# Patient Record
Sex: MALE | Race: WHITE | NOT HISPANIC OR LATINO | Employment: OTHER | URBAN - METROPOLITAN AREA
[De-identification: names, ages, dates, MRNs, and addresses within clinical notes are randomized per-mention and may not be internally consistent; named-entity substitution may affect disease eponyms.]

---

## 2017-01-26 NOTE — TELEPHONE ENCOUNTER
1/1/17 Started januvia 50mg and on reduced  half of metformin. (500mg twice daily)    Glucose lowest fasting is 233.  Highest 260's.  Most am's in 250-260.  Has tried taking januvia and doesn't seem to affect am reading.     Increase rx ?  januvia copay is $50 for 30 days.  Pharmacist says she doesn't have confidence in januvia.    Any ideas on medication changes?   Increase januvia to 100mg?    Please advise.  Pharmacy correct in chart.    sukhjinder orourke

## 2017-01-26 NOTE — TELEPHONE ENCOUNTER
----- Message from Kristie Hunter sent at 1/26/2017 11:13 AM CST -----  Contact: Self/[phone] or [phone]  Script that was prescribed for blood sugar is worse than the last blood work.Pt is asking is there another script that can be prescribed. [facility] [phone] (Phone) or  [phone] (Fax).

## 2017-01-26 NOTE — TELEPHONE ENCOUNTER
Patient advised to take 2 of the 50mg pills.  He only had 7 left.  So i called rx into pharmacy recorder..  i told him to let us know in 1-2 weeks how glucose doing on new dose.    Please sign rx to chart.    Thanks haven

## 2017-02-07 NOTE — TELEPHONE ENCOUNTER
Patient stated that you had increase his Diabetic medication, but does not feel like it is working.    The lowest reading was 233. He is ranging from 253-300.    Please advise.

## 2017-02-07 NOTE — TELEPHONE ENCOUNTER
----- Message from Calista Box sent at 2/7/2017  8:20 AM CST -----  Contact: Self- Erick- [phone]  Patient is calling to talk about the change in his diabetic medication. He does not feel like it is working as needed. Please call to discuss.

## 2017-02-10 NOTE — TELEPHONE ENCOUNTER
----- Message from Mable Garcia sent at 2/10/2017  7:34 AM CST -----  Contact: Pt at [phone]  Pt said script SITagliptan (JANUVIA) 100 MG Tab is not working for him. Please call/advise.

## 2017-02-10 NOTE — TELEPHONE ENCOUNTER
See rest of message. Patient says januvia not effective.  On 2 50mg pills.  i apologized dr hasn't answered message from 2/7.  Please advise.  Thanks haven

## 2017-02-11 NOTE — TELEPHONE ENCOUNTER
The next step is getting on insulin.  If that is ok with him then levemir 10 units qPM to begin and will work up dose as needed.  He would have to get off Januvia and eventually reduce the metformin doses  [facility] and help with glcuose control

## 2017-02-13 NOTE — TELEPHONE ENCOUNTER
----- Message from Leydi Gaytan sent at 2/13/2017 11:55 AM CST -----  Contact: [facility] [phone]  RX request - refill or new RX.  Is this a refill or new RX:  new  RX name and strength: Levemir pen needles   Directions:   Is this a 30 day or 90 day RX:  30  Pharmacy name and phone #: [id number] @[phone]   Comments:

## 2017-02-13 NOTE — TELEPHONE ENCOUNTER
Patient agreeable to doing insulin and seeing endocrine in [place], close to home.  i told him stop januvia on day he starts insulin and continue metformin.  i said to let us know how blood sugars are running.    He has a friend that can help him with injection education and feels he can handle.    rx called to pharmacy.  Please sign to chart.    Thanks haven

## 2017-02-13 NOTE — TELEPHONE ENCOUNTER
----- Message from Diana Mckinney sent at 2/13/2017  8:57 AM CST -----  Contact: patient [phone]  Pt asked to speak with Sushila . He wants to know what  wants him to do about his elevated blood sugar readings.

## 2017-02-14 RX ORDER — PEN NEEDLE, DIABETIC 30 GX3/16"
NEEDLE, DISPOSABLE MISCELLANEOUS
Qty: 100 EACH | Refills: 3 | Status: SHIPPED | OUTPATIENT
Start: 2017-02-14

## 2017-02-18 ENCOUNTER — TELEPHONE (OUTPATIENT)
Dept: INTERNAL MEDICINE | Facility: CLINIC | Age: 67
End: 2017-02-18

## 2017-02-18 NOTE — TELEPHONE ENCOUNTER
----- Message from Daya Carmona sent at 2/18/2017  9:16 AM CST -----  Contact: Call Pt [phone]  Pt called requesting to speak to you concerning High glucose levels and increasing insulin

## 2017-02-18 NOTE — TELEPHONE ENCOUNTER
Patient called this morning complaining of elevated glucose since starting the Levemir on 02/14/17 in the evening. Please advise.    Readings:  02/14/17 Tues. - 261 / first reading prior to eating (Prior to starting the Levemir)  02/15/17 Wed. - 260 / first reading prior to eating   02/16/17 Thur. - 255 / first reading prior to eating   02/17/17 Fri. - 281 / first reading prior to eating   02/18/17 Sat. - 316 / first reading prior to eating (6:15 AM)  02/18/17 Sat. - 296 / 9 AM  02/18/17 Sat. - 239 / 12:10 PM (After eating a sausage karen & pork chop)

## 2017-02-18 NOTE — TELEPHONE ENCOUNTER
He should increase his levemir to 20 units at night and call next Thursday with his blood sugar readings

## 2017-02-18 NOTE — TELEPHONE ENCOUNTER
Patient has been informed, and verbalized understanding. He will call back with readings next week for Dr. Kinney.

## 2017-02-20 ENCOUNTER — TELEPHONE (OUTPATIENT)
Dept: INTERNAL MEDICINE | Facility: CLINIC | Age: 67
End: 2017-02-20

## 2017-02-20 DIAGNOSIS — E11.9 TYPE II OR UNSPECIFIED TYPE DIABETES MELLITUS WITHOUT MENTION OF COMPLICATION, NOT STATED AS UNCONTROLLED: Primary | ICD-10-CM

## 2017-02-20 NOTE — TELEPHONE ENCOUNTER
1. Dr sepulveda staff called them Saturday and told them to go to 20 units levemir pm and call Thursday with readings.  Did Saturday and Sunday nite.  20 units.  Sunday am 287, Monday am 302.     Still working on  finding Endocrine dr in [place] close to home.  Shona oseguera in [place] ph   Fax   i told him i would send referral.  i entered referral and will fax today.    2.  When you started him on januvia you told him to decrease the metformin 1000mg  1/2 twice daily.  Continue on half?    Is this normal that so difficult to get blood sugar lower?    Please advise today.  Thanks haven

## 2017-02-20 NOTE — TELEPHONE ENCOUNTER
----- Message from Dayaallie Carmona sent at 2/17/2017  3:31 PM CST -----  Contact: Call Pt [phone] or [phone]  Pt called requesting to speak to you concerning insulin and Pt is having trouble finding an endocrinologist.  Pt states glucose levels are not dropping.

## 2017-02-24 ENCOUNTER — TELEPHONE (OUTPATIENT)
Dept: INTERNAL MEDICINE | Facility: CLINIC | Age: 67
End: 2017-02-24

## 2017-02-24 NOTE — TELEPHONE ENCOUNTER
On 30 units levemir  And metformin 500mg twice daily.     Glucose started 261 am    And now Down to 180's am    218am today 4 hours fasting.    Has appt 3/3 with encdocrine (that is next Friday).    Any adjustment before see's endocrine?    Please advise.  Thanks haven    (will be back in [facility] 3/11 thru 3/14)

## 2017-02-24 NOTE — TELEPHONE ENCOUNTER
----- Message from Mable Garcia sent at 2/24/2017  7:41 AM CST -----  Contact: Pt at [phone]  Pt requesting a call back to give an update regarding insulin injection/dosage.

## 2017-03-01 ENCOUNTER — TELEPHONE (OUTPATIENT)
Dept: INTERNAL MEDICINE | Facility: CLINIC | Age: 67
End: 2017-03-01

## 2017-03-01 NOTE — TELEPHONE ENCOUNTER
i called him and confirmed faxing now.  Faxed labs including hgaic from last year , snapshot and last office note to endocrine.  She will see him Friday.

## 2017-03-01 NOTE — TELEPHONE ENCOUNTER
----- Message from Sushila Maharaj sent at 3/1/2017  8:51 AM CST -----  Contact: self- [phone] or [phone]  Patient needs his last blood work results faxed to [phone] patient's Endocrinologist. Can Sushila please send before 3-2 and verify with patient when it has been done.

## 2017-04-06 RX ORDER — AMLODIPINE BESYLATE 5 MG/1
TABLET ORAL
Qty: 34 TABLET | Refills: 11 | Status: SHIPPED | OUTPATIENT
Start: 2017-04-06 | End: 2017-12-27 | Stop reason: SDUPTHER

## 2017-04-18 RX ORDER — HYDROCODONE BITARTRATE AND IBUPROFEN 7.5; 2 MG/1; MG/1
1 TABLET, FILM COATED ORAL EVERY 8 HOURS PRN
Qty: 90 TABLET | Refills: 0 | Status: SHIPPED | OUTPATIENT
Start: 2017-04-18 | End: 2017-10-25

## 2017-04-18 NOTE — TELEPHONE ENCOUNTER
----- Message from Kristie Hunter sent at 4/18/2017  8:35 AM CDT -----  Contact: Self/[phone]  Pt states that he is unable to make apt on 4/25. Pt states that he can do labs on 4/27 and was wondering if he needs to come in or can someone just call pt with results

## 2017-04-18 NOTE — TELEPHONE ENCOUNTER
Patient advised we'll let him know about results once they are back and printed hydrocodone ready for  at .

## 2017-04-18 NOTE — TELEPHONE ENCOUNTER
He is seeing endocrine dr in [place] and she has him janumet and invokana.    Is now off levemir and plain metformin.  Glucose and aic is doing much better.  He is seeing her every 3 months  (he has seen her 3 times since January).    He is booked end of this month for cmp,hgaic, lipid and microprotein u/a.  Needs us to call him on results because can't stay in town to see us after labs done to go over results..     Needs refill of hydrocodone when he is in town next week.  Please approve.  Last refill ed in November.    Thanks haven

## 2017-04-27 ENCOUNTER — LAB VISIT (OUTPATIENT)
Dept: LAB | Facility: HOSPITAL | Age: 67
End: 2017-04-27
Attending: INTERNAL MEDICINE
Payer: MEDICARE

## 2017-04-27 DIAGNOSIS — E11.9 TYPE 2 DIABETES MELLITUS WITHOUT COMPLICATION, WITHOUT LONG-TERM CURRENT USE OF INSULIN: ICD-10-CM

## 2017-04-27 LAB
CREAT UR-MCNC: 182 MG/DL
MICROALBUMIN UR DL<=1MG/L-MCNC: 25 UG/ML
MICROALBUMIN/CREATININE RATIO: 13.7 UG/MG

## 2017-04-27 PROCEDURE — 82570 ASSAY OF URINE CREATININE: CPT

## 2017-05-03 RX ORDER — OMEPRAZOLE 20 MG/1
CAPSULE, DELAYED RELEASE ORAL
Qty: 34 CAPSULE | Refills: 11 | Status: SHIPPED | OUTPATIENT
Start: 2017-05-03 | End: 2017-12-27 | Stop reason: SDUPTHER

## 2017-05-03 RX ORDER — PRAVASTATIN SODIUM 80 MG/1
TABLET ORAL
Qty: 34 TABLET | Refills: 11 | Status: SHIPPED | OUTPATIENT
Start: 2017-05-03 | End: 2017-12-13

## 2017-05-03 RX ORDER — LISINOPRIL 40 MG/1
TABLET ORAL
Qty: 34 TABLET | Refills: 11 | Status: SHIPPED | OUTPATIENT
Start: 2017-05-03 | End: 2017-12-27 | Stop reason: SDUPTHER

## 2017-05-03 RX ORDER — FENOFIBRATE 145 MG/1
TABLET, FILM COATED ORAL
Qty: 34 TABLET | Refills: 11 | Status: SHIPPED | OUTPATIENT
Start: 2017-05-03 | End: 2017-12-27 | Stop reason: SDUPTHER

## 2017-05-05 ENCOUNTER — TELEPHONE (OUTPATIENT)
Dept: INTERNAL MEDICINE | Facility: CLINIC | Age: 67
End: 2017-05-05

## 2017-05-05 NOTE — TELEPHONE ENCOUNTER
----- Message from Leydi Gaytan sent at 5/5/2017 11:23 AM CDT -----  Contact: pt [phone]  Patient would like to get test results.  Name of test (lab, mammo, etc.):  labs  Date of test:  4-27  Ordering provider: broderick  [facility] the [facility] performed[facility]  Comments:

## 2017-05-05 NOTE — TELEPHONE ENCOUNTER
Please see 4/27 labs and give me your comments to give patient.  He is calling and can't make it back to see you till late June early July.    Thanks haven

## 2017-05-11 RX ORDER — GLIMEPIRIDE 4 MG/1
1 TABLET ORAL DAILY
Refills: 5 | COMMUNITY
Start: 2017-04-19 | End: 2017-12-13 | Stop reason: SDUPTHER

## 2017-05-11 NOTE — TELEPHONE ENCOUNTER
----- Message from Kristie Hunter sent at 5/11/2017  9:38 AM CDT -----  Contact: Self/[phone]  Patient would like to get test results.  Name of test (lab, mammo, etc.):  URINE and fasting lab  Date of test:  4/27  Ordering provider: Brandon Kinney  Where was the test performed:  [facility]  Comments:

## 2017-05-11 NOTE — TELEPHONE ENCOUNTER
Since i haven't heard from you yet and he called again  i gave him the numbers. i updated his med list.    Please give me your comments  On labs and he will see you during the summer.  i told him to continue same meds and i'll be in touch once you see labs.    Please advise.  Thanks haven

## 2017-05-16 NOTE — TELEPHONE ENCOUNTER
----- Message from Disha aBker sent at 5/16/2017  9:59 AM CDT -----  Contact: Self. Call him at  [phone]  Sushila patient is requesting a copy of his labs results from 4/27/2017 to be mailed  to him. He never got a copy.

## 2017-05-16 NOTE — TELEPHONE ENCOUNTER
i told him i mailed him letter yesterday .  He asked me to fax copy of labs to endocrine dr oseguera     i am faxing today.  We will see him in fall for annual because he can't make trip in town till then from [place].

## 2017-08-16 ENCOUNTER — TELEPHONE (OUTPATIENT)
Dept: INTERNAL MEDICINE | Facility: CLINIC | Age: 67
End: 2017-08-16

## 2017-08-16 DIAGNOSIS — Z00.00 ANNUAL PHYSICAL EXAM: Primary | ICD-10-CM

## 2017-08-16 NOTE — TELEPHONE ENCOUNTER
----- Message from Mable Garcia sent at 8/16/2017  8:05 AM CDT -----  Contact: Pt at [phone]  Doctor appointment and lab have been scheduled.  Please link lab orders to the lab appointment.  Date of doctor appointment:  10/30  Physical or EP:  physical  Date of lab appointment:  10/27  Comments:

## 2017-10-25 ENCOUNTER — HOSPITAL ENCOUNTER (OUTPATIENT)
Dept: RADIOLOGY | Facility: HOSPITAL | Age: 67
Discharge: HOME | End: 2017-10-25
Attending: INTERNAL MEDICINE
Payer: MEDICARE

## 2017-10-25 ENCOUNTER — TELEPHONE (OUTPATIENT)
Dept: INTERNAL MEDICINE | Facility: CLINIC | Age: 67
End: 2017-10-25

## 2017-10-25 ENCOUNTER — OFFICE VISIT (OUTPATIENT)
Dept: INTERNAL MEDICINE | Facility: CLINIC | Age: 67
End: 2017-10-25
Payer: MEDICARE

## 2017-10-25 VITALS
DIASTOLIC BLOOD PRESSURE: 69 MMHG | HEIGHT: 67 IN | BODY MASS INDEX: 36.47 KG/M2 | RESPIRATION RATE: 16 BRPM | WEIGHT: 232.38 LBS | SYSTOLIC BLOOD PRESSURE: 159 MMHG | HEART RATE: 71 BPM | TEMPERATURE: 98 F

## 2017-10-25 DIAGNOSIS — M77.11 LATERAL EPICONDYLITIS, RIGHT ELBOW: ICD-10-CM

## 2017-10-25 DIAGNOSIS — M25.521 RIGHT ELBOW PAIN: Primary | ICD-10-CM

## 2017-10-25 DIAGNOSIS — M25.521 RIGHT ELBOW PAIN: ICD-10-CM

## 2017-10-25 PROCEDURE — 20551 NJX 1 TENDON ORIGIN/INSJ: CPT | Mod: PBBFAC,PO | Performed by: INTERNAL MEDICINE

## 2017-10-25 PROCEDURE — 73070 X-RAY EXAM OF ELBOW: CPT | Mod: TC,PO,RT

## 2017-10-25 PROCEDURE — 99999 PR PBB SHADOW E&M-EST. PATIENT-LVL III: CPT | Mod: PBBFAC,,, | Performed by: INTERNAL MEDICINE

## 2017-10-25 PROCEDURE — 99213 OFFICE O/P EST LOW 20 MIN: CPT | Mod: PBBFAC,25,PO | Performed by: INTERNAL MEDICINE

## 2017-10-25 PROCEDURE — 99214 OFFICE O/P EST MOD 30 MIN: CPT | Mod: 25,S$PBB,, | Performed by: INTERNAL MEDICINE

## 2017-10-25 PROCEDURE — 20551 NJX 1 TENDON ORIGIN/INSJ: CPT | Mod: S$PBB,,, | Performed by: INTERNAL MEDICINE

## 2017-10-25 PROCEDURE — 73070 X-RAY EXAM OF ELBOW: CPT | Mod: 26,RT,, | Performed by: RADIOLOGY

## 2017-10-25 RX ORDER — ATORVASTATIN CALCIUM 40 MG/1
40 TABLET, FILM COATED ORAL NIGHTLY
Refills: 5 | COMMUNITY
Start: 2017-10-18 | End: 2017-12-20 | Stop reason: SDUPTHER

## 2017-10-25 RX ORDER — MELOXICAM 15 MG/1
15 TABLET ORAL DAILY
Qty: 30 TABLET | Refills: 1 | Status: SHIPPED | OUTPATIENT
Start: 2017-10-25 | End: 2017-12-27 | Stop reason: SDUPTHER

## 2017-10-25 RX ORDER — MELOXICAM 15 MG/1
15 TABLET ORAL DAILY
Refills: 0 | COMMUNITY
Start: 2017-09-01 | End: 2017-10-25 | Stop reason: SDUPTHER

## 2017-10-25 RX ORDER — TRIAMCINOLONE ACETONIDE 40 MG/ML
40 INJECTION, SUSPENSION INTRA-ARTICULAR; INTRAMUSCULAR
Status: COMPLETED | OUTPATIENT
Start: 2017-10-25 | End: 2017-10-25

## 2017-10-25 RX ORDER — HYDROCODONE BITARTRATE AND ACETAMINOPHEN 5; 325 MG/1; MG/1
1 TABLET ORAL EVERY 6 HOURS PRN
Qty: 30 TABLET | Refills: 0 | Status: SHIPPED | OUTPATIENT
Start: 2017-10-25 | End: 2017-10-25

## 2017-10-25 RX ADMIN — TRIAMCINOLONE ACETONIDE 40 MG: 40 INJECTION, SUSPENSION INTRA-ARTICULAR; INTRAMUSCULAR at 10:10

## 2017-10-25 NOTE — TELEPHONE ENCOUNTER
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

## 2017-10-25 NOTE — PROGRESS NOTES
Subjective:       Patient ID: Erick Valdovinos is a 66 y.o. male.    Chief Complaint: Elbow Pain (rt arm pain with tingling sensation)    HPI   Pt is here for evaluation of 2 weeks of right sided elbow pain which is radiating down the arm to the wrist/fingers. It is described as sharp with movement. Minimal relief with Mobic.   Review of Systems   Constitutional: Negative for activity change, appetite change, chills, diaphoresis, fatigue, fever and unexpected weight change.   HENT: Negative for postnasal drip, rhinorrhea, sinus pressure, sneezing, sore throat, trouble swallowing and voice change.    Respiratory: Negative for cough, shortness of breath and wheezing.    Cardiovascular: Negative for chest pain, palpitations and leg swelling.   Gastrointestinal: Negative for abdominal pain, blood in stool, constipation, diarrhea, nausea and vomiting.   Genitourinary: Negative for dysuria.   Musculoskeletal: Positive for arthralgias and back pain. Negative for myalgias.   Skin: Negative for rash and wound.   Allergic/Immunologic: Negative for environmental allergies and food allergies.   Hematological: Negative for adenopathy. Does not bruise/bleed easily.       Objective:      Physical Exam   Constitutional: He is oriented to person, place, and time. He appears well-developed and well-nourished. No distress.   HENT:   Head: Normocephalic and atraumatic.   Right Ear: External ear normal.   Left Ear: External ear normal.   Nose: Nose normal.   Mouth/Throat: Oropharynx is clear and moist. No oropharyngeal exudate.   Eyes: Conjunctivae and EOM are normal. Pupils are equal, round, and reactive to light. Right eye exhibits no discharge. Left eye exhibits no discharge. No scleral icterus.   Neck: Normal range of motion. Neck supple. No JVD present.   Cardiovascular: Normal rate, regular rhythm and normal heart sounds.    No murmur heard.  Pulmonary/Chest: Effort normal and breath sounds normal. No respiratory distress. He has  no wheezes. He has no rales.   Abdominal: Soft. Bowel sounds are normal. He exhibits no mass.   Musculoskeletal: Normal range of motion. He exhibits no edema.        Arms:  Tenderness    Lymphadenopathy:     He has no cervical adenopathy.   Neurological: He is alert and oriented to person, place, and time. He exhibits normal muscle tone.   Skin: Skin is warm and dry. No rash noted. He is not diaphoretic. No pallor.   Psychiatric: He has a normal mood and affect.   Nursing note and vitals reviewed.      Assessment:       1. Right elbow pain    2. Lateral epicondylitis, right elbow        Plan:    1/2. X-ray elbow          S/p tendon injection          Refill Mobic 15 mg daily       Procedure Note(tendon injection):   After the potential risk's and benefit's were discussed with pt and verbal consent was obtained, pt's right lateral elbow area was prepped with alcohol. A total of 2 cc containing 1 cc Marcaine 0.5 % and 1 cc Kenalog 40 mg/cc was injected into the origin of extensor tendons of the right elbow. The pt tolerated the procedure well without any complications.

## 2017-11-06 ENCOUNTER — HOSPITAL ENCOUNTER (OUTPATIENT)
Dept: RADIOLOGY | Facility: HOSPITAL | Age: 67
Discharge: HOME | End: 2017-11-06
Attending: PHYSICIAN ASSISTANT
Payer: MEDICARE

## 2017-11-06 ENCOUNTER — OFFICE VISIT (OUTPATIENT)
Dept: SPORTS MEDICINE | Facility: CLINIC | Age: 67
End: 2017-11-06
Payer: MEDICARE

## 2017-11-06 VITALS
HEIGHT: 67 IN | BODY MASS INDEX: 36.41 KG/M2 | HEART RATE: 75 BPM | DIASTOLIC BLOOD PRESSURE: 90 MMHG | WEIGHT: 232 LBS | SYSTOLIC BLOOD PRESSURE: 160 MMHG

## 2017-11-06 DIAGNOSIS — R20.2 PARESTHESIA AND PAIN OF RIGHT EXTREMITY: ICD-10-CM

## 2017-11-06 DIAGNOSIS — M79.609 PARESTHESIA AND PAIN OF RIGHT EXTREMITY: ICD-10-CM

## 2017-11-06 DIAGNOSIS — M25.511 RIGHT SHOULDER PAIN, UNSPECIFIED CHRONICITY: ICD-10-CM

## 2017-11-06 DIAGNOSIS — M25.511 RIGHT SHOULDER PAIN, UNSPECIFIED CHRONICITY: Primary | ICD-10-CM

## 2017-11-06 PROCEDURE — 99999 PR PBB SHADOW E&M-EST. PATIENT-LVL V: CPT | Mod: PBBFAC,,, | Performed by: PHYSICIAN ASSISTANT

## 2017-11-06 PROCEDURE — 99203 OFFICE O/P NEW LOW 30 MIN: CPT | Mod: S$PBB,,, | Performed by: PHYSICIAN ASSISTANT

## 2017-11-06 PROCEDURE — 73030 X-RAY EXAM OF SHOULDER: CPT | Mod: 26,RT,, | Performed by: RADIOLOGY

## 2017-11-06 PROCEDURE — 99215 OFFICE O/P EST HI 40 MIN: CPT | Mod: PBBFAC,25,PO | Performed by: PHYSICIAN ASSISTANT

## 2017-11-06 PROCEDURE — 73030 X-RAY EXAM OF SHOULDER: CPT | Mod: TC,PO,RT

## 2017-11-09 ENCOUNTER — TELEPHONE (OUTPATIENT)
Dept: SPORTS MEDICINE | Facility: CLINIC | Age: 67
End: 2017-11-09

## 2017-11-09 NOTE — TELEPHONE ENCOUNTER
Attempt to contact pt X2 in regards to scheduling EMG.  Also attempted to reach out to pt emergency contact, but no answer.

## 2017-12-13 ENCOUNTER — OFFICE VISIT (OUTPATIENT)
Dept: ENDOCRINOLOGY | Facility: CLINIC | Age: 67
End: 2017-12-13
Payer: MEDICARE

## 2017-12-13 VITALS
SYSTOLIC BLOOD PRESSURE: 140 MMHG | HEIGHT: 67 IN | WEIGHT: 228.38 LBS | HEART RATE: 66 BPM | DIASTOLIC BLOOD PRESSURE: 94 MMHG | BODY MASS INDEX: 35.84 KG/M2

## 2017-12-13 DIAGNOSIS — R20.9 DISTURBANCE OF SKIN SENSATION: ICD-10-CM

## 2017-12-13 DIAGNOSIS — Z87.19 HISTORY OF ACUTE PANCREATITIS: ICD-10-CM

## 2017-12-13 DIAGNOSIS — I10 ESSENTIAL HYPERTENSION: ICD-10-CM

## 2017-12-13 DIAGNOSIS — N18.30 CHRONIC KIDNEY DISEASE, STAGE III (MODERATE): ICD-10-CM

## 2017-12-13 DIAGNOSIS — E11.29 TYPE 2 DIABETES MELLITUS WITH MICROALBUMINURIA, WITHOUT LONG-TERM CURRENT USE OF INSULIN: Primary | ICD-10-CM

## 2017-12-13 DIAGNOSIS — R80.9 TYPE 2 DIABETES MELLITUS WITH MICROALBUMINURIA, WITHOUT LONG-TERM CURRENT USE OF INSULIN: Primary | ICD-10-CM

## 2017-12-13 DIAGNOSIS — E11.42 TYPE 2 DIABETES MELLITUS WITH DIABETIC POLYNEUROPATHY, WITHOUT LONG-TERM CURRENT USE OF INSULIN: ICD-10-CM

## 2017-12-13 DIAGNOSIS — R06.83 SNORING: ICD-10-CM

## 2017-12-13 DIAGNOSIS — E78.9 LIPID DISORDER: ICD-10-CM

## 2017-12-13 PROCEDURE — 99204 OFFICE O/P NEW MOD 45 MIN: CPT | Mod: S$PBB,,, | Performed by: INTERNAL MEDICINE

## 2017-12-13 PROCEDURE — 99213 OFFICE O/P EST LOW 20 MIN: CPT | Mod: PBBFAC | Performed by: INTERNAL MEDICINE

## 2017-12-13 PROCEDURE — 99999 PR PBB SHADOW E&M-EST. PATIENT-LVL III: CPT | Mod: PBBFAC,,, | Performed by: INTERNAL MEDICINE

## 2017-12-13 RX ORDER — GLIMEPIRIDE 4 MG/1
4 TABLET ORAL
Qty: 90 TABLET | Refills: 3 | Status: SHIPPED | OUTPATIENT
Start: 2017-12-13 | End: 2017-12-27 | Stop reason: SDUPTHER

## 2017-12-13 RX ORDER — HYDROCODONE BITARTRATE AND IBUPROFEN 7.5; 2 MG/1; MG/1
1 TABLET, FILM COATED ORAL EVERY 8 HOURS PRN
COMMUNITY
End: 2018-03-23 | Stop reason: DRUGHIGH

## 2017-12-13 NOTE — LETTER
December 15, 2017      Brandon Kinney MD  [address] [facility] [place]  [place] [address]           [facility]  [address]  [facility] [address]  Phone: [phone]  Fax: [phone]          Patient: Erick Valdovinos   MR Number: [id number]   YOB: 1950   Date of Visit: 12/13/2017       Dear Dr. Brandon Kinney:    Thank you for referring Erick Valdovinos to me for evaluation. Attached you will find relevant portions of my assessment and plan of care.    If you have questions, please do not hesitate to call me. I look forward to following Erick Valdovinos along with you.    Sincerely,    Stephanie Huerta MD    Enclosure  CC:  No Recipients    If you would like to receive this communication electronically, please contact [email or web address] or [phone] to request more information on [employer] access.    For providers and/or their staff who would like to refer a patient to [facility], please contact us through our one-stop-shop provider referral line, [facility], at [phone].    If you feel you have received this communication in error or would no longer like to receive these types of communications, please e-mail [email or web address]

## 2017-12-13 NOTE — PROGRESS NOTES
Subjective:      Patient ID: Erick Valdovinos is a 67 y.o. male.    Chief Complaint:  Diabetes      History of Present Illness  Moved back here from [place]   Looking at houses in [place]        With regards to the diabetes:  Diagnosed in his early 60s   Known complications: PN, ckd     Current regimen:  amaryl 4 mg qam  janumet 50/1000 bid           Was on  SGLT2:   invokana  - too expensive   Basal insulin  levemir 40 qhs       Missed doses? Generally not but recently with moving has missed a few and in the past week diet has not been the best but he will change this       # times a day testing x 1  fbg   qhs      Log reviewed: yes  No hypoglycemia      Hypoglycemic event? None   Yes, did not need assistance     Last eye exam - w/i last year   No DR         Typical meals: generally tries to keep to a healthy diet    Education - last visit:  W/i last year   Exercise - triesto stay active     No Polyuria polydipsia nocturia or vision changes  Denies numbness or tingling of feet      With regards to hypertension:  Tolerating ACEI   Did not take pills yet     With regards to dyslipidemia:  Tolerating statin     Does snore       Review of Systems   Constitutional: Negative for unexpected weight change.   Eyes: Negative for visual disturbance.   Respiratory: Negative for shortness of breath.    Cardiovascular: Negative for chest pain.   Gastrointestinal: Negative for abdominal pain.   Musculoskeletal: Positive for back pain.   Skin: Negative for wound.   Neurological: Negative for headaches.   Hematological: Does not bruise/bleed easily.   Psychiatric/Behavioral: Negative for sleep disturbance.       Objective:   Physical Exam   Constitutional: He appears well-developed.   HENT:   Right Ear: External ear normal.   Left Ear: External ear normal.   Nose: Nose normal.   Hearing normal    Neck: No tracheal deviation present. No thyromegaly present.   Cardiovascular: Normal rate.    No murmur  heard.  Pulmonary/Chest: Effort normal and breath sounds normal.   Abdominal: Soft. He exhibits no mass.   No hernia noted   Musculoskeletal: He exhibits no edema.   Neurological: He is alert. No cranial nerve deficit or sensory deficit. Coordination and gait normal.        Skin: No rash noted.   No nodules   Psychiatric: He has a normal mood and affect. Judgment normal.   Vitals reviewed.    Feet no cuts or  scratches  Shoes appropriate  sensation decreased to vibration    Thick long nails       Body mass index is 35.77 kg/m².    Lab Review:   Lab Results   Component Value Date    HGBA1C 6.9 (H) 04/27/2017     Lab Results   Component Value Date    CHOL 205 (H) 04/27/2017    HDL 38 (L) 04/27/2017    LDLCALC 111.2 04/27/2017    TRIG 279 (H) 04/27/2017    CHOLHDL 18.5 (L) 04/27/2017     Lab Results   Component Value Date     04/27/2017    K 4.2 04/27/2017     04/27/2017    CO2 21 (L) 04/27/2017     04/27/2017    BUN 27 (H) 04/27/2017    CREATININE 1.4 04/27/2017    CALCIUM 10.0 04/27/2017    PROT 6.9 04/27/2017    ALBUMIN 3.7 04/27/2017    BILITOT 0.9 04/27/2017    ALKPHOS 45 (L) 04/27/2017    AST 26 04/27/2017    ALT 20 04/27/2017    ANIONGAP 9 04/27/2017    ESTGFRAFRICA >60.0 04/27/2017    EGFRNONAA 52.4 (A) 04/27/2017    TSH 1.853 11/14/2016   urine + MAC 3/2017     Assessment and Plan     Snoring  Suspect aminata - offered referral to sleep   He prefers to discuss this with Dr Kinney        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  Patient encouraged to document glucose results and bring them to every clinic visit      -Hypoglycemia precautions discussed. Instructed on precautions before driving.      -Close adherence to lifestyle changes recommended.      -Periodic follow ups for eye  evaluations, foot care and dental care suggested.     Labs to determine follow up       Hypertension associated with diabetes  - on ACEI    Controlled  Blood pressure goals discussed with patient      Dyslipidemia associated with diabetes -   On statin per ADA recommendations

## 2017-12-13 NOTE — ASSESSMENT & PLAN NOTE
Labs today  Check b12  Reviewed goals of therapy are to get the best control we can without hypoglycemia         -Advised self blood glucose monitoring.  Patient encouraged to document glucose results and bring them to every clinic visit      -Hypoglycemia precautions discussed. Instructed on precautions before driving.      -Close adherence to lifestyle changes recommended.      -Periodic follow ups for eye evaluations, foot care and dental care suggested.     rtc with me or PAUL in

## 2017-12-14 ENCOUNTER — TELEPHONE (OUTPATIENT)
Dept: INTERNAL MEDICINE | Facility: CLINIC | Age: 67
End: 2017-12-14

## 2017-12-14 NOTE — TELEPHONE ENCOUNTER
----- Message from Mable Garcia sent at 12/14/2017  8:33 AM CST -----  Contact: Pt at [phone]  Doctor appointment and lab have been scheduled.  Please link lab orders to the lab appointment.  Date of doctor appointment:  12/27  Physical or EP:  physical  Date of lab appointment:  12/20  Comments:

## 2017-12-14 NOTE — TELEPHONE ENCOUNTER
Orders were already entered in august.  He just had cmp and hgaic yesterday so that was not linked to next weeks appt.

## 2017-12-20 ENCOUNTER — LAB VISIT (OUTPATIENT)
Dept: LAB | Facility: HOSPITAL | Age: 67
End: 2017-12-20
Attending: INTERNAL MEDICINE
Payer: MEDICARE

## 2017-12-20 DIAGNOSIS — Z00.00 ANNUAL PHYSICAL EXAM: ICD-10-CM

## 2017-12-20 LAB
BASOPHILS # BLD AUTO: 0.03 K/UL
BASOPHILS NFR BLD: 0.5 %
CHOLEST SERPL-MCNC: 192 MG/DL
CHOLEST/HDLC SERPL: 4.4 {RATIO}
COMPLEXED PSA SERPL-MCNC: 1.1 NG/ML
DIFFERENTIAL METHOD: ABNORMAL
EOSINOPHIL # BLD AUTO: 0.4 K/UL
EOSINOPHIL NFR BLD: 7.1 %
ERYTHROCYTE [DISTWIDTH] IN BLOOD BY AUTOMATED COUNT: 12.7 %
HCT VFR BLD AUTO: 39.7 %
HDLC SERPL-MCNC: 44 MG/DL
HDLC SERPL: 22.9 %
HGB BLD-MCNC: 12.8 G/DL
IMM GRANULOCYTES # BLD AUTO: 0.03 K/UL
IMM GRANULOCYTES NFR BLD AUTO: 0.5 %
LDLC SERPL CALC-MCNC: 116.2 MG/DL
LYMPHOCYTES # BLD AUTO: 1.5 K/UL
LYMPHOCYTES NFR BLD: 26.8 %
MCH RBC QN AUTO: 30.4 PG
MCHC RBC AUTO-ENTMCNC: 32.2 G/DL
MCV RBC AUTO: 94 FL
MONOCYTES # BLD AUTO: 1 K/UL
MONOCYTES NFR BLD: 18.1 %
NEUTROPHILS # BLD AUTO: 2.7 K/UL
NEUTROPHILS NFR BLD: 47 %
NONHDLC SERPL-MCNC: 148 MG/DL
NRBC BLD-RTO: 0 /100 WBC
PLATELET # BLD AUTO: 218 K/UL
PMV BLD AUTO: 10.7 FL
RBC # BLD AUTO: 4.21 M/UL
TRIGL SERPL-MCNC: 159 MG/DL
TSH SERPL DL<=0.005 MIU/L-ACNC: 2.23 UIU/ML
WBC # BLD AUTO: 5.74 K/UL

## 2017-12-20 PROCEDURE — 84443 ASSAY THYROID STIM HORMONE: CPT

## 2017-12-20 PROCEDURE — 84153 ASSAY OF PSA TOTAL: CPT

## 2017-12-20 PROCEDURE — 85025 COMPLETE CBC W/AUTO DIFF WBC: CPT

## 2017-12-20 PROCEDURE — 36415 COLL VENOUS BLD VENIPUNCTURE: CPT | Mod: PO

## 2017-12-20 PROCEDURE — 80061 LIPID PANEL: CPT

## 2017-12-20 NOTE — TELEPHONE ENCOUNTER
I called and confirmed what he needed refilled.  rx called into pharmacy recorder to get him to appt end of month.    Please sign to chart.    Thanks haven

## 2017-12-20 NOTE — TELEPHONE ENCOUNTER
"----- Message from Sirisha Hampton sent at 12/20/2017 12:40 PM CST -----  Contact: self/[phone]      RX request - refill or new New RX.  Is this a refill or new RX:  Refill  RX name and strength: atorvastatin (LIPITOR) 40 MG tablet  Directions: Take 40 mg by mouth every evening. - Oral  Is this a 30 day or 90 day RX:    Pharmacy name and phone # (DON'T enter "on file" or "in chart"): [facility], [phone]. [phone], [phone].  Comments:  Please call and advise.       Thank you!!!        "

## 2017-12-22 RX ORDER — ATORVASTATIN CALCIUM 40 MG/1
40 TABLET, FILM COATED ORAL NIGHTLY
Qty: 30 TABLET | Refills: 0 | Status: SHIPPED | OUTPATIENT
Start: 2017-12-22 | End: 2017-12-27 | Stop reason: SDUPTHER

## 2017-12-27 ENCOUNTER — OFFICE VISIT (OUTPATIENT)
Dept: INTERNAL MEDICINE | Facility: CLINIC | Age: 67
End: 2017-12-27
Payer: MEDICARE

## 2017-12-27 VITALS
WEIGHT: 228.19 LBS | TEMPERATURE: 98 F | BODY MASS INDEX: 35.82 KG/M2 | DIASTOLIC BLOOD PRESSURE: 102 MMHG | SYSTOLIC BLOOD PRESSURE: 162 MMHG | RESPIRATION RATE: 16 BRPM | HEART RATE: 98 BPM | HEIGHT: 67 IN

## 2017-12-27 DIAGNOSIS — E08.40 DIABETES MELLITUS DUE TO UNDERLYING CONDITION, CONTROLLED, WITH DIABETIC NEUROPATHY, WITHOUT LONG-TERM CURRENT USE OF INSULIN: ICD-10-CM

## 2017-12-27 DIAGNOSIS — K21.9 GASTROESOPHAGEAL REFLUX DISEASE WITHOUT ESOPHAGITIS: ICD-10-CM

## 2017-12-27 DIAGNOSIS — Z00.00 ANNUAL PHYSICAL EXAM: Primary | ICD-10-CM

## 2017-12-27 DIAGNOSIS — I10 ESSENTIAL HYPERTENSION: ICD-10-CM

## 2017-12-27 DIAGNOSIS — R80.9 TYPE 2 DIABETES MELLITUS WITH MICROALBUMINURIA, WITHOUT LONG-TERM CURRENT USE OF INSULIN: ICD-10-CM

## 2017-12-27 DIAGNOSIS — E11.29 TYPE 2 DIABETES MELLITUS WITH MICROALBUMINURIA, WITHOUT LONG-TERM CURRENT USE OF INSULIN: ICD-10-CM

## 2017-12-27 DIAGNOSIS — E78.1 HYPERLIPIDEMIA TYPE IV: ICD-10-CM

## 2017-12-27 DIAGNOSIS — Z87.19 HISTORY OF PANCREATITIS: ICD-10-CM

## 2017-12-27 PROCEDURE — 99999 PR PBB SHADOW E&M-EST. PATIENT-LVL IV: CPT | Mod: PBBFAC,,, | Performed by: INTERNAL MEDICINE

## 2017-12-27 PROCEDURE — 99214 OFFICE O/P EST MOD 30 MIN: CPT | Mod: PBBFAC,PO | Performed by: INTERNAL MEDICINE

## 2017-12-27 PROCEDURE — 99214 OFFICE O/P EST MOD 30 MIN: CPT | Mod: S$PBB,,, | Performed by: INTERNAL MEDICINE

## 2017-12-27 RX ORDER — AMLODIPINE BESYLATE 10 MG/1
10 TABLET ORAL NIGHTLY
Qty: 90 TABLET | Refills: 3 | Status: SHIPPED | OUTPATIENT
Start: 2017-12-27 | End: 2019-01-02 | Stop reason: SDUPTHER

## 2017-12-27 RX ORDER — FENOFIBRATE 145 MG/1
145 TABLET, FILM COATED ORAL NIGHTLY
Qty: 90 TABLET | Refills: 3 | Status: SHIPPED | OUTPATIENT
Start: 2017-12-27 | End: 2019-01-04 | Stop reason: SDUPTHER

## 2017-12-27 RX ORDER — GLIMEPIRIDE 4 MG/1
4 TABLET ORAL
Qty: 90 TABLET | Refills: 3 | Status: SHIPPED | OUTPATIENT
Start: 2017-12-27 | End: 2019-01-04 | Stop reason: SDUPTHER

## 2017-12-27 RX ORDER — OMEPRAZOLE 20 MG/1
20 CAPSULE, DELAYED RELEASE ORAL DAILY
Qty: 90 CAPSULE | Refills: 3 | Status: SHIPPED | OUTPATIENT
Start: 2017-12-27 | End: 2019-01-04 | Stop reason: SDUPTHER

## 2017-12-27 RX ORDER — LISINOPRIL 40 MG/1
40 TABLET ORAL DAILY
Qty: 90 TABLET | Refills: 3 | Status: SHIPPED | OUTPATIENT
Start: 2017-12-27 | End: 2019-01-04 | Stop reason: SDUPTHER

## 2017-12-27 RX ORDER — AZITHROMYCIN 250 MG/1
TABLET, FILM COATED ORAL
Qty: 6 TABLET | Refills: 0 | Status: SHIPPED | OUTPATIENT
Start: 2017-12-27 | End: 2018-08-21

## 2017-12-27 RX ORDER — ATORVASTATIN CALCIUM 40 MG/1
40 TABLET, FILM COATED ORAL NIGHTLY
Qty: 90 TABLET | Refills: 3 | Status: SHIPPED | OUTPATIENT
Start: 2017-12-27 | End: 2018-01-20 | Stop reason: SDUPTHER

## 2017-12-27 RX ORDER — MELOXICAM 15 MG/1
15 TABLET ORAL DAILY
Qty: 90 TABLET | Refills: 3 | Status: SHIPPED | OUTPATIENT
Start: 2017-12-27 | End: 2019-01-04 | Stop reason: SDUPTHER

## 2018-01-15 NOTE — PROGRESS NOTES
Subjective:       Patient ID: Erick Valdovinos is a 67 y.o. male.    Chief Complaint: Annual Exam  HISTORY OF PRESENT ILLNESS:  A 67-year-old white male patient of mine coming in   for an annual review and he has now moved back to [place], so he has been   less consistent about taking his medication.  The last two to three weeks, he   has been having what he calls a flu with the cough and ear pain on the left,   congestion.  He has been using Coricidin with some control.  He has not been   able monitor his blood pressure because his  blood pressure cuff at home was not   working.  The glucose has been well controlled, was 99 on the day of this   visit.  He, however, has had increased frequency of urination.  He has not   noticed really polydipsia.  He also has noticed nocturia, which he has not had   before.    PHYSICAL EXAMINATION:  VITAL SIGNS:  Blood pressure 162/102.  SKIN:  Fair and healthy.  HEENT:  Clear.  NECK:  Shows no adenopathy, thyroid enlargement or bruit.  CHEST:  Clear.  HEART:  There is no murmur or gallop.  ABDOMEN:  Somewhat obese, nontender, no organomegaly.  RECTAL:  Done.  He has 1+ BPH.  No nodules.  Stool is negative for blood.  EXTREMITIES:  Show muscles, joints are normal.  Good pulses.  Feet were examined   in detail.  He has decreased sensation in his feet, primarily just really the   bottom of his feet.  It  does not extend into the proximal toes even on the   dorsum.  He has no break in the skin, mild nail disease.  NEUROLOGIC:  Otherwise normal.    IMPRESSION:  1.  Hypertension, poor control.  I increased his amlodipine to 10 mg q.p.m. and   asked him to get a new blood pressure cuff and monitor that.  2.  Mild diabetic neuropathy.  3.  Persisting sinobronchial infection for which I put him on Mucinex and a   Z-STEVEN.  4.  Diabetes with microalbuminemia.  5.  History of pancreatitis related to his type IV hyperlipidemia.  6.  Type IV hyperlipidemia.  7.  Gastroesophageal reflux  disease.      I will see him again in four weeks to recheck how he is doing on his blood   pressure control.      JDC/HN  dd: 01/14/2018 21:33:38 (CST)  td: 01/14/2018 21:50:25 (CST)  Doc ID   #[id number]  Job ID #[id number]    CC:     [facility] [id number]  HPI  Review of Systems   Constitutional: Negative for activity change, appetite change, fatigue and unexpected weight change.   HENT: Positive for congestion, ear pain, hearing loss, postnasal drip and sinus pressure. Negative for dental problem and mouth sores.    Eyes: Negative for discharge and visual disturbance.   Respiratory: Positive for cough. Negative for shortness of breath.    Cardiovascular: Negative for chest pain and palpitations.   Gastrointestinal: Negative for abdominal pain, blood in stool, constipation, diarrhea and nausea.   Genitourinary: Positive for frequency. Negative for dysuria, hematuria and testicular pain.   Musculoskeletal: Negative for arthralgias, back pain, joint swelling and neck pain.   Skin: Negative for rash.   Neurological: Negative for weakness and headaches.   Psychiatric/Behavioral: Negative for agitation and sleep disturbance.       Objective:      Physical Exam   Constitutional: He is oriented to person, place, and time. He appears well-developed and well-nourished.   HENT:   Head: Normocephalic.   Right Ear: External ear normal.   Left Ear: External ear normal.   Mouth/Throat: Oropharynx is clear and moist.   Eyes: EOM are normal. Pupils are equal, round, and reactive to light. Right eye exhibits no discharge.   Neck: Normal range of motion. No JVD present. No tracheal deviation present. No thyromegaly present.   Cardiovascular: Normal rate, regular rhythm, normal heart sounds and intact distal pulses.  Exam reveals no gallop.    No murmur heard.  Pulmonary/Chest: Effort normal and breath sounds normal. He has no wheezes. He has no rales.   Abdominal: Soft. Bowel sounds are normal. He exhibits no mass. There is no tenderness.    Genitourinary: Prostate normal and penis normal. Rectal exam shows guaiac negative stool.   Musculoskeletal: Normal range of motion. He exhibits no edema.   Lymphadenopathy:     He has no cervical adenopathy.   Neurological: He is oriented to person, place, and time. He displays normal reflexes. A sensory deficit is present. No cranial nerve deficit. Coordination normal.   Skin: Skin is warm. No rash noted. No pallor.   Psychiatric: He has a normal mood and affect.       Assessment:       1. Annual physical exam    2. Essential hypertension    3. Type 2 diabetes mellitus with microalbuminuria, without long-term current use of insulin    4. Gastroesophageal reflux disease without esophagitis    5. Diabetes mellitus due to underlying condition, controlled, with diabetic neuropathy, without long-term current use of insulin    6. History of pancreatitis    7. Hyperlipidemia type IV        Plan:

## 2018-01-20 RX ORDER — ATORVASTATIN CALCIUM 40 MG/1
TABLET, FILM COATED ORAL
Qty: 30 TABLET | Refills: 6 | Status: SHIPPED | OUTPATIENT
Start: 2018-01-20 | End: 2019-01-04 | Stop reason: SDUPTHER

## 2018-02-05 ENCOUNTER — TELEPHONE (OUTPATIENT)
Dept: INTERNAL MEDICINE | Facility: CLINIC | Age: 68
End: 2018-02-05

## 2018-02-05 ENCOUNTER — OFFICE VISIT (OUTPATIENT)
Dept: INTERNAL MEDICINE | Facility: CLINIC | Age: 68
End: 2018-02-05
Payer: MEDICARE

## 2018-02-05 VITALS
BODY MASS INDEX: 36.85 KG/M2 | RESPIRATION RATE: 16 BRPM | TEMPERATURE: 98 F | SYSTOLIC BLOOD PRESSURE: 142 MMHG | WEIGHT: 234.81 LBS | HEART RATE: 86 BPM | DIASTOLIC BLOOD PRESSURE: 86 MMHG | HEIGHT: 67 IN

## 2018-02-05 DIAGNOSIS — E11.29 TYPE 2 DIABETES MELLITUS WITH MICROALBUMINURIA, WITHOUT LONG-TERM CURRENT USE OF INSULIN: ICD-10-CM

## 2018-02-05 DIAGNOSIS — M25.521 ELBOW PAIN, RIGHT: ICD-10-CM

## 2018-02-05 DIAGNOSIS — M47.22 OSTEOARTHRITIS OF SPINE WITH RADICULOPATHY, CERVICAL REGION: ICD-10-CM

## 2018-02-05 DIAGNOSIS — I10 ESSENTIAL HYPERTENSION: Primary | ICD-10-CM

## 2018-02-05 DIAGNOSIS — R80.9 TYPE 2 DIABETES MELLITUS WITH MICROALBUMINURIA, WITHOUT LONG-TERM CURRENT USE OF INSULIN: ICD-10-CM

## 2018-02-05 DIAGNOSIS — R20.0 RIGHT ARM NUMBNESS: ICD-10-CM

## 2018-02-05 DIAGNOSIS — M54.12 CERVICAL RADICULITIS: ICD-10-CM

## 2018-02-05 PROCEDURE — 1126F AMNT PAIN NOTED NONE PRSNT: CPT | Mod: ,,, | Performed by: INTERNAL MEDICINE

## 2018-02-05 PROCEDURE — 1159F MED LIST DOCD IN RCRD: CPT | Mod: ,,, | Performed by: INTERNAL MEDICINE

## 2018-02-05 PROCEDURE — 99999 PR PBB SHADOW E&M-EST. PATIENT-LVL III: CPT | Mod: PBBFAC,,, | Performed by: INTERNAL MEDICINE

## 2018-02-05 PROCEDURE — 99214 OFFICE O/P EST MOD 30 MIN: CPT | Mod: S$PBB,,, | Performed by: INTERNAL MEDICINE

## 2018-02-05 PROCEDURE — 99213 OFFICE O/P EST LOW 20 MIN: CPT | Mod: PBBFAC,PO | Performed by: INTERNAL MEDICINE

## 2018-02-06 NOTE — PROGRESS NOTES
Subjective:       Patient ID: Erick Valdovinos is a 67 y.o. male.    Chief Complaint: Follow-up (1 month)  HISTORY OF PRESENT ILLNESS:  The patient was seen on 02/05/2018, in followup to   a visit that I had with him in the end of December.  The patient's blood   pressure was elevated at that time.  He was on medication for his blood   pressure.  I increased his amlodipine to 10 mg and he monitored his blood   pressure from home and it stayed in the 150 to 170 something range systolic, but   he brought his cuff in with him on this occasion.  His blood pressure here was   142/86; at the same time in the same arm, blood pressure was 176/102, I believe,   it was.  The patient was told his cuff is not reliable, to get a new one and   bring it in with him when he comes the next time.  If it is running high at   home, to come in and compare to ours.    The patient in the meantime has developed right elbow pain, for which he was   seen by Dr. Mobley, felt to have epicondylitis.  He was given an injection   with no help.  He subsequently saw Sports Medicine who feels that he might have   a cervical radiculopathy.  The patient has severe cervical disease on the left   side, for which he is going to need to be seen by someone here, but I have not   made any arrangements for that yet.  The patient tells me at this point that he   not only has right elbow pain but he has numbness going down his right arm and   forearm to his hand.  The patient has minimal elbow pain currently, but that   will recur with the numbness either with that or preceding that.  The patient   already has an EMG nerve conduction ordered by Sports Medicine.    PHYSICAL EXAMINATION:  VITAL SIGNS:  Normal.  EXTREMITIES:  The right epicondyle laterally has minimal tenderness.  He does   not appear to have epicondylitis.  He does have slight reduction in sensation in   the distal forearm laterally.    IMPRESSION:  1.  Probable cervical radiculopathy  causing his right arm symptoms being   explored with the EMG nerve conduction.  2.  Severe problems with the left side of his neck.  3.  Diabetes.  4.  Severe type IV hyperlipidemia, controlled with his current medication.  5.  Hypertension, appears to be controlled though his home measurements were   wrong and that has been discussed above.  I will see him again if all is well in   six months.  In the meantime, we will probably get him arranged to see   Neurosurgery about his problems with the left side of his neck.      JDC/HN  dd: 02/06/2018 19:39:21 (CST)  td: 02/07/2018 12:19:49 (CST)  Doc ID   #[id number]  Job ID #[id number]    CC:     Dict [id number]  [facility] [place]  Review of Systems   Constitutional: Negative for activity change, appetite change, fatigue and unexpected weight change.   HENT: Negative for dental problem, hearing loss, mouth sores, postnasal drip and sinus pressure.    Eyes: Negative for discharge and visual disturbance.   Respiratory: Negative for cough and shortness of breath.    Cardiovascular: Negative for chest pain and palpitations.   Gastrointestinal: Negative for abdominal pain, blood in stool, constipation, diarrhea and nausea.   Genitourinary: Negative for dysuria, hematuria and testicular pain.   Musculoskeletal: Positive for arthralgias, myalgias and neck pain. Negative for back pain and joint swelling.   Skin: Negative for rash.   Neurological: Positive for numbness. Negative for weakness and headaches.   Psychiatric/Behavioral: Negative for agitation and sleep disturbance.       Objective:      Physical Exam   Constitutional: He is oriented to person, place, and time. He appears well-developed and well-nourished.   HENT:   Head: Normocephalic.   Right Ear: External ear normal.   Left Ear: External ear normal.   Mouth/Throat: Oropharynx is clear and moist.   Eyes: EOM are normal. Pupils are equal, round, and reactive to light. Right eye exhibits no discharge.   Neck: Normal range of motion. No JVD  present. No tracheal deviation present. No thyromegaly present.   Cardiovascular: Normal rate, regular rhythm, normal heart sounds and intact distal pulses.  Exam reveals no gallop.    No murmur heard.  Pulmonary/Chest: Effort normal and breath sounds normal. He has no wheezes. He has no rales.   Abdominal: Soft. Bowel sounds are normal. He exhibits no mass. There is no tenderness.   Genitourinary: Prostate normal and penis normal. Rectal exam shows guaiac negative stool.   Musculoskeletal: Normal range of motion. He exhibits no edema.   Lymphadenopathy:     He has no cervical adenopathy.   Neurological: He is oriented to person, place, and time. He displays normal reflexes. No cranial nerve deficit. Coordination normal.   Skin: Skin is warm. No rash noted. No pallor.   Psychiatric: He has a normal mood and affect.       Assessment:       No diagnosis found.    Plan:

## 2018-02-19 ENCOUNTER — TELEPHONE (OUTPATIENT)
Dept: INTERNAL MEDICINE | Facility: CLINIC | Age: 68
End: 2018-02-19

## 2018-02-19 NOTE — TELEPHONE ENCOUNTER
----- Message from Hipolito Mora sent at 2/19/2018  8:37 AM CST -----  Contact: Self [phone]  Pt would like to leave his BP Readings       02/07 137/84  02/08 134/89  02/09 140/88  02/10 153/85  02/11 137/85    02/13 140/82  02/14 126/77  02/15 115/69  02/16 125/79  02/17 140/80  02/18 136/78  02/19 115/73

## 2018-03-16 ENCOUNTER — TELEPHONE (OUTPATIENT)
Dept: NEUROLOGY | Facility: CLINIC | Age: 68
End: 2018-03-16

## 2018-03-16 NOTE — TELEPHONE ENCOUNTER
----- Message from Milly Pastor sent at 3/12/2018  2:09 PM CDT -----  Contact: Self  Pt is calling to schedule an EMG for the pt from the order in Epic.    She can be reached at [phone].    Thank you.

## 2018-03-20 ENCOUNTER — PROCEDURE VISIT (OUTPATIENT)
Dept: NEUROLOGY | Facility: CLINIC | Age: 68
End: 2018-03-20
Payer: MEDICARE

## 2018-03-20 DIAGNOSIS — M25.511 RIGHT SHOULDER PAIN, UNSPECIFIED CHRONICITY: ICD-10-CM

## 2018-03-20 DIAGNOSIS — M79.609 PARESTHESIA AND PAIN OF RIGHT EXTREMITY: ICD-10-CM

## 2018-03-20 DIAGNOSIS — R20.2 PARESTHESIA AND PAIN OF RIGHT EXTREMITY: ICD-10-CM

## 2018-03-20 PROCEDURE — 95886 MUSC TEST DONE W/N TEST COMP: CPT | Mod: 26,S$PBB,, | Performed by: PSYCHIATRY & NEUROLOGY

## 2018-03-20 PROCEDURE — 95886 MUSC TEST DONE W/N TEST COMP: CPT | Mod: PBBFAC | Performed by: PSYCHIATRY & NEUROLOGY

## 2018-03-20 PROCEDURE — 95887 MUSC TST DONE W/N TST NONEXT: CPT | Mod: PBBFAC | Performed by: PSYCHIATRY & NEUROLOGY

## 2018-03-20 PROCEDURE — 95913 NRV CNDJ TEST 13/> STUDIES: CPT | Mod: 26,S$PBB,, | Performed by: PSYCHIATRY & NEUROLOGY

## 2018-03-20 PROCEDURE — 95913 NRV CNDJ TEST 13/> STUDIES: CPT | Mod: PBBFAC | Performed by: PSYCHIATRY & NEUROLOGY

## 2018-03-22 ENCOUNTER — TELEPHONE (OUTPATIENT)
Dept: SPORTS MEDICINE | Facility: CLINIC | Age: 68
End: 2018-03-22

## 2018-03-22 NOTE — TELEPHONE ENCOUNTER
Called Pt. In regards to in basket message, was unable to leave a message due to Pt. voicemail not being set up.

## 2018-03-22 NOTE — TELEPHONE ENCOUNTER
----- Message from Armin Perez III, PA-C sent at 3/21/2018  1:22 PM CDT -----  Regarding: Please call patient to schedule follow-up visit.   Please call patient to schedule follow-up visit for recheck of symptoms and to go over results of EMG (nerve conduction study). It has been 4 months since I have seen them and need to recheck.     [place]    ----- Message -----  From: Cheyenne Gautam MA  Sent: 3/21/2018   8:06 AM  To: Armin Perez III, PA-C    Patient has only seen you back in November. I don't think he saw Lala. Do you still want me to have Dr. Reeves review it??    Cheyenne Gautam   Clinical assistant to Dr. Billie Reeves    ----- Message -----  From: Armin Perez III, PA-C  Sent: 3/20/2018   5:24 PM  To: ERIC Aparicio patient EMG result

## 2018-03-23 ENCOUNTER — TELEPHONE (OUTPATIENT)
Dept: SPORTS MEDICINE | Facility: CLINIC | Age: 68
End: 2018-03-23

## 2018-03-23 RX ORDER — HYDROCODONE BITARTRATE AND ACETAMINOPHEN 5; 325 MG/1; MG/1
1 TABLET ORAL EVERY 6 HOURS PRN
Qty: 60 TABLET | Refills: 0 | Status: SHIPPED | OUTPATIENT
Start: 2018-03-23 | End: 2018-03-26 | Stop reason: SDUPTHER

## 2018-03-23 NOTE — TELEPHONE ENCOUNTER
----- Message from Liliane Holbrook sent at 3/23/2018  9:47 AM CDT -----  Contact: self/home   Pt would like to speak with Sushila. This is all the info provided by the pt.

## 2018-03-23 NOTE — TELEPHONE ENCOUNTER
Made several attempts to call Pt and spouse to in regards to in basket message to schedule an appointment to review EMG. Unable to leave message on Pt. Number provided and no service on number for spouse.

## 2018-03-23 NOTE — TELEPHONE ENCOUNTER
"Patient reached.  Pressure doing well on current meds.  129-140/ 79-88    Just went thru a "induction test" and neck c4,c5 c6 and rotator cuff problem in left arm.    He hasn't gotten hydrocodone since October.    Can you ok Monday and i'll call when ready to ?    Thanks haven"

## 2018-03-26 RX ORDER — HYDROCODONE BITARTRATE AND ACETAMINOPHEN 5; 325 MG/1; MG/1
1 TABLET ORAL EVERY 6 HOURS PRN
Qty: 60 TABLET | Refills: 0 | Status: SHIPPED | OUTPATIENT
Start: 2018-03-26 | End: 2018-09-06 | Stop reason: SDUPTHER

## 2018-04-02 ENCOUNTER — OFFICE VISIT (OUTPATIENT)
Dept: SPORTS MEDICINE | Facility: CLINIC | Age: 68
End: 2018-04-02
Payer: MEDICARE

## 2018-04-02 VITALS
BODY MASS INDEX: 36.1 KG/M2 | HEART RATE: 83 BPM | DIASTOLIC BLOOD PRESSURE: 69 MMHG | HEIGHT: 67 IN | SYSTOLIC BLOOD PRESSURE: 107 MMHG | WEIGHT: 230 LBS

## 2018-04-02 DIAGNOSIS — R20.2 PARESTHESIA AND PAIN OF RIGHT EXTREMITY: Primary | ICD-10-CM

## 2018-04-02 DIAGNOSIS — M79.609 PARESTHESIA AND PAIN OF RIGHT EXTREMITY: Primary | ICD-10-CM

## 2018-04-02 DIAGNOSIS — G89.29 NECK PAIN, CHRONIC: ICD-10-CM

## 2018-04-02 DIAGNOSIS — M54.2 NECK PAIN, CHRONIC: ICD-10-CM

## 2018-04-02 DIAGNOSIS — M54.12 CERVICAL RADICULOPATHY: ICD-10-CM

## 2018-04-02 PROCEDURE — 99999 PR PBB SHADOW E&M-EST. PATIENT-LVL V: CPT | Mod: PBBFAC,,, | Performed by: PHYSICIAN ASSISTANT

## 2018-04-02 PROCEDURE — 99214 OFFICE O/P EST MOD 30 MIN: CPT | Mod: S$PBB,,, | Performed by: PHYSICIAN ASSISTANT

## 2018-04-02 PROCEDURE — 99215 OFFICE O/P EST HI 40 MIN: CPT | Mod: PBBFAC,PO | Performed by: PHYSICIAN ASSISTANT

## 2018-04-05 NOTE — PROGRESS NOTES
CC: right shoulder pain     67 y.o. Male who reports that the pain is severe and not responding to any conservative care.  He was referred to us by Dr. Mobley.     He reports burning pains intermittently and of varying locations to his right lateral elbow, right shoulder and right hand that has been present for 4-5 months with no injury or trauma. He also reports intermittent paresthesias of these same areas that sometimes wake him from sleep. His pain and paresthesias area independent of activity. He received a lateral epicondyle steroid injection by Dr. Mobley 2 weeks ago with no pain relief at all.      It is difficult for him to locate or isolate where his pain is coming from today. He does however have pain located of the right cervical neck area that is consistently present.     He has chronic pain of other areas and takes vicodin for pain control.    Is affecting ADLs.     Review of Systems   Constitution: Negative. Negative for chills, fever and night sweats.   HENT: Negative for congestion and headaches.    Eyes: Negative for blurred vision, left vision loss and right vision loss.   Cardiovascular: Negative for chest pain and syncope.   Respiratory: Negative for cough and shortness of breath.    Endocrine: Negative for polydipsia, polyphagia and polyuria.   Hematologic/Lymphatic: Negative for bleeding problem. Does not bruise/bleed easily.   Skin: Negative for dry skin, itching and rash.   Musculoskeletal: Negative for falls and muscle weakness.   Gastrointestinal: Negative for abdominal pain and bowel incontinence.   Genitourinary: Negative for bladder incontinence and nocturia.   Neurological: Negative for disturbances in coordination, loss of balance and seizures.   Psychiatric/Behavioral: Negative for depression. The patient does not have insomnia.    Allergic/Immunologic: Negative for hives and persistent infections.     PAST MEDICAL HISTORY:   Past Medical History:   Diagnosis Date     Diabetes mellitus     Diabetes mellitus, type 2     Esophageal reflux     Hyperlipidemia     Hypertension     Nephrolithiasis     Osteoarthritis      PAST SURGICAL HISTORY:   Past Surgical History:   Procedure Laterality Date    hemrrhoidectomy      WRIST FRACTURE SURGERY      WRIST SURGERY       FAMILY HISTORY:   Family History   Problem Relation Age of Onset    Kidney disease Mother     Kidney failure Mother     Hypertension Mother     Heart attack Father     Heart disease Father     Coronary artery disease Father     Hyperlipidemia Father     Coronary artery disease Brother     Hyperlipidemia Brother     Hyperlipidemia Brother      SOCIAL HISTORY:   Social History     Social History    Marital status:      Spouse name: N/A    Number of children: N/A    Years of education: N/A     Occupational History    Not on file.     Social History Main Topics    Smoking status: Never Smoker    Smokeless tobacco: Never Used    Alcohol use Yes      Comment: socially/     Drug use: No    Sexual activity: No     Other Topics Concern    Not on file     Social History Narrative    No narrative on file       MEDICATIONS:   Current Outpatient Prescriptions:     amLODIPine (NORVASC) 10 MG tablet, Take 1 tablet (10 mg total) by mouth every evening., Disp: 90 tablet, Rfl: 3    aspirin (ECOTRIN) 81 MG EC tablet, Take 81 mg by mouth once daily., Disp: , Rfl:     atorvastatin (LIPITOR) 40 MG tablet, TAKE 1 TABLET BY MOUTH EVERY EVENING, Disp: 30 tablet, Rfl: 6    azithromycin (Z-STEVEN) 250 MG tablet, Take 2 tablets by mouth on day 1; Take 1 tablet by mouth on days 2-5, Disp: 6 tablet, Rfl: 0    blood sugar diagnostic Strp, 1 strip by Misc.(Non-Drug; Combo Route) route once daily., Disp: 180 strip, Rfl: 3    blood-glucose meter Misc, Use as directed, Disp: 1 each, Rfl: 0    butabarbital (BUTISOL) 30 mg Tab, Take 30 mg by mouth continuous prn., Disp: , Rfl:     fenofibrate (TRICOR) 145 MG tablet,  "Take 1 tablet (145 mg total) by mouth every evening., Disp: 90 tablet, Rfl: 3    glimepiride (AMARYL) 4 MG tablet, Take 1 tablet (4 mg total) by mouth daily with breakfast., Disp: 90 tablet, Rfl: 3    hydrocodone-acetaminophen 5-325mg (NORCO) 5-325 mg per tablet, Take 1 tablet by mouth every 6 (six) hours as needed for Pain., Disp: 60 tablet, Rfl: 0    lancets Misc, 1 Device by Misc.(Non-Drug; Combo Route) route once daily., Disp: 180 each, Rfl: 3    lisinopril (PRINIVIL,ZESTRIL) 40 MG tablet, Take 1 tablet (40 mg total) by mouth once daily., Disp: 90 tablet, Rfl: 3    meloxicam (MOBIC) 15 MG tablet, Take 1 tablet (15 mg total) by mouth once daily., Disp: 90 tablet, Rfl: 3    multivitamin (MULTIVITAMIN) per tablet, Take 1 tablet by mouth once daily., Disp: , Rfl:     omeprazole (PRILOSEC) 20 MG capsule, Take 1 capsule (20 mg total) by mouth once daily., Disp: 90 capsule, Rfl: 3    pen needle, diabetic (RELION PEN NEEDLES) 32 gauge x 5/32" Ndle, Needs levemir flexpen needles. To use once daily, Disp: 100 each, Rfl: 3    SITagliptan-metformin (JANUMET)  mg per tablet, Take 1 tablet by mouth 2 (two) times daily with meals., Disp: 180 tablet, Rfl: 3    triamcinolone acetonide 0.1% (KENALOG) 0.1 % cream, Apply 1 application topically continuous prn., Disp: 454 g, Rfl: 11    UBIDECARENONE (CO Q-10 ORAL), Take 1 capsule by mouth once daily., Disp: , Rfl:     bacitracin-neomycin-polymyxin b-hydrocortisone 1 % ointment, Apply topically 2 (two) times daily., Disp: 15 g, Rfl: 1  ALLERGIES:   Review of patient's allergies indicates:   Allergen Reactions    Codeine Nausea Only       VITAL SIGNS: /69   Pulse 83   Ht 5' 7" (1.702 m)   Wt 104.3 kg (230 lb)   BMI 36.02 kg/m²      PHYSICAL EXAMINATION:  General:  The patient is alert and oriented x 3.  Mood is pleasant.  Observation of ears, eyes and nose reveal no gross abnormalities.  No labored breathing observed.  Gait is coordinated. Patient can toe " walk and heel walk without difficulty.      right Shoulder / Upper Extremity Exam    OBSERVATION:     Swelling  none  Deformity  none   Discoloration  none   Scapular winging none   Scars   none  Atrophy  none    TENDERNESS / CREPITUS (T/C):          T/C      T/C   Clavicle   -/-  SUPRAspinatus    -/-     AC Jt.    -/-  INFRAspinatus  -/-    SC Jt.    -/-  Deltoid    -/-      G. Tuberosity  -/-  LH BICEP groove  -/-   Acromion:  -/-  Midline Neck   -/-     Scapular Spine -/-  Trapezium   -/-   SMA Scapula  -/-  GH jt. line - post  -/-     Scapulothoracic  -/-         ROM: (* = with pain)  Right shoulder   Left shoulder        AROM (PROM)   AROM (PROM)   FE    170° (175°)     170° (175°)     ER at 0°    60°  (65°)    60°  (65°)   ER at 90° ABD  90°  (90°)    90°  (90°)   IR at 90°  ABD   NA  (40°)     NA  (40°)      IR (spine level)   T12     T10    STRENGTH: (* = with pain) RIGHT SHOULDER  LEFT SHOULDER   SCAPTION   5/5    5/5    IR    5/5    5/5   ER    5/5    5/5   BICEPS   5/5    5/5   Deltoid    5/5    5/5     SIGNS:  Painful side       NEER   -    OPAZS  neg    CORTES   -   SPEEDS  neg     DROP ARM   neg   BELLY PRESS neg   Superior escape none    LIFT-OFF  neg   X-Body ADD    neg    MOVING VALGUS neg        STABILITY TESTING    RIGHT SHOULDER   LEFT SHOULDER     Translation     Anterior  up face     up face    Posterior  up face    up face    Sulcus   < 10mm    < 10 mm     Signs   Apprehension   neg      neg       Relocation   no change     no change      Jerk test  neg     neg    EXTREMITY NEURO-VASCULAR EXAM    Sensation grossly intact to light touch all dermatomal regions.    DTR 2+ Biceps, Triceps, BR and Negative Ledas sign   Grossly intact motor function at Elbow, Wrist and Hand   Distal pulses radial and ulnar 2+, brisk cap refill, symmetric.      NECK:  Painless FROM and spinous processes non-tender. Negative Spurlings sign.      OTHER FINDINGS:    Right Elbow Exam:    Musculoskeletal   Left UE    Upper extremities shows full active and passive range of motion bilaterally of the shoulders and wrist.                            Elbow show full active and passive range of motion, full flexion and extension bilaterally.     Palpation shows no masses    Shoulder stability is normal    Strength to flexion and extension well maintained;    Skin shows no rashes, lesions or cafe au lait spots    Negative tinels, neg phalen's compression test, no thenar atrophy     Right UE    ROM shows normal flexion, extension of elbow and wrist    Palpation shows no masses    Shoulder stability is normal.     Strength to flexion and extension well maintained    Skin shows no rashes, lesions, abrasions, or cafe au lait spots                          Negative tinels, neg phalen's compression test, no thenar atrophy      Patient has NO pain with palpation over lateral epicondyle and NO pain with resisted wrist extension on the left    ROM:  Elbow (AROM)  Right    Left            Elbow Flexion   165°                  165°      Elbow Extension   0°      0°      Pronation   90     90   Supination   90    90    Elbow STRENGTH:                       RIGHT    LEFT    Elbow Flexion   5/5    5/5    Elbow Extension  5/5    5/5   Hand    5/5    5/5   Supination    5/5    5/5   Pronation              5/5    5/5      Hook test - Negative  Cozen sign - negative  Mills test    - negative  Painful supination - negative    Xrays of the right elbow from PCP were reviewed today:  Xrays of the right elbow were reviewed by me today. No fracture, subluxation, or other significant bony or joint abnormality is identified. Mild DJD noted of the elbow joint.    XRAYS:  Shoulder 3 view series right,  were reviewed  No convincing fracture or dislocation is noted. The osseous structures appear well mineralized and well aligned. Mild DJD noted of the right shoulder.    EMG from 3/20/18:  Impression   This is an abnormal study.  There is electrophysiologic evidence of:   1. A moderately severe left carpal tunnel syndrome.   2. Chronic denervation in the right C5 myotome consistent with radiculopathy   3. Chronic denervation in the left C5, C6, and C7 myotomes consistent with radiculopathy.     MRI C-Spine w/o contrast from [facility] in [place] on 8/18/17: (in media)  C6-C7 Broad based posterior disc osteophyte complex with mild facet hypertrophy. Moderate canal stenosis and severe bilateral foraminal narrowing.   C5-[id number] based posterior disc osteophyte complex with mild facet hypertrophy. Mild canal stenosis severe left foraminal narrowing, and moderate right foraminal narrowing.   C4/C5  Broad based posterior disc osteophyte complex with mild facet hypertrophy resulting in severe left foraminal narrowing, mild right foraminal narrowing, and mild central canal stenosis.      ASSESSMENT:   right:  1. Shoulder pain, acute   2.   Chronic cervical neck pain  3.   Paresthesias and pain of the RUE  4. Cervical radiculopathy    I do not suspect pathology of the right upper extremity shoulder, elbow, or wrist joint. Pain appears to be coming from cervical radiculopathy.     PLAN:    1. Continue OTC pain control +/- current narcotic meds    2. Referral to Dr. Michelle and interventional pain management to discuss possible injection to help pain    3.  Referral to [facility] and Dr. Lynn for evaluation and treatment of cervical radiculopathy.     4. RTC prn    All questions were answered, pt will contact us for questions or concerns in the interim.

## 2018-04-10 ENCOUNTER — OFFICE VISIT (OUTPATIENT)
Dept: SPINE | Facility: CLINIC | Age: 68
End: 2018-04-10
Attending: PHYSICAL MEDICINE & REHABILITATION
Payer: MEDICARE

## 2018-04-10 VITALS
BODY MASS INDEX: 36.1 KG/M2 | SYSTOLIC BLOOD PRESSURE: 127 MMHG | HEIGHT: 67 IN | DIASTOLIC BLOOD PRESSURE: 72 MMHG | HEART RATE: 75 BPM | WEIGHT: 230 LBS

## 2018-04-10 DIAGNOSIS — M50.30 DDD (DEGENERATIVE DISC DISEASE), CERVICAL: Primary | ICD-10-CM

## 2018-04-10 DIAGNOSIS — M54.12 CERVICAL RADICULOPATHY: ICD-10-CM

## 2018-04-10 DIAGNOSIS — M54.2 NECK PAIN: ICD-10-CM

## 2018-04-10 DIAGNOSIS — G56.02 CARPAL TUNNEL SYNDROME OF LEFT WRIST: ICD-10-CM

## 2018-04-10 DIAGNOSIS — S76.319A HAMSTRING STRAIN, UNSPECIFIED LATERALITY, INITIAL ENCOUNTER: ICD-10-CM

## 2018-04-10 PROCEDURE — 99999 PR PBB SHADOW E&M-EST. PATIENT-LVL III: CPT | Mod: PBBFAC,,, | Performed by: PHYSICAL MEDICINE & REHABILITATION

## 2018-04-10 PROCEDURE — 99213 OFFICE O/P EST LOW 20 MIN: CPT | Mod: PBBFAC | Performed by: PHYSICAL MEDICINE & REHABILITATION

## 2018-04-10 PROCEDURE — 99204 OFFICE O/P NEW MOD 45 MIN: CPT | Mod: S$PBB,,, | Performed by: PHYSICAL MEDICINE & REHABILITATION

## 2018-04-10 RX ORDER — GABAPENTIN 300 MG/1
300-600 CAPSULE ORAL 3 TIMES DAILY
Qty: 60 CAPSULE | Refills: 2 | Status: SHIPPED | OUTPATIENT
Start: 2018-04-10 | End: 2018-09-27

## 2018-04-10 NOTE — PROGRESS NOTES
Subjective:      Patient ID: Erick Valdovinos is a 67 y.o. male.    Chief Complaint: Neck Pain    Mr Valdovinos is a 66 yo male here for evaluation of left neck and shoulder pain.  He has had neck and shoulder pain for over 2 years and worse 10 months ago.  He thought the pain was in the shoulder, but was not able to do anything.  The pain is in the neck and goes down the left arm.  The neck pain is worse than the arm pain.  The pain is constant.  He has days that are better.  He feels like the pain is with looking down.  Then he feels like he cannot lift the head up.  He has been staying active.  He has been doing work on the house.  The pain is a stabbing pain.  He does not have trouble with buttons or balance.  There is numbness and tingling in the entire left arm and all the fingers.  The right hand it is digit 4 and 5.  Pain is 2/10 now, worst 8/10 looking down too long, best sitting with head slightly down 2/10.  He is taking meloxicam once a day.  He takes hydrocodone as needed.  He has not taken any in 2 weeks.  He has not done PT.  He has not been to chiropractor.  He has not had any injections.  He had right elbow injection in November by Dr. Mobley.      He has left leg pain for 4-5 months.  He feels like the leg knots up.  He feels like his left hip feels tight.  He feels like it spasms and has to get out of bed and walk around and then lie back down.  The leg has been bothering him more than the neck.  The pain is not constant.  He has more pain with standing and walking.      MRI cervical 8/2017  1.  C6-7 broad based posterior disc osteophyte complex with mild facet hypertrophy, moderate canal stenosis and severe bilateral foraminal narrowing  2.  C5-6 broad based posterior disc osteophyte complex with mild facet hypertrophy, mild canal stenosis and severe left foraminal narrowing and moderate right  3.  C4-5 broad based posterior disc osteophyte complex with mild facet hypertrophy, mild canal  stenosis and severe left and mild right foraminal narrowing    MRI left 8/2017 shoulder  Moderate supraspinatus and infraspinatous tendinosis, mild glenohumeral DJD with effusion and synovitis. Moderate AC joint DJD    EMG  Summary  Concentric needle examination of selected muscles in bilateral upper extremities revealed no evidence of acute denervation but did show chronic denervation in bilateral C5 myotomes, the left C6 and C7 myotomes and in the left APB muscle.  Impression  This is an abnormal study. There is electrophysiologic evidence of:  1. A moderately severe left carpal tunnel syndrome.  2. Chronic denervation in the right C5 myotome consistent with radiculopathy  3. Chronic denervation in the left C5, C6, and C7 myotomes consistent with radiculopathy.    Past Medical History:  No date: Diabetes mellitus  No date: Diabetes mellitus, type 2  No date: Esophageal reflux  No date: Hyperlipidemia  No date: Hypertension  No date: Nephrolithiasis  No date: Osteoarthritis    Past Surgical History:  No date: hemrrhoidectomy  No date: WRIST FRACTURE SURGERY  No date: WRIST SURGERY    Review of patient's family history indicates:    Kidney disease                 Mother                    Kidney failure                 Mother                    Hypertension                   Mother                    Heart attack                   Father                    Heart disease                  Father                    Coronary artery disease        Father                    Hyperlipidemia                 Father                    Coronary artery disease        Brother                   Hyperlipidemia                 Brother                   Hyperlipidemia                 Brother                     Social History    Marital status:              Spouse name:                       Years of education:                 Number of children:               Social History Main Topics    Smoking status: Never Smoker                                                                 Smokeless tobacco: Never Used                        Alcohol use: Yes                Comment: socially/     Drug use: No              Sexual activity: No                       Current Outpatient Prescriptions:  amLODIPine (NORVASC) 10 MG tablet, Take 1 tablet (10 mg total) by mouth every evening., Disp: 90 tablet, Rfl: 3  aspirin (ECOTRIN) 81 MG EC tablet, Take 81 mg by mouth once daily., Disp: , Rfl:   atorvastatin (LIPITOR) 40 MG tablet, TAKE 1 TABLET BY MOUTH EVERY EVENING, Disp: 30 tablet, Rfl: 6  azithromycin (Z-STEVEN) 250 MG tablet, Take 2 tablets by mouth on day 1; Take 1 tablet by mouth on days 2-5, Disp: 6 tablet, Rfl: 0  bacitracin-neomycin-polymyxin b-hydrocortisone 1 % ointment, Apply topically 2 (two) times daily., Disp: 15 g, Rfl: 1  blood sugar diagnostic Strp, 1 strip by Misc.(Non-Drug; Combo Route) route once daily., Disp: 180 strip, Rfl: 3  blood-glucose meter Misc, Use as directed, Disp: 1 each, Rfl: 0  butabarbital (BUTISOL) 30 mg Tab, Take 30 mg by mouth continuous prn., Disp: , Rfl:   fenofibrate (TRICOR) 145 MG tablet, Take 1 tablet (145 mg total) by mouth every evening., Disp: 90 tablet, Rfl: 3  glimepiride (AMARYL) 4 MG tablet, Take 1 tablet (4 mg total) by mouth daily with breakfast., Disp: 90 tablet, Rfl: 3  hydrocodone-acetaminophen 5-325mg (NORCO) 5-325 mg per tablet, Take 1 tablet by mouth every 6 (six) hours as needed for Pain., Disp: 60 tablet, Rfl: 0  lancets Misc, 1 Device by Misc.(Non-Drug; Combo Route) route once daily., Disp: 180 each, Rfl: 3  lisinopril (PRINIVIL,ZESTRIL) 40 MG tablet, Take 1 tablet (40 mg total) by mouth once daily., Disp: 90 tablet, Rfl: 3  meloxicam (MOBIC) 15 MG tablet, Take 1 tablet (15 mg total) by mouth once daily., Disp: 90 tablet, Rfl: 3  multivitamin (MULTIVITAMIN) per tablet, Take 1 tablet by mouth once daily., Disp: , Rfl:   omeprazole (PRILOSEC) 20 MG capsule, Take 1 capsule (20 mg total) by mouth  "once daily., Disp: 90 capsule, Rfl: 3  pen needle, diabetic (RELION PEN NEEDLES) 32 gauge x 5/32" Ndle, Needs levemir flexpen needles. To use once daily, Disp: 100 each, Rfl: 3  SITagliptan-metformin (JANUMET)  mg per tablet, Take 1 tablet by mouth 2 (two) times daily with meals., Disp: 180 tablet, Rfl: 3  triamcinolone acetonide 0.1% (KENALOG) 0.1 % cream, Apply 1 application topically continuous prn., Disp: 454 g, Rfl: 11  UBIDECARENONE (CO Q-10 ORAL), Take 1 capsule by mouth once daily., Disp: , Rfl:     No current facility-administered medications for this visit.       Review of patient's allergies indicates:   -- Codeine -- Nausea Only          Review of Systems   Constitution: Negative for weight gain and weight loss.   Cardiovascular: Negative for chest pain.   Respiratory: Negative for shortness of breath.    Musculoskeletal: Positive for back pain (left leg) and neck pain (left arm). Negative for joint pain and joint swelling.   Gastrointestinal: Negative for abdominal pain and bowel incontinence.   Genitourinary: Negative for bladder incontinence.   Neurological: Negative for numbness.         Objective:        General: Erick is well-developed, well-nourished, appears stated age, in no acute distress, alert and oriented to time, place and person.     General    Vitals reviewed.  Constitutional: He is oriented to person, place, and time. He appears well-developed and well-nourished.   HENT:   Head: Normocephalic and atraumatic.   Pulmonary/Chest: Effort normal.   Neurological: He is alert and oriented to person, place, and time.   Psychiatric: He has a normal mood and affect. His behavior is normal. Judgment and thought content normal.     General Musculoskeletal Exam   Gait: normal     Right Ankle/Foot Exam     Tests   Heel Walk: able to perform  Tiptoe Walk: able to perform    Left Ankle/Foot Exam     Tests   Heel Walk: able to perform  Tiptoe Walk: able to perform      Right Hip Exam     Tests "   Pain w/ forced internal rotation (HIRAM): absent  Left Hip Exam     Tenderness   The patient tender to palpation of the piriformis.    Tests   Pain w/ forced internal rotation (HIRAM): absent    Comments:  Pain and muscle tightness proximal hamstring on the left      Back (L-Spine & T-Spine) / Neck (C-Spine) Exam     Tenderness Left paramedian tenderness of the Lower C-Spine and Sacrum.     Back (L-Spine & T-Spine) Range of Motion   Extension: 10   Flexion: 80   Lateral Bend Right: 20   Lateral Bend Left: 20   Rotation Right: 30   Rotation Left: 30     Neck (C-Spine) Range of Motion   Flexion:     > 40  Extension: 30Right Lateral Bend: 20 Left Lateral Bend: 20 Right Rotation: 40 Left Rotation: 40     Spinal Sensation   Right Side Sensation  C-Spine Level: normal   L-Spine Level: normal  S-Spine Level: normal  Left Side Sensation  C-Spine Level: normal  L-Spine Level: normal  S-Spine Level: normal    Back (L-Spine & T-Spine) Tests   Right Side Tests  Straight leg raise:      Sitting SLR: > 70 degrees      Left Side Tests  Straight leg raise:     Sitting SLR: > 70 degrees          Other He has no scoliosis .  Spinal Kyphosis:  Absent      Muscle Strength   Right Upper Extremity   Biceps: 5/5/5   Deltoid:  5/5  Triceps:  5/5  Wrist Extension: 5/5/5   Finger Flexors:  5/5  Left Upper Extremity  Biceps: 5/5/5   Deltoid:  5/5  Triceps:  5/5  Wrist Extension: 5/5/5   Finger Flexors:  5/5  Right Lower Extremity   Hip Flexion: 5/5   Quadriceps:  5/5   Anterior tibial:  5/5/5  EHL:  5/5  Left Lower Extremity   Hip Flexion: 5/5   Quadriceps:  5/5   Anterior tibial:  5/5/5   EHL:  5/5    Reflexes     Left Side  Biceps:  2+  Triceps:  2+  Brachioradialis:  2+  Quadriceps:  2+  Achilles:  2+  Left Pineda's Sign:  Absent  Babinski Sign:  absent    Right Side   Biceps:  2+  Triceps:  2+  Brachioradialis:  2+  Quadriceps:  2+  Achilles:  2+  Right Pineda's Sign:  absent  Babinski Sign:  absent    Vascular Exam     Right  Pulses        Carotid:                  2+    Left Pulses        Carotid:                  2+              Assessment:       1. DDD (degenerative disc disease), cervical    2. Neck pain    3. Hamstring strain, unspecified laterality, initial encounter    4. Carpal tunnel syndrome of left wrist    5. Cervical radiculopathy           Plan:       Orders Placed This Encounter    Ambulatory Referral to Physical/Occupational Therapy    gabapentin (NEURONTIN) 300 MG capsule     More than 50% of the total time of 45 minutes was spent in counseling on diagnosis and treatment options. I reviewed his outside CD with cervical and shoulder MRI from 8/2017.  He does have some mild spinal narrowing, but severe NF narrowing.  I reviewed EMG which showed left cervical radic that is chronic and severe CTS.  We discussed trying a neck injection.  He might also need to see hand about CTS.  We discussed neck and back pain and the nature of back pain.  We discussed the left hamstring is tight and tender, likely just hamstring.  We can get him some stretches.  We also discussed not sitting on the wallet in the left pocket.  We discussed that it is not one thing that causes the pain but an accumulation of multiple things that we do.  We discussed posture sitting and the importance of trying to sit better.  We discussed the benefits of therapy and exercise and continuing to move.  We discussed strengthening and weight loss.  He would like to go to PT in [place].  He is dealing with hamstring and neck.  He might need healthy back later  1.  PT for hamstring stretches, back and core strength neck retractions and scapula stabilization and HEP  2.  We discussed a cervical C7-T1 intralaminar KOBE, but he does not like steroids  3.  mobic 15mg po Qday  4.  Gabapentin 300mg po 1-2 QHS  5.  RTC 10 weeks      Follow-up: Follow-up in about 10 weeks (around 6/19/2018). If there are any questions prior to this, the patient was instructed to contact the  office.

## 2018-04-20 ENCOUNTER — CLINICAL SUPPORT (OUTPATIENT)
Dept: REHABILITATION | Facility: HOSPITAL | Age: 68
End: 2018-04-20
Attending: PHYSICAL MEDICINE & REHABILITATION
Payer: MEDICARE

## 2018-04-20 DIAGNOSIS — M54.2 NECK PAIN: ICD-10-CM

## 2018-04-20 DIAGNOSIS — M79.606 PAIN OF LOWER EXTREMITY, UNSPECIFIED LATERALITY: ICD-10-CM

## 2018-04-20 PROCEDURE — 97161 PT EVAL LOW COMPLEX 20 MIN: CPT | Mod: PO | Performed by: PHYSICAL THERAPIST

## 2018-04-20 PROCEDURE — G8985 CARRY GOAL STATUS: HCPCS | Mod: CJ,PO | Performed by: PHYSICAL THERAPIST

## 2018-04-20 PROCEDURE — G8984 CARRY CURRENT STATUS: HCPCS | Mod: CK,PO | Performed by: PHYSICAL THERAPIST

## 2018-04-20 NOTE — PATIENT INSTRUCTIONS
Hamstring Step 3        Left leg in maximal straight leg raise, heel at maximal stretch, straighten knee further by tightening knee cap. Warning: Intense stretch. Stay within tolerance. Hold _30__ seconds. Relax knee cap only.  Repeat __3_ times, perform twice a day.    Copyright © [employer]. All rights reserved.

## 2018-04-20 NOTE — PLAN OF CARE
Physical Therapy Evaluation    Name: Erick Valdovinos  [facility] Number: [id number]    Diagnosis: No diagnosis found.  Physician: Tameka Crabtree, *  Treatment Orders: PT Eval and Treat, hamstring stretches, back and core strength, neck retractions and scapula stabilization, shoulder ROm  and HEP    Past Medical History:   Diagnosis Date    Diabetes mellitus     Diabetes mellitus, type 2     Esophageal reflux     Hyperlipidemia     Hypertension     Nephrolithiasis     Osteoarthritis      Current Outpatient Prescriptions   Medication Sig    amLODIPine (NORVASC) 10 MG tablet Take 1 tablet (10 mg total) by mouth every evening.    aspirin (ECOTRIN) 81 MG EC tablet Take 81 mg by mouth once daily.    atorvastatin (LIPITOR) 40 MG tablet TAKE 1 TABLET BY MOUTH EVERY EVENING    azithromycin (Z-STEVEN) 250 MG tablet Take 2 tablets by mouth on day 1; Take 1 tablet by mouth on days 2-5    bacitracin-neomycin-polymyxin b-hydrocortisone 1 % ointment Apply topically 2 (two) times daily.    blood sugar diagnostic Strp 1 strip by Misc.(Non-Drug; Combo Route) route once daily.    blood-glucose meter Misc Use as directed    butabarbital (BUTISOL) 30 mg Tab Take 30 mg by mouth continuous prn.    fenofibrate (TRICOR) 145 MG tablet Take 1 tablet (145 mg total) by mouth every evening.    gabapentin (NEURONTIN) 300 MG capsule Take 1-2 capsules (300-600 mg total) by mouth 3 (three) times daily.    glimepiride (AMARYL) 4 MG tablet Take 1 tablet (4 mg total) by mouth daily with breakfast.    hydrocodone-acetaminophen 5-325mg (NORCO) 5-325 mg per tablet Take 1 tablet by mouth every 6 (six) hours as needed for Pain.    lancets Misc 1 Device by Misc.(Non-Drug; Combo Route) route once daily.    lisinopril (PRINIVIL,ZESTRIL) 40 MG tablet Take 1 tablet (40 mg total) by mouth once daily.    meloxicam (MOBIC) 15 MG tablet Take 1 tablet (15 mg total) by mouth once daily.    multivitamin (MULTIVITAMIN) per tablet Take 1 tablet by  "mouth once daily.    omeprazole (PRILOSEC) 20 MG capsule Take 1 capsule (20 mg total) by mouth once daily.    pen needle, diabetic (RELION PEN NEEDLES) 32 gauge x 5/32" Ndle Needs levemir flexpen needles. To use once daily    SITagliptan-metformin (JANUMET)  mg per tablet Take 1 tablet by mouth 2 (two) times daily with meals.    triamcinolone acetonide 0.1% (KENALOG) 0.1 % cream Apply 1 application topically continuous prn.    UBIDECARENONE (CO Q-10 ORAL) Take 1 capsule by mouth once daily.     No current facility-administered medications for this visit.      Review of patient's allergies indicates:   Allergen Reactions    Codeine Nausea Only     Precautions: standard    Evaluation Date: 4/20/18  Visit # authorized: 1/1  Authorization period: 4/10/19  Plan of care Expiration: 6/15/18    Time in : 0850  Time out: 0950  Total time: 60 minutes    Subjective     Onset/SAVANA: gradual    Onset Date: 2 years  Prior Level of Function: able to perform all activities  Current level of Function: difficulty with prolonged standing due to leg pain  Social History: Lives with wife in 2 story house, all bedrooms upstairs  Med History: HTN, DM2  Occupation: retired mailman       History of Present Illness: Erick is a 67 y.o. male that [facility] to [facility] secondary to neck pain and hamstring strain. Erick states he has had neck pain for at least a year. He was fixing up his house, putting in new floors and painting, that's when it really got started. He recently dug a Romanian drain in his yard, that has increased his pain, this was 1 month ago. He has been feeling better lately and much more manageable. Lately his main complaint is hip and hamstring pain on the L, some on the Right..     Imaging: see outside MRI in media:     Pain: current 0/10, worst 10/10 for neck and leg, best 0/10, stabbing, intermittent  Radicular symptoms: no  Aggravating factors: prolonged standing, holding head up  Easing " factors: looking down, walking    Pts goals: reduce his pain        Objective     Observation: gait is WNL    Posture: elevated shoulders, forward head, increased thoracic kyphosis    Selective Functional Movement Assessment (SFMA):  FN: functional, non-painful          FP: functional, painful          DP: dysfunctional, painful          DN: dysfunctional, non-painful  SCMD = Stabilization/Motor Control Dysfunction          CB = Tissue Extensibility Dysfunction          JMD = Joint Mobility Dysfunction    Active Cervical Flexion: FN  Active Cervical Extension: DN  Cervical Rotation:   Right: DN   Left: DN  Upper extremity pattern 1 (MRE):   Right: DN   Left: DN  Upper extremity pattern 2 (LRF):   Right: DN   Left: DN  Multi-Segmental Flexion (MSF): DN  Multi-Segmental Extension (MSE): DN  Multi-Segmental Rotation:    Right: DN   Left: DN  Single Leg Stance:   Right: DN   Left: DN  Overhead Deep Squat (ODS): DN     Breakouts Performed: cervical rotation  SFMA Impairments Include: lower cervical JMD/CB        Cervical Range of Motion:    Degrees Pain   Flexion full no     Extension 25% no     Right Rotation 50% no     Left Rotation 50% no          Upper Extremity Strength  (R) UE  (L) UE    Shoulder flexion: 5/5 Shoulder flexion: 5/5   Shoulder Abduction: 5/5 Shoulder abduction: 5/5   Shoulder ER 5/5 Shoulder ER 5/5   Shoulder IR 5/5 Shoulder IR 5/5     Bilateral lower traps, middle traps, and rhomboids all 3+/5 MMT    Special Tests:  Distraction -   Compression -   Spurlings -   Sharp-Sanket -   DNF test +     Upper Limb Neurodynamic testing:   Right   Left     S1 S2  S1 S2   BPNT 4/5 4+/5  3+/5 5/5   UNT 5/5 5/5  5/5 5/5     Lower limb Neuro Dynamic Testing:    Sciatic nerve:      SLR: R = negative     L = negative        Palpation: No TTP      Sensation: intact    Flexibility:    Popliteal Angle: R = -30 degrees ; L = -40 degrees      Functional Limitations Reports - G Codes  Category: Carry  Tool: FOTO  Score:  "46% limitation  Current:  CK 40<60%  Goal:  CJ 20<40%      PT Evaluation Completed? Yes  Discussed Plan of Care with patient: Yes    TREATMENT:  Erick received therapeutic exercises to develop strength and endurance, flexibility for 5 minutes including:    HS stretch: 3 x 30"        HEP provided:  See EMR notes for patient instructions.  Instructed pt. Regarding: Proper technique with all exercises. Pt demo good understanding of the education provided. Erick demonstrated good return demonstration of activities.      Assessment     This is a 67 y.o. male referred to outpatient physical therapy and presents with a medical diagnosis of neck pain and hamstring straing and demonstrates limitations as described in the problem list. Pt presents with limited neck ROM, poor posture, and hamstring tightness. Pt will benefit from physcial therapy services in order to maximize pain free functional independence. The following goals were discussed with the patient and patient is in agreement with them as to be addressed in the treatment plan. Pt was given a HEP consisting of hamstring stretches. Pt verbally understood the instructions as they were given and demonstrated proper form and technique during therapy. Pt was advise to perform these exercises free of pain, and to stop performing them if pain occurs.     Anticipated barriers to physical therapy: none    Medical necessity is demonstrated by the following IMPAIRMENTS/PROBLEM LIST:     History  Co-morbidities and personal factors that may impact the plan of care Examination  Body Structures and Functions, activity limitations and participation restrictions that may impact the plan of care    Clinical Presentation   Co-morbidities:   HTN, DM2        Personal Factors:   no deficits Body Regions:   neck  lower extremities    Body Systems:    ROM  strength  flexibility            Participation Restrictions:   Prolonged standing     Activity limitations:   Learning " and applying knowledge  no deficits    General Tasks and Commands  no deficits    Communication  no deficits    Mobility  standing    Self care  no deficits    Domestic Life  sleeping    Interactions/Relationships  no deficits    Life Areas  no deficits    Community and Social Life  no deficits         stable and uncomplicated                      low   low  low Decision Making/ Complexity Score:  low             GOALS: Short Term Goals: 4 weeks  1.Report decreased L LE pain  <   / =  4  /10  to increase tolerance for standing  2. Pt to increase B popliteal angle by > or = 10 degrees to improve flexibility and decrease LE pain with standing  3.  Increased MMT  for lower traps/middle traps/rhomboids to > or = 4-/5 to increase tolerance for ADL and improve posture.  4. Pt to demonstrate improved seated posture without cueing from PT in order to improve neck function.  5. Pt to tolerate HEP to improve ROM and independence with ADL's      Long Term Goals: 8 weeks  1.Report decreased L LE pain  <   / =  2  /10  to increase tolerance for standing  2.Pt to increase L popliteal angle by > or = 20 degrees to improve flexibility and decrease LE pain with standing  3.Pt will report at CJ level (20-40% impaired) on FOTO neck survey score for neck pain disability to demonstrate decrease in disability and improvement in neck pain.  4. Pt to be Independent with HEP to improve ROM and independence with ADL's  5. Increased MMT  for lower traps/middle traps/rhomboids to > or = 4/5 to increase tolerance for ADL and improve posture.        Plan     Pt will be treated by physical therapy 2 times a week for 8 weeks for Pt Education, HEP, therapeutic exercises, neuromuscular re-education, joint mobilizations, dry needling, modalities prn to achieve established goals. Erick may at times be seen by a PTA as part of the Rehab Team.     Plan of care certification: 4/20/18 - 6/15/18    Osmel Smith, PT 4/20/2018     I certify the need for  these services furnished under this plan of treatment and while under my care.______________________________ Physician/Referring Practitioner  Date of Signature

## 2018-04-26 ENCOUNTER — CLINICAL SUPPORT (OUTPATIENT)
Dept: REHABILITATION | Facility: HOSPITAL | Age: 68
End: 2018-04-26
Attending: PHYSICAL MEDICINE & REHABILITATION
Payer: MEDICARE

## 2018-04-26 DIAGNOSIS — M79.606 PAIN OF LOWER EXTREMITY, UNSPECIFIED LATERALITY: ICD-10-CM

## 2018-04-26 DIAGNOSIS — M54.2 NECK PAIN: Primary | ICD-10-CM

## 2018-04-26 NOTE — PROGRESS NOTES
[id number]  Date of Treatment: 04/26/2018  Diagnosis:   Encounter Diagnoses   Name Primary?    Neck pain Yes    Pain of lower extremity, unspecified laterality        Patient seen by PT Hay Wan

## 2018-05-03 NOTE — PROGRESS NOTES
"                            Physical Therapy Daily Treatment Note     Name: Erick STACY [facility] Number: [id number]    Therapy Diagnosis:   Encounter Diagnoses   Name Primary?    Neck pain     Pain of lower extremity, unspecified laterality      Physician: Tameka Crabtree, *    Physician Orders: Eval and treat  Medical Diagnosis: Neck pain and hamstring strain    Autnorization period Expiration: 4/10/19  Plan of Care Certification Period: 6/15/19    Evaluation Date: 4/20/18    Visit # / authorized: 3/ 1  Time In:0710  Time Out: 0805  Total Billable Time: 55 minutes    Precautions: Standard    Subjective   Pt reports: that his hamstring has been feeling better since last visit,              and reports he  was not compliant with home exercise program given last session. 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 After this his neck hurt for a few days so he didn't do any exercises  Response to previous treatment:improvement in hamstring pain  Functional change: none    Pain:  Current 4/10, worst 8/10, best 4/10   Location: neck  left    Objective   Erick received therapeutic exercises to develop strength, endurance, ROM, flexibility and posture for 55 minutes including:    Scapular retractions: 30 x 5"  Chin retractions: 30 x 5"  Upper trap stretch: 2 x 30" B  Levator scap stretch: 2 x 30" B  Supine chin tucks: 3 x 10 x 5" holds  HS stretch: 3 x 30" B    Home Exercises Provided and Patient Education Provided     Education provided re: proper technique with new exercises, importance of compliance with HEP.  - progress towards goals   - role of therapy in multi - disciplinary team, goals for therapy  No spiritual or educational barriers to learning provided    Written Home Exercises Provided: See pt instructions.  Exercises were reviewed and Erick was able to demonstrate them prior to the end of the session.   Pt received a written copy of exercises to perform at home. Erick " demonstrated good  understanding of the education provided.     Assessment   Pt tolerated therapy well. He experienced a decrease in his neck pain post treatment, rated 1/10. Pt has had some improvement in his hamstring pain with his HEP. He was limited in performance of HEP due to increased work and physical labor earlier in the week. Pt educated on importance of HEP and how it could help him feel better during times of increased discomfort.  Erick is not progressing well towards his goals.   Pt prognosis is Good.     Pt will continue to benefit from skilled outpatient physical therapy to address the deficits listed in the problem list box on initial evaluation, provide pt/family education and to maximize pt's level of independence in the home and community environment.     Anticipated barriers to physical therapy: none    Pt's spiritual, cultural and educational needs considered and pt agreeable to plan of care and goals.    GOALS: Short Term Goals: 4 weeks  1.Report decreased L LE pain  <   / =  4  /10  to increase tolerance for standing  2. Pt to increase B popliteal angle by > or = 10 degrees to improve flexibility and decrease LE pain with standing  3.  Increased MMT  for lower traps/middle traps/rhomboids to > or = 4-/5 to increase tolerance for ADL and improve posture.  4. Pt to demonstrate improved seated posture without cueing from PT in order to improve neck function.  5. Pt to tolerate HEP to improve ROM and independence with ADL's        Long Term Goals: 8 weeks  1.Report decreased L LE pain  <   / =  2  /10  to increase tolerance for standing  2.Pt to increase L popliteal angle by > or = 20 degrees to improve flexibility and decrease LE pain with standing  3.Pt will report at CJ level (20-40% impaired) on FOTO neck survey score for neck pain disability to demonstrate decrease in disability and improvement in neck pain.  4. Pt to be Independent with HEP to improve ROM and independence with ADL's  5.  Increased MMT  for lower traps/middle traps/rhomboids to > or = 4/5 to increase tolerance for ADL and improve posture.       Plan   Continue with established plan of care.     Osmel Smith, PT

## 2018-05-04 ENCOUNTER — CLINICAL SUPPORT (OUTPATIENT)
Dept: REHABILITATION | Facility: HOSPITAL | Age: 68
End: 2018-05-04
Attending: PHYSICAL MEDICINE & REHABILITATION
Payer: MEDICARE

## 2018-05-04 DIAGNOSIS — M54.2 NECK PAIN: ICD-10-CM

## 2018-05-04 DIAGNOSIS — M79.606 PAIN OF LOWER EXTREMITY, UNSPECIFIED LATERALITY: ICD-10-CM

## 2018-05-04 PROCEDURE — 97110 THERAPEUTIC EXERCISES: CPT | Mod: PO | Performed by: PHYSICAL THERAPIST

## 2018-05-04 NOTE — PATIENT INSTRUCTIONS
Flexibility: Upper Trapezius Stretch    Sit up tall and place one hand behind your back and the other on top of your head.  Gently draw your head towards the side of your top hand. Do not over-stretch.  Hold 30 seconds. Repeat 3 times per session each side. Do 2 sessions per day.                      Levator Scapula Stretch, Sitting    Sit, one hand on same-side shoulder blade, other hand on head. Gently pull head down and away from hand on shoulder blade. Hold 30 seconds.  Repeat 3 times per session each side. Do 2 sessions per day.            Chin Tuck, Sitting / Standing    Stand or sit, head in comfortable, centered position. Draw chin in towards throat, pulling head straight back, keeping jaw and eyes level. Do not hold your breath. Hold 5 seconds.  Repeat 10 times per session. Do 3 sessions per day.      Chin Tuck, Supine    Lie on your back, head on small, rolled towel. Gently tuck chin and bring toward your throat while pushing the back of your head down. Do not lift your head or look towards your feet. Hold 5 seconds. Do not hold your breath.  Repeat 10 times per session. Do 2 sessions per day.      Scapular Retraction (Standing)    With arms at sides, squeeze shoulder blades together. Do not shrug and do not hold your breath. Hold 5 seconds.  Repeat 10 times per session. Do 3 sessions per day.

## 2018-05-22 ENCOUNTER — DOCUMENTATION ONLY (OUTPATIENT)
Dept: REHABILITATION | Facility: HOSPITAL | Age: 68
End: 2018-05-22

## 2018-05-22 DIAGNOSIS — M79.606 PAIN OF LOWER EXTREMITY, UNSPECIFIED LATERALITY: ICD-10-CM

## 2018-05-22 DIAGNOSIS — M54.2 NECK PAIN: ICD-10-CM

## 2018-05-22 NOTE — PROGRESS NOTES
Outpatient Therapy Discharge Summary     Name: Erick Valdovinos  [facility] Number: [id number]    Therapy Diagnosis:   Encounter Diagnoses   Name Primary?    Neck pain     Pain of lower extremity, unspecified laterality      Physician: Tameka Crabtree  Physician Orders: eval and treat  Medical Diagnosis: neck pain and hamstring strain  Evaluation Date: 4/20/18      Date of Last visit: 5/4/18  Total Visits Received: 3  Cancelled Visits: 1  No Show Visits: 1    Assessment    Goals: pt did not meet any goals. No showed last visit has not scheduled any additional follow ups. His insurance has not approved visits.       Short Term Goals: 4 weeks  1.Report decreased L LE pain  <   / =  4  /10  to increase tolerance for standing  2. Pt to increase B popliteal angle by > or = 10 degrees to improve flexibility and decrease LE pain with standing  3.  Increased MMT  for lower traps/middle traps/rhomboids to > or = 4-/5 to increase tolerance for ADL and improve posture.  4. Pt to demonstrate improved seated posture without cueing from PT in order to improve neck function.  5. Pt to tolerate HEP to improve ROM and independence with ADL's        Long Term Goals: 8 weeks  1.Report decreased L LE pain  <   / =  2  /10  to increase tolerance for standing  2.Pt to increase L popliteal angle by > or = 20 degrees to improve flexibility and decrease LE pain with standing  3.Pt will report at CJ level (20-40% impaired) on FOTO neck survey score for neck pain disability to demonstrate decrease in disability and improvement in neck pain.  4. Pt to be Independent with HEP to improve ROM and independence with ADL's  5. Increased MMT  for lower traps/middle traps/rhomboids to > or = 4/5 to increase tolerance for ADL and improve posture.    Discharge reason: Other:  Has not scheduled additional visits, insurance has not authorized visits.     No g-code for discharge since discharge documentation is doc only with no  patient visit. At evaluation pt's g-codes were as follows.  Functional Limitations Reports - G Codes  Category: Carry  Tool: FOTO  Score: 46% limitation  Current:  CK 40<60%  Goal:  CJ 20<40%      Plan   This patient is discharged from Physical Therapy  Osmel Smith, PT 5/22/2018

## 2018-06-25 ENCOUNTER — LAB VISIT (OUTPATIENT)
Dept: LAB | Facility: HOSPITAL | Age: 68
End: 2018-06-25
Attending: INTERNAL MEDICINE
Payer: MEDICARE

## 2018-06-25 ENCOUNTER — OFFICE VISIT (OUTPATIENT)
Dept: INTERNAL MEDICINE | Facility: CLINIC | Age: 68
End: 2018-06-25
Payer: MEDICARE

## 2018-06-25 VITALS
RESPIRATION RATE: 18 BRPM | WEIGHT: 234.38 LBS | TEMPERATURE: 98 F | SYSTOLIC BLOOD PRESSURE: 130 MMHG | HEART RATE: 65 BPM | DIASTOLIC BLOOD PRESSURE: 74 MMHG | HEIGHT: 68 IN | BODY MASS INDEX: 35.52 KG/M2

## 2018-06-25 DIAGNOSIS — E78.1 HYPERLIPIDEMIA TYPE IV: ICD-10-CM

## 2018-06-25 DIAGNOSIS — R80.9 TYPE 2 DIABETES MELLITUS WITH MICROALBUMINURIA, WITHOUT LONG-TERM CURRENT USE OF INSULIN: ICD-10-CM

## 2018-06-25 DIAGNOSIS — M54.12 CERVICAL RADICULITIS: ICD-10-CM

## 2018-06-25 DIAGNOSIS — M54.50 ACUTE MIDLINE LOW BACK PAIN WITHOUT SCIATICA: ICD-10-CM

## 2018-06-25 DIAGNOSIS — E11.29 TYPE 2 DIABETES MELLITUS WITH MICROALBUMINURIA, WITHOUT LONG-TERM CURRENT USE OF INSULIN: ICD-10-CM

## 2018-06-25 DIAGNOSIS — E08.40 DIABETES MELLITUS DUE TO UNDERLYING CONDITION, CONTROLLED, WITH DIABETIC NEUROPATHY, WITHOUT LONG-TERM CURRENT USE OF INSULIN: ICD-10-CM

## 2018-06-25 DIAGNOSIS — I10 ESSENTIAL HYPERTENSION: Primary | ICD-10-CM

## 2018-06-25 DIAGNOSIS — I10 ESSENTIAL HYPERTENSION: ICD-10-CM

## 2018-06-25 DIAGNOSIS — M25.521 ELBOW PAIN, RIGHT: ICD-10-CM

## 2018-06-25 LAB
ANION GAP SERPL CALC-SCNC: 6 MMOL/L
BUN SERPL-MCNC: 39 MG/DL
CALCIUM SERPL-MCNC: 10.3 MG/DL
CHLORIDE SERPL-SCNC: 110 MMOL/L
CHOLEST SERPL-MCNC: 245 MG/DL
CHOLEST/HDLC SERPL: 5.3 {RATIO}
CO2 SERPL-SCNC: 22 MMOL/L
CREAT SERPL-MCNC: 1.9 MG/DL
EST. GFR  (AFRICAN AMERICAN): 41.5 ML/MIN/1.73 M^2
EST. GFR  (NON AFRICAN AMERICAN): 35.9 ML/MIN/1.73 M^2
ESTIMATED AVG GLUCOSE: 120 MG/DL
GLUCOSE SERPL-MCNC: 91 MG/DL
HBA1C MFR BLD HPLC: 5.8 %
HDLC SERPL-MCNC: 46 MG/DL
HDLC SERPL: 18.8 %
LDLC SERPL CALC-MCNC: 158.4 MG/DL
NONHDLC SERPL-MCNC: 199 MG/DL
POTASSIUM SERPL-SCNC: 5.9 MMOL/L
SODIUM SERPL-SCNC: 138 MMOL/L
TRIGL SERPL-MCNC: 203 MG/DL

## 2018-06-25 PROCEDURE — 99214 OFFICE O/P EST MOD 30 MIN: CPT | Mod: S$PBB,,, | Performed by: INTERNAL MEDICINE

## 2018-06-25 PROCEDURE — 80061 LIPID PANEL: CPT

## 2018-06-25 PROCEDURE — 99999 PR PBB SHADOW E&M-EST. PATIENT-LVL III: CPT | Mod: PBBFAC,,, | Performed by: INTERNAL MEDICINE

## 2018-06-25 PROCEDURE — 80048 BASIC METABOLIC PNL TOTAL CA: CPT

## 2018-06-25 PROCEDURE — 99213 OFFICE O/P EST LOW 20 MIN: CPT | Mod: PBBFAC,PO | Performed by: INTERNAL MEDICINE

## 2018-06-25 PROCEDURE — 36415 COLL VENOUS BLD VENIPUNCTURE: CPT | Mod: PO

## 2018-06-25 PROCEDURE — 83036 HEMOGLOBIN GLYCOSYLATED A1C: CPT

## 2018-06-25 RX ORDER — SILDENAFIL 50 MG/1
50 TABLET, FILM COATED ORAL DAILY PRN
Qty: 3 TABLET | Refills: 3 | Status: SHIPPED | OUTPATIENT
Start: 2018-06-25 | End: 2018-07-26 | Stop reason: ALTCHOICE

## 2018-06-25 NOTE — PROGRESS NOTES
Subjective:       Patient ID: Erick Valdovinos is a 67 y.o. male.    Chief Complaint: Follow-up (6 month); Back Pain; Hip Pain; Abdominal Pain; Tingling (legs & bottom of feet); Neck Pain; and Elbow Pain (right)  HISTORY OF PRESENT ILLNESS:  This is a 67-year-old white male coming in for   periodic exam.  He recently took an 800-mile motorcycle trip through [place] and   while he was on that developed problems with his neck, elbow and back, which   were all better since he has not done that recently.  Blood sugars run in the   100 to 130 range.  He does occasionally wake up early in the morning with   umbilical pain.  It is very brief, lasting less than 5 minutes.  No other   attendant abdominal problems.  The patient has multiple medical problems, mostly   related to his diabetes and type IV hyperlipidemia.  The patient has had   pancreatitis in the past, none recently.    PHYSICAL EXAMINATION:  VITAL SIGNS:  Normal.  SKIN:  Fair and healthy.  HEENT:  Clear.  CHEST:  Clear.  HEART:  There is no murmur or gallop.  ABDOMEN:  Nontender.  He does not have an umbilical hernia.  NEUROLOGIC:  Appears normal.    IMPRESSION AND PLAN:  1.  Diabetes, appears to be well controlled.  2.  Erectile dysfunction, wanted to get Viagra, which was arranged.  3.  Type IV hyperlipidemia.  4.  History of pancreatitis.  5.  Recent injury of right elbow.  6.  Cervical radiculitis, resolved.  7.  Abdominal pain in the periumbilical area, etiology uncertain.  The patient   had blood work ordered, which is back showing lipids minimally elevated and   hemoglobin A1c 5.8.  His creatinine has risen to 1.9, so I am going to tell him   to push fluids.  We will repeat his BMP in 2 weeks.  This is probably from his   recent motorcycle trip.      JDC/IN  dd: 06/28/2018 00:16:50 (CDT)  td: 06/28/2018 20:08:51 (CDT)  Doc ID   #[id number]  Job ID #[id number]    CC:     Dict  [id number]  HPI  Review of Systems   Constitutional: Negative for activity change, appetite  change, fatigue and unexpected weight change.   HENT: Negative for dental problem, hearing loss, mouth sores, postnasal drip and sinus pressure.    Eyes: Negative for discharge and visual disturbance.   Respiratory: Negative for cough and shortness of breath.    Cardiovascular: Negative for chest pain and palpitations.   Gastrointestinal: Positive for abdominal pain. Negative for blood in stool, constipation, diarrhea and nausea.   Genitourinary: Negative for dysuria, hematuria and testicular pain.   Musculoskeletal: Positive for arthralgias, back pain, myalgias and neck pain. Negative for joint swelling.   Skin: Negative for rash.   Neurological: Negative for weakness and headaches.   Psychiatric/Behavioral: Negative for agitation and sleep disturbance.       Objective:      Physical Exam   Constitutional: He is oriented to person, place, and time. He appears well-developed and well-nourished.   HENT:   Head: Normocephalic.   Right Ear: External ear normal.   Left Ear: External ear normal.   Mouth/Throat: Oropharynx is clear and moist.   Eyes: EOM are normal. Pupils are equal, round, and reactive to light. Right eye exhibits no discharge.   Neck: Normal range of motion. No JVD present. No tracheal deviation present. No thyromegaly present.   Cardiovascular: Normal rate, regular rhythm, normal heart sounds and intact distal pulses.  Exam reveals no gallop.    No murmur heard.  Pulmonary/Chest: Effort normal and breath sounds normal. He has no wheezes. He has no rales.   Abdominal: Soft. Bowel sounds are normal. He exhibits no mass. There is no tenderness.   Genitourinary: Prostate normal and penis normal. Rectal exam shows guaiac negative stool.   Musculoskeletal: Normal range of motion. He exhibits no edema.   Lymphadenopathy:     He has no cervical adenopathy.   Neurological: He is oriented to person, place, and time. He displays normal reflexes. No cranial nerve deficit. Coordination normal.   Skin: Skin is warm.  No rash noted. No pallor.   Psychiatric: He has a normal mood and affect.       Assessment:       1. Essential hypertension    2. Type 2 diabetes mellitus with microalbuminuria, without long-term current use of insulin    3. Cervical radiculitis    4. Elbow pain, right    5. Acute midline low back pain without sciatica    6. Hyperlipidemia type IV    7. Diabetes mellitus due to underlying condition, controlled, with diabetic neuropathy, without long-term current use of insulin        Plan:

## 2018-06-28 ENCOUNTER — TELEPHONE (OUTPATIENT)
Dept: INTERNAL MEDICINE | Facility: CLINIC | Age: 68
End: 2018-06-28

## 2018-06-28 DIAGNOSIS — N28.9 RENAL DYSFUNCTION: Primary | ICD-10-CM

## 2018-06-28 NOTE — TELEPHONE ENCOUNTER
His renal function is off likely related to recent [employer] trip and should drink a lot of fluids and repeat BMP in 2 weeks

## 2018-07-26 ENCOUNTER — TELEPHONE (OUTPATIENT)
Dept: INTERNAL MEDICINE | Facility: CLINIC | Age: 68
End: 2018-07-26

## 2018-07-26 RX ORDER — SILDENAFIL CITRATE 20 MG/1
20 TABLET ORAL DAILY PRN
Qty: 90 TABLET | Refills: 11 | Status: SHIPPED | OUTPATIENT
Start: 2018-07-26 | End: 2019-02-11 | Stop reason: SDUPTHER

## 2018-07-26 NOTE — TELEPHONE ENCOUNTER
----- Message from Loulou Murphy sent at 7/26/2018  2:48 PM CDT -----  Contact: Patient [phone]  New refill request    The patient said the sildenafil (VIAGRA) 50 MG tablet is too expense and he requested that you prescribe Revatio 20 mg, 30 tablets instead.    Pharmacy: [id number] - [place] - [address] [facility] [phone] (Phone) [phone] (Fax)    He also want you to put in the directions how many he can take per day maximum.

## 2018-08-03 ENCOUNTER — LAB VISIT (OUTPATIENT)
Dept: LAB | Facility: HOSPITAL | Age: 68
End: 2018-08-03
Attending: INTERNAL MEDICINE
Payer: MEDICARE

## 2018-08-03 DIAGNOSIS — N28.9 RENAL DYSFUNCTION: ICD-10-CM

## 2018-08-03 LAB
ANION GAP SERPL CALC-SCNC: 7 MMOL/L
BUN SERPL-MCNC: 29 MG/DL
CALCIUM SERPL-MCNC: 10 MG/DL
CHLORIDE SERPL-SCNC: 106 MMOL/L
CO2 SERPL-SCNC: 23 MMOL/L
CREAT SERPL-MCNC: 1.8 MG/DL
EST. GFR  (AFRICAN AMERICAN): 44.3 ML/MIN/1.73 M^2
EST. GFR  (NON AFRICAN AMERICAN): 38.4 ML/MIN/1.73 M^2
GLUCOSE SERPL-MCNC: 128 MG/DL
POTASSIUM SERPL-SCNC: 5.4 MMOL/L
SODIUM SERPL-SCNC: 136 MMOL/L

## 2018-08-03 PROCEDURE — 80048 BASIC METABOLIC PNL TOTAL CA: CPT

## 2018-08-03 PROCEDURE — 36415 COLL VENOUS BLD VENIPUNCTURE: CPT | Mod: PO

## 2018-08-06 ENCOUNTER — TELEPHONE (OUTPATIENT)
Dept: INTERNAL MEDICINE | Facility: CLINIC | Age: 68
End: 2018-08-06

## 2018-08-06 NOTE — TELEPHONE ENCOUNTER
----- Message from Priya Randall sent at 8/6/2018  9:03 AM CDT -----  Contact: Pt Mobile/Home [phone]  Patient would like to get test results    Name of test (lab, mammo, etc.):   Fasting labs     Date of test:  08/03/2018    Ordering provider: Doctor Brandon Kinney     Where was the test performed:  At the [place] location     Would you prefer a response via [address]?:  No    Comments:  Patient would like a phone call

## 2018-08-06 NOTE — TELEPHONE ENCOUNTER
He is worried about 8/3 labs.  I told him the numbers. They are improving.  He will continue plenty water.    I told him once dr villafana has reviewed he will let me know when to repeat or other needed and I will be in touch.    He doesn't use [employer] so disenrolled.

## 2018-08-13 ENCOUNTER — TELEPHONE (OUTPATIENT)
Dept: INTERNAL MEDICINE | Facility: CLINIC | Age: 68
End: 2018-08-13

## 2018-08-13 DIAGNOSIS — R80.9 TYPE 2 DIABETES MELLITUS WITH MICROALBUMINURIA, WITHOUT LONG-TERM CURRENT USE OF INSULIN: ICD-10-CM

## 2018-08-13 DIAGNOSIS — I10 ESSENTIAL HYPERTENSION: ICD-10-CM

## 2018-08-13 DIAGNOSIS — E11.29 TYPE 2 DIABETES MELLITUS WITH MICROALBUMINURIA, WITHOUT LONG-TERM CURRENT USE OF INSULIN: ICD-10-CM

## 2018-08-13 DIAGNOSIS — N28.9 RENAL DYSFUNCTION: Primary | ICD-10-CM

## 2018-08-13 DIAGNOSIS — E08.40 DIABETES MELLITUS DUE TO UNDERLYING CONDITION, CONTROLLED, WITH DIABETIC NEUROPATHY, WITHOUT LONG-TERM CURRENT USE OF INSULIN: ICD-10-CM

## 2018-08-14 NOTE — TELEPHONE ENCOUNTER
He does need to push fluids and avoid heat.  His renal function has gotten slightly better but still needs to improve, so repeat in 6 weeks bmp

## 2018-08-21 ENCOUNTER — LAB VISIT (OUTPATIENT)
Dept: LAB | Facility: HOSPITAL | Age: 68
End: 2018-08-21
Attending: INTERNAL MEDICINE
Payer: MEDICARE

## 2018-08-21 ENCOUNTER — OFFICE VISIT (OUTPATIENT)
Dept: INTERNAL MEDICINE | Facility: CLINIC | Age: 68
End: 2018-08-21
Payer: MEDICARE

## 2018-08-21 VITALS
HEIGHT: 68 IN | BODY MASS INDEX: 36.19 KG/M2 | RESPIRATION RATE: 18 BRPM | SYSTOLIC BLOOD PRESSURE: 121 MMHG | WEIGHT: 238.75 LBS | DIASTOLIC BLOOD PRESSURE: 89 MMHG | HEART RATE: 75 BPM | TEMPERATURE: 98 F

## 2018-08-21 DIAGNOSIS — E11.29 TYPE 2 DIABETES MELLITUS WITH MICROALBUMINURIA, WITHOUT LONG-TERM CURRENT USE OF INSULIN: ICD-10-CM

## 2018-08-21 DIAGNOSIS — E08.40 DIABETES MELLITUS DUE TO UNDERLYING CONDITION, CONTROLLED, WITH DIABETIC NEUROPATHY, WITHOUT LONG-TERM CURRENT USE OF INSULIN: ICD-10-CM

## 2018-08-21 DIAGNOSIS — R80.9 TYPE 2 DIABETES MELLITUS WITH MICROALBUMINURIA, WITHOUT LONG-TERM CURRENT USE OF INSULIN: ICD-10-CM

## 2018-08-21 DIAGNOSIS — E11.42 TYPE 2 DIABETES MELLITUS WITH DIABETIC POLYNEUROPATHY, WITHOUT LONG-TERM CURRENT USE OF INSULIN: ICD-10-CM

## 2018-08-21 DIAGNOSIS — Z87.19 HISTORY OF PANCREATITIS: ICD-10-CM

## 2018-08-21 DIAGNOSIS — E78.1 HYPERLIPIDEMIA TYPE IV: ICD-10-CM

## 2018-08-21 DIAGNOSIS — K21.9 GASTROESOPHAGEAL REFLUX DISEASE WITHOUT ESOPHAGITIS: ICD-10-CM

## 2018-08-21 DIAGNOSIS — N28.9 RENAL DYSFUNCTION: Primary | ICD-10-CM

## 2018-08-21 DIAGNOSIS — R10.31 RIGHT LOWER QUADRANT ABDOMINAL PAIN: ICD-10-CM

## 2018-08-21 DIAGNOSIS — I10 ESSENTIAL HYPERTENSION: ICD-10-CM

## 2018-08-21 DIAGNOSIS — N28.9 RENAL DYSFUNCTION: ICD-10-CM

## 2018-08-21 LAB
ALBUMIN SERPL BCP-MCNC: 4.2 G/DL
ALP SERPL-CCNC: 47 U/L
ALT SERPL W/O P-5'-P-CCNC: 27 U/L
AMYLASE SERPL-CCNC: 47 U/L
ANION GAP SERPL CALC-SCNC: 8 MMOL/L
AST SERPL-CCNC: 21 U/L
BASOPHILS # BLD AUTO: 0.06 K/UL
BASOPHILS NFR BLD: 0.7 %
BILIRUB SERPL-MCNC: 0.6 MG/DL
BUN SERPL-MCNC: 29 MG/DL
CALCIUM SERPL-MCNC: 10.3 MG/DL
CHLORIDE SERPL-SCNC: 108 MMOL/L
CO2 SERPL-SCNC: 21 MMOL/L
CREAT SERPL-MCNC: 1.8 MG/DL
DIFFERENTIAL METHOD: ABNORMAL
EOSINOPHIL # BLD AUTO: 0.6 K/UL
EOSINOPHIL NFR BLD: 6.7 %
ERYTHROCYTE [DISTWIDTH] IN BLOOD BY AUTOMATED COUNT: 11.9 %
EST. GFR  (AFRICAN AMERICAN): 44.3 ML/MIN/1.73 M^2
EST. GFR  (NON AFRICAN AMERICAN): 38.4 ML/MIN/1.73 M^2
GLUCOSE SERPL-MCNC: 64 MG/DL
HCT VFR BLD AUTO: 38.1 %
HGB BLD-MCNC: 12.5 G/DL
IMM GRANULOCYTES # BLD AUTO: 0.06 K/UL
IMM GRANULOCYTES NFR BLD AUTO: 0.7 %
LYMPHOCYTES # BLD AUTO: 2.5 K/UL
LYMPHOCYTES NFR BLD: 29.2 %
MCH RBC QN AUTO: 31.5 PG
MCHC RBC AUTO-ENTMCNC: 32.8 G/DL
MCV RBC AUTO: 96 FL
MONOCYTES # BLD AUTO: 1 K/UL
MONOCYTES NFR BLD: 10.9 %
NEUTROPHILS # BLD AUTO: 4.5 K/UL
NEUTROPHILS NFR BLD: 51.8 %
NRBC BLD-RTO: 0 /100 WBC
PLATELET # BLD AUTO: 251 K/UL
PMV BLD AUTO: 10.8 FL
POTASSIUM SERPL-SCNC: 5.5 MMOL/L
PROT SERPL-MCNC: 7.3 G/DL
RBC # BLD AUTO: 3.97 M/UL
SODIUM SERPL-SCNC: 137 MMOL/L
WBC # BLD AUTO: 8.7 K/UL

## 2018-08-21 PROCEDURE — 99214 OFFICE O/P EST MOD 30 MIN: CPT | Mod: S$PBB,,, | Performed by: INTERNAL MEDICINE

## 2018-08-21 PROCEDURE — 99213 OFFICE O/P EST LOW 20 MIN: CPT | Mod: PBBFAC,PO | Performed by: INTERNAL MEDICINE

## 2018-08-21 PROCEDURE — 85025 COMPLETE CBC W/AUTO DIFF WBC: CPT

## 2018-08-21 PROCEDURE — 80053 COMPREHEN METABOLIC PANEL: CPT

## 2018-08-21 PROCEDURE — 99999 PR PBB SHADOW E&M-EST. PATIENT-LVL III: CPT | Mod: PBBFAC,,, | Performed by: INTERNAL MEDICINE

## 2018-08-21 PROCEDURE — 82150 ASSAY OF AMYLASE: CPT

## 2018-08-21 PROCEDURE — 36415 COLL VENOUS BLD VENIPUNCTURE: CPT | Mod: PO

## 2018-08-23 ENCOUNTER — HOSPITAL ENCOUNTER (OUTPATIENT)
Dept: RADIOLOGY | Facility: HOSPITAL | Age: 68
Discharge: HOME | End: 2018-08-23
Attending: INTERNAL MEDICINE
Payer: MEDICARE

## 2018-08-23 DIAGNOSIS — R80.9 TYPE 2 DIABETES MELLITUS WITH MICROALBUMINURIA, WITHOUT LONG-TERM CURRENT USE OF INSULIN: ICD-10-CM

## 2018-08-23 DIAGNOSIS — R10.31 RIGHT LOWER QUADRANT ABDOMINAL PAIN: ICD-10-CM

## 2018-08-23 DIAGNOSIS — N28.9 RENAL DYSFUNCTION: ICD-10-CM

## 2018-08-23 DIAGNOSIS — E11.29 TYPE 2 DIABETES MELLITUS WITH MICROALBUMINURIA, WITHOUT LONG-TERM CURRENT USE OF INSULIN: ICD-10-CM

## 2018-08-23 DIAGNOSIS — I10 ESSENTIAL HYPERTENSION: ICD-10-CM

## 2018-08-23 DIAGNOSIS — Z87.19 HISTORY OF PANCREATITIS: ICD-10-CM

## 2018-08-23 PROCEDURE — 74176 CT ABD & PELVIS W/O CONTRAST: CPT | Mod: TC

## 2018-08-23 PROCEDURE — 74176 CT ABD & PELVIS W/O CONTRAST: CPT | Mod: 26,,, | Performed by: RADIOLOGY

## 2018-08-23 PROCEDURE — 72192 CT PELVIS W/O DYE: CPT | Mod: TC

## 2018-08-26 NOTE — PROGRESS NOTES
Subjective:       Patient ID: Erick Valdovinos is a 67 y.o. male.    Chief Complaint: Abdominal Pain (more pian on the right side); Back Pain; Cough (persistant & dry); and Ear Fullness  HISTORY OF PRESENT ILLNESS:  A 67-year-old white male patient coming in for   several problems.  The first is four months ago, he had lower abdominal pain,   right lower quadrant and also in the midline below the umbilicus.  That seemed   to go away and then one month ago awaken him from sleep at 4:00 a.m.  He has had   it at least one time a day since then, lasting 3-5 minutes.  He has had kidney   stones when he was in [place] two years ago on the right side.  The patient does   not have any problems with urinating.  He has not had any change in the color   of his urine.  He has had no back pain.  He has been moving his bowels   regularly.  The patient is also on a recent cough that seems to be clearing that   was relatively brief.  The patient is a diabetic, has severe hyperlipidemia,   type IV with pancreatitis in the past.    PHYSICAL EXAMINATION:  VITAL SIGNS:  Normal including temperature.  SKIN:  Fair and healthy.  CHEST:  Clear.  NECK:  He has no back pain.  ABDOMEN:  Not distended.  He is somewhat obese.  He has no mass, organomegaly,   guarding pain or fullness.    Abdominal pain, etiology uncertain, could be another kidney stones.  So, I have   arranged for him to get some lab done, which is back showing normal urinalysis.    Chemistries show a creatinine of 1.8, BUN 29.  Glucose was low at that time.    Potassium was 5.4, but otherwise his chemistries were normal.  CBC showed a   white count of 33,970 with a normal differential and his hematocrit was normal.    Amylase 47.  The scan showed no stones.  It did show fatty liver and   cholelithiasis and was read out as having nonspecific induration of the small   bowel mesentery.    IMPRESSION:  1.  Abdominal pain, etiology uncertain.  2.  Severe diabetes.  3.  Severe type  IV hyperlipidemia.  4.  History of pancreatitis, does not appear to have that currently.  5.  Abdominal pain, etiology uncertain may be related to his mesentery   panniculitis.  If he continues to have the next step would be him to see   Surgery.      JDC/HN  dd: 08/26/2018 15:14:15 (CDT)  td: 08/27/2018 01:18:33 (CDT)  Doc ID   #[id number]  Job ID #[id number]    CC:     Dict [id number]  HPI  Review of Systems   Constitutional: Negative for activity change, appetite change, fatigue and unexpected weight change.   HENT: Negative for dental problem, hearing loss, mouth sores, postnasal drip and sinus pressure.    Eyes: Negative for discharge and visual disturbance.   Respiratory: Negative for cough and shortness of breath.    Cardiovascular: Negative for chest pain and palpitations.   Gastrointestinal: Positive for abdominal pain. Negative for blood in stool, constipation, diarrhea and nausea.   Genitourinary: Negative for dysuria, hematuria and testicular pain.   Musculoskeletal: Negative for arthralgias, back pain, joint swelling and neck pain.   Skin: Negative for rash.   Neurological: Negative for weakness and headaches.   Psychiatric/Behavioral: Negative for agitation and sleep disturbance.       Objective:      Physical Exam   Constitutional: He is oriented to person, place, and time. He appears well-developed and well-nourished.   HENT:   Head: Normocephalic.   Right Ear: External ear normal.   Left Ear: External ear normal.   Mouth/Throat: Oropharynx is clear and moist.   Eyes: EOM are normal. Pupils are equal, round, and reactive to light. Right eye exhibits no discharge.   Neck: Normal range of motion. No JVD present. No tracheal deviation present. No thyromegaly present.   Cardiovascular: Normal rate, regular rhythm, normal heart sounds and intact distal pulses. Exam reveals no gallop.   No murmur heard.  Pulmonary/Chest: Effort normal and breath sounds normal. He has no wheezes. He has no rales.   Abdominal: Soft.  Bowel sounds are normal. He exhibits no mass. There is no tenderness.   Genitourinary: Prostate normal and penis normal. Rectal exam shows guaiac negative stool.   Musculoskeletal: Normal range of motion. He exhibits no edema.   Lymphadenopathy:     He has no cervical adenopathy.   Neurological: He is oriented to person, place, and time. He displays normal reflexes. No cranial nerve deficit. Coordination normal.   Skin: Skin is warm. No rash noted. No pallor.   Psychiatric: He has a normal mood and affect.       Assessment:       1. Renal dysfunction    2. Right lower quadrant abdominal pain    3. Type 2 diabetes mellitus with microalbuminuria, without long-term current use of insulin    4. Diabetes mellitus due to underlying condition, controlled, with diabetic neuropathy, without long-term current use of insulin    5. Gastroesophageal reflux disease without esophagitis    6. History of pancreatitis    7. Type 2 diabetes mellitus with diabetic polyneuropathy, without long-term current use of insulin    8. Hyperlipidemia type IV    9. Essential hypertension        Plan:

## 2018-08-27 ENCOUNTER — TELEPHONE (OUTPATIENT)
Dept: INTERNAL MEDICINE | Facility: CLINIC | Age: 68
End: 2018-08-27

## 2018-08-27 DIAGNOSIS — R10.30 LOWER ABDOMINAL PAIN: Primary | ICD-10-CM

## 2018-08-27 NOTE — TELEPHONE ENCOUNTER
I gave him 's comments. He will continue fluids.    You want him to see general surgery?  When should we retest kidney function?    Please advise.  Thanks haven

## 2018-08-27 NOTE — TELEPHONE ENCOUNTER
----- Message from Brandon Kinney MD sent at 8/26/2018 11:12 AM CDT -----  He has lab that is ok but for reduce renal function and should be working on increased fluid intake.  His scan shows fatty liver and possibly some inflammation in area around intestines.  If this persists, next step is to see surgery for evaluation

## 2018-08-31 NOTE — TELEPHONE ENCOUNTER
----- Message from Christine Salazar sent at 8/31/2018  8:04 AM CDT -----  Contact: Self [phone]  Patient would like to get test results    Name of test (lab, mammo, etc.):   Labs    Date of test:  8/3    Ordering provider: Dr. Kinney    Where was the test performed:  [facility]    Would you prefer a response via [employer]?:  No     Comments:  Pt will like to speak to the nurse.

## 2018-08-31 NOTE — TELEPHONE ENCOUNTER
Dr villafana discussed message before he left clinic.  He said see gen surg if still having ab pain and repeat labs at next visit with us/ December.    I told patient.  I entered referral and sent clare aly to help him book appt.

## 2018-09-06 ENCOUNTER — OFFICE VISIT (OUTPATIENT)
Dept: INTERNAL MEDICINE | Facility: CLINIC | Age: 68
End: 2018-09-06
Payer: COMMERCIAL

## 2018-09-06 ENCOUNTER — LAB VISIT (OUTPATIENT)
Dept: LAB | Facility: HOSPITAL | Age: 68
End: 2018-09-06
Attending: INTERNAL MEDICINE
Payer: COMMERCIAL

## 2018-09-06 VITALS
TEMPERATURE: 98 F | HEART RATE: 75 BPM | SYSTOLIC BLOOD PRESSURE: 130 MMHG | RESPIRATION RATE: 18 BRPM | DIASTOLIC BLOOD PRESSURE: 88 MMHG | WEIGHT: 236.75 LBS | HEIGHT: 68 IN | BODY MASS INDEX: 35.88 KG/M2

## 2018-09-06 DIAGNOSIS — R10.31 RIGHT LOWER QUADRANT ABDOMINAL PAIN: ICD-10-CM

## 2018-09-06 DIAGNOSIS — Z12.11 SCREENING FOR COLORECTAL CANCER: ICD-10-CM

## 2018-09-06 DIAGNOSIS — K76.0 FATTY LIVER: ICD-10-CM

## 2018-09-06 DIAGNOSIS — E78.1 HYPERLIPIDEMIA TYPE IV: ICD-10-CM

## 2018-09-06 DIAGNOSIS — E08.40 DIABETES MELLITUS DUE TO UNDERLYING CONDITION, CONTROLLED, WITH DIABETIC NEUROPATHY, WITHOUT LONG-TERM CURRENT USE OF INSULIN: ICD-10-CM

## 2018-09-06 DIAGNOSIS — Z12.12 SCREENING FOR COLORECTAL CANCER: ICD-10-CM

## 2018-09-06 DIAGNOSIS — M54.2 PAIN IN NECK: Primary | ICD-10-CM
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
## 2018-09-06 LAB
ALBUMIN SERPL BCP-MCNC: 4.2 G/DL
ALP SERPL-CCNC: 43 U/L
ALT SERPL W/O P-5'-P-CCNC: 25 U/L
ANION GAP SERPL CALC-SCNC: 8 MMOL/L
AST SERPL-CCNC: 26 U/L
BILIRUB SERPL-MCNC: 0.7 MG/DL
BUN SERPL-MCNC: 30 MG/DL
CALCIUM SERPL-MCNC: 10.5 MG/DL
CHLORIDE SERPL-SCNC: 110 MMOL/L
CO2 SERPL-SCNC: 21 MMOL/L
CREAT SERPL-MCNC: 1.9 MG/DL
EST. GFR  (AFRICAN AMERICAN): 41.5 ML/MIN/1.73 M^2
EST. GFR  (NON AFRICAN AMERICAN): 35.9 ML/MIN/1.73 M^2
GLUCOSE SERPL-MCNC: 109 MG/DL
POTASSIUM SERPL-SCNC: 5.4 MMOL/L
PROT SERPL-MCNC: 7.3 G/DL
SODIUM SERPL-SCNC: 139 MMOL/L

## 2018-09-06 PROCEDURE — 36415 COLL VENOUS BLD VENIPUNCTURE: CPT | Mod: PO

## 2018-09-06 PROCEDURE — 99999 PR PBB SHADOW E&M-EST. PATIENT-LVL III: CPT | Mod: PBBFAC,,, | Performed by: INTERNAL MEDICINE

## 2018-09-06 PROCEDURE — 80053 COMPREHEN METABOLIC PANEL: CPT

## 2018-09-06 PROCEDURE — 99213 OFFICE O/P EST LOW 20 MIN: CPT | Mod: PBBFAC,PO | Performed by: INTERNAL MEDICINE

## 2018-09-06 PROCEDURE — 99214 OFFICE O/P EST MOD 30 MIN: CPT | Mod: S$GLB,,, | Performed by: INTERNAL MEDICINE

## 2018-09-06 RX ORDER — ACETAMINOPHEN 160 MG/5ML
SUSPENSION, ORAL (FINAL DOSE FORM) ORAL DAILY
COMMUNITY

## 2018-09-06 RX ORDER — HYDROCODONE BITARTRATE AND ACETAMINOPHEN 5; 325 MG/1; MG/1
1 TABLET ORAL EVERY 6 HOURS PRN
Qty: 60 TABLET | Refills: 0 | Status: SHIPPED | OUTPATIENT
Start: 2018-09-06 | End: 2019-01-01 | Stop reason: SDUPTHER

## 2018-09-07 ENCOUNTER — TELEPHONE (OUTPATIENT)
Dept: INTERNAL MEDICINE | Facility: CLINIC | Age: 68
End: 2018-09-07

## 2018-09-07 DIAGNOSIS — Z87.19 HISTORY OF PANCREATITIS: ICD-10-CM

## 2018-09-07 DIAGNOSIS — E08.40 DIABETES MELLITUS DUE TO UNDERLYING CONDITION, CONTROLLED, WITH DIABETIC NEUROPATHY, WITHOUT LONG-TERM CURRENT USE OF INSULIN: ICD-10-CM

## 2018-09-07 DIAGNOSIS — N28.9 RENAL DYSFUNCTION: Primary | ICD-10-CM
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
## 2018-09-07 NOTE — TELEPHONE ENCOUNTER
I called and told him your comments.  We went over his numbers. He is seeing gen surgery Monday.  He will continue to work on plenty water daily.  Doesn't like being on hydrocodone .     I sent clare aly to get him in w nephrology asap.

## 2018-09-10 ENCOUNTER — OFFICE VISIT (OUTPATIENT)
Dept: SURGERY | Facility: CLINIC | Age: 68
End: 2018-09-10
Payer: MEDICARE

## 2018-09-10 VITALS
WEIGHT: 238.63 LBS | BODY MASS INDEX: 36.17 KG/M2 | HEART RATE: 75 BPM | HEIGHT: 68 IN | SYSTOLIC BLOOD PRESSURE: 132 MMHG | TEMPERATURE: 98 F | DIASTOLIC BLOOD PRESSURE: 81 MMHG

## 2018-09-10 DIAGNOSIS — K80.20 CALCULUS OF GALLBLADDER WITHOUT CHOLECYSTITIS WITHOUT OBSTRUCTION: ICD-10-CM

## 2018-09-10 PROCEDURE — 99999 PR PBB SHADOW E&M-EST. PATIENT-LVL IV: CPT | Mod: PBBFAC,,, | Performed by: SURGERY

## 2018-09-10 PROCEDURE — 99214 OFFICE O/P EST MOD 30 MIN: CPT | Mod: PBBFAC,PO | Performed by: SURGERY

## 2018-09-10 PROCEDURE — 99202 OFFICE O/P NEW SF 15 MIN: CPT | Mod: S$PBB,,, | Performed by: SURGERY

## 2018-09-10 NOTE — LETTER
September 10, 2018      Brandon Kinney MD  [address] [place]  [place] [address]           [facility]  [address]  [address]th [address] [facility] [address]  Phone: [phone]          Patient: Erick Valdovinos   MR Number: [id number]   YOB: 1950   Date of Visit: 9/10/2018       Dear Dr. Brandon Kinney:    Thank you for referring Erick Valdovinos to me for evaluation. Attached you will find relevant portions of my assessment and plan of care.    If you have questions, please do not hesitate to call me. I look forward to following Erick Valdovinos along with you.    Sincerely,    Cameron Tenorio Jr., MD    Enclosure  CC:  No Recipients    If you would like to receive this communication electronically, please contact [email or web address] or [phone] to request more information on [employer] Link access.    For providers and/or their staff who would like to refer a patient to [facility], please contact us through our one-stop-shop provider referral line, [facility], at [phone].    If you feel you have received this communication in error or would no longer like to receive these types of communications, please e-mail [email or web address]

## 2018-09-10 NOTE — H&P
History & Physical    SUBJECTIVE:     History of Present Illness:  Patient is a 67 y.o. male presents as a referral for intermittent mid abdominal pain. The patient reports symptoms began ~1-2 months ago as intermittent 4/10 sharp abd pain near the umbilicus that would last a few seconds, occasionally radiating around to the back. The symptoms seemed to slowly increase in frequency and duration, occurring daily and lasting up to 5 minutes. He is unaware of any exacerbating or alleviating factors. The pain will occasionally awake him from sleep. He saw his PCP at which time work-up showed unremarkable amylase, LFTs and UA. A non-contrast Renal CT was obtained, as the patient had h/o right kidney stones, which showed possible mild induration of the small bowel mesentery (?mesenteric panniculitis), gallstones but no apparent cholecystitis, a normal appendix, no renal or ureteral calculi and fatty liver changes. Referred for surgical evaluation. Of note the patient does have a personal and family history of severe hypertriglyceridemia; currently his triglycerides are ~200 with medical mgmt. He also has a history of heavy alcohol and caffeine intake which he has ceased since finding out about his fatty liver. Since stopping his abdominal pain has decreased in frequency and severity.    He denies f/c, n/v, obstructive symptoms, jaundice, change in stool or urine color, previous episodes of pancreatitis (though notes show possible pancreatitis in the past possibly from hypertriglyceridemia.             Review of patient's allergies indicates:   Allergen Reactions    Codeine Nausea Only       Current Outpatient Medications   Medication Sig Dispense Refill    amLODIPine (NORVASC) 10 MG tablet Take 1 tablet (10 mg total) by mouth every evening. 90 tablet 3    aspirin (ECOTRIN) 81 MG EC tablet Take 81 mg by mouth once daily.      atorvastatin (LIPITOR) 40 MG tablet TAKE 1 TABLET BY MOUTH EVERY EVENING 30 tablet 6    blood  "sugar diagnostic Strp 1 strip by Misc.(Non-Drug; Combo Route) route once daily. 180 strip 3    blood-glucose meter Misc Use as directed 1 each 0    butabarbital (BUTISOL) 30 mg Tab Take 30 mg by mouth continuous prn.      coenzyme Q10 200 mg capsule Take by mouth.      fenofibrate (TRICOR) 145 MG tablet Take 1 tablet (145 mg total) by mouth every evening. 90 tablet 3    gabapentin (NEURONTIN) 300 MG capsule Take 1-2 capsules (300-600 mg total) by mouth 3 (three) times daily. 60 capsule 2    glimepiride (AMARYL) 4 MG tablet Take 1 tablet (4 mg total) by mouth daily with breakfast. 90 tablet 3    HYDROcodone-acetaminophen (NORCO) 5-325 mg per tablet Take 1 tablet by mouth every 6 (six) hours as needed for Pain. 60 tablet 0    lancets Misc 1 Device by Misc.(Non-Drug; Combo Route) route once daily. 180 each 3    lisinopril (PRINIVIL,ZESTRIL) 40 MG tablet Take 1 tablet (40 mg total) by mouth once daily. 90 tablet 3    meloxicam (MOBIC) 15 MG tablet Take 1 tablet (15 mg total) by mouth once daily. 90 tablet 3    multivitamin (MULTIVITAMIN) per tablet Take 1 tablet by mouth once daily.      omeprazole (PRILOSEC) 20 MG capsule Take 1 capsule (20 mg total) by mouth once daily. 90 capsule 3    pen needle, diabetic (RELION PEN NEEDLES) 32 gauge x 5/32" Ndle Needs levemir flexpen needles. To use once daily 100 each 3    sildenafil (REVATIO) 20 mg Tab Take 1 tablet (20 mg total) by mouth daily as needed. 90 tablet 11    SITagliptan-metformin (JANUMET)  mg per tablet Take 1 tablet by mouth 2 (two) times daily with meals. 180 tablet 3    triamcinolone acetonide 0.1% (KENALOG) 0.1 % cream Apply 1 application topically continuous prn. 454 g 11    bacitracin-neomycin-polymyxin b-hydrocortisone 1 % ointment Apply topically 2 (two) times daily. 15 g 1     No current facility-administered medications for this visit.        Past Medical History:   Diagnosis Date    Diabetes mellitus     Diabetes mellitus, type 2  " "   Esophageal reflux     Hyperlipidemia     Hypertension     Nephrolithiasis     Osteoarthritis      Past Surgical History:   Procedure Laterality Date    hemrrhoidectomy      WRIST FRACTURE SURGERY      WRIST SURGERY       Family History   Problem Relation Age of Onset    Kidney disease Mother     Kidney failure Mother     Hypertension Mother     Heart attack Father     Heart disease Father     Coronary artery disease Father     Hyperlipidemia Father     Coronary artery disease Brother     Hyperlipidemia Brother     Hyperlipidemia Brother      Social History     Tobacco Use    Smoking status: Never Smoker    Smokeless tobacco: Never Used   Substance Use Topics    Alcohol use: Yes     Comment: socially/     Drug use: No        Review of Systems:  Review of Systems   Constitutional: Negative.  Negative for activity change, appetite change, chills, diaphoresis, fatigue, fever and unexpected weight change.   Eyes: Negative.         No jaundice   Respiratory: Negative for apnea, choking, chest tightness and shortness of breath.    Cardiovascular: Negative for chest pain, palpitations and leg swelling.   Gastrointestinal: Positive for abdominal pain (intermittent sharp pain mostly at the umbilicus, occasionaly radiating to the back). Negative for abdominal distention, blood in stool, constipation (but will occasionally "slow down" when taking vicodin for chronic neck pain), diarrhea, nausea and vomiting.   Genitourinary: Positive for frequency. Negative for difficulty urinating, dysuria and hematuria.   Musculoskeletal: Positive for neck pain (chronic) and neck stiffness.   Skin: Negative for color change and pallor.       OBJECTIVE:     Vital Signs (Most Recent)  Temp: 98.3 °F (36.8 °C) (09/10/18 0818)  Pulse: 75 (09/10/18 0818)  BP: 132/81 (09/10/18 0818)  5' 7.5" (1.715 m)  108.3 kg (238 lb 10.4 oz)     Physical Exam:  Physical Exam   Constitutional: He is oriented to person, place, and time. He " "appears well-developed and well-nourished. No distress.   HENT:   Head: Normocephalic and atraumatic.   Eyes: Conjunctivae are normal. No scleral icterus.   Neck: Normal range of motion. Neck supple.   Cardiovascular: Normal rate and regular rhythm.   Pulmonary/Chest: Effort normal and breath sounds normal. No respiratory distress.   Abdominal: Soft. He exhibits no distension and no mass. There is no tenderness. There is no rebound and no guarding. No hernia.   Musculoskeletal: Normal range of motion. He exhibits no edema.   Neurological: He is alert and oriented to person, place, and time.   Skin: Skin is warm and dry. He is not diaphoretic.       Laboratory  CBC: Reviewed  CMP: Reviewed  Urinalysis: Reviewed  Aylase reviewed    Diagnostic Results:  CT Reviewed -   No evidence for obstructive uropathy.  No renal or ureteral calculus.  Hepatomegaly with diffuse fatty infiltration of the liver.  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  - Appendix normal on imaging.   - Pancreatitis in unlikely given intermittent nature and normal amylase  - Mesenteric panniculitis could possibly cause pain but again not likely to be so intermittent. Further imaging may be warranted in the future but as of now I would hold off.   - No kidney stones seen on imaging and UA clean  - Symptomatic cholelithiasis possible etiology of pain but does not fit the "typical" pattern. Given the fact that his symptoms seem to be improving with avoidance of caffeine and alcohol we agreed to hold off on a cholecystectomy. The patient will call and reschedule an appointment if his symptoms fail to improve or worsen. " The patient was given signs and symptoms to look out for including jaundice, change in stools or urine, severe abd pain, etc. He and his wife expressed understanding and their questions were answered.     Cameron Tenorio Jr

## 2018-09-20 ENCOUNTER — TELEPHONE (OUTPATIENT)
Dept: INTERNAL MEDICINE | Facility: CLINIC | Age: 68
End: 2018-09-20

## 2018-09-20 DIAGNOSIS — M54.2 CHRONIC NECK AND BACK PAIN: Primary | ICD-10-CM

## 2018-09-20 DIAGNOSIS — M54.9 CHRONIC NECK AND BACK PAIN: Primary | ICD-10-CM

## 2018-09-20 DIAGNOSIS — G89.29 CHRONIC NECK AND BACK PAIN: Primary | ICD-10-CM

## 2018-09-20 NOTE — TELEPHONE ENCOUNTER
----- Message from Leatha Biswas sent at 9/20/2018  8:47 AM CDT -----  Contact: [phone]  PT states he would like to know if there is an alternative to taking Vicodin and Meloxicam for his neck pains. Pt states he has exsisting kidney and liver condition. PT states he would like to know if there is an injection he can get for his neck pains instead of taking medication. Please call and advise.

## 2018-09-20 NOTE — TELEPHONE ENCOUNTER
He is wondering if there is other medication like a shot  In neck for pain , like ones pain  does,  that is less damaging to kidney .    He is not able to do much because of neck pain.  Stopped vicodin totally and last few days back on one daily. Off meloxicam.  Has appt w/ nephrology 10/4    Please advise.  Thanks haven

## 2018-09-20 NOTE — TELEPHONE ENCOUNTER
Told him 's recommendation.  I told him clare will call him to help book appt.  He is agreeable.    Needs something soon for neck pain.  Labs off for kidney function and needs something to help with pain so can avoid  Vicodin/meloxicam use.

## 2018-09-27 ENCOUNTER — OFFICE VISIT (OUTPATIENT)
Dept: NEPHROLOGY | Facility: CLINIC | Age: 68
End: 2018-09-27
Payer: MEDICARE

## 2018-09-27 ENCOUNTER — LAB VISIT (OUTPATIENT)
Dept: LAB | Facility: HOSPITAL | Age: 68
End: 2018-09-27
Payer: MEDICARE

## 2018-09-27 VITALS
WEIGHT: 241.63 LBS | DIASTOLIC BLOOD PRESSURE: 70 MMHG | HEIGHT: 68 IN | SYSTOLIC BLOOD PRESSURE: 110 MMHG | OXYGEN SATURATION: 98 % | HEART RATE: 77 BPM | BODY MASS INDEX: 36.62 KG/M2

## 2018-09-27 DIAGNOSIS — D63.1 ANEMIA OF CHRONIC RENAL FAILURE, UNSPECIFIED CKD STAGE: Primary | ICD-10-CM

## 2018-09-27 DIAGNOSIS — N18.9 ANEMIA OF CHRONIC RENAL FAILURE, UNSPECIFIED CKD STAGE: Primary | ICD-10-CM

## 2018-09-27 DIAGNOSIS — N18.9 ANEMIA OF CHRONIC RENAL FAILURE, UNSPECIFIED CKD STAGE: ICD-10-CM

## 2018-09-27 DIAGNOSIS — N18.30 CHRONIC KIDNEY DISEASE, STAGE III (MODERATE): ICD-10-CM

## 2018-09-27 DIAGNOSIS — D63.1 ANEMIA OF CHRONIC RENAL FAILURE, UNSPECIFIED CKD STAGE: ICD-10-CM

## 2018-09-27 LAB
BILIRUB UR QL STRIP: NEGATIVE
CLARITY UR REFRACT.AUTO: CLEAR
COLOR UR AUTO: YELLOW
CREAT UR-MCNC: 61 MG/DL
GLUCOSE UR QL STRIP: NEGATIVE
HGB UR QL STRIP: NEGATIVE
KETONES UR QL STRIP: NEGATIVE
LEUKOCYTE ESTERASE UR QL STRIP: NEGATIVE
NITRITE UR QL STRIP: NEGATIVE
PH UR STRIP: 6 [PH] (ref 5–8)
PROT UR QL STRIP: NEGATIVE
PROT UR-MCNC: <7 MG/DL
PROT/CREAT UR: NORMAL MG/G{CREAT}
SP GR UR STRIP: 1.01 (ref 1–1.03)
URN SPEC COLLECT METH UR: NORMAL
UROBILINOGEN UR STRIP-ACNC: NEGATIVE EU/DL

## 2018-09-27 PROCEDURE — 99999 PR PBB SHADOW E&M-EST. PATIENT-LVL V: CPT | Mod: PBBFAC,GC,, | Performed by: INTERNAL MEDICINE

## 2018-09-27 PROCEDURE — 82570 ASSAY OF URINE CREATININE: CPT

## 2018-09-27 PROCEDURE — 99204 OFFICE O/P NEW MOD 45 MIN: CPT | Mod: S$PBB,GC,, | Performed by: INTERNAL MEDICINE

## 2018-09-27 PROCEDURE — 99215 OFFICE O/P EST HI 40 MIN: CPT | Mod: PBBFAC | Performed by: INTERNAL MEDICINE

## 2018-09-27 PROCEDURE — 81003 URINALYSIS AUTO W/O SCOPE: CPT

## 2018-09-27 NOTE — ASSESSMENT & PLAN NOTE
CKD, baseline prior to June sCr of 1.2 - 1.4 w/ no significant proteinuria, except for one outlier in 2015, likely underlying etiology of this baseline is DM nephropathy / ?HTN    Abrupt rise over the past past 10 month to a new baseline over the summer noted for 1.8-1.9 is likely to be secondary addition of meloxicam in June of 2017 causing afferent net renal artery constriction combined with ACE-I causing net efferent renal artery constriction.    Possible , but probably less likely, is progression of established DM nephropathy whose rate of decline does tend to increase with time    Plan:  1) NO NSAIDs, meloxicam stopped two weeks today  2) repeat labs today and would hope for an improvement as he has not taken NSAIDs for two weeks  3) check other CKD labs (see orders) and renal U/S to evaluate baseline CKD    Patient discussed with Dr Lopez

## 2018-09-27 NOTE — PROGRESS NOTES
Subjective:       Patient ID: Erick Valdovinos is a 67 y.o. White male who presents for new evaluation of Chronic Renal Failure    Presents for a new patient visit.  He appears to have baseline CKD IIIa with most recent negative proteinuria, did micralbumin elevated in 2015, he is a diabetic, current A1c is under good control at 5.7, no DM retinopathy, but does have neuropathy, likely from diabetes, sCr baseline prior to June of this year (most recent lab prior in Dec of 2017) with sCr baseline 1.2 -1.4.  In June sCr was at 1.9, noted he was started on meloxicam in December fo 2017 and prior to this was not taking NSAIDs.    PMH/PSH  T2DM  HTN  Overweight  Chronic pain  OA    FH  Non-contributory    SH  Denies smoking, no significant EtOH          Review of Systems   Constitutional: Negative for fever and unexpected weight change.   Respiratory: Negative for cough, chest tightness, shortness of breath and wheezing.    Cardiovascular: Negative for chest pain and leg swelling.   Gastrointestinal: Negative for abdominal distention, abdominal pain, diarrhea and nausea.   Endocrine: Negative for polydipsia.   Genitourinary: Negative for difficulty urinating, dysuria, flank pain, frequency and hematuria.   Musculoskeletal: Negative for arthralgias and myalgias.   Skin: Negative for rash.   Allergic/Immunologic: Negative for immunocompromised state.   Neurological: Negative for dizziness and light-headedness.   Hematological: Negative for adenopathy.   Psychiatric/Behavioral: Negative for confusion.       Objective:      Physical Exam   Constitutional: He is oriented to person, place, and time.   HENT:   Head: Atraumatic.   Neck: No JVD present.   Cardiovascular: Normal rate and regular rhythm. Exam reveals no friction rub.   Pulmonary/Chest: Effort normal and breath sounds normal.   Abdominal: Soft. He exhibits no distension.   Musculoskeletal: He exhibits no edema.   Neurological: He is alert and oriented to person,  place, and time.   Skin: Skin is warm.   Psychiatric: He has a normal mood and affect.       Assessment & Plan:       Chronic kidney disease, stage III (moderate)  CKD, baseline prior to June sCr of 1.2 - 1.4 w/ no significant proteinuria, except for one outlier in 2015, likely underlying etiology of this baseline is DM nephropathy / ?HTN    Abrupt rise over the past past 10 month to a new baseline over the summer noted for 1.8-1.9 is likely to be secondary addition of meloxicam in June of 2017 causing afferent net renal artery constriction combined with ACE-I causing net efferent renal artery constriction.    Possible , but probably less likely, is progression of established DM nephropathy whose rate of decline does tend to increase with time    Plan:  1) NO NSAIDs, meloxicam stopped two weeks today  2) repeat labs today and would hope for an improvement as he has not taken NSAIDs for two weeks  3) check other CKD labs (see orders) and renal U/S to evaluate baseline CKD    Patient discussed with Dr Lopez

## 2018-09-27 NOTE — PROGRESS NOTES
[facility] New Patient Evaluation    Referred by: Brandon Kinney MD   Reason for referral: Chronic neck and back pain     CC: No chief complaint on file.    No flowsheet data found.    HPI: Erick Valdovinos is a 67 y.o. male referred for chronic neck and low back pain though he wishes to focus on his neck pain.  His history is significant for long term NSAID use followed by onset of CKD.  Current Cr is 1.7 and he is avoiding all nephrotoxic agents.  NSAIDS previously provided him excellent relief of his neck pain and enabled him to perform ADLs and other activities such as working on his cars (hobby).  He would like to explore interventions and other kidney-safe medications for management of his symptoms.    Location: neck, bilateral  Severity: Currently: 8/10   Typical Range: 8/10     Exacerbation: 10/10   Onset: 20 yrs ago, insidious  Quality: mostly Aching though punctuated by sharp pains when he turns his head or looks up  Radiation: into shoulders bilaterally  Axial/Extremity Percentage of Pain: into hands arms bilaterally  Exacerbating Factors: flexion, standing and straining  Mitigating Factors: medications  Assoc: denies night fever/night sweats, urinary incontinence, bowel incontinence, significant weight loss, significant motor weakness and loss of sensations    Previous Therapies:  PT: Denies for neck  HEP: Denies  TENS:  Injections: Denies  Surgery: Denies spine sugrgery  Medications:   - NSAIDS:   - MSK Relaxants:   - TCAs:   - SNRIs:   - Topicals:   - Anticonvulsants:  - Opioids:     Current Pain Medications:  1. Norco 7.5-325     Full Medication List:    Current Outpatient Medications:     amLODIPine (NORVASC) 10 MG tablet, Take 1 tablet (10 mg total) by mouth every evening., Disp: 90 tablet, Rfl: 3    aspirin (ECOTRIN) 81 MG EC tablet, Take 81 mg by mouth once daily., Disp: , Rfl:     atorvastatin (LIPITOR) 40 MG tablet, TAKE 1 TABLET BY MOUTH EVERY EVENING, Disp: 30 tablet, Rfl:  "6    bacitracin-neomycin-polymyxin b-hydrocortisone 1 % ointment, Apply topically 2 (two) times daily., Disp: 15 g, Rfl: 1    blood sugar diagnostic Strp, 1 strip by Misc.(Non-Drug; Combo Route) route once daily., Disp: 180 strip, Rfl: 3    blood-glucose meter Misc, Use as directed, Disp: 1 each, Rfl: 0    butabarbital (BUTISOL) 30 mg Tab, Take 30 mg by mouth continuous prn., Disp: , Rfl:     coenzyme Q10 200 mg capsule, Take by mouth., Disp: , Rfl:     fenofibrate (TRICOR) 145 MG tablet, Take 1 tablet (145 mg total) by mouth every evening., Disp: 90 tablet, Rfl: 3    gabapentin (NEURONTIN) 300 MG capsule, Take 1-2 capsules (300-600 mg total) by mouth 3 (three) times daily., Disp: 60 capsule, Rfl: 2    glimepiride (AMARYL) 4 MG tablet, Take 1 tablet (4 mg total) by mouth daily with breakfast., Disp: 90 tablet, Rfl: 3    HYDROcodone-acetaminophen (NORCO) 5-325 mg per tablet, Take 1 tablet by mouth every 6 (six) hours as needed for Pain., Disp: 60 tablet, Rfl: 0    lancets Misc, 1 Device by Misc.(Non-Drug; Combo Route) route once daily., Disp: 180 each, Rfl: 3    lisinopril (PRINIVIL,ZESTRIL) 40 MG tablet, Take 1 tablet (40 mg total) by mouth once daily., Disp: 90 tablet, Rfl: 3    meloxicam (MOBIC) 15 MG tablet, Take 1 tablet (15 mg total) by mouth once daily., Disp: 90 tablet, Rfl: 3    multivitamin (MULTIVITAMIN) per tablet, Take 1 tablet by mouth once daily., Disp: , Rfl:     omeprazole (PRILOSEC) 20 MG capsule, Take 1 capsule (20 mg total) by mouth once daily., Disp: 90 capsule, Rfl: 3    pen needle, diabetic (RELION PEN NEEDLES) 32 gauge x 5/32" Ndle, Needs levemir flexpen needles. To use once daily, Disp: 100 each, Rfl: 3    sildenafil (REVATIO) 20 mg Tab, Take 1 tablet (20 mg total) by mouth daily as needed., Disp: 90 tablet, Rfl: 11    SITagliptan-metformin (JANUMET)  mg per tablet, Take 1 tablet by mouth 2 (two) times daily with meals., Disp: 180 tablet, Rfl: 3    triamcinolone " acetonide 0.1% (KENALOG) 0.1 % cream, Apply 1 application topically continuous prn., Disp: 454 g, Rfl: 11     Review of Systems:  Review of Systems   Constitutional: Negative for chills and fever.   HENT: Negative for nosebleeds.    Eyes: Negative for blurred vision and pain.   Respiratory: Negative for hemoptysis.    Cardiovascular: Negative for chest pain and palpitations.   Gastrointestinal: Negative for heartburn, nausea and vomiting.   Genitourinary: Negative for dysuria and hematuria.   Musculoskeletal: Positive for back pain, joint pain (right shoulder) and neck pain. Negative for myalgias.   Skin: Negative for rash.   Neurological: Negative for seizures and loss of consciousness.   Endo/Heme/Allergies: Does not bruise/bleed easily.   Psychiatric/Behavioral: Negative for hallucinations.       Allergies:  Codeine     Medical History:  Past Medical History:   Diagnosis Date    Diabetes mellitus     Diabetes mellitus, type 2     Esophageal reflux     Hyperlipidemia     Hypertension     Nephrolithiasis     Osteoarthritis         Surgical History:  Past Surgical History:   Procedure Laterality Date    hemrrhoidectomy      WRIST FRACTURE SURGERY      WRIST SURGERY          Social History:  Social History     Socioeconomic History    Marital status:      Spouse name: Not on file    Number of children: Not on file    Years of education: Not on file    Highest education level: Not on file   Social Needs    Financial resource strain: Not on file    Food insecurity - worry: Not on file    Food insecurity - inability: Not on file    Transportation needs - medical: Not on file    Transportation needs - non-medical: Not on file   Occupational History    Not on file   Tobacco Use    Smoking status: Never Smoker    Smokeless tobacco: Never Used   Substance and Sexual Activity    Alcohol use: Yes     Comment: socially/     Drug use: No    Sexual activity: No   Other Topics Concern    Not on  file   Social History Narrative    Not on file       Physical Exam:  There were no vitals filed for this visit.  General    Nursing note and vitals reviewed.  Constitutional: He is oriented to person, place, and time. He appears well-developed and well-nourished. No distress.   HENT:   Head: Normocephalic and atraumatic.   Nose: Nose normal.   Eyes: Conjunctivae and EOM are normal. Pupils are equal, round, and reactive to light. Right eye exhibits no discharge. Left eye exhibits no discharge. No scleral icterus.   Neck: No JVD present.   Cardiovascular: Intact distal pulses.    Pulmonary/Chest: Effort normal. No respiratory distress.   Abdominal: He exhibits no distension.   Neurological: He is alert and oriented to person, place, and time. Coordination normal.   Psychiatric: He has a normal mood and affect. His behavior is normal. Judgment and thought content normal.     General Musculoskeletal Exam   Gait: normal     Back (L-Spine & T-Spine) / Neck (C-Spine) Exam     Tenderness Posterior midline palpation reveals tenderness of the Upper C-Spine and Lower C-Spine. Right paramedian tenderness of the Lower C-Spine and Upper C-Spine. Left paramedian tenderness of the Upper C-Spine and Lower C-Spine.     Back (L-Spine & T-Spine) Range of Motion   Extension: abnormal Back extension: facet loading is positive and exacerabtes/reproduces the patient's typical low back pain    Flexion: abnormal Back flexion: limited ROM but partial relief of low back pain noted.     Neck (C-Spine) Range of Motion   Flexion:     Limited  Extension: Limited  Right Lateral Bend: abnormal  Left Lateral Bend: abnormal  Right Rotation: abnormal  Left Rotation: abnormal    Spinal Sensation   Right Side Sensation  C-Spine Level: normal   L-Spine Level: normal  Left Side Sensation  C-Spine Level: normal  L-Spine Level: normal    Neck (C-Spine) Tests   Spurling's Test   Left:  Negative  Right: negative    Other He has no scoliosis .      Muscle  Strength   Right Upper Extremity   Biceps: 5/5/5   Deltoid:  5/5  Triceps:  5/5  Wrist extension: 55/5   Wrist flexion: 5/   Finger Flexors:  5/5  Finger Extensors:  5/5  Left Upper Extremity  Biceps: 5/5/5   Deltoid:  5/5  Triceps:  5/5  Wrist extension: /   Wrist flexion: 5/5   Finger Flexors:  5/5  Finger Extensors:  5/5  Right Lower Extremity   Hip Flexion: 5/5   Hip Extensors: 5/5  Quadriceps:  5/5   Hamstrin/5   Gastrocsoleus:  /5  Left Lower Extremity   Hip Flexion: 5/5   Hip Extensors: 5/5  Quadriceps:  5/   Hamstrin   Gastrocsoleus:      Reflexes     Left Side  Biceps:  2+  Quadriceps:  2+  Achilles:  2+    Right Side   Biceps:  2+  Quadriceps:  2+  Achilles:  2+      Imaging:  MRI outside 2017 (report scanned into media and disc reviewed in clinic)  Multilevel DDD, osteophyte formation, and facet arthropathy contributing to multilevel neuroforaminal stenosis and varying degrees of central stenosis.  There is no central canal stenosis at C7-T1.    Labs:  BMP  Lab Results   Component Value Date     2018    K 5.4 (H) 2018     2018    CO2 21 (L) 2018    BUN 30 (H) 2018    CREATININE 1.9 (H) 2018    CALCIUM 10.5 2018    ANIONGAP 8 2018    ESTGFRAFRICA 41.5 (A) 2018    EGFRNONAA 35.9 (A) 2018     Lab Results   Component Value Date    ALT 25 2018    AST 26 2018    ALKPHOS 43 (L) 2018    BILITOT 0.7 2018       Assessment:  Problem List Items Addressed This Visit     Myofascial pain syndrome    DDD (degenerative disc disease), cervical    Osteoarthritis of spine with radiculopathy, cervical region    Cervical radiculopathy    Chronic neck pain - Primary          This is a 67-year-old male with chronic arthritic pain in multiple locations including the neck, low back, and right shoulder previously well controlled with meloxicam; however, now he is unable to take NSAIDs due to advancing  chronic kidney disease.  Chronic kidney disease stabilized with a creatinine of 1.7 but he is seeking an alternate regimen for management of his symptoms that does not include NSAIDs.  I have reviewed his outside imaging and have recommended a combination of therapies including physical therapy, cervical epidural steroid injections to treat radicular symptoms, bilateral medial branch block radiofrequency ablation to treat arthritic pain and optimization of remaining therapies such as gabapentin and Tylenol.  Patient stated that his PCP recommended against additional steroids.  I will reach out to him to discuss this recommendation prior to any interventional procedures utilizing steroids.    Treatment Plan:   PT/OT/HEP: I plan to refer him to PT in the near future once pain is better controlled  Procedures: None pending reply from Dr. Kinney.  Message sent to him on 9/28/18 requesting clarification of rec to avoid steroids.   - Plan to start with SHERIF, then bilat cervical MBB.   Medications:    Take tylenol 650 with each Norco for a max of 3,000 mg per day with monitoring on hepatic fxn   Restart gabapentin   Avoid nephrotoxins  Imaging: No additional imaging recommended at this time.  Labs: Reviewed.  Medications are appropriately dosed for current hepatorenal function.    Follow Up: RTC 4-6 weeks    Hernan Murguia Jr, MD  Interventional Pain Medicine / Anesthesiology    Disclaimer: This note was partly generated using dictation software which may occasionally result in transcription errors.

## 2018-09-28 ENCOUNTER — OFFICE VISIT (OUTPATIENT)
Dept: PAIN MEDICINE | Facility: CLINIC | Age: 68
End: 2018-09-28
Payer: COMMERCIAL

## 2018-09-28 VITALS
HEART RATE: 85 BPM | DIASTOLIC BLOOD PRESSURE: 74 MMHG | SYSTOLIC BLOOD PRESSURE: 118 MMHG | BODY MASS INDEX: 37.03 KG/M2 | WEIGHT: 240 LBS

## 2018-09-28 DIAGNOSIS — M79.18 MYOFASCIAL PAIN SYNDROME: ICD-10-CM

## 2018-09-28 DIAGNOSIS — M47.22 OSTEOARTHRITIS OF SPINE WITH RADICULOPATHY, CERVICAL REGION: ICD-10-CM

## 2018-09-28 DIAGNOSIS — G89.29 CHRONIC NECK PAIN: Primary | ICD-10-CM

## 2018-09-28 DIAGNOSIS — M50.30 DDD (DEGENERATIVE DISC DISEASE), CERVICAL: ICD-10-CM

## 2018-09-28 DIAGNOSIS — M54.12 CERVICAL RADICULOPATHY: ICD-10-CM

## 2018-09-28 DIAGNOSIS — M54.2 CHRONIC NECK PAIN: Primary | ICD-10-CM

## 2018-09-28 PROCEDURE — 99214 OFFICE O/P EST MOD 30 MIN: CPT | Mod: S$GLB,,, | Performed by: PAIN MEDICINE

## 2018-09-28 PROCEDURE — 99213 OFFICE O/P EST LOW 20 MIN: CPT | Mod: PBBFAC,PO | Performed by: PAIN MEDICINE

## 2018-09-28 PROCEDURE — 99999 PR PBB SHADOW E&M-EST. PATIENT-LVL III: CPT | Mod: PBBFAC,,, | Performed by: PAIN MEDICINE

## 2018-09-28 RX ORDER — GABAPENTIN 300 MG/1
300-600 CAPSULE ORAL 3 TIMES DAILY
Qty: 60 CAPSULE | Refills: 2 | Status: SHIPPED | OUTPATIENT
Start: 2018-09-28 | End: 2019-01-17 | Stop reason: SDUPTHER

## 2018-09-28 NOTE — LETTER
September 28, 2018      Brandon Kinney MD  [address] [facility] [address]           [facility]  [address]  [facility] [address]  Phone: [phone]          Patient: Erick Valdovinos   MR Number: [id number]   YOB: 1950   Date of Visit: 9/28/2018       Dear Dr. Brandon Kinney:    Thank you for referring Erick Valdovinos to me for evaluation. Attached you will find relevant portions of my assessment and plan of care.    If you have questions, please do not hesitate to call me. I look forward to following Erick Valdovinos along with you.    Sincerely,    Hernan Murguia Jr., MD    Enclosure  CC:  No Recipients    If you would like to receive this communication electronically, please contact [email or web address] or [phone] to request more information on [facility] access.    For providers and/or their staff who would like to refer a patient to [facility], please contact us through our one-stop-shop provider referral line, [facility] , at [phone].    If you feel you have received this communication in error or would no longer like to receive these types of communications, please e-mail [email or web address]

## 2018-10-03 NOTE — PROGRESS NOTES
ATTENDING PHYSICIAN ATTESTATION  I have personally interviewed and examined the patient. I thoroughly reviewed the demographic, clinical, laboratorial and imaging information available in medical records. I agree with the assessment and recommendations provided by the subspecialty resident. Dr. Quan was under my supervision.

## 2018-10-05 ENCOUNTER — TELEPHONE (OUTPATIENT)
Dept: PAIN MEDICINE | Facility: CLINIC | Age: 68
End: 2018-10-05

## 2018-10-05 NOTE — TELEPHONE ENCOUNTER
----- Message from Hernan Murguia Jr., MD sent at 10/5/2018  8:10 AM CDT -----  I haven't heard from Dr. Kinney yet.  Please let the patient know I sent him 2 messages without response and that he should he reach out to Dr. Kinney's office directly.    Hernan Murguia Jr, MD  Interventional Pain Medicine / Anesthesiology    ----- Message -----  From: Renuka Ramirez LPN  Sent: 10/4/2018   5:10 PM  To: Hernan Murguia Jr., MD    Pt was seen in clinic on 9/28/18.  He is inquiring if you have heard back from Dr Kinney?  He states he would like to proceed with injections if possible, prior to his follow up appt scheduled 11/12/18.  Please advise.

## 2018-10-05 NOTE — TELEPHONE ENCOUNTER
Spoke with pt in regards to contacting Dr. Kinney. I informed pt Dr. Murguia stated he reached out to Dr. Kinney twice with no success. I also informed pt Dr. Murguia would like for pt to reach out to Dr. Kinney's office to see if can get a faster response. Pt verbalized understanding.

## 2018-10-08 ENCOUNTER — TELEPHONE (OUTPATIENT)
Dept: INTERNAL MEDICINE | Facility: CLINIC | Age: 68
End: 2018-10-08

## 2018-10-08 NOTE — TELEPHONE ENCOUNTER
----- Message from Jaqueline Choudhurys sent at 10/8/2018  8:49 AM CDT -----  Contact: Erick DIEGOHayley [phone]  Erick states his pain management doctor is recommended a steroid shot to relieve some of the pain he's having. Erick states Dr. Kinney has told him before steroids isn't a good idea due to some other health issues. Erick would like Dr. Kinney and Dr. Hernan Murguia Jr., MD to discuss the procedure as soon as possible.

## 2018-10-08 NOTE — TELEPHONE ENCOUNTER
"I reached patient and told him dr murguia sent dr kinney a message and he will reply as soon as he can.    See dr murguia message  He sent you in your "patient call back" messages.  You can click right on the message line and respond to sender your reply.    His office  note in chart says:      "Treatment Plan:   PT/OT/HEP: I plan to refer him to PT in the near future once pain is better controlled  Procedures: None pending reply from Dr. Kinney.  Message sent to him on 9/28/18 requesting clarification of rec to avoid steroids.              - Plan to start with SHERIF, then bilat cervical MBB.   Medications:               Take tylenol 650 with each Norco for a max of 3,000 mg per day with monitoring on hepatic fxn              Restart gabapentin              Avoid nephrotoxins  Imaging: No additional imaging recommended at this time.  Labs: Reviewed.  Medications are appropriately dosed for current hepatorenal function.  Follow Up: RTC 4-6 weeks    Hernan Murguia Jr, MD"      "

## 2018-10-11 ENCOUNTER — TELEPHONE (OUTPATIENT)
Dept: PAIN MEDICINE | Facility: CLINIC | Age: 68
End: 2018-10-11

## 2018-10-11 NOTE — TELEPHONE ENCOUNTER
Spoke with pt in regards to scheduling a procedure with Dr. Murguia. Pt stated Dr. Kinney is out of town until November and he would like to proceed with the procedure. I explained to pt I'll send a message to Dr. Murguia and if he states it's okay for pt to have procedure I will give him a call back to schedule. Pt verbalized understanding.

## 2018-10-11 NOTE — TELEPHONE ENCOUNTER
----- Message from Becki Serrano sent at 10/9/2018  1:19 PM CDT -----  Contact: Self [phone]  Patient is calling to schedule a procedure. Please advice

## 2018-10-11 NOTE — TELEPHONE ENCOUNTER
Dr. Kinney, the patient's PCP, responded to my message and stated concern for exacerbating pancreatitis with steroids.  We will relay this message to the patient and consent him appropriately.

## 2018-10-11 NOTE — TELEPHONE ENCOUNTER
----- Message from Libia Bal MA sent at 10/11/2018  9:44 AM CDT -----  Pt called stating he spoke with Dr. Kinney's nurse and was told Dr. Kinney is on vacation until November. Pt stated he is in pain and cannot wait to get cleared until November. Pt would like to proceed with procedure (Cervical KOBE). Please advise.

## 2018-10-12 NOTE — TELEPHONE ENCOUNTER
After consultation with several physicians in the Pain group, I have decided to offer this patient an epidural steroid injection for management of his symptoms.  He will be consented appropriately so that he understands there may be exacerbation of his chronic pancreatitis with use of steroids.    Hernan Murguia Jr, MD  Interventional Pain Medicine / Anesthesiology

## 2018-10-16 ENCOUNTER — TELEPHONE (OUTPATIENT)
Dept: PAIN MEDICINE | Facility: CLINIC | Age: 68
End: 2018-10-16

## 2018-10-16 DIAGNOSIS — M54.12 CERVICAL RADICULOPATHY: Primary | ICD-10-CM

## 2018-10-16 NOTE — TELEPHONE ENCOUNTER
Spoke with pt in regards to scheduling a procedure per Dr. Murguia. Pt is scheduled to have a cervical KOBE with Dr. Murguia on 10/30/18. Pt was given procedure instructions over the phone. Pt verbalized understanding and confirmed procedure date.

## 2018-10-26 NOTE — DISCHARGE INSTRUCTIONS
Recovery After Procedural Sedation (Adult)  You have been given medicine by vein to make you sleep during your surgery. This may have included both a pain medicine and sleeping medicine. Most of the effects have worn off. But you may still have some drowsiness for the next 6 to 8 hours.  Home care  Follow these guidelines when you get home:  · For the next 8 hours, you should be watched by a responsible adult. This person should make sure your condition is not getting worse.  · Don't drink any alcohol for the next 24 hours.  · Don't drive, operate dangerous machinery, or make important business or personal decisions during the next 24 hours.  Note: Your healthcare provider may tell you not to take any medicine by mouth for pain or sleep in the next 4 hours. These medicines may react with the medicines you were given in the hospital. This could cause a much stronger response than usual.  Follow-up care  Follow up with your healthcare provider if you are not alert and back to your usual level of activity within 12 hours.  When to seek medical advice  Call your healthcare provider right away if any of these occur:  · Drowsiness gets worse  · Weakness or dizziness gets worse  · Repeated vomiting  · You can't be awakened   Date Last Reviewed: 10/18/2016  © 1798-4812 [employer]. [address] [place] [address], [place] [address]. All rights reserved. This information is not intended as a substitute for professional medical care. Always follow your healthcare professional's instructions.      Home Care Instructions Pain Management:    1.  DIET:    You may resume your normal diet today.    2.  BATHING:    You may shower with luke warm water.    3.  DRESSING:    You may remove your bandage today.    4.  ACTIVITY LEVEL:      You may resume your normal activities 24 hours after your procedure.    5.  MEDICATIONS:    You may resume your normal medications today.    6.  SPECIAL INSTRUCTIONS:    No heat to the injection  site for 24 hours including bath or shower, heating pad, moist heat or hot tubs.    Use an ice pack to the injection site for any pain or discomfort.  Apply ice packs for 20 minute intervals as needed.    If you have received any sedatives by mouth today, you can not drive for 12 hours.    If you have received sedation through an IV, you can not drive for 24 hours.    PLEASE CALL YOUR DOCTOR FOR THE FOLLOWIN.  Redness or swelling around the injection site.  2.  Fever of 101 degrees.  3.  Drainage (pus) from the injection site.  4.  For any continuous bleeding (some dried blood over the incision is normal.)    FOR EMERGENCIES:    If any unusual problems or difficulties occur during clinic hours, call [phone] or dial [phone].    Follow up with with your physician in 2-3 weeks.

## 2018-10-29 ENCOUNTER — TELEPHONE (OUTPATIENT)
Dept: INTERNAL MEDICINE | Facility: CLINIC | Age: 68
End: 2018-10-29

## 2018-10-29 DIAGNOSIS — Z12.5 PROSTATE CANCER SCREENING: ICD-10-CM

## 2018-10-29 DIAGNOSIS — E11.42 DIABETIC POLYNEUROPATHY ASSOCIATED WITH TYPE 2 DIABETES MELLITUS: ICD-10-CM

## 2018-10-29 DIAGNOSIS — I10 BENIGN HYPERTENSION: Primary | ICD-10-CM

## 2018-10-30 ENCOUNTER — HOSPITAL ENCOUNTER (OUTPATIENT)
Facility: HOSPITAL | Age: 68
Discharge: HOME | End: 2018-10-30
Attending: PAIN MEDICINE | Admitting: PAIN MEDICINE
Payer: MEDICARE

## 2018-10-30 VITALS
OXYGEN SATURATION: 100 % | HEART RATE: 76 BPM | WEIGHT: 230 LBS | DIASTOLIC BLOOD PRESSURE: 75 MMHG | SYSTOLIC BLOOD PRESSURE: 139 MMHG | RESPIRATION RATE: 14 BRPM | TEMPERATURE: 98 F | BODY MASS INDEX: 36.1 KG/M2 | HEIGHT: 67 IN

## 2018-10-30 DIAGNOSIS — M54.12 CERVICAL RADICULOPATHY: Primary | ICD-10-CM

## 2018-10-30 DIAGNOSIS — G89.29 CHRONIC PAIN: ICD-10-CM

## 2018-10-30 LAB — POCT GLUCOSE: 122 MG/DL (ref 70–110)

## 2018-10-30 PROCEDURE — 62321 NJX INTERLAMINAR CRV/THRC: CPT | Mod: ,,, | Performed by: PAIN MEDICINE

## 2018-10-30 PROCEDURE — 99152 MOD SED SAME PHYS/QHP 5/>YRS: CPT | Mod: ,,, | Performed by: PAIN MEDICINE

## 2018-10-30 PROCEDURE — 25500020 PHARM REV CODE 255: Performed by: PAIN MEDICINE

## 2018-10-30 PROCEDURE — 62321 NJX INTERLAMINAR CRV/THRC: CPT | Performed by: PAIN MEDICINE

## 2018-10-30 PROCEDURE — 63600175 PHARM REV CODE 636 W HCPCS: Performed by: PAIN MEDICINE

## 2018-10-30 RX ORDER — FENTANYL CITRATE 50 UG/ML
INJECTION, SOLUTION INTRAMUSCULAR; INTRAVENOUS
Status: DISCONTINUED | OUTPATIENT
Start: 2018-10-30 | End: 2018-10-30 | Stop reason: HOSPADM

## 2018-10-30 RX ORDER — DEXAMETHASONE SODIUM PHOSPHATE 10 MG/ML
INJECTION INTRAMUSCULAR; INTRAVENOUS
Status: DISCONTINUED | OUTPATIENT
Start: 2018-10-30 | End: 2018-10-30 | Stop reason: HOSPADM

## 2018-10-30 RX ORDER — SODIUM CHLORIDE 9 MG/ML
500 INJECTION, SOLUTION INTRAVENOUS CONTINUOUS
Status: DISCONTINUED | OUTPATIENT
Start: 2018-10-30 | End: 2018-10-30 | Stop reason: HOSPADM

## 2018-10-30 RX ORDER — MIDAZOLAM HYDROCHLORIDE 1 MG/ML
INJECTION INTRAMUSCULAR; INTRAVENOUS
Status: DISCONTINUED | OUTPATIENT
Start: 2018-10-30 | End: 2018-10-30 | Stop reason: HOSPADM

## 2018-10-30 RX ORDER — LIDOCAINE HYDROCHLORIDE 10 MG/ML
1 INJECTION, SOLUTION EPIDURAL; INFILTRATION; INTRACAUDAL; PERINEURAL ONCE
Status: DISCONTINUED | OUTPATIENT
Start: 2018-10-30 | End: 2018-10-30 | Stop reason: HOSPADM

## 2018-10-30 NOTE — H&P
"[facility]  History & Physical - Short Stay  Pain Management           SUBJECTIVE:     Procedure: Procedure(s) (LRB):  Injection-steroid-epidural-cervical - C7 - T1 (N/A)    Chief Complaint/Reason for Admission:  Cervical radiculopathy [M54.12]    PTA Medications   Medication Sig    amLODIPine (NORVASC) 10 MG tablet Take 1 tablet (10 mg total) by mouth every evening.    atorvastatin (LIPITOR) 40 MG tablet TAKE 1 TABLET BY MOUTH EVERY EVENING    blood sugar diagnostic Strp 1 strip by Misc.(Non-Drug; Combo Route) route once daily.    blood-glucose meter Misc Use as directed    butabarbital (BUTISOL) 30 mg Tab Take 30 mg by mouth continuous prn.    coenzyme Q10 200 mg capsule Take by mouth.    fenofibrate (TRICOR) 145 MG tablet Take 1 tablet (145 mg total) by mouth every evening.    gabapentin (NEURONTIN) 300 MG capsule Take 1-2 capsules (300-600 mg total) by mouth 3 (three) times daily.    glimepiride (AMARYL) 4 MG tablet Take 1 tablet (4 mg total) by mouth daily with breakfast.    HYDROcodone-acetaminophen (NORCO) 5-325 mg per tablet Take 1 tablet by mouth every 6 (six) hours as needed for Pain.    lancets Misc 1 Device by Misc.(Non-Drug; Combo Route) route once daily.    lisinopril (PRINIVIL,ZESTRIL) 40 MG tablet Take 1 tablet (40 mg total) by mouth once daily.    meloxicam (MOBIC) 15 MG tablet Take 1 tablet (15 mg total) by mouth once daily.    multivitamin (MULTIVITAMIN) per tablet Take 1 tablet by mouth once daily.    omeprazole (PRILOSEC) 20 MG capsule Take 1 capsule (20 mg total) by mouth once daily.    pen needle, diabetic (RELION PEN NEEDLES) 32 gauge x 5/32" Ndle Needs levemir flexpen needles. To use once daily    sildenafil (REVATIO) 20 mg Tab Take 1 tablet (20 mg total) by mouth daily as needed.    SITagliptan-metformin (JANUMET)  mg per tablet Take 1 tablet by mouth 2 (two) times daily with meals.    triamcinolone acetonide 0.1% (KENALOG) 0.1 % cream Apply 1 " application topically continuous prn.    aspirin (ECOTRIN) 81 MG EC tablet Take 81 mg by mouth once daily.       Review of patient's allergies indicates:   Allergen Reactions    Codeine Nausea Only       Past Medical History:   Diagnosis Date    Diabetes mellitus     Diabetes mellitus, type 2     Esophageal reflux     Hyperlipidemia     Hypertension     Nephrolithiasis     Osteoarthritis      Past Surgical History:   Procedure Laterality Date    hemrrhoidectomy      WRIST FRACTURE SURGERY      WRIST SURGERY       Family History   Problem Relation Age of Onset    Kidney disease Mother     Kidney failure Mother     Hypertension Mother     Heart attack Father     Heart disease Father     Coronary artery disease Father     Hyperlipidemia Father     Coronary artery disease Brother     Hyperlipidemia Brother     Hyperlipidemia Brother      Social History     Tobacco Use    Smoking status: Never Smoker    Smokeless tobacco: Never Used   Substance Use Topics    Alcohol use: Yes     Comment: socially/     Drug use: No        Review of Systems:  Review of Systems   Constitutional: Negative for chills and fever.   HENT: Negative for nosebleeds.    Eyes: Negative for blurred vision.   Respiratory: Negative for hemoptysis.    Cardiovascular: Negative for chest pain and palpitations.   Gastrointestinal: Negative for heartburn and vomiting.   Genitourinary: Negative for hematuria.   Musculoskeletal: Positive for neck pain. Negative for myalgias.   Skin: Negative for rash.   Neurological: Negative for seizures and loss of consciousness.   Endo/Heme/Allergies: Does not bruise/bleed easily.   Psychiatric/Behavioral: Negative for hallucinations.       OBJECTIVE:     Vital Signs (Most Recent):  Temp: 98.4 °F (36.9 °C) (10/30/18 0944)  Pulse: 72 (10/30/18 1020)  Resp: 16 (10/30/18 1020)  BP: (!) 113/56 (10/30/18 1020)  SpO2: 100 % (10/30/18 1020)    Physical Exam:  General appearance - alert, well appearing,  and in no distress  Mental status - AOx3  Eyes - pupils equal and reactive, extraocular eye movements intact  Heart - normal rate, regular rhythm, normal S1, S2, no murmurs, rubs, clicks or gallops  Chest - clear to auscultation, no wheezes, rales or rhonchi, symmetric air entry  Abdomen - soft, nontender, nondistended, no masses or organomegaly  Neurological - alert, oriented, normal speech, no focal findings or movement disorder noted  Extremities - peripheral pulses normal, no pedal edema, no clubbing or cyanosis        ASSESSMENT/PLAN:     Active Hospital Problems    Diagnosis  POA    Chronic pain [G89.29]  Yes      Resolved Hospital Problems   No resolved problems to display.        The risks and benefits of this intervention, and alternative therapies were discussed with the patient.  The discussion of risks included infection, bleeding, need for additional procedures or surgery, nerve damage, paralysis, adverse medication reaction(s), stroke, and/or death.  Questions regarding the procedure, risks, expected outcome, and possible side effects were solicited and answered to the patient's satisfaction.  Erick wishes to proceed with the injection.  Verbal and written consent were verified.      Proceed with intervention as scheduled.      Hernan Murguia Jr, MD  Interventional Pain Medicine / Anesthesiology

## 2018-10-30 NOTE — DISCHARGE SUMMARY
[facility]  Discharge Note  Short Stay     Admit Date: 10/30/2018    Discharge Date: 10/30/2018     Attending Physician: Hernan Murguia Jr, MD    Diagnoses:  Active Hospital Problems    Diagnosis  POA    *Cervical radiculopathy [M54.12]  Yes    Chronic pain [G89.29]  Yes      Resolved Hospital Problems   No resolved problems to display.     Discharged Condition: Good     Hospital Course: Patient was admitted for an outpatient interventional pain management procedure and tolerated the procedure well with no complications.     Final Diagnoses: Same as principal problem.     Disposition: Home or Self Care     Follow up/Patient Instructions:    Follow-up Information     Hernan Murguia Jr, MD In 2 weeks.    Specialty:  Pain Medicine  Why:  Post-procedural Follow Up As Scheduled, Call to make an appointment if you do not have one  Contact information:  [address]  [address]  [place] [address]  [phone]                   Reconciled Medications:     Medication List      CONTINUE taking these medications    amLODIPine 10 MG tablet  Commonly known as:  NORVASC  Take 1 tablet (10 mg total) by mouth every evening.     aspirin 81 MG EC tablet  Commonly known as:  ECOTRIN  Take 81 mg by mouth once daily.     atorvastatin 40 MG tablet  Commonly known as:  LIPITOR  TAKE 1 TABLET BY MOUTH EVERY EVENING     blood sugar diagnostic Strp  1 strip by Misc.(Non-Drug; Combo Route) route once daily.     blood-glucose meter Misc  Use as directed     butabarbital 30 mg Tab  Commonly known as:  BUTISOL  Take 30 mg by mouth continuous prn.     coenzyme Q10 200 mg capsule  Take by mouth.     fenofibrate 145 MG tablet  Commonly known as:  TRICOR  Take 1 tablet (145 mg total) by mouth every evening.     gabapentin 300 MG capsule  Commonly known as:  NEURONTIN  Take 1-2 capsules (300-600 mg total) by mouth 3 (three) times daily.     glimepiride 4 MG tablet  Commonly known as:  AMARYL  Take 1 tablet (4 mg total) by  "mouth daily with breakfast.     HYDROcodone-acetaminophen 5-325 mg per tablet  Commonly known as:  NORCO  Take 1 tablet by mouth every 6 (six) hours as needed for Pain.     lancets Misc  1 Device by Misc.(Non-Drug; Combo Route) route once daily.     lisinopril 40 MG tablet  Commonly known as:  PRINIVIL,ZESTRIL  Take 1 tablet (40 mg total) by mouth once daily.     meloxicam 15 MG tablet  Commonly known as:  MOBIC  Take 1 tablet (15 mg total) by mouth once daily.     omeprazole 20 MG capsule  Commonly known as:  PRILOSEC  Take 1 capsule (20 mg total) by mouth once daily.     ONE DAILY MULTIVITAMIN per tablet  Generic drug:  multivitamin  Take 1 tablet by mouth once daily.     pen needle, diabetic 32 gauge x 5/32" Ndle  Commonly known as:  RELION PEN NEEDLES  Needs levemir flexpen needles. To use once daily     sildenafil 20 mg Tab  Commonly known as:  REVATIO  Take 1 tablet (20 mg total) by mouth daily as needed.     SITagliptan-metformin  mg per tablet  Commonly known as:  JANUMET  Take 1 tablet by mouth 2 (two) times daily with meals.     triamcinolone acetonide 0.1% 0.1 % cream  Commonly known as:  KENALOG  Apply 1 application topically continuous prn.           Discharge Procedure Orders (must include Diet, Follow-up, Activity)   Call MD for:  temperature >100.4     Call MD for:  severe uncontrolled pain     Call MD for:  redness, tenderness, or signs of infection (pain, swelling, redness, odor or green/yellow discharge around incision site)     Call MD for:  difficulty breathing or increased cough     Call MD for:  severe persistent headache     Call MD for:  worsening rash     Remove dressing in 24 hours       Hernan Murguia Jr, MD  Interventional Pain Medicine / Anesthesiology    "

## 2018-10-30 NOTE — PROGRESS NOTES
Discharge instructions reviewed with patient. Verbalized understanding, no questions at this time. IV d/c'd with tip intact.  Procedure sites are DSI. VSS. No acute distress noted. Staff at bedside to help pt change. Wheeled to DC are by staff. Home with family in private vehicle.

## 2018-11-09 NOTE — PROGRESS NOTES
[facility] New Patient Evaluation    Referred by: Brandon Kinney MD   Reason for referral: Chronic neck and back pain     CC:   Chief Complaint   Patient presents with    Low-back Pain    Hip Pain    Neck Pain     Last 3 PDI Scores 11/12/2018 9/28/2018   Pain Disability Index (PDI) 13 62     Interval Update:   11/12/18 - Pt returns today with 3/10 pain in the neck today.  He reports 85-90% relief of neck and cervical radicular pain following the Cervical KOBE on 10/30/18 and is very pleased with the results.  He also notes intermittent, bilateral hip pain that is worse with strenuous activity and better with rest.  He is not experiencing the pain at this time.    Background:  Erick Valdovinos is a 67 y.o. male referred for chronic neck and low back pain though he wishes to focus on his neck pain.  His history is significant for long term NSAID use followed by onset of CKD.  Current Cr is 1.7 and he is avoiding all nephrotoxic agents.  NSAIDS previously provided him excellent relief of his neck pain and enabled him to perform ADLs and other activities such as working on his cars (hobby).  He would like to explore interventions and other kidney-safe medications for management of his symptoms.    Location: neck, bilateral  Severity: Currently: 8/10   Typical Range: 8/10     Exacerbation: 10/10   Onset: 20 yrs ago, insidious  Quality: mostly Aching though punctuated by sharp pains when he turns his head or looks up  Radiation: into shoulders bilaterally  Axial/Extremity Percentage of Pain: into hands arms bilaterally  Exacerbating Factors: flexion, standing and straining  Mitigating Factors: medications  Assoc: denies night fever/night sweats, urinary incontinence, bowel incontinence, significant weight loss, significant motor weakness and loss of sensations    Previous Therapies:  PT: Denies for neck  HEP: Denies  TENS:  Injections: Denies  Surgery: Denies spine sugrgery  Medications:   - NSAIDS:   -  MSK Relaxants:   - TCAs:   - SNRIs:   - Topicals:   - Anticonvulsants:  - Opioids:     Current Pain Medications:  1. Norco 7.5-325     Full Medication List:    Current Outpatient Medications:     amLODIPine (NORVASC) 10 MG tablet, Take 1 tablet (10 mg total) by mouth every evening., Disp: 90 tablet, Rfl: 3    aspirin (ECOTRIN) 81 MG EC tablet, Take 81 mg by mouth once daily., Disp: , Rfl:     atorvastatin (LIPITOR) 40 MG tablet, TAKE 1 TABLET BY MOUTH EVERY EVENING, Disp: 30 tablet, Rfl: 6    blood sugar diagnostic Strp, 1 strip by Misc.(Non-Drug; Combo Route) route once daily., Disp: 180 strip, Rfl: 3    blood-glucose meter Misc, Use as directed, Disp: 1 each, Rfl: 0    butabarbital (BUTISOL) 30 mg Tab, Take 30 mg by mouth continuous prn., Disp: , Rfl:     coenzyme Q10 200 mg capsule, Take by mouth., Disp: , Rfl:     fenofibrate (TRICOR) 145 MG tablet, Take 1 tablet (145 mg total) by mouth every evening., Disp: 90 tablet, Rfl: 3    gabapentin (NEURONTIN) 300 MG capsule, Take 1-2 capsules (300-600 mg total) by mouth 3 (three) times daily., Disp: 60 capsule, Rfl: 2    glimepiride (AMARYL) 4 MG tablet, Take 1 tablet (4 mg total) by mouth daily with breakfast., Disp: 90 tablet, Rfl: 3    HYDROcodone-acetaminophen (NORCO) 5-325 mg per tablet, Take 1 tablet by mouth every 6 (six) hours as needed for Pain., Disp: 60 tablet, Rfl: 0    lancets Misc, 1 Device by Misc.(Non-Drug; Combo Route) route once daily., Disp: 180 each, Rfl: 3    lisinopril (PRINIVIL,ZESTRIL) 40 MG tablet, Take 1 tablet (40 mg total) by mouth once daily., Disp: 90 tablet, Rfl: 3    meloxicam (MOBIC) 15 MG tablet, Take 1 tablet (15 mg total) by mouth once daily., Disp: 90 tablet, Rfl: 3    multivitamin (MULTIVITAMIN) per tablet, Take 1 tablet by mouth once daily., Disp: , Rfl:     omeprazole (PRILOSEC) 20 MG capsule, Take 1 capsule (20 mg total) by mouth once daily., Disp: 90 capsule, Rfl: 3    pen needle, diabetic (RELION PEN NEEDLES)  "32 gauge x 5/32" Ndle, Needs levemir flexpen needles. To use once daily, Disp: 100 each, Rfl: 3    sildenafil (REVATIO) 20 mg Tab, Take 1 tablet (20 mg total) by mouth daily as needed., Disp: 90 tablet, Rfl: 11    SITagliptan-metformin (JANUMET)  mg per tablet, Take 1 tablet by mouth 2 (two) times daily with meals., Disp: 180 tablet, Rfl: 3    triamcinolone acetonide 0.1% (KENALOG) 0.1 % cream, Apply 1 application topically continuous prn., Disp: 454 g, Rfl: 11     Review of Systems:  Review of Systems   Constitutional: Negative for chills and fever.   HENT: Negative for nosebleeds.    Eyes: Negative for blurred vision and pain.   Respiratory: Negative for hemoptysis.    Cardiovascular: Negative for chest pain and palpitations.   Gastrointestinal: Negative for heartburn, nausea and vomiting.   Genitourinary: Negative for dysuria and hematuria.   Musculoskeletal: Positive for back pain, joint pain (right shoulder) and neck pain. Negative for myalgias.   Skin: Negative for rash.   Neurological: Negative for seizures and loss of consciousness.   Endo/Heme/Allergies: Does not bruise/bleed easily.   Psychiatric/Behavioral: Negative for hallucinations.       Allergies:  Codeine     Medical History:  Past Medical History:   Diagnosis Date    Diabetes mellitus     Diabetes mellitus, type 2     Esophageal reflux     Hyperlipidemia     Hypertension     Nephrolithiasis     Osteoarthritis         Surgical History:  Past Surgical History:   Procedure Laterality Date    EPIDURAL STEROID INJECTION INTO CERVICAL SPINE N/A 10/30/2018    Procedure: Injection-steroid-epidural-cervical - C7 - T1;  Surgeon: Hernan Murguia Jr., MD;  Location: [facility];  Service: Pain Management;  Laterality: N/A;  PT IS DIABETIC    hemrrhoidectomy      Injection-steroid-epidural-cervical - C7 - T1 N/A 10/30/2018    Performed by Hernan Murguia Jr., MD at [facility]    WRIST FRACTURE SURGERY      WRIST SURGERY      "     Social History:  Social History     Socioeconomic History    Marital status:      Spouse name: Not on file    Number of children: Not on file    Years of education: Not on file    Highest education level: Not on file   Social Needs    Financial resource strain: Not on file    Food insecurity - worry: Not on file    Food insecurity - inability: Not on file    Transportation needs - medical: Not on file    Transportation needs - non-medical: Not on file   Occupational History    Not on file   Tobacco Use    Smoking status: Never Smoker    Smokeless tobacco: Never Used   Substance and Sexual Activity    Alcohol use: Yes     Comment: socially/     Drug use: No    Sexual activity: No   Other Topics Concern    Not on file   Social History Narrative    Not on file       Physical Exam:  Vitals:    11/12/18 0807   BP: 135/80   Pulse: 87   Weight: 112 kg (247 lb)   PainSc:   2     General    Nursing note and vitals reviewed.  Constitutional: He is oriented to person, place, and time. He appears well-developed and well-nourished. No distress.   HENT:   Head: Normocephalic and atraumatic.   Nose: Nose normal.   Eyes: Conjunctivae and EOM are normal. Pupils are equal, round, and reactive to light. Right eye exhibits no discharge. Left eye exhibits no discharge. No scleral icterus.   Neck: No JVD present.   Cardiovascular: Intact distal pulses.    Pulmonary/Chest: Effort normal. No respiratory distress.   Abdominal: He exhibits no distension.   Neurological: He is alert and oriented to person, place, and time. Coordination normal.   Psychiatric: He has a normal mood and affect. His behavior is normal. Judgment and thought content normal.     General Musculoskeletal Exam   Gait: normal     Back (L-Spine & T-Spine) / Neck (C-Spine) Exam     Tenderness Posterior midline palpation reveals tenderness of the Upper C-Spine and Lower C-Spine. Right paramedian tenderness of the Lower C-Spine and Upper C-Spine.  Left paramedian tenderness of the Upper C-Spine and Lower C-Spine.     Back (L-Spine & T-Spine) Range of Motion   Extension: abnormal Back extension: facet loading is positive and exacerabtes/reproduces the patient's typical low back pain    Flexion: abnormal Back flexion: limited ROM but partial relief of low back pain noted.     Neck (C-Spine) Range of Motion   Flexion:     Limited  Extension: Limited  Right Lateral Bend: abnormal  Left Lateral Bend: abnormal  Right Rotation: abnormal  Left Rotation: abnormal    Spinal Sensation   Right Side Sensation  C-Spine Level: normal   L-Spine Level: normal  Left Side Sensation  C-Spine Level: normal  L-Spine Level: normal    Neck (C-Spine) Tests   Spurling's Test   Left:  Negative  Right: negative    Other He has no scoliosis .      Muscle Strength   Right Upper Extremity   Biceps: 5/5/5   Deltoid:  5/5  Triceps:  5/5  Wrist extension: 5/5/5   Wrist flexion: 5/5/5   Finger Flexors:  5/5  Finger Extensors:  5/5  Left Upper Extremity  Biceps: 5/5/5   Deltoid:  5/5  Triceps:  5/5  Wrist extension: 5/5/5   Wrist flexion: 5/5/5   Finger Flexors:  5/5  Finger Extensors:  5/5  Right Lower Extremity   Hip Flexion: 5/5   Hip Extensors: 5/5  Quadriceps:  5/5   Hamstrin/5   Gastrocsoleus:  5/5/5  Left Lower Extremity   Hip Flexion: 5/5   Hip Extensors: 5/5  Quadriceps:  5/5   Hamstrin/5   Gastrocsoleus:  5/5/5    Reflexes     Left Side  Biceps:  2+  Quadriceps:  2+  Achilles:  2+    Right Side   Biceps:  2+  Quadriceps:  2+  Achilles:  2+      Imaging:  MRI outside 2017 (report scanned into media and disc reviewed in clinic)  Multilevel DDD, osteophyte formation, and facet arthropathy contributing to multilevel neuroforaminal stenosis and varying degrees of central stenosis.  There is no central canal stenosis at C7-T1.    Labs:  BMP  Lab Results   Component Value Date     2018     2018     2018    K 4.6 2018    K 4.6 2018     K 4.6 09/27/2018     09/27/2018     09/27/2018     09/27/2018    CO2 23 09/27/2018    CO2 23 09/27/2018    CO2 23 09/27/2018    BUN 25 (H) 09/27/2018    BUN 25 (H) 09/27/2018    BUN 25 (H) 09/27/2018    CREATININE 1.7 (H) 09/27/2018    CREATININE 1.7 (H) 09/27/2018    CREATININE 1.7 (H) 09/27/2018    CALCIUM 10.4 09/27/2018    CALCIUM 10.4 09/27/2018    CALCIUM 10.4 09/27/2018    ANIONGAP 9 09/27/2018    ANIONGAP 9 09/27/2018    ANIONGAP 9 09/27/2018    ESTGFRAFRICA 47.5 (A) 09/27/2018    ESTGFRAFRICA 47.5 (A) 09/27/2018    ESTGFRAFRICA 47.5 (A) 09/27/2018    EGFRNONAA 41.1 (A) 09/27/2018    EGFRNONAA 41.1 (A) 09/27/2018    EGFRNONAA 41.1 (A) 09/27/2018     Lab Results   Component Value Date    ALT 21 09/27/2018    AST 20 09/27/2018    ALKPHOS 54 (L) 09/27/2018    BILITOT 0.5 09/27/2018       Assessment:  Problem List Items Addressed This Visit     Chronic kidney disease, stage III (moderate)    Myofascial pain syndrome    DDD (degenerative disc disease), cervical    Osteoarthritis of spine with radiculopathy, cervical region    Cervical radiculopathy - Primary    Chronic pain    Deconditioned low back          9/28/18 - This is a 67-year-old male with chronic arthritic pain in multiple locations including the neck, low back, and right shoulder previously well controlled with meloxicam; however, now he is unable to take NSAIDs due to advancing chronic kidney disease.  Chronic kidney disease stabilized with a creatinine of 1.7 but he is seeking an alternate regimen for management of his symptoms that does not include NSAIDs.  I have reviewed his outside imaging and have recommended a combination of therapies including physical therapy, cervical epidural steroid injections to treat radicular symptoms, bilateral medial branch block radiofrequency ablation to treat arthritic pain and optimization of remaining therapies such as gabapentin and Tylenol.  Patient stated that his PCP recommended against  additional steroids.  I will reach out to him to discuss this recommendation prior to any interventional procedures utilizing steroids.    11/12/18 - patient received 85-90% relief from cervical epidural steroid injection performed on 10/30/2018 and is very pleased with the results.  He was able to return to some activities of daily living such as cutting his grass.  I have encouraged him to focus on abdominal, core, hip, and leg strengthening exercises to recondition his lower back as I believe his hip pain to be primarily due to muscle strain and spasm in the gluteus medius/minimus muscles.    Treatment Plan:   PT/OT/HEP: Patient's son is a PTA and he would like to start with a self-directed HEP guided by his son.  I will provide him a printed packet of Low Back Exercises focused on core strengthening and stretching.  Procedures: May repeat Cervical KOBE in the future up to 4x/yr - if current results are short-lived, consider using Depomedrol vs Dexamethasone  Medications:    Take tylenol 650 with each Norco for a max of 3,000 mg per day with monitoring on hepatic fxn   Restart gabapentin   Avoid nephrotoxins  Imaging: No additional imaging recommended at this time.  Labs: Reviewed.  Medications are appropriately dosed for current hepatorenal function.    Follow Up: RTC prn    Hernan Murguia Jr, MD  Interventional Pain Medicine / Anesthesiology    Disclaimer: This note was partly generated using dictation software which may occasionally result in transcription errors.

## 2018-11-12 ENCOUNTER — OFFICE VISIT (OUTPATIENT)
Dept: PAIN MEDICINE | Facility: CLINIC | Age: 68
End: 2018-11-12
Payer: MEDICARE

## 2018-11-12 VITALS
WEIGHT: 247 LBS | BODY MASS INDEX: 38.69 KG/M2 | HEART RATE: 87 BPM | DIASTOLIC BLOOD PRESSURE: 80 MMHG | SYSTOLIC BLOOD PRESSURE: 135 MMHG

## 2018-11-12 DIAGNOSIS — M47.22 OSTEOARTHRITIS OF SPINE WITH RADICULOPATHY, CERVICAL REGION: ICD-10-CM

## 2018-11-12 DIAGNOSIS — M50.30 DDD (DEGENERATIVE DISC DISEASE), CERVICAL: ICD-10-CM

## 2018-11-12 DIAGNOSIS — M54.12 CERVICAL RADICULOPATHY: Primary | ICD-10-CM

## 2018-11-12 DIAGNOSIS — R29.898 DECONDITIONED LOW BACK: ICD-10-CM

## 2018-11-12 DIAGNOSIS — M79.18 MYOFASCIAL PAIN SYNDROME: ICD-10-CM

## 2018-11-12 DIAGNOSIS — G89.4 CHRONIC PAIN SYNDROME: ICD-10-CM

## 2018-11-12 DIAGNOSIS — N18.30 CHRONIC KIDNEY DISEASE, STAGE III (MODERATE): ICD-10-CM

## 2018-11-12 PROCEDURE — 99213 OFFICE O/P EST LOW 20 MIN: CPT | Mod: S$PBB,,, | Performed by: PAIN MEDICINE

## 2018-11-12 PROCEDURE — 99999 PR PBB SHADOW E&M-EST. PATIENT-LVL III: CPT | Mod: PBBFAC,,, | Performed by: PAIN MEDICINE

## 2018-11-12 PROCEDURE — 99213 OFFICE O/P EST LOW 20 MIN: CPT | Mod: PBBFAC,PO | Performed by: PAIN MEDICINE

## 2018-11-15 ENCOUNTER — TELEPHONE (OUTPATIENT)
Dept: INTERNAL MEDICINE | Facility: CLINIC | Age: 68
End: 2018-11-15

## 2018-11-15 NOTE — TELEPHONE ENCOUNTER
Colonoscopy order put in September.    Is on prilosec for reflux and wondering if upper gi could be done at same time.      Ok to order?    Please advise.  Thanks haven

## 2018-11-15 NOTE — TELEPHONE ENCOUNTER
----- Message from Jaqueline Perry sent at 11/15/2018 11:41 AM CST -----  Contact: Erick Valdovinos [phone]  Erick is requesting a call backing regards to scheduling his colposcopy & upper GI. Please call to advise

## 2019-01-01 ENCOUNTER — OFFICE VISIT (OUTPATIENT)
Dept: PRIMARY CARE CLINIC | Facility: CLINIC | Age: 69
End: 2019-01-01
Payer: MEDICARE

## 2019-01-01 ENCOUNTER — OFFICE VISIT (OUTPATIENT)
Dept: OPTOMETRY | Facility: CLINIC | Age: 69
End: 2019-01-01
Payer: MEDICARE

## 2019-01-01 ENCOUNTER — TELEPHONE (OUTPATIENT)
Dept: ENDOCRINOLOGY | Facility: CLINIC | Age: 69
End: 2019-01-01

## 2019-01-01 ENCOUNTER — TELEPHONE (OUTPATIENT)
Dept: INTERNAL MEDICINE | Facility: CLINIC | Age: 69
End: 2019-01-01

## 2019-01-01 ENCOUNTER — TELEPHONE (OUTPATIENT)
Dept: PRIMARY CARE CLINIC | Facility: CLINIC | Age: 69
End: 2019-01-01

## 2019-01-01 ENCOUNTER — OFFICE VISIT (OUTPATIENT)
Dept: ENDOCRINOLOGY | Facility: CLINIC | Age: 69
End: 2019-01-01
Payer: MEDICARE

## 2019-01-01 ENCOUNTER — OFFICE VISIT (OUTPATIENT)
Dept: INTERNAL MEDICINE | Facility: CLINIC | Age: 69
End: 2019-01-01
Payer: MEDICARE

## 2019-01-01 ENCOUNTER — OFFICE VISIT (OUTPATIENT)
Dept: GASTROENTEROLOGY | Facility: CLINIC | Age: 69
End: 2019-01-01
Payer: MEDICARE

## 2019-01-01 ENCOUNTER — LAB VISIT (OUTPATIENT)
Dept: LAB | Facility: HOSPITAL | Age: 69
End: 2019-01-01
Attending: INTERNAL MEDICINE
Payer: MEDICARE

## 2019-01-01 ENCOUNTER — TELEPHONE (OUTPATIENT)
Dept: GASTROENTEROLOGY | Facility: CLINIC | Age: 69
End: 2019-01-01

## 2019-01-01 ENCOUNTER — PATIENT MESSAGE (OUTPATIENT)
Dept: ENDOCRINOLOGY | Facility: CLINIC | Age: 69
End: 2019-01-01

## 2019-01-01 ENCOUNTER — TELEPHONE (OUTPATIENT)
Dept: CARDIOLOGY | Facility: CLINIC | Age: 69
End: 2019-01-01

## 2019-01-01 ENCOUNTER — HOSPITAL ENCOUNTER (OUTPATIENT)
Facility: HOSPITAL | Age: 69
Discharge: HOME | End: 2019-04-30
Attending: PAIN MEDICINE | Admitting: PAIN MEDICINE
Payer: MEDICARE

## 2019-01-01 ENCOUNTER — HOSPITAL ENCOUNTER (OUTPATIENT)
Facility: HOSPITAL | Age: 69
Discharge: HOME | End: 2019-05-03
Attending: EMERGENCY MEDICINE | Admitting: INTERNAL MEDICINE
Payer: MEDICARE

## 2019-01-01 ENCOUNTER — OFFICE VISIT (OUTPATIENT)
Dept: CARDIOLOGY | Facility: CLINIC | Age: 69
End: 2019-01-01
Payer: MEDICARE

## 2019-01-01 ENCOUNTER — NURSE TRIAGE (OUTPATIENT)
Dept: ADMINISTRATIVE | Facility: CLINIC | Age: 69
End: 2019-01-01

## 2019-01-01 ENCOUNTER — CLINICAL SUPPORT (OUTPATIENT)
Dept: OPHTHALMOLOGY | Facility: CLINIC | Age: 69
End: 2019-01-01
Payer: MEDICARE

## 2019-01-01 ENCOUNTER — HOSPITAL ENCOUNTER (OUTPATIENT)
Dept: RADIOLOGY | Facility: HOSPITAL | Age: 69
Discharge: HOME | End: 2019-08-29
Attending: INTERNAL MEDICINE
Payer: MEDICARE

## 2019-01-01 ENCOUNTER — LAB VISIT (OUTPATIENT)
Dept: LAB | Facility: HOSPITAL | Age: 69
End: 2019-01-01
Attending: NURSE PRACTITIONER
Payer: MEDICARE

## 2019-01-01 ENCOUNTER — HOSPITAL ENCOUNTER (OUTPATIENT)
Dept: RADIOLOGY | Facility: HOSPITAL | Age: 69
Discharge: HOME | End: 2019-11-26
Attending: INTERNAL MEDICINE
Payer: MEDICARE

## 2019-01-01 ENCOUNTER — TELEPHONE (OUTPATIENT)
Dept: PAIN MEDICINE | Facility: CLINIC | Age: 69
End: 2019-01-01

## 2019-01-01 ENCOUNTER — OFFICE VISIT (OUTPATIENT)
Dept: PAIN MEDICINE | Facility: CLINIC | Age: 69
End: 2019-01-01
Payer: MEDICARE

## 2019-01-01 ENCOUNTER — CLINICAL SUPPORT (OUTPATIENT)
Dept: CARDIOLOGY | Facility: CLINIC | Age: 69
End: 2019-01-01
Attending: INTERNAL MEDICINE
Payer: MEDICARE

## 2019-01-01 ENCOUNTER — OFFICE VISIT (OUTPATIENT)
Dept: SURGERY | Facility: CLINIC | Age: 69
End: 2019-01-01
Payer: MEDICARE

## 2019-01-01 VITALS
WEIGHT: 249.31 LBS | DIASTOLIC BLOOD PRESSURE: 86 MMHG | HEIGHT: 67 IN | SYSTOLIC BLOOD PRESSURE: 138 MMHG | BODY MASS INDEX: 39.13 KG/M2 | HEART RATE: 73 BPM

## 2019-01-01 VITALS
WEIGHT: 250.88 LBS | SYSTOLIC BLOOD PRESSURE: 117 MMHG | BODY MASS INDEX: 38.71 KG/M2 | DIASTOLIC BLOOD PRESSURE: 56 MMHG | HEART RATE: 79 BPM

## 2019-01-01 VITALS
DIASTOLIC BLOOD PRESSURE: 70 MMHG | TEMPERATURE: 98 F | HEIGHT: 67 IN | HEART RATE: 84 BPM | SYSTOLIC BLOOD PRESSURE: 132 MMHG | RESPIRATION RATE: 18 BRPM | WEIGHT: 247 LBS | BODY MASS INDEX: 38.77 KG/M2

## 2019-01-01 VITALS
TEMPERATURE: 99 F | HEIGHT: 67 IN | HEART RATE: 80 BPM | WEIGHT: 246.56 LBS | WEIGHT: 247.25 LBS | HEART RATE: 85 BPM | DIASTOLIC BLOOD PRESSURE: 74 MMHG | DIASTOLIC BLOOD PRESSURE: 74 MMHG | BODY MASS INDEX: 38.7 KG/M2 | BODY MASS INDEX: 38.81 KG/M2 | HEIGHT: 67 IN | SYSTOLIC BLOOD PRESSURE: 119 MMHG | SYSTOLIC BLOOD PRESSURE: 121 MMHG

## 2019-01-01 VITALS
DIASTOLIC BLOOD PRESSURE: 78 MMHG | RESPIRATION RATE: 20 BRPM | TEMPERATURE: 97 F | BODY MASS INDEX: 38.54 KG/M2 | SYSTOLIC BLOOD PRESSURE: 153 MMHG | HEART RATE: 78 BPM | OXYGEN SATURATION: 95 % | WEIGHT: 245.56 LBS | HEIGHT: 67 IN

## 2019-01-01 VITALS
HEIGHT: 67 IN | RESPIRATION RATE: 15 BRPM | BODY MASS INDEX: 37.67 KG/M2 | OXYGEN SATURATION: 100 % | DIASTOLIC BLOOD PRESSURE: 73 MMHG | TEMPERATURE: 99 F | HEART RATE: 62 BPM | SYSTOLIC BLOOD PRESSURE: 152 MMHG | WEIGHT: 240 LBS

## 2019-01-01 VITALS
TEMPERATURE: 99 F | HEART RATE: 75 BPM | BODY MASS INDEX: 39.03 KG/M2 | WEIGHT: 248.69 LBS | RESPIRATION RATE: 18 BRPM | SYSTOLIC BLOOD PRESSURE: 130 MMHG | HEIGHT: 67 IN | DIASTOLIC BLOOD PRESSURE: 64 MMHG

## 2019-01-01 VITALS
SYSTOLIC BLOOD PRESSURE: 124 MMHG | WEIGHT: 240 LBS | TEMPERATURE: 98 F | RESPIRATION RATE: 16 BRPM | HEIGHT: 67 IN | HEART RATE: 80 BPM | DIASTOLIC BLOOD PRESSURE: 78 MMHG | BODY MASS INDEX: 37.67 KG/M2

## 2019-01-01 VITALS
DIASTOLIC BLOOD PRESSURE: 61 MMHG | SYSTOLIC BLOOD PRESSURE: 126 MMHG | HEIGHT: 68 IN | BODY MASS INDEX: 36.57 KG/M2 | WEIGHT: 241.31 LBS | HEART RATE: 87 BPM

## 2019-01-01 VITALS
DIASTOLIC BLOOD PRESSURE: 74 MMHG | HEIGHT: 67 IN | BODY MASS INDEX: 38.53 KG/M2 | HEART RATE: 84 BPM | RESPIRATION RATE: 18 BRPM | SYSTOLIC BLOOD PRESSURE: 132 MMHG | WEIGHT: 245.5 LBS

## 2019-01-01 VITALS — SYSTOLIC BLOOD PRESSURE: 133 MMHG | HEART RATE: 70 BPM | DIASTOLIC BLOOD PRESSURE: 76 MMHG

## 2019-01-01 DIAGNOSIS — E11.42 TYPE 2 DIABETES MELLITUS WITH DIABETIC POLYNEUROPATHY, WITHOUT LONG-TERM CURRENT USE OF INSULIN: Primary | ICD-10-CM

## 2019-01-01 DIAGNOSIS — H01.004 BLEPHARITIS OF UPPER EYELIDS OF BOTH EYES, UNSPECIFIED TYPE: ICD-10-CM

## 2019-01-01 DIAGNOSIS — E08.40 DIABETES MELLITUS DUE TO UNDERLYING CONDITION, CONTROLLED, WITH DIABETIC NEUROPATHY, WITHOUT LONG-TERM CURRENT USE OF INSULIN: ICD-10-CM

## 2019-01-01 DIAGNOSIS — I10 ESSENTIAL HYPERTENSION: ICD-10-CM

## 2019-01-01 DIAGNOSIS — Z12.5 PROSTATE CANCER SCREENING: ICD-10-CM

## 2019-01-01 DIAGNOSIS — E66.01 SEVERE OBESITY (BMI 35.0-39.9) WITH COMORBIDITY: ICD-10-CM

## 2019-01-01 DIAGNOSIS — R07.2 PRECORDIAL PAIN: ICD-10-CM

## 2019-01-01 DIAGNOSIS — K21.9 GASTROESOPHAGEAL REFLUX DISEASE WITHOUT ESOPHAGITIS: ICD-10-CM

## 2019-01-01 DIAGNOSIS — N18.30 CHRONIC KIDNEY DISEASE, STAGE III (MODERATE): ICD-10-CM

## 2019-01-01 DIAGNOSIS — M50.30 DDD (DEGENERATIVE DISC DISEASE), CERVICAL: ICD-10-CM

## 2019-01-01 DIAGNOSIS — E11.42 DIABETIC POLYNEUROPATHY ASSOCIATED WITH TYPE 2 DIABETES MELLITUS: ICD-10-CM

## 2019-01-01 DIAGNOSIS — M47.22 OSTEOARTHRITIS OF SPINE WITH RADICULOPATHY, CERVICAL REGION: Primary | ICD-10-CM

## 2019-01-01 DIAGNOSIS — J32.9 BACTERIAL SINUSITIS: ICD-10-CM

## 2019-01-01 DIAGNOSIS — E11.42 TYPE 2 DIABETES MELLITUS WITH DIABETIC POLYNEUROPATHY, WITHOUT LONG-TERM CURRENT USE OF INSULIN: ICD-10-CM

## 2019-01-01 DIAGNOSIS — J11.1 FLU SYNDROME: ICD-10-CM

## 2019-01-01 DIAGNOSIS — M54.12 CERVICAL RADICULOPATHY: ICD-10-CM

## 2019-01-01 DIAGNOSIS — S04.011S OPTIC NERVE TRAUMA OF RIGHT EYE, SEQUELA: ICD-10-CM

## 2019-01-01 DIAGNOSIS — E11.65 UNCONTROLLED TYPE 2 DIABETES MELLITUS WITH HYPERGLYCEMIA: ICD-10-CM

## 2019-01-01 DIAGNOSIS — E78.1 HYPERLIPIDEMIA TYPE IV: Primary | ICD-10-CM

## 2019-01-01 DIAGNOSIS — R06.83 SNORING: ICD-10-CM

## 2019-01-01 DIAGNOSIS — S04.011S OPTIC NERVE TRAUMA OF RIGHT EYE, SEQUELA: Primary | ICD-10-CM

## 2019-01-01 DIAGNOSIS — B96.89 BACTERIAL SINUSITIS: ICD-10-CM

## 2019-01-01 DIAGNOSIS — Z01.00 DIABETIC EYE EXAM: ICD-10-CM

## 2019-01-01 DIAGNOSIS — E78.1 HYPERLIPIDEMIA TYPE IV: ICD-10-CM

## 2019-01-01 DIAGNOSIS — K80.20 CALCULUS OF GALLBLADDER WITHOUT CHOLECYSTITIS WITHOUT OBSTRUCTION: ICD-10-CM

## 2019-01-01 DIAGNOSIS — H01.001 BLEPHARITIS OF UPPER EYELIDS OF BOTH EYES, UNSPECIFIED TYPE: ICD-10-CM

## 2019-01-01 DIAGNOSIS — B96.89 ACUTE BACTERIAL BRONCHITIS: ICD-10-CM

## 2019-01-01 DIAGNOSIS — G89.4 CHRONIC PAIN SYNDROME: ICD-10-CM

## 2019-01-01 DIAGNOSIS — J32.9 BACTERIAL SINUSITIS: Primary | ICD-10-CM

## 2019-01-01 DIAGNOSIS — H52.7 REFRACTIVE ERROR: ICD-10-CM

## 2019-01-01 DIAGNOSIS — J20.8 ACUTE BACTERIAL BRONCHITIS: ICD-10-CM

## 2019-01-01 DIAGNOSIS — Z00.00 ANNUAL PHYSICAL EXAM: ICD-10-CM

## 2019-01-01 DIAGNOSIS — Z00.00 ANNUAL PHYSICAL EXAM: Primary | ICD-10-CM

## 2019-01-01 DIAGNOSIS — R10.31 RIGHT LOWER QUADRANT ABDOMINAL PAIN: ICD-10-CM

## 2019-01-01 DIAGNOSIS — H21.561 AFFERENT PUPILLARY DEFECT OF RIGHT EYE: ICD-10-CM

## 2019-01-01 DIAGNOSIS — G89.29 CHRONIC NECK PAIN: ICD-10-CM

## 2019-01-01 DIAGNOSIS — E11.9 TYPE 2 DIABETES MELLITUS WITHOUT RETINOPATHY: Primary | ICD-10-CM

## 2019-01-01 DIAGNOSIS — R10.11 RUQ PAIN: ICD-10-CM

## 2019-01-01 DIAGNOSIS — H25.13 NUCLEAR SCLEROSIS OF BOTH EYES: ICD-10-CM

## 2019-01-01 DIAGNOSIS — E11.65 UNCONTROLLED TYPE 2 DIABETES MELLITUS WITH HYPERGLYCEMIA: Primary | ICD-10-CM

## 2019-01-01 DIAGNOSIS — R07.9 CHEST PAIN: Primary | ICD-10-CM

## 2019-01-01 DIAGNOSIS — R10.11 RUQ PAIN: Primary | ICD-10-CM

## 2019-01-01 DIAGNOSIS — I21.4 NSTEMI (NON-ST ELEVATED MYOCARDIAL INFARCTION): ICD-10-CM

## 2019-01-01 DIAGNOSIS — I21.4 NSTEMI (NON-ST ELEVATED MYOCARDIAL INFARCTION): Primary | ICD-10-CM

## 2019-01-01 DIAGNOSIS — Z87.19 HISTORY OF PANCREATITIS: ICD-10-CM

## 2019-01-01 DIAGNOSIS — E11.9 DIABETIC EYE EXAM: ICD-10-CM

## 2019-01-01 DIAGNOSIS — Z91.89 AT RISK FOR CORONARY ARTERY DISEASE: ICD-10-CM

## 2019-01-01 DIAGNOSIS — M54.2 CHRONIC NECK PAIN: ICD-10-CM

## 2019-01-01 DIAGNOSIS — G89.29 CHRONIC PAIN: ICD-10-CM

## 2019-01-01 DIAGNOSIS — R07.9 CHEST PAIN, UNSPECIFIED TYPE: ICD-10-CM

## 2019-01-01 DIAGNOSIS — R07.2 PRECORDIAL PAIN: Primary | ICD-10-CM

## 2019-01-01 DIAGNOSIS — H47.211 PRIMARY OPTIC ATROPHY OF RIGHT EYE: ICD-10-CM

## 2019-01-01 DIAGNOSIS — K80.20 CALCULUS OF GALLBLADDER WITHOUT CHOLECYSTITIS WITHOUT OBSTRUCTION: Primary | ICD-10-CM

## 2019-01-01 DIAGNOSIS — N17.9 AKI (ACUTE KIDNEY INJURY): ICD-10-CM

## 2019-01-01 DIAGNOSIS — B96.89 BACTERIAL SINUSITIS: Primary | ICD-10-CM

## 2019-01-01 DIAGNOSIS — R07.9 CHEST PAIN, UNSPECIFIED TYPE: Primary | ICD-10-CM

## 2019-01-01 DIAGNOSIS — R80.9 TYPE 2 DIABETES MELLITUS WITH MICROALBUMINURIA, WITHOUT LONG-TERM CURRENT USE OF INSULIN: ICD-10-CM

## 2019-01-01 DIAGNOSIS — E11.29 TYPE 2 DIABETES MELLITUS WITH MICROALBUMINURIA, WITHOUT LONG-TERM CURRENT USE OF INSULIN: ICD-10-CM

## 2019-01-01 DIAGNOSIS — M47.22 OSTEOARTHRITIS OF SPINE WITH RADICULOPATHY, CERVICAL REGION: ICD-10-CM

## 2019-01-01 LAB
ALBUMIN SERPL BCP-MCNC: 3.6 G/DL (ref 3.5–5.2)
ALBUMIN SERPL BCP-MCNC: 3.6 G/DL (ref 3.5–5.2)
ALBUMIN SERPL BCP-MCNC: 3.9 G/DL (ref 3.5–5.2)
ALBUMIN SERPL BCP-MCNC: 3.9 G/DL (ref 3.5–5.2)
ALP SERPL-CCNC: 50 U/L (ref 55–135)
ALP SERPL-CCNC: 52 U/L (ref 55–135)
ALP SERPL-CCNC: 52 U/L (ref 55–135)
ALP SERPL-CCNC: 54 U/L (ref 55–135)
ALT SERPL W/O P-5'-P-CCNC: 21 U/L (ref 10–44)
ALT SERPL W/O P-5'-P-CCNC: 22 U/L (ref 10–44)
ALT SERPL W/O P-5'-P-CCNC: 22 U/L (ref 10–44)
ALT SERPL W/O P-5'-P-CCNC: 24 U/L (ref 10–44)
ANION GAP SERPL CALC-SCNC: 10 MMOL/L (ref 8–16)
ANION GAP SERPL CALC-SCNC: 11 MMOL/L (ref 8–16)
ANION GAP SERPL CALC-SCNC: 9 MMOL/L (ref 8–16)
AORTIC ROOT ANNULUS: 4.41 CM
AORTIC VALVE CUSP SEPERATION: 1.6 CM
AST SERPL-CCNC: 16 U/L (ref 10–40)
AST SERPL-CCNC: 17 U/L (ref 10–40)
AST SERPL-CCNC: 17 U/L (ref 10–40)
AST SERPL-CCNC: 22 U/L (ref 10–40)
AV INDEX (PROSTH): 0.28
AV MEAN GRADIENT: 18.78 MMHG
AV PEAK GRADIENT: 30.03 MMHG
AV VALVE AREA: 1.03 CM2
AV VELOCITY RATIO: 0.3
BASOPHILS # BLD AUTO: 0 K/UL (ref 0–0.2)
BASOPHILS # BLD AUTO: 0.01 K/UL (ref 0–0.2)
BASOPHILS # BLD AUTO: 0.01 K/UL (ref 0–0.2)
BASOPHILS # BLD AUTO: 0.04 K/UL (ref 0–0.2)
BASOPHILS NFR BLD: 0 % (ref 0–1.9)
BASOPHILS NFR BLD: 0.1 % (ref 0–1.9)
BASOPHILS NFR BLD: 0.1 % (ref 0–1.9)
BASOPHILS NFR BLD: 0.4 % (ref 0–1.9)
BILIRUB SERPL-MCNC: 0.4 MG/DL (ref 0.1–1)
BILIRUB SERPL-MCNC: 0.5 MG/DL (ref 0.1–1)
BILIRUB SERPL-MCNC: 0.5 MG/DL (ref 0.1–1)
BILIRUB SERPL-MCNC: 0.6 MG/DL (ref 0.1–1)
BNP SERPL-MCNC: 56 PG/ML (ref 0–99)
BSA FOR ECHO PROCEDURE: 2.28 M2
BUN SERPL-MCNC: 19 MG/DL (ref 8–23)
BUN SERPL-MCNC: 25 MG/DL (ref 8–23)
BUN SERPL-MCNC: 26 MG/DL (ref 8–23)
BUN SERPL-MCNC: 38 MG/DL (ref 8–23)
BUN SERPL-MCNC: 46 MG/DL (ref 8–23)
CALCIUM SERPL-MCNC: 10.1 MG/DL (ref 8.7–10.5)
CALCIUM SERPL-MCNC: 9.6 MG/DL (ref 8.7–10.5)
CALCIUM SERPL-MCNC: 9.6 MG/DL (ref 8.7–10.5)
CALCIUM SERPL-MCNC: 9.7 MG/DL (ref 8.7–10.5)
CALCIUM SERPL-MCNC: 9.8 MG/DL (ref 8.7–10.5)
CFR FLOW - ANTERIOR: 1.5 CC/MIN/G
CFR FLOW - INFERIOR: 1.66 CC/MIN/G
CFR FLOW - LATERAL: 1.8 CC/MIN/G
CFR FLOW - MAX: 2.6 CC/MIN/G
CFR FLOW - MIN: 1 CC/MIN/G
CFR FLOW - SEPTAL: 1.7 CC/MIN/G
CFR FLOW - WHOLE HEART: 1.67 CC/MIN/G
CHLORIDE SERPL-SCNC: 108 MMOL/L (ref 95–110)
CHLORIDE SERPL-SCNC: 108 MMOL/L (ref 95–110)
CHLORIDE SERPL-SCNC: 109 MMOL/L (ref 95–110)
CHLORIDE SERPL-SCNC: 109 MMOL/L (ref 95–110)
CHLORIDE SERPL-SCNC: 110 MMOL/L (ref 95–110)
CHOLEST SERPL-MCNC: 192 MG/DL (ref 120–199)
CHOLEST SERPL-MCNC: 208 MG/DL (ref 120–199)
CHOLEST/HDLC SERPL: 5.1 {RATIO} (ref 2–5)
CHOLEST/HDLC SERPL: 5.8 {RATIO} (ref 2–5)
CO2 SERPL-SCNC: 16 MMOL/L (ref 23–29)
CO2 SERPL-SCNC: 19 MMOL/L (ref 23–29)
CO2 SERPL-SCNC: 21 MMOL/L (ref 23–29)
CO2 SERPL-SCNC: 21 MMOL/L (ref 23–29)
CO2 SERPL-SCNC: 23 MMOL/L (ref 23–29)
CREAT SERPL-MCNC: 1.3 MG/DL (ref 0.5–1.4)
CREAT SERPL-MCNC: 1.4 MG/DL (ref 0.5–1.4)
CREAT SERPL-MCNC: 1.6 MG/DL (ref 0.5–1.4)
CREAT SERPL-MCNC: 1.9 MG/DL (ref 0.5–1.4)
CREAT SERPL-MCNC: 2.1 MG/DL (ref 0.5–1.4)
CREAT UR-MCNC: 54.6 MG/DL (ref 23–375)
CV ECHO LV RWT: 0.6 CM
CV PHARM DOSE: 60 MG
CV STRESS BASE HR: 60 BPM
CV STRESS BASE HR: 70 BPM
DIASTOLIC BLOOD PRESSURE: 77 MMHG
DIASTOLIC BLOOD PRESSURE: 95 MMHG
DIFFERENTIAL METHOD: ABNORMAL
DOP CALC AO PEAK VEL: 2.74 M/S
DOP CALC AO VTI: 66.38 CM
DOP CALC LVOT AREA: 3.63 CM2
DOP CALC LVOT DIAMETER: 2.15 CM
DOP CALC LVOT PEAK VEL: 0.82 M/S
DOP CALC LVOT STROKE VOLUME: 68.58 CM3
DOP CALCLVOT PEAK VEL VTI: 18.9 CM
ECHO LV POSTERIOR WALL: 1.21 CM (ref 0.6–1.1)
END DIASTOLIC INDEX-HIGH: 170 ML/M2
END SYSTOLIC INDEX-HIGH: 70 ML/M2
EOSINOPHIL # BLD AUTO: 0 K/UL (ref 0–0.5)
EOSINOPHIL # BLD AUTO: 0 K/UL (ref 0–0.5)
EOSINOPHIL # BLD AUTO: 0.2 K/UL (ref 0–0.5)
EOSINOPHIL # BLD AUTO: 0.6 K/UL (ref 0–0.5)
EOSINOPHIL NFR BLD: 0 % (ref 0–8)
EOSINOPHIL NFR BLD: 0.1 % (ref 0–8)
EOSINOPHIL NFR BLD: 1.6 % (ref 0–8)
EOSINOPHIL NFR BLD: 6.1 % (ref 0–8)
ERYTHROCYTE [DISTWIDTH] IN BLOOD BY AUTOMATED COUNT: 12.3 % (ref 11.5–14.5)
ERYTHROCYTE [DISTWIDTH] IN BLOOD BY AUTOMATED COUNT: 12.6 % (ref 11.5–14.5)
ERYTHROCYTE [DISTWIDTH] IN BLOOD BY AUTOMATED COUNT: 12.7 % (ref 11.5–14.5)
ERYTHROCYTE [DISTWIDTH] IN BLOOD BY AUTOMATED COUNT: 12.9 % (ref 11.5–14.5)
EST. GFR  (AFRICAN AMERICAN): 37 ML/MIN/1.73 M^2
EST. GFR  (AFRICAN AMERICAN): 41 ML/MIN/1.73 M^2
EST. GFR  (AFRICAN AMERICAN): 50.8 ML/MIN/1.73 M^2
EST. GFR  (AFRICAN AMERICAN): 60 ML/MIN/1.73 M^2
EST. GFR  (AFRICAN AMERICAN): >60 ML/MIN/1.73 M^2
EST. GFR  (NON AFRICAN AMERICAN): 32 ML/MIN/1.73 M^2
EST. GFR  (NON AFRICAN AMERICAN): 36 ML/MIN/1.73 M^2
EST. GFR  (NON AFRICAN AMERICAN): 43.9 ML/MIN/1.73 M^2
EST. GFR  (NON AFRICAN AMERICAN): 52 ML/MIN/1.73 M^2
EST. GFR  (NON AFRICAN AMERICAN): 56.5 ML/MIN/1.73 M^2
ESTIMATED AVG GLUCOSE: 157 MG/DL (ref 68–131)
ESTIMATED AVG GLUCOSE: 177 MG/DL (ref 68–131)
ESTIMATED AVG GLUCOSE: 180 MG/DL (ref 68–131)
FERRITIN SERPL-MCNC: 113 NG/ML (ref 20–300)
FRACTIONAL SHORTENING: 34 % (ref 28–44)
GLUCOSE SERPL-MCNC: 108 MG/DL (ref 70–110)
GLUCOSE SERPL-MCNC: 141 MG/DL (ref 70–110)
GLUCOSE SERPL-MCNC: 167 MG/DL (ref 70–110)
GLUCOSE SERPL-MCNC: 171 MG/DL (ref 70–110)
GLUCOSE SERPL-MCNC: 248 MG/DL (ref 70–110)
HBA1C MFR BLD HPLC: 7.1 % (ref 4–5.6)
HBA1C MFR BLD HPLC: 7.8 % (ref 4–5.6)
HBA1C MFR BLD HPLC: 7.9 % (ref 4–5.6)
HCT VFR BLD AUTO: 31.2 % (ref 40–54)
HCT VFR BLD AUTO: 32.5 % (ref 40–54)
HCT VFR BLD AUTO: 33.9 % (ref 40–54)
HCT VFR BLD AUTO: 35.7 % (ref 40–54)
HDLC SERPL-MCNC: 36 MG/DL (ref 40–75)
HDLC SERPL-MCNC: 38 MG/DL (ref 40–75)
HDLC SERPL: 17.3 % (ref 20–50)
HDLC SERPL: 19.8 % (ref 20–50)
HGB BLD-MCNC: 10.6 G/DL (ref 14–18)
HGB BLD-MCNC: 10.8 G/DL (ref 14–18)
HGB BLD-MCNC: 11.4 G/DL (ref 14–18)
HGB BLD-MCNC: 11.4 G/DL (ref 14–18)
IMM GRANULOCYTES # BLD AUTO: 0.05 K/UL (ref 0–0.04)
IMM GRANULOCYTES NFR BLD AUTO: 0.5 % (ref 0–0.5)
INFLUENZA A, MOLECULAR: NEGATIVE
INFLUENZA B, MOLECULAR: NEGATIVE
INTERVENTRICULAR SEPTUM: 1.39 CM (ref 0.6–1.1)
IRON SERPL-MCNC: 77 UG/DL (ref 45–160)
IVRT: 0.07 MSEC
LDLC SERPL CALC-MCNC: 103.6 MG/DL (ref 63–159)
LDLC SERPL CALC-MCNC: 124.2 MG/DL (ref 63–159)
LEFT ATRIUM SIZE: 2.77 CM
LEFT INTERNAL DIMENSION IN SYSTOLE: 2.69 CM (ref 2.1–4)
LEFT VENTRICLE DIASTOLIC VOLUME INDEX: 33.13 ML/M2
LEFT VENTRICLE DIASTOLIC VOLUME: 72.73 ML
LEFT VENTRICLE MASS INDEX: 86.9 G/M2
LEFT VENTRICLE SYSTOLIC VOLUME INDEX: 12.1 ML/M2
LEFT VENTRICLE SYSTOLIC VOLUME: 26.66 ML
LEFT VENTRICULAR INTERNAL DIMENSION IN DIASTOLE: 4.06 CM (ref 3.5–6)
LEFT VENTRICULAR MASS: 190.7 G
LYMPHOCYTES # BLD AUTO: 1.7 K/UL (ref 1–4.8)
LYMPHOCYTES # BLD AUTO: 2.2 K/UL (ref 1–4.8)
LYMPHOCYTES # BLD AUTO: 2.4 K/UL (ref 1–4.8)
LYMPHOCYTES # BLD AUTO: 3.8 K/UL (ref 1–4.8)
LYMPHOCYTES NFR BLD: 10 % (ref 18–48)
LYMPHOCYTES NFR BLD: 17.2 % (ref 18–48)
LYMPHOCYTES NFR BLD: 24.3 % (ref 18–48)
LYMPHOCYTES NFR BLD: 40.8 % (ref 18–48)
MCH RBC QN AUTO: 30.6 PG (ref 27–31)
MCH RBC QN AUTO: 30.6 PG (ref 27–31)
MCH RBC QN AUTO: 30.9 PG (ref 27–31)
MCH RBC QN AUTO: 30.9 PG (ref 27–31)
MCHC RBC AUTO-ENTMCNC: 31.9 G/DL (ref 32–36)
MCHC RBC AUTO-ENTMCNC: 33.2 G/DL (ref 32–36)
MCHC RBC AUTO-ENTMCNC: 33.6 G/DL (ref 32–36)
MCHC RBC AUTO-ENTMCNC: 34 G/DL (ref 32–36)
MCV RBC AUTO: 90 FL (ref 82–98)
MCV RBC AUTO: 92 FL (ref 82–98)
MCV RBC AUTO: 92 FL (ref 82–98)
MCV RBC AUTO: 97 FL (ref 82–98)
MONOCYTES # BLD AUTO: 0.8 K/UL (ref 0.3–1)
MONOCYTES # BLD AUTO: 0.9 K/UL (ref 0.3–1)
MONOCYTES # BLD AUTO: 0.9 K/UL (ref 0.3–1)
MONOCYTES # BLD AUTO: 1.6 K/UL (ref 0.3–1)
MONOCYTES NFR BLD: 6.8 % (ref 4–15)
MONOCYTES NFR BLD: 8.9 % (ref 4–15)
MONOCYTES NFR BLD: 9.2 % (ref 4–15)
MONOCYTES NFR BLD: 9.4 % (ref 4–15)
NEUTROPHILS # BLD AUTO: 10.3 K/UL (ref 1.8–7.7)
NEUTROPHILS # BLD AUTO: 13.9 K/UL (ref 1.8–7.7)
NEUTROPHILS # BLD AUTO: 4.5 K/UL (ref 1.8–7.7)
NEUTROPHILS # BLD AUTO: 5.5 K/UL (ref 1.8–7.7)
NEUTROPHILS NFR BLD: 47.8 % (ref 38–73)
NEUTROPHILS NFR BLD: 59.8 % (ref 38–73)
NEUTROPHILS NFR BLD: 75.4 % (ref 38–73)
NEUTROPHILS NFR BLD: 80.6 % (ref 38–73)
NONHDLC SERPL-MCNC: 154 MG/DL
NONHDLC SERPL-MCNC: 172 MG/DL
NRBC BLD-RTO: 0 /100 WBC
NUC REST DIASTOLIC VOLUME INDEX: 64
NUC REST EJECTION FRACTION: 74
NUC REST SYSTOLIC VOLUME INDEX: 15
NUC STRESS DIASTOLIC VOLUME INDEX: 103
NUC STRESS EJECTION FRACTION: 68 %
NUC STRESS SYSTOLIC VOLUME INDEX: 70
OHS CV CPX 85 PERCENT MAX PREDICTED HEART RATE MALE: 130
OHS CV CPX 85 PERCENT MAX PREDICTED HEART RATE MALE: 130
OHS CV CPX MAX PREDICTED HEART RATE: 153
OHS CV CPX MAX PREDICTED HEART RATE: 153
OHS CV CPX PATIENT IS FEMALE: 0
OHS CV CPX PATIENT IS FEMALE: 0
OHS CV CPX PATIENT IS MALE: 1
OHS CV CPX PATIENT IS MALE: 1
OHS CV CPX PEAK DIASTOLIC BLOOD PRESSURE: 63 MMHG
OHS CV CPX PEAK DIASTOLIC BLOOD PRESSURE: 66 MMHG
OHS CV CPX PEAK HEAR RATE: 136 BPM
OHS CV CPX PEAK HEAR RATE: 80 BPM
OHS CV CPX PEAK RATE PRESSURE PRODUCT: ABNORMAL
OHS CV CPX PEAK RATE PRESSURE PRODUCT: NORMAL
OHS CV CPX PEAK SYSTOLIC BLOOD PRESSURE: 126 MMHG
OHS CV CPX PEAK SYSTOLIC BLOOD PRESSURE: 149 MMHG
OHS CV CPX PERCENT MAX PREDICTED HEART RATE ACHIEVED: 52
OHS CV CPX PERCENT MAX PREDICTED HEART RATE ACHIEVED: 89
OHS CV CPX PERCENT TARGET HEART RATE ACHIEVED: 88.89
OHS CV CPX RATE PRESSURE PRODUCT PRESENTING: 7320
OHS CV CPX RATE PRESSURE PRODUCT PRESENTING: 8890
OHS CV CPX TARGET HEART RATE: 153
PISA TR MAX VEL: 1.91 M/S
PLATELET # BLD AUTO: 201 K/UL (ref 150–350)
PLATELET # BLD AUTO: 204 K/UL (ref 150–350)
PLATELET # BLD AUTO: 215 K/UL (ref 150–350)
PLATELET # BLD AUTO: 217 K/UL (ref 150–350)
PMV BLD AUTO: 10.2 FL (ref 9.2–12.9)
PMV BLD AUTO: 10.3 FL (ref 9.2–12.9)
PMV BLD AUTO: 10.4 FL (ref 9.2–12.9)
PMV BLD AUTO: 11 FL (ref 9.2–12.9)
POCT GLUCOSE: 122 MG/DL (ref 70–110)
POCT GLUCOSE: 170 MG/DL (ref 70–110)
POCT GLUCOSE: 172 MG/DL (ref 70–110)
POCT GLUCOSE: 191 MG/DL (ref 70–110)
POCT GLUCOSE: 207 MG/DL (ref 70–110)
POCT GLUCOSE: 219 MG/DL (ref 70–110)
POCT GLUCOSE: 250 MG/DL (ref 70–110)
POTASSIUM SERPL-SCNC: 4.1 MMOL/L (ref 3.5–5.1)
POTASSIUM SERPL-SCNC: 4.2 MMOL/L (ref 3.5–5.1)
POTASSIUM SERPL-SCNC: 4.2 MMOL/L (ref 3.5–5.1)
POTASSIUM SERPL-SCNC: 4.6 MMOL/L (ref 3.5–5.1)
POTASSIUM SERPL-SCNC: 4.7 MMOL/L (ref 3.5–5.1)
PROT SERPL-MCNC: 6.4 G/DL (ref 6–8.4)
PROT SERPL-MCNC: 6.4 G/DL (ref 6–8.4)
PROT SERPL-MCNC: 6.6 G/DL (ref 6–8.4)
PROT SERPL-MCNC: 6.9 G/DL (ref 6–8.4)
PULM VEIN S/D RATIO: 0.59
PV PEAK D VEL: 0.41 M/S
PV PEAK S VEL: 0.24 M/S
PV PEAK VELOCITY: 1.12 CM/S
RBC # BLD AUTO: 3.46 M/UL (ref 4.6–6.2)
RBC # BLD AUTO: 3.53 M/UL (ref 4.6–6.2)
RBC # BLD AUTO: 3.69 M/UL (ref 4.6–6.2)
RBC # BLD AUTO: 3.69 M/UL (ref 4.6–6.2)
REST FLOW - ANTERIOR: 0.89 CC/MIN/G
REST FLOW - INFERIOR: 0.85 CC/MIN/G
REST FLOW - LATERAL: 0.79 CC/MIN/G
REST FLOW - MAX: 1.2 CC/MIN/G
REST FLOW - MIN: 0.6 CC/MIN/G
REST FLOW - SEPTAL: 0.84 CC/MIN/G
REST FLOW - WHOLE HEART: 0.84
RETIRED EF AND QEF - SEE NOTES: 51 %
RIGHT VENTRICULAR END-DIASTOLIC DIMENSION: 3.1 CM
SODIUM SERPL-SCNC: 135 MMOL/L (ref 136–145)
SODIUM SERPL-SCNC: 137 MMOL/L (ref 136–145)
SODIUM SERPL-SCNC: 139 MMOL/L (ref 136–145)
SODIUM SERPL-SCNC: 140 MMOL/L (ref 136–145)
SODIUM SERPL-SCNC: 141 MMOL/L (ref 136–145)
SODIUM UR-SCNC: 89 MMOL/L (ref 20–250)
SPECIMEN SOURCE: NORMAL
STRESS ECHO TARGET HR: 130.05 BPM
STRESS FLOW - ANTERIOR: 1.3 CC/MIN/G
STRESS FLOW - INFERIOR: 1.37 CC/MIN/G
STRESS FLOW - LATERAL: 1.4 CC/MIN/G
STRESS FLOW - MAX: 1.8 CC/MIN/G
STRESS FLOW - MIN: 0.9 CC/MIN/G
STRESS FLOW - SEPTAL: 1.39 CC/MIN/G
STRESS FLOW - WHOLE HEART: 1.37 CC/MIN/G
SYSTOLIC BLOOD PRESSURE: 122 MMHG
SYSTOLIC BLOOD PRESSURE: 127 MMHG
TR MAX PG: 14.59 MMHG
TRIGL SERPL-MCNC: 239 MG/DL (ref 30–150)
TRIGL SERPL-MCNC: 252 MG/DL (ref 30–150)
TROPONIN I SERPL DL<=0.01 NG/ML-MCNC: 0.01 NG/ML (ref 0–0.03)
TROPONIN I SERPL DL<=0.01 NG/ML-MCNC: 0.02 NG/ML (ref 0–0.03)
TROPONIN I SERPL DL<=0.01 NG/ML-MCNC: 0.02 NG/ML (ref 0–0.03)
TROPONIN I SERPL DL<=0.01 NG/ML-MCNC: 0.03 NG/ML (ref 0–0.03)
TROPONIN I SERPL DL<=0.01 NG/ML-MCNC: 0.05 NG/ML (ref 0–0.03)
WBC # BLD AUTO: 13.63 K/UL (ref 3.9–12.7)
WBC # BLD AUTO: 17.24 K/UL (ref 3.9–12.7)
WBC # BLD AUTO: 9.19 K/UL (ref 3.9–12.7)
WBC # BLD AUTO: 9.37 K/UL (ref 3.9–12.7)

## 2019-01-01 PROCEDURE — 1101F PT FALLS ASSESS-DOCD LE1/YR: CPT | Mod: CPTII,S$GLB,, | Performed by: INTERNAL MEDICINE

## 2019-01-01 PROCEDURE — 3078F PR MOST RECENT DIASTOLIC BLOOD PRESSURE < 80 MM HG: ICD-10-PCS | Mod: CPTII,S$GLB,, | Performed by: INTERNAL MEDICINE

## 2019-01-01 PROCEDURE — 1101F PT FALLS ASSESS-DOCD LE1/YR: CPT | Mod: CPTII,S$GLB,, | Performed by: NURSE PRACTITIONER

## 2019-01-01 PROCEDURE — 25000003 PHARM REV CODE 250: Performed by: PAIN MEDICINE

## 2019-01-01 PROCEDURE — 99999 PR PBB SHADOW E&M-EST. PATIENT-LVL III: ICD-10-PCS | Mod: PBBFAC,,, | Performed by: NURSE PRACTITIONER

## 2019-01-01 PROCEDURE — 83540 ASSAY OF IRON: CPT

## 2019-01-01 PROCEDURE — G0009 ADMIN PNEUMOCOCCAL VACCINE: HCPCS | Performed by: INTERNAL MEDICINE

## 2019-01-01 PROCEDURE — 83036 HEMOGLOBIN GLYCOSYLATED A1C: CPT

## 2019-01-01 PROCEDURE — 78492 CARDIAC PET SCAN STRESS (CUPID ONLY): ICD-10-PCS | Mod: S$GLB,,, | Performed by: INTERNAL MEDICINE

## 2019-01-01 PROCEDURE — 80061 LIPID PANEL: CPT

## 2019-01-01 PROCEDURE — 94761 N-INVAS EAR/PLS OXIMETRY MLT: CPT

## 2019-01-01 PROCEDURE — 96372 THER/PROPH/DIAG INJ SC/IM: CPT | Mod: 59

## 2019-01-01 PROCEDURE — 80053 COMPREHEN METABOLIC PANEL: CPT

## 2019-01-01 PROCEDURE — 3045F PR MOST RECENT HEMOGLOBIN A1C LEVEL 7.0-9.0%: ICD-10-PCS | Mod: CPTII,S$GLB,, | Performed by: INTERNAL MEDICINE

## 2019-01-01 PROCEDURE — 75571 CT HRT W/O DYE W/CA TEST: CPT | Mod: TC

## 2019-01-01 PROCEDURE — 93015 CV STRESS TEST SUPVJ I&R: CPT | Mod: S$GLB,,, | Performed by: INTERNAL MEDICINE

## 2019-01-01 PROCEDURE — 3078F DIAST BP <80 MM HG: CPT | Mod: CPTII,S$GLB,, | Performed by: INTERNAL MEDICINE

## 2019-01-01 PROCEDURE — 99499 UNLISTED E&M SERVICE: CPT | Mod: S$GLB,,, | Performed by: INTERNAL MEDICINE

## 2019-01-01 PROCEDURE — 99499 RISK ADDL DX/OHS AUDIT: ICD-10-PCS | Mod: S$GLB,,, | Performed by: INTERNAL MEDICINE

## 2019-01-01 PROCEDURE — 71046 X-RAY EXAM CHEST 2 VIEWS: CPT | Mod: 26,,, | Performed by: RADIOLOGY

## 2019-01-01 PROCEDURE — 99214 PR OFFICE/OUTPT VISIT, EST, LEVL IV, 30-39 MIN: ICD-10-PCS | Mod: S$GLB,,, | Performed by: INTERNAL MEDICINE

## 2019-01-01 PROCEDURE — 99214 OFFICE O/P EST MOD 30 MIN: CPT | Mod: S$GLB,,, | Performed by: NURSE PRACTITIONER

## 2019-01-01 PROCEDURE — 99214 PR OFFICE/OUTPT VISIT, EST, LEVL IV, 30-39 MIN: ICD-10-PCS | Mod: 25,S$GLB,, | Performed by: INTERNAL MEDICINE

## 2019-01-01 PROCEDURE — 3074F PR MOST RECENT SYSTOLIC BLOOD PRESSURE < 130 MM HG: ICD-10-PCS | Mod: CPTII,S$GLB,, | Performed by: INTERNAL MEDICINE

## 2019-01-01 PROCEDURE — 63600175 PHARM REV CODE 636 W HCPCS: Performed by: PAIN MEDICINE

## 2019-01-01 PROCEDURE — 92015 DETERMINE REFRACTIVE STATE: CPT | Mod: S$GLB,,, | Performed by: OPTOMETRIST

## 2019-01-01 PROCEDURE — 85025 COMPLETE CBC W/AUTO DIFF WBC: CPT

## 2019-01-01 PROCEDURE — 3045F PR MOST RECENT HEMOGLOBIN A1C LEVEL 7.0-9.0%: ICD-10-PCS | Mod: CPTII,S$GLB,, | Performed by: NURSE PRACTITIONER

## 2019-01-01 PROCEDURE — 63600175 PHARM REV CODE 636 W HCPCS: Performed by: INTERNAL MEDICINE

## 2019-01-01 PROCEDURE — 96360 HYDRATION IV INFUSION INIT: CPT

## 2019-01-01 PROCEDURE — 84300 ASSAY OF URINE SODIUM: CPT

## 2019-01-01 PROCEDURE — 1101F PR PT FALLS ASSESS DOC 0-1 FALLS W/OUT INJ PAST YR: ICD-10-PCS | Mod: CPTII,S$GLB,, | Performed by: INTERNAL MEDICINE

## 2019-01-01 PROCEDURE — 36415 COLL VENOUS BLD VENIPUNCTURE: CPT

## 2019-01-01 PROCEDURE — 93005 ELECTROCARDIOGRAM TRACING: CPT

## 2019-01-01 PROCEDURE — 75571 CT CALCIUM SCORING CARDIAC: ICD-10-PCS | Mod: 26,,, | Performed by: RADIOLOGY

## 2019-01-01 PROCEDURE — 96374 PR INJECTION,THERAP/PROPH/DIAGNOST, IV PUSH, INITIAL DRUG: ICD-10-PCS | Mod: S$GLB,,, | Performed by: INTERNAL MEDICINE

## 2019-01-01 PROCEDURE — 99999 PR PBB SHADOW E&M-EST. PATIENT-LVL III: ICD-10-PCS | Mod: PBBFAC,,, | Performed by: INTERNAL MEDICINE

## 2019-01-01 PROCEDURE — 99999 PR PBB SHADOW E&M-EST. PATIENT-LVL II: ICD-10-PCS | Mod: PBBFAC,,, | Performed by: OPTOMETRIST

## 2019-01-01 PROCEDURE — 99999 PR PBB SHADOW E&M-EST. PATIENT-LVL I: ICD-10-PCS | Mod: PBBFAC,,,

## 2019-01-01 PROCEDURE — 3075F PR MOST RECENT SYSTOLIC BLOOD PRESS GE 130-139MM HG: ICD-10-PCS | Mod: CPTII,S$GLB,, | Performed by: INTERNAL MEDICINE

## 2019-01-01 PROCEDURE — 99214 OFFICE O/P EST MOD 30 MIN: CPT | Mod: 25,S$GLB,, | Performed by: INTERNAL MEDICINE

## 2019-01-01 PROCEDURE — 90714 TD VACCINE GREATER THAN OR EQUAL TO 7YO PRESERVATIVE FREE IM: ICD-10-PCS | Mod: S$GLB,,, | Performed by: INTERNAL MEDICINE

## 2019-01-01 PROCEDURE — 83880 ASSAY OF NATRIURETIC PEPTIDE: CPT

## 2019-01-01 PROCEDURE — 78492 MYOCRD IMG PET MLT RST&STRS: CPT | Mod: S$GLB,,, | Performed by: INTERNAL MEDICINE

## 2019-01-01 PROCEDURE — 90471 IMMUNIZATION ADMIN: CPT | Performed by: INTERNAL MEDICINE

## 2019-01-01 PROCEDURE — 99499 UNLISTED E&M SERVICE: CPT | Mod: S$GLB,,, | Performed by: OPTOMETRIST

## 2019-01-01 PROCEDURE — 96372 THER/PROPH/DIAG INJ SC/IM: CPT

## 2019-01-01 PROCEDURE — 84484 ASSAY OF TROPONIN QUANT: CPT

## 2019-01-01 PROCEDURE — 3074F PR MOST RECENT SYSTOLIC BLOOD PRESSURE < 130 MM HG: ICD-10-PCS | Mod: CPTII,S$GLB,, | Performed by: NURSE PRACTITIONER

## 2019-01-01 PROCEDURE — 82570 ASSAY OF URINE CREATININE: CPT

## 2019-01-01 PROCEDURE — 75571 CT HRT W/O DYE W/CA TEST: CPT | Mod: 26,,, | Performed by: RADIOLOGY

## 2019-01-01 PROCEDURE — 71046 XR CHEST PA AND LATERAL: ICD-10-PCS | Mod: 26,,, | Performed by: RADIOLOGY

## 2019-01-01 PROCEDURE — 84484 ASSAY OF TROPONIN QUANT: CPT | Mod: 91

## 2019-01-01 PROCEDURE — 63600175 PHARM REV CODE 636 W HCPCS: Performed by: STUDENT IN AN ORGANIZED HEALTH CARE EDUCATION/TRAINING PROGRAM

## 2019-01-01 PROCEDURE — 25000003 PHARM REV CODE 250: Performed by: EMERGENCY MEDICINE

## 2019-01-01 PROCEDURE — 3075F SYST BP GE 130 - 139MM HG: CPT | Mod: CPTII,S$GLB,, | Performed by: INTERNAL MEDICINE

## 2019-01-01 PROCEDURE — 92004 PR EYE EXAM, NEW PATIENT,COMPREHESV: ICD-10-PCS | Mod: S$GLB,,, | Performed by: OPTOMETRIST

## 2019-01-01 PROCEDURE — 90471 TD VACCINE GREATER THAN OR EQUAL TO 7YO PRESERVATIVE FREE IM: ICD-10-PCS | Mod: S$GLB,,, | Performed by: INTERNAL MEDICINE

## 2019-01-01 PROCEDURE — 93010 ELECTROCARDIOGRAM REPORT: CPT | Mod: S$GLB,,, | Performed by: INTERNAL MEDICINE

## 2019-01-01 PROCEDURE — 1125F AMNT PAIN NOTED PAIN PRSNT: CPT | Mod: S$GLB,,, | Performed by: INTERNAL MEDICINE

## 2019-01-01 PROCEDURE — 62321 NJX INTERLAMINAR CRV/THRC: CPT | Mod: ,,, | Performed by: PAIN MEDICINE

## 2019-01-01 PROCEDURE — 92082 INTERMEDIATE VISUAL FIELD XM: CPT | Mod: S$GLB,,, | Performed by: OPTOMETRIST

## 2019-01-01 PROCEDURE — 3045F PR MOST RECENT HEMOGLOBIN A1C LEVEL 7.0-9.0%: CPT | Mod: CPTII,S$GLB,, | Performed by: NURSE PRACTITIONER

## 2019-01-01 PROCEDURE — 3045F PR MOST RECENT HEMOGLOBIN A1C LEVEL 7.0-9.0%: CPT | Mod: CPTII,S$GLB,, | Performed by: INTERNAL MEDICINE

## 2019-01-01 PROCEDURE — 99285 EMERGENCY DEPT VISIT HI MDM: CPT | Mod: 25

## 2019-01-01 PROCEDURE — 99999 PR PBB SHADOW E&M-EST. PATIENT-LVL III: CPT | Mod: PBBFAC,,, | Performed by: INTERNAL MEDICINE

## 2019-01-01 PROCEDURE — 96374 THER/PROPH/DIAG INJ IV PUSH: CPT | Mod: S$GLB,,, | Performed by: INTERNAL MEDICINE

## 2019-01-01 PROCEDURE — 36415 COLL VENOUS BLD VENIPUNCTURE: CPT | Mod: PO

## 2019-01-01 PROCEDURE — 99214 OFFICE O/P EST MOD 30 MIN: CPT | Mod: S$GLB,,, | Performed by: INTERNAL MEDICINE

## 2019-01-01 PROCEDURE — 3078F DIAST BP <80 MM HG: CPT | Mod: CPTII,S$GLB,, | Performed by: STUDENT IN AN ORGANIZED HEALTH CARE EDUCATION/TRAINING PROGRAM

## 2019-01-01 PROCEDURE — 92004 COMPRE OPH EXAM NEW PT 1/>: CPT | Mod: S$GLB,,, | Performed by: OPTOMETRIST

## 2019-01-01 PROCEDURE — 90670 PCV13 VACCINE IM: CPT | Performed by: INTERNAL MEDICINE

## 2019-01-01 PROCEDURE — 3074F PR MOST RECENT SYSTOLIC BLOOD PRESSURE < 130 MM HG: ICD-10-PCS | Mod: CPTII,S$GLB,, | Performed by: STUDENT IN AN ORGANIZED HEALTH CARE EDUCATION/TRAINING PROGRAM

## 2019-01-01 PROCEDURE — G0378 HOSPITAL OBSERVATION PER HR: HCPCS

## 2019-01-01 PROCEDURE — 92133 OCT, OPTIC NERVE - OU - BOTH EYES: ICD-10-PCS | Mod: S$GLB,,, | Performed by: OPTOMETRIST

## 2019-01-01 PROCEDURE — 3079F DIAST BP 80-89 MM HG: CPT | Mod: CPTII,S$GLB,, | Performed by: NURSE PRACTITIONER

## 2019-01-01 PROCEDURE — 92133 CPTRZD OPH DX IMG PST SGM ON: CPT | Mod: S$GLB,,, | Performed by: OPTOMETRIST

## 2019-01-01 PROCEDURE — 3075F PR MOST RECENT SYSTOLIC BLOOD PRESS GE 130-139MM HG: ICD-10-PCS | Mod: CPTII,S$GLB,, | Performed by: NURSE PRACTITIONER

## 2019-01-01 PROCEDURE — 25000003 PHARM REV CODE 250: Performed by: STUDENT IN AN ORGANIZED HEALTH CARE EDUCATION/TRAINING PROGRAM

## 2019-01-01 PROCEDURE — 1125F PR PAIN SEVERITY QUANTIFIED, PAIN PRESENT: ICD-10-PCS | Mod: S$GLB,,, | Performed by: INTERNAL MEDICINE

## 2019-01-01 PROCEDURE — 99999 PR PBB SHADOW E&M-EST. PATIENT-LVL IV: ICD-10-PCS | Mod: PBBFAC,,, | Performed by: INTERNAL MEDICINE

## 2019-01-01 PROCEDURE — 62321 NJX INTERLAMINAR CRV/THRC: CPT | Performed by: PAIN MEDICINE

## 2019-01-01 PROCEDURE — 3074F SYST BP LT 130 MM HG: CPT | Mod: CPTII,S$GLB,, | Performed by: STUDENT IN AN ORGANIZED HEALTH CARE EDUCATION/TRAINING PROGRAM

## 2019-01-01 PROCEDURE — 99499 NO LOS: ICD-10-PCS | Mod: S$GLB,,, | Performed by: INTERNAL MEDICINE

## 2019-01-01 PROCEDURE — 99214 PR OFFICE/OUTPT VISIT, EST, LEVL IV, 30-39 MIN: ICD-10-PCS | Mod: S$GLB,,, | Performed by: NURSE PRACTITIONER

## 2019-01-01 PROCEDURE — 99499 UNLISTED E&M SERVICE: CPT | Mod: S$GLB,,, | Performed by: NURSE PRACTITIONER

## 2019-01-01 PROCEDURE — 1159F MED LIST DOCD IN RCRD: CPT | Mod: S$GLB,,, | Performed by: INTERNAL MEDICINE

## 2019-01-01 PROCEDURE — 93015 CARDIAC PET SCAN STRESS (CUPID ONLY): ICD-10-PCS | Mod: S$GLB,,, | Performed by: INTERNAL MEDICINE

## 2019-01-01 PROCEDURE — 99999 PR PBB SHADOW E&M-EST. PATIENT-LVL III: CPT | Mod: PBBFAC,,, | Performed by: NURSE PRACTITIONER

## 2019-01-01 PROCEDURE — 99999 PR PBB SHADOW E&M-EST. PATIENT-LVL IV: CPT | Mod: PBBFAC,,, | Performed by: INTERNAL MEDICINE

## 2019-01-01 PROCEDURE — 99999 PR PBB SHADOW E&M-EST. PATIENT-LVL I: CPT | Mod: PBBFAC,,, | Performed by: INTERNAL MEDICINE

## 2019-01-01 PROCEDURE — 99213 PR OFFICE/OUTPT VISIT, EST, LEVL III, 20-29 MIN: ICD-10-PCS | Mod: S$GLB,,, | Performed by: STUDENT IN AN ORGANIZED HEALTH CARE EDUCATION/TRAINING PROGRAM

## 2019-01-01 PROCEDURE — 99213 OFFICE O/P EST LOW 20 MIN: CPT | Mod: S$GLB,,, | Performed by: STUDENT IN AN ORGANIZED HEALTH CARE EDUCATION/TRAINING PROGRAM

## 2019-01-01 PROCEDURE — 3074F SYST BP LT 130 MM HG: CPT | Mod: CPTII,S$GLB,, | Performed by: NURSE PRACTITIONER

## 2019-01-01 PROCEDURE — 96372 THER/PROPH/DIAG INJ SC/IM: CPT | Mod: S$GLB,,, | Performed by: INTERNAL MEDICINE

## 2019-01-01 PROCEDURE — 3078F PR MOST RECENT DIASTOLIC BLOOD PRESSURE < 80 MM HG: ICD-10-PCS | Mod: CPTII,S$GLB,, | Performed by: STUDENT IN AN ORGANIZED HEALTH CARE EDUCATION/TRAINING PROGRAM

## 2019-01-01 PROCEDURE — 99999 PR PBB SHADOW E&M-EST. PATIENT-LVL III: CPT | Mod: PBBFAC,,, | Performed by: STUDENT IN AN ORGANIZED HEALTH CARE EDUCATION/TRAINING PROGRAM

## 2019-01-01 PROCEDURE — 3074F SYST BP LT 130 MM HG: CPT | Mod: CPTII,S$GLB,, | Performed by: INTERNAL MEDICINE

## 2019-01-01 PROCEDURE — 99999 PR PBB SHADOW E&M-EST. PATIENT-LVL IV: CPT | Mod: PBBFAC,,, | Performed by: NURSE PRACTITIONER

## 2019-01-01 PROCEDURE — 3075F SYST BP GE 130 - 139MM HG: CPT | Mod: CPTII,S$GLB,, | Performed by: NURSE PRACTITIONER

## 2019-01-01 PROCEDURE — 99999 PR PBB SHADOW E&M-EST. PATIENT-LVL I: CPT | Mod: PBBFAC,,,

## 2019-01-01 PROCEDURE — 3078F DIAST BP <80 MM HG: CPT | Mod: CPTII,S$GLB,, | Performed by: NURSE PRACTITIONER

## 2019-01-01 PROCEDURE — 99204 PR OFFICE/OUTPT VISIT, NEW, LEVL IV, 45-59 MIN: ICD-10-PCS | Mod: S$GLB,,, | Performed by: INTERNAL MEDICINE

## 2019-01-01 PROCEDURE — 1101F PT FALLS ASSESS-DOCD LE1/YR: CPT | Mod: CPTII,S$GLB,, | Performed by: STUDENT IN AN ORGANIZED HEALTH CARE EDUCATION/TRAINING PROGRAM

## 2019-01-01 PROCEDURE — 82728 ASSAY OF FERRITIN: CPT

## 2019-01-01 PROCEDURE — 99152 PR MOD CONSCIOUS SEDATION, SAME PHYS, 5+ YRS, FIRST 15 MIN: ICD-10-PCS | Mod: ,,, | Performed by: PAIN MEDICINE

## 2019-01-01 PROCEDURE — 3078F PR MOST RECENT DIASTOLIC BLOOD PRESSURE < 80 MM HG: ICD-10-PCS | Mod: CPTII,S$GLB,, | Performed by: NURSE PRACTITIONER

## 2019-01-01 PROCEDURE — 92015 PR REFRACTION: ICD-10-PCS | Mod: S$GLB,,, | Performed by: OPTOMETRIST

## 2019-01-01 PROCEDURE — 1159F PR MEDICATION LIST DOCUMENTED IN MEDICAL RECORD: ICD-10-PCS | Mod: S$GLB,,, | Performed by: INTERNAL MEDICINE

## 2019-01-01 PROCEDURE — 99999 PR PBB SHADOW E&M-EST. PATIENT-LVL III: ICD-10-PCS | Mod: PBBFAC,,, | Performed by: STUDENT IN AN ORGANIZED HEALTH CARE EDUCATION/TRAINING PROGRAM

## 2019-01-01 PROCEDURE — 99499 RISK ADDL DX/OHS AUDIT: ICD-10-PCS | Mod: S$GLB,,, | Performed by: NURSE PRACTITIONER

## 2019-01-01 PROCEDURE — 96372 PR INJECTION,THERAP/PROPH/DIAG2ST, IM OR SUBCUT: ICD-10-PCS | Mod: S$GLB,,, | Performed by: INTERNAL MEDICINE

## 2019-01-01 PROCEDURE — 90471 IMMUNIZATION ADMIN: CPT | Mod: S$GLB,,, | Performed by: INTERNAL MEDICINE

## 2019-01-01 PROCEDURE — 87502 INFLUENZA DNA AMP PROBE: CPT

## 2019-01-01 PROCEDURE — 93005 EKG 12-LEAD: ICD-10-PCS | Mod: S$GLB,,, | Performed by: INTERNAL MEDICINE

## 2019-01-01 PROCEDURE — 80048 BASIC METABOLIC PNL TOTAL CA: CPT

## 2019-01-01 PROCEDURE — 99999 PR PBB SHADOW E&M-EST. PATIENT-LVL II: CPT | Mod: PBBFAC,,, | Performed by: OPTOMETRIST

## 2019-01-01 PROCEDURE — 93005 ELECTROCARDIOGRAM TRACING: CPT | Mod: S$GLB,,, | Performed by: INTERNAL MEDICINE

## 2019-01-01 PROCEDURE — 99999 PR PBB SHADOW E&M-EST. PATIENT-LVL IV: ICD-10-PCS | Mod: PBBFAC,,, | Performed by: NURSE PRACTITIONER

## 2019-01-01 PROCEDURE — 99213 OFFICE O/P EST LOW 20 MIN: CPT | Mod: S$GLB,,, | Performed by: NURSE PRACTITIONER

## 2019-01-01 PROCEDURE — A9555 RB82 RUBIDIUM: HCPCS | Mod: S$GLB,,, | Performed by: INTERNAL MEDICINE

## 2019-01-01 PROCEDURE — 99213 PR OFFICE/OUTPT VISIT, EST, LEVL III, 20-29 MIN: ICD-10-PCS | Mod: S$GLB,,, | Performed by: NURSE PRACTITIONER

## 2019-01-01 PROCEDURE — 1101F PR PT FALLS ASSESS DOC 0-1 FALLS W/OUT INJ PAST YR: ICD-10-PCS | Mod: CPTII,S$GLB,, | Performed by: NURSE PRACTITIONER

## 2019-01-01 PROCEDURE — 1101F PR PT FALLS ASSESS DOC 0-1 FALLS W/OUT INJ PAST YR: ICD-10-PCS | Mod: CPTII,S$GLB,, | Performed by: STUDENT IN AN ORGANIZED HEALTH CARE EDUCATION/TRAINING PROGRAM

## 2019-01-01 PROCEDURE — 3079F PR MOST RECENT DIASTOLIC BLOOD PRESSURE 80-89 MM HG: ICD-10-PCS | Mod: CPTII,S$GLB,, | Performed by: NURSE PRACTITIONER

## 2019-01-01 PROCEDURE — 93010 EKG 12-LEAD: ICD-10-PCS | Mod: S$GLB,,, | Performed by: INTERNAL MEDICINE

## 2019-01-01 PROCEDURE — 71046 X-RAY EXAM CHEST 2 VIEWS: CPT | Mod: TC,PO

## 2019-01-01 PROCEDURE — 99499 NO LOS: ICD-10-PCS | Mod: S$GLB,,, | Performed by: OPTOMETRIST

## 2019-01-01 PROCEDURE — 99999 PR PBB SHADOW E&M-EST. PATIENT-LVL I: ICD-10-PCS | Mod: PBBFAC,,, | Performed by: INTERNAL MEDICINE

## 2019-01-01 PROCEDURE — 99152 MOD SED SAME PHYS/QHP 5/>YRS: CPT | Mod: ,,, | Performed by: PAIN MEDICINE

## 2019-01-01 PROCEDURE — 62321 PR INJ CERV/THORAC, W/GUIDANCE: ICD-10-PCS | Mod: ,,, | Performed by: PAIN MEDICINE

## 2019-01-01 PROCEDURE — 99204 OFFICE O/P NEW MOD 45 MIN: CPT | Mod: S$GLB,,, | Performed by: INTERNAL MEDICINE

## 2019-01-01 PROCEDURE — 90714 TD VACC NO PRESV 7 YRS+ IM: CPT | Mod: S$GLB,,, | Performed by: INTERNAL MEDICINE

## 2019-01-01 PROCEDURE — A9555 CARDIAC PET SCAN STRESS (CUPID ONLY): ICD-10-PCS | Mod: S$GLB,,, | Performed by: INTERNAL MEDICINE

## 2019-01-01 PROCEDURE — 92082 HUMPHREY VISUAL FIELD-OU-INTERMEDIATE-BOTH EYES: ICD-10-PCS | Mod: S$GLB,,, | Performed by: OPTOMETRIST

## 2019-01-01 RX ORDER — LIDOCAINE HYDROCHLORIDE 10 MG/ML
1 INJECTION, SOLUTION EPIDURAL; INFILTRATION; INTRACAUDAL; PERINEURAL ONCE
Status: COMPLETED | OUTPATIENT
Start: 2019-01-01 | End: 2019-01-01

## 2019-01-01 RX ORDER — METFORMIN HYDROCHLORIDE 500 MG/1
500 TABLET, EXTENDED RELEASE ORAL 2 TIMES DAILY WITH MEALS
Qty: 360 TABLET | Refills: 3 | Status: SHIPPED | OUTPATIENT
Start: 2019-01-01 | End: 2019-01-01

## 2019-01-01 RX ORDER — DEXAMETHASONE SODIUM PHOSPHATE 10 MG/ML
INJECTION INTRAMUSCULAR; INTRAVENOUS
Status: DISCONTINUED | OUTPATIENT
Start: 2019-01-01 | End: 2019-01-01 | Stop reason: HOSPADM

## 2019-01-01 RX ORDER — LANCETS
1 EACH MISCELLANEOUS DAILY
Qty: 180 EACH | Refills: 3 | Status: SHIPPED | OUTPATIENT
Start: 2019-01-01

## 2019-01-01 RX ORDER — PANTOPRAZOLE SODIUM 40 MG/1
40 TABLET, DELAYED RELEASE ORAL DAILY
Status: DISCONTINUED | OUTPATIENT
Start: 2019-01-01 | End: 2019-01-01 | Stop reason: HOSPADM

## 2019-01-01 RX ORDER — GLIMEPIRIDE 4 MG/1
TABLET ORAL
Qty: 90 TABLET | Refills: 3 | Status: SHIPPED | OUTPATIENT
Start: 2019-01-01 | End: 2019-01-01 | Stop reason: SDUPTHER

## 2019-01-01 RX ORDER — SODIUM CHLORIDE 0.9 % (FLUSH) 0.9 %
10 SYRINGE (ML) INJECTION
Status: DISCONTINUED | OUTPATIENT
Start: 2019-01-01 | End: 2019-01-01 | Stop reason: HOSPADM

## 2019-01-01 RX ORDER — TRIAMCINOLONE ACETONIDE 40 MG/ML
40 INJECTION, SUSPENSION INTRA-ARTICULAR; INTRAMUSCULAR ONCE
Status: COMPLETED | OUTPATIENT
Start: 2019-01-01 | End: 2019-01-01

## 2019-01-01 RX ORDER — ATORVASTATIN CALCIUM 80 MG/1
80 TABLET, FILM COATED ORAL DAILY
Qty: 90 TABLET | Refills: 3 | Status: SHIPPED | OUTPATIENT
Start: 2019-01-01 | End: 2020-01-01 | Stop reason: SDUPTHER

## 2019-01-01 RX ORDER — AMLODIPINE BESYLATE 5 MG/1
10 TABLET ORAL DAILY
Status: DISCONTINUED | OUTPATIENT
Start: 2019-01-01 | End: 2019-01-01 | Stop reason: HOSPADM

## 2019-01-01 RX ORDER — GLIMEPIRIDE 4 MG/1
4 TABLET ORAL
Qty: 90 TABLET | Refills: 3 | Status: SHIPPED | OUTPATIENT
Start: 2019-01-01 | End: 2019-01-01 | Stop reason: SDUPTHER

## 2019-01-01 RX ORDER — IBUPROFEN 200 MG
16 TABLET ORAL
Status: DISCONTINUED | OUTPATIENT
Start: 2019-01-01 | End: 2019-01-01 | Stop reason: HOSPADM

## 2019-01-01 RX ORDER — CLOPIDOGREL BISULFATE 75 MG/1
75 TABLET ORAL DAILY
Qty: 30 TABLET | Refills: 11 | Status: SHIPPED | OUTPATIENT
Start: 2019-01-01 | End: 2020-01-01 | Stop reason: SDUPTHER

## 2019-01-01 RX ORDER — DEXTROSE 4 G
TABLET,CHEWABLE ORAL
Qty: 1 EACH | Refills: 0 | Status: SHIPPED | OUTPATIENT
Start: 2019-01-01

## 2019-01-01 RX ORDER — HEPARIN SODIUM 5000 [USP'U]/ML
5000 INJECTION, SOLUTION INTRAVENOUS; SUBCUTANEOUS EVERY 8 HOURS
Status: DISCONTINUED | OUTPATIENT
Start: 2019-01-01 | End: 2019-01-01 | Stop reason: HOSPADM

## 2019-01-01 RX ORDER — GLIMEPIRIDE 4 MG/1
TABLET ORAL
Qty: 30 TABLET | Refills: 11 | Status: SHIPPED | OUTPATIENT
Start: 2019-01-01 | End: 2020-01-01 | Stop reason: SDUPTHER

## 2019-01-01 RX ORDER — GLIMEPIRIDE 4 MG/1
4 TABLET ORAL
Qty: 30 TABLET | Refills: 0 | Status: SHIPPED | OUTPATIENT
Start: 2019-01-01 | End: 2019-01-01 | Stop reason: SDUPTHER

## 2019-01-01 RX ORDER — INDOMETHACIN 25 MG/1
CAPSULE ORAL
Status: DISCONTINUED | OUTPATIENT
Start: 2019-01-01 | End: 2019-01-01 | Stop reason: HOSPADM

## 2019-01-01 RX ORDER — DEXTROSE 50 % IN WATER (D50W) INTRAVENOUS SYRINGE
25
Status: DISCONTINUED | OUTPATIENT
Start: 2019-01-01 | End: 2019-01-01 | Stop reason: HOSPADM

## 2019-01-01 RX ORDER — GLIMEPIRIDE 4 MG/1
4 TABLET ORAL
Qty: 90 TABLET | Refills: 3
Start: 2019-01-01 | End: 2019-01-01 | Stop reason: SDUPTHER

## 2019-01-01 RX ORDER — DOXYCYCLINE HYCLATE 100 MG
TABLET ORAL
Qty: 30 TABLET | Refills: 0 | Status: SHIPPED | OUTPATIENT
Start: 2019-01-01 | End: 2020-01-01

## 2019-01-01 RX ORDER — ATORVASTATIN CALCIUM 40 MG/1
40 TABLET, FILM COATED ORAL NIGHTLY
Status: DISCONTINUED | OUTPATIENT
Start: 2019-01-01 | End: 2019-01-01 | Stop reason: HOSPADM

## 2019-01-01 RX ORDER — MIDAZOLAM HYDROCHLORIDE 1 MG/ML
INJECTION, SOLUTION INTRAMUSCULAR; INTRAVENOUS
Status: DISCONTINUED | OUTPATIENT
Start: 2019-01-01 | End: 2019-01-01 | Stop reason: HOSPADM

## 2019-01-01 RX ORDER — GLUCAGON 1 MG
1 KIT INJECTION
Status: DISCONTINUED | OUTPATIENT
Start: 2019-01-01 | End: 2019-01-01 | Stop reason: HOSPADM

## 2019-01-01 RX ORDER — DIPYRIDAMOLE 5 MG/ML
60 INJECTION INTRAVENOUS
Status: COMPLETED | OUTPATIENT
Start: 2019-01-01 | End: 2019-01-01

## 2019-01-01 RX ORDER — DEXTROSE 50 % IN WATER (D50W) INTRAVENOUS SYRINGE
12.5
Status: DISCONTINUED | OUTPATIENT
Start: 2019-01-01 | End: 2019-01-01 | Stop reason: HOSPADM

## 2019-01-01 RX ORDER — IBUPROFEN 200 MG
24 TABLET ORAL
Status: DISCONTINUED | OUTPATIENT
Start: 2019-01-01 | End: 2019-01-01 | Stop reason: HOSPADM

## 2019-01-01 RX ORDER — ROPIVACAINE HYDROCHLORIDE 2 MG/ML
INJECTION, SOLUTION EPIDURAL; INFILTRATION; PERINEURAL
Status: COMPLETED | OUTPATIENT
Start: 2019-01-01 | End: 2019-01-01

## 2019-01-01 RX ORDER — INSULIN ASPART 100 [IU]/ML
1-10 INJECTION, SOLUTION INTRAVENOUS; SUBCUTANEOUS
Status: DISCONTINUED | OUTPATIENT
Start: 2019-01-01 | End: 2019-01-01 | Stop reason: HOSPADM

## 2019-01-01 RX ORDER — DOBUTAMINE HYDROCHLORIDE 400 MG/100ML
10 INJECTION, SOLUTION INTRAVENOUS CONTINUOUS
Status: DISCONTINUED | OUTPATIENT
Start: 2019-01-01 | End: 2019-01-01 | Stop reason: HOSPADM

## 2019-01-01 RX ORDER — FENOFIBRATE 145 MG/1
145 TABLET, FILM COATED ORAL NIGHTLY
Status: DISCONTINUED | OUTPATIENT
Start: 2019-01-01 | End: 2019-01-01

## 2019-01-01 RX ORDER — GLIPIZIDE 10 MG/1
10 TABLET, FILM COATED, EXTENDED RELEASE ORAL
Qty: 30 TABLET | Refills: 11 | Status: SHIPPED | OUTPATIENT
Start: 2019-01-01 | End: 2019-01-01

## 2019-01-01 RX ORDER — LANCETS
EACH MISCELLANEOUS
Qty: 100 EACH | Refills: 3 | Status: SHIPPED | OUTPATIENT
Start: 2019-01-01

## 2019-01-01 RX ORDER — SODIUM CHLORIDE 9 MG/ML
500 INJECTION, SOLUTION INTRAVENOUS CONTINUOUS
Status: DISCONTINUED | OUTPATIENT
Start: 2019-01-01 | End: 2019-01-01 | Stop reason: HOSPADM

## 2019-01-01 RX ORDER — HYDROCODONE BITARTRATE AND ACETAMINOPHEN 5; 325 MG/1; MG/1
1 TABLET ORAL EVERY 6 HOURS PRN
Qty: 60 TABLET | Refills: 0 | Status: SHIPPED | OUTPATIENT
Start: 2019-01-01 | End: 2019-01-01 | Stop reason: SDUPTHER

## 2019-01-01 RX ORDER — DOXYCYCLINE HYCLATE 100 MG
100 TABLET ORAL 2 TIMES DAILY
Qty: 14 TABLET | Refills: 0 | Status: SHIPPED | OUTPATIENT
Start: 2019-01-01 | End: 2019-01-01 | Stop reason: SDUPTHER

## 2019-01-01 RX ORDER — FENTANYL CITRATE 50 UG/ML
INJECTION, SOLUTION INTRAMUSCULAR; INTRAVENOUS
Status: DISCONTINUED | OUTPATIENT
Start: 2019-01-01 | End: 2019-01-01 | Stop reason: HOSPADM

## 2019-01-01 RX ORDER — HYDROCODONE BITARTRATE AND ACETAMINOPHEN 5; 325 MG/1; MG/1
1 TABLET ORAL EVERY 6 HOURS PRN
Qty: 60 TABLET | Refills: 0 | Status: SHIPPED | OUTPATIENT
Start: 2019-01-01 | End: 2020-01-01 | Stop reason: SDUPTHER

## 2019-01-01 RX ORDER — AMINOPHYLLINE 25 MG/ML
75 INJECTION, SOLUTION INTRAVENOUS
Status: COMPLETED | OUTPATIENT
Start: 2019-01-01 | End: 2019-01-01

## 2019-01-01 RX ADMIN — AMLODIPINE BESYLATE 10 MG: 5 TABLET ORAL at 08:05

## 2019-01-01 RX ADMIN — HEPARIN SODIUM 5000 UNITS: 5000 INJECTION, SOLUTION INTRAVENOUS; SUBCUTANEOUS at 09:05

## 2019-01-01 RX ADMIN — SODIUM CHLORIDE 500 ML: 0.9 INJECTION, SOLUTION INTRAVENOUS at 09:04

## 2019-01-01 RX ADMIN — INSULIN ASPART 2 UNITS: 100 INJECTION, SOLUTION INTRAVENOUS; SUBCUTANEOUS at 09:05

## 2019-01-01 RX ADMIN — PNEUMOCOCCAL 13-VALENT CONJUGATE VACCINE 0.5 ML: 2.2; 2.2; 2.2; 2.2; 2.2; 4.4; 2.2; 2.2; 2.2; 2.2; 2.2; 2.2; 2.2 INJECTION, SUSPENSION INTRAMUSCULAR at 12:05

## 2019-01-01 RX ADMIN — THERA TABS 1 TABLET: TAB at 08:05

## 2019-01-01 RX ADMIN — PANTOPRAZOLE SODIUM 40 MG: 40 TABLET, DELAYED RELEASE ORAL at 08:05

## 2019-01-01 RX ADMIN — HEPARIN SODIUM 5000 UNITS: 5000 INJECTION, SOLUTION INTRAVENOUS; SUBCUTANEOUS at 01:05

## 2019-01-01 RX ADMIN — INSULIN ASPART 4 UNITS: 100 INJECTION, SOLUTION INTRAVENOUS; SUBCUTANEOUS at 05:05

## 2019-01-01 RX ADMIN — HEPARIN SODIUM 5000 UNITS: 5000 INJECTION, SOLUTION INTRAVENOUS; SUBCUTANEOUS at 05:05

## 2019-01-01 RX ADMIN — INSULIN ASPART 4 UNITS: 100 INJECTION, SOLUTION INTRAVENOUS; SUBCUTANEOUS at 08:05

## 2019-01-01 RX ADMIN — AMINOPHYLLINE 75 MG: 25 INJECTION, SOLUTION INTRAVENOUS at 09:10

## 2019-01-01 RX ADMIN — DIPYRIDAMOLE 60 MG: 5 INJECTION INTRAVENOUS at 09:10

## 2019-01-01 RX ADMIN — LOSARTAN POTASSIUM 12.5 MG: 25 TABLET, FILM COATED ORAL at 08:05

## 2019-01-01 RX ADMIN — DOBUTAMINE IN DEXTROSE 10 MCG/KG/MIN: 200 INJECTION, SOLUTION INTRAVENOUS at 09:05

## 2019-01-01 RX ADMIN — SODIUM CHLORIDE 1000 ML: 0.9 INJECTION, SOLUTION INTRAVENOUS at 09:05

## 2019-01-01 RX ADMIN — ATORVASTATIN CALCIUM 40 MG: 40 TABLET, FILM COATED ORAL at 02:05

## 2019-01-01 RX ADMIN — HEPARIN SODIUM 5000 UNITS: 5000 INJECTION, SOLUTION INTRAVENOUS; SUBCUTANEOUS at 06:05

## 2019-01-01 RX ADMIN — FENOFIBRATE 145 MG: 145 TABLET ORAL at 02:05

## 2019-01-01 RX ADMIN — TRIAMCINOLONE ACETONIDE 40 MG: 40 INJECTION, SUSPENSION INTRA-ARTICULAR; INTRAMUSCULAR at 10:11

## 2019-01-01 RX ADMIN — ATORVASTATIN CALCIUM 40 MG: 40 TABLET, FILM COATED ORAL at 09:05

## 2019-01-02 ENCOUNTER — TELEPHONE (OUTPATIENT)
Dept: INTERNAL MEDICINE | Facility: CLINIC | Age: 69
End: 2019-01-02

## 2019-01-03 ENCOUNTER — LAB VISIT (OUTPATIENT)
Dept: LAB | Facility: HOSPITAL | Age: 69
End: 2019-01-03
Attending: INTERNAL MEDICINE
Payer: MEDICARE

## 2019-01-03 DIAGNOSIS — I10 BENIGN HYPERTENSION: ICD-10-CM

## 2019-01-03 DIAGNOSIS — E11.42 DIABETIC POLYNEUROPATHY ASSOCIATED WITH TYPE 2 DIABETES MELLITUS: ICD-10-CM

## 2019-01-03 DIAGNOSIS — Z12.5 PROSTATE CANCER SCREENING: ICD-10-CM

## 2019-01-03 LAB
ALBUMIN SERPL BCP-MCNC: 3.4 G/DL
ALBUMIN/CREAT UR: 19 UG/MG
ALP SERPL-CCNC: 52 U/L
ALT SERPL W/O P-5'-P-CCNC: 22 U/L
ANION GAP SERPL CALC-SCNC: 12 MMOL/L
AST SERPL-CCNC: 24 U/L
BASOPHILS # BLD AUTO: 0.04 K/UL
BASOPHILS NFR BLD: 0.6 %
BILIRUB SERPL-MCNC: 0.7 MG/DL
BILIRUB UR QL STRIP: NEGATIVE
BUN SERPL-MCNC: 43 MG/DL
CALCIUM SERPL-MCNC: 9 MG/DL
CHLORIDE SERPL-SCNC: 109 MMOL/L
CHOLEST SERPL-MCNC: 184 MG/DL
CHOLEST/HDLC SERPL: 5 {RATIO}
CLARITY UR REFRACT.AUTO: ABNORMAL
CO2 SERPL-SCNC: 13 MMOL/L
COLOR UR AUTO: YELLOW
COMPLEXED PSA SERPL-MCNC: 0.76 NG/ML
CREAT SERPL-MCNC: 2.1 MG/DL
CREAT UR-MCNC: 247 MG/DL
DIFFERENTIAL METHOD: ABNORMAL
EOSINOPHIL # BLD AUTO: 0.2 K/UL
EOSINOPHIL NFR BLD: 2.4 %
ERYTHROCYTE [DISTWIDTH] IN BLOOD BY AUTOMATED COUNT: 12.4 %
EST. GFR  (AFRICAN AMERICAN): 36.6 ML/MIN/1.73 M^2
EST. GFR  (NON AFRICAN AMERICAN): 31.6 ML/MIN/1.73 M^2
ESTIMATED AVG GLUCOSE: 189 MG/DL
GLUCOSE SERPL-MCNC: 156 MG/DL
GLUCOSE UR QL STRIP: NEGATIVE
HBA1C MFR BLD HPLC: 8.2 %
HCT VFR BLD AUTO: 38.6 %
HDLC SERPL-MCNC: 37 MG/DL
HDLC SERPL: 20.1 %
HGB BLD-MCNC: 12.5 G/DL
HGB UR QL STRIP: NEGATIVE
IMM GRANULOCYTES # BLD AUTO: 0.03 K/UL
IMM GRANULOCYTES NFR BLD AUTO: 0.4 %
KETONES UR QL STRIP: NEGATIVE
LDLC SERPL CALC-MCNC: ABNORMAL MG/DL
LEUKOCYTE ESTERASE UR QL STRIP: NEGATIVE
LYMPHOCYTES # BLD AUTO: 1.7 K/UL
LYMPHOCYTES NFR BLD: 23.9 %
MCH RBC QN AUTO: 31 PG
MCHC RBC AUTO-ENTMCNC: 32.4 G/DL
MCV RBC AUTO: 96 FL
MICROALBUMIN UR DL<=1MG/L-MCNC: 47 UG/ML
MONOCYTES # BLD AUTO: 1.1 K/UL
MONOCYTES NFR BLD: 16.1 %
NEUTROPHILS # BLD AUTO: 4 K/UL
NEUTROPHILS NFR BLD: 56.6 %
NITRITE UR QL STRIP: NEGATIVE
NONHDLC SERPL-MCNC: 147 MG/DL
NRBC BLD-RTO: 0 /100 WBC
PH UR STRIP: 5 [PH] (ref 5–8)
PLATELET # BLD AUTO: 249 K/UL
PMV BLD AUTO: 11.1 FL
POTASSIUM SERPL-SCNC: 4.4 MMOL/L
PROT SERPL-MCNC: 7.1 G/DL
PROT UR QL STRIP: NEGATIVE
RBC # BLD AUTO: 4.03 M/UL
SODIUM SERPL-SCNC: 134 MMOL/L
SP GR UR STRIP: 1.02 (ref 1–1.03)
TRIGL SERPL-MCNC: 469 MG/DL
TSH SERPL DL<=0.005 MIU/L-ACNC: 3.49 UIU/ML
URN SPEC COLLECT METH UR: ABNORMAL
WBC # BLD AUTO: 7.04 K/UL

## 2019-01-03 PROCEDURE — 36415 COLL VENOUS BLD VENIPUNCTURE: CPT | Mod: PO

## 2019-01-03 PROCEDURE — 80053 COMPREHEN METABOLIC PANEL: CPT

## 2019-01-03 PROCEDURE — 83036 HEMOGLOBIN GLYCOSYLATED A1C: CPT

## 2019-01-03 PROCEDURE — 81003 URINALYSIS AUTO W/O SCOPE: CPT

## 2019-01-03 PROCEDURE — 84443 ASSAY THYROID STIM HORMONE: CPT

## 2019-01-03 PROCEDURE — 84153 ASSAY OF PSA TOTAL: CPT

## 2019-01-03 PROCEDURE — 80061 LIPID PANEL: CPT

## 2019-01-03 PROCEDURE — 85025 COMPLETE CBC W/AUTO DIFF WBC: CPT

## 2019-01-03 PROCEDURE — 82043 UR ALBUMIN QUANTITATIVE: CPT

## 2019-01-03 RX ORDER — AMLODIPINE BESYLATE 10 MG/1
TABLET ORAL
Qty: 90 TABLET | Refills: 0 | Status: SHIPPED | OUTPATIENT
Start: 2019-01-03 | End: 2019-01-04 | Stop reason: SDUPTHER

## 2019-01-03 NOTE — TELEPHONE ENCOUNTER
Has appt next week to see dr villafana and go over results.  I told patient 's comments.    12/30 had diarrhea and vomiting , lasted for a few days and re occur this am.   He says no vomiting today.  He has taken 3 imodium's.  He can hold fluids.    Today had soup , can.  Brocoli/soup.  I told him broth soup ok, avoid cream soups.    I told him to push fluids and broth, avoid eating much food.  I told him to call me tomorrow if not better.

## 2019-01-03 NOTE — TELEPHONE ENCOUNTER
His diabetes and renal function are worse and in meantime diet and drink more fluid, [facility]

## 2019-01-04 ENCOUNTER — TELEPHONE (OUTPATIENT)
Dept: INTERNAL MEDICINE | Facility: CLINIC | Age: 69
End: 2019-01-04

## 2019-01-04 DIAGNOSIS — N18.30 CKD (CHRONIC KIDNEY DISEASE) STAGE 3, GFR 30-59 ML/MIN: Primary | ICD-10-CM

## 2019-01-04 NOTE — TELEPHONE ENCOUNTER
Started 12/30 pm with diarrhea and vomiting.  I spoke to him yesterday about labs.    Last vomited the day before had labwork.  Had  Vomited  For a  day and half.    Diarrhea still going on. I spoke to him yesterday about fluids & broth and viral diet avoid dairy for normal foods.      Using imodium 3-4 times a day. No loose bm laste till till this 1am. Took 1/2 immodium after that.  Had loose bowels 5 times since 1am . Not much volume to stool.    He is pushing fluids. Will continue liquid diet.  No openings left here in clinic.  I told him he could change to kaopectate for loose bm'.    Any further advise?  I told him to get to er if worsens or feels week, for possible iv.    Thanks haven

## 2019-01-04 NOTE — TELEPHONE ENCOUNTER
----- Message from Pooja Muñoz sent at 1/4/2019  9:25 AM CST -----  Contact: Yenny/ spouse [phone]  Pt spouse called state pt is not feeling any better and now pt has diarrhea     Please advise

## 2019-01-04 NOTE — TELEPHONE ENCOUNTER
Needs 90 day rxs sent to new mail order [facility].    Please approve.  He will see you in a week for annual.    Thanks haven

## 2019-01-07 RX ORDER — LISINOPRIL 40 MG/1
40 TABLET ORAL DAILY
Qty: 90 TABLET | Refills: 3 | Status: SHIPPED | OUTPATIENT
Start: 2019-01-07 | End: 2019-01-10 | Stop reason: ALTCHOICE

## 2019-01-07 RX ORDER — MELOXICAM 15 MG/1
15 TABLET ORAL DAILY
Qty: 90 TABLET | Refills: 3 | Status: SHIPPED | OUTPATIENT
Start: 2019-01-07 | End: 2019-01-10 | Stop reason: SINTOL

## 2019-01-07 RX ORDER — OMEPRAZOLE 20 MG/1
20 CAPSULE, DELAYED RELEASE ORAL DAILY
Qty: 90 CAPSULE | Refills: 3 | Status: SHIPPED | OUTPATIENT
Start: 2019-01-07 | End: 2020-01-01 | Stop reason: SDUPTHER

## 2019-01-07 RX ORDER — FENOFIBRATE 145 MG/1
145 TABLET, FILM COATED ORAL NIGHTLY
Qty: 90 TABLET | Refills: 3 | Status: SHIPPED | OUTPATIENT
Start: 2019-01-07 | End: 2020-01-01 | Stop reason: SDUPTHER

## 2019-01-07 RX ORDER — ATORVASTATIN CALCIUM 40 MG/1
40 TABLET, FILM COATED ORAL NIGHTLY
Qty: 90 TABLET | Refills: 3 | Status: SHIPPED | OUTPATIENT
Start: 2019-01-07 | End: 2019-02-11 | Stop reason: SDUPTHER

## 2019-01-07 RX ORDER — GLIMEPIRIDE 4 MG/1
4 TABLET ORAL
Qty: 90 TABLET | Refills: 3 | Status: SHIPPED | OUTPATIENT
Start: 2019-01-07 | End: 2019-01-01

## 2019-01-07 RX ORDER — AMLODIPINE BESYLATE 10 MG/1
TABLET ORAL
Qty: 90 TABLET | Refills: 3 | Status: SHIPPED | OUTPATIENT
Start: 2019-01-07 | End: 2019-02-11 | Stop reason: SDUPTHER

## 2019-01-08 NOTE — TELEPHONE ENCOUNTER
He is better.  We will do labs tomorrow to recheck kidney function since he is working more on fluids since last week.  Annual is Thursday to go over all.    Orders entered and linked.

## 2019-01-08 NOTE — TELEPHONE ENCOUNTER
Left  apologizing  just answered his message from Friday and he wants to see him if he is still stick.  I can fit him in today if need.

## 2019-01-09 ENCOUNTER — LAB VISIT (OUTPATIENT)
Dept: LAB | Facility: HOSPITAL | Age: 69
End: 2019-01-09
Attending: INTERNAL MEDICINE
Payer: MEDICARE

## 2019-01-09 DIAGNOSIS — N18.30 CKD (CHRONIC KIDNEY DISEASE) STAGE 3, GFR 30-59 ML/MIN: ICD-10-CM

## 2019-01-09 LAB
ALBUMIN SERPL BCP-MCNC: 3.5 G/DL
ALP SERPL-CCNC: 54 U/L
ALT SERPL W/O P-5'-P-CCNC: 23 U/L
ANION GAP SERPL CALC-SCNC: 7 MMOL/L
AST SERPL-CCNC: 20 U/L
BILIRUB SERPL-MCNC: 0.5 MG/DL
BUN SERPL-MCNC: 20 MG/DL
CALCIUM SERPL-MCNC: 9.9 MG/DL
CHLORIDE SERPL-SCNC: 108 MMOL/L
CO2 SERPL-SCNC: 24 MMOL/L
CREAT SERPL-MCNC: 1.4 MG/DL
EST. GFR  (AFRICAN AMERICAN): 59.7 ML/MIN/1.73 M^2
EST. GFR  (NON AFRICAN AMERICAN): 51.6 ML/MIN/1.73 M^2
GLUCOSE SERPL-MCNC: 132 MG/DL
POTASSIUM SERPL-SCNC: 4.7 MMOL/L
PROT SERPL-MCNC: 6.5 G/DL
SODIUM SERPL-SCNC: 139 MMOL/L

## 2019-01-09 PROCEDURE — 36415 COLL VENOUS BLD VENIPUNCTURE: CPT | Mod: PO

## 2019-01-09 PROCEDURE — 80053 COMPREHEN METABOLIC PANEL: CPT

## 2019-01-10 ENCOUNTER — OFFICE VISIT (OUTPATIENT)
Dept: INTERNAL MEDICINE | Facility: CLINIC | Age: 69
End: 2019-01-10
Payer: MEDICARE

## 2019-01-10 ENCOUNTER — HOSPITAL ENCOUNTER (OUTPATIENT)
Dept: RADIOLOGY | Facility: HOSPITAL | Age: 69
Discharge: HOME | End: 2019-01-10
Attending: INTERNAL MEDICINE
Payer: MEDICARE

## 2019-01-10 VITALS
HEART RATE: 78 BPM | OXYGEN SATURATION: 99 % | HEIGHT: 68 IN | BODY MASS INDEX: 36.92 KG/M2 | SYSTOLIC BLOOD PRESSURE: 122 MMHG | RESPIRATION RATE: 18 BRPM | DIASTOLIC BLOOD PRESSURE: 82 MMHG | WEIGHT: 243.63 LBS | TEMPERATURE: 99 F

## 2019-01-10 DIAGNOSIS — N28.9 RENAL DYSFUNCTION: ICD-10-CM

## 2019-01-10 DIAGNOSIS — M25.551 PAIN OF BOTH HIP JOINTS: ICD-10-CM

## 2019-01-10 DIAGNOSIS — M47.22 OSTEOARTHRITIS OF SPINE WITH RADICULOPATHY, CERVICAL REGION: ICD-10-CM

## 2019-01-10 DIAGNOSIS — Z87.19 HISTORY OF PANCREATITIS: ICD-10-CM

## 2019-01-10 DIAGNOSIS — E11.42 DIABETIC POLYNEUROPATHY ASSOCIATED WITH TYPE 2 DIABETES MELLITUS: ICD-10-CM

## 2019-01-10 DIAGNOSIS — I10 ESSENTIAL HYPERTENSION: Primary | ICD-10-CM

## 2019-01-10 DIAGNOSIS — N18.30 CHRONIC KIDNEY DISEASE, STAGE III (MODERATE): ICD-10-CM

## 2019-01-10 DIAGNOSIS — R80.9 TYPE 2 DIABETES MELLITUS WITH MICROALBUMINURIA, WITHOUT LONG-TERM CURRENT USE OF INSULIN: ICD-10-CM

## 2019-01-10 DIAGNOSIS — E08.40 DIABETES MELLITUS DUE TO UNDERLYING CONDITION, CONTROLLED, WITH DIABETIC NEUROPATHY, WITHOUT LONG-TERM CURRENT USE OF INSULIN: ICD-10-CM

## 2019-01-10 DIAGNOSIS — M25.552 PAIN OF BOTH HIP JOINTS: ICD-10-CM

## 2019-01-10 DIAGNOSIS — E11.29 TYPE 2 DIABETES MELLITUS WITH MICROALBUMINURIA, WITHOUT LONG-TERM CURRENT USE OF INSULIN: ICD-10-CM

## 2019-01-10 DIAGNOSIS — Z00.00 ANNUAL PHYSICAL EXAM: ICD-10-CM

## 2019-01-10 PROCEDURE — 73502 XR HIP 2 VIEW RIGHT: ICD-10-PCS | Mod: 26,RT,, | Performed by: RADIOLOGY

## 2019-01-10 PROCEDURE — 73502 X-RAY EXAM HIP UNI 2-3 VIEWS: CPT | Mod: TC,PO,RT

## 2019-01-10 PROCEDURE — 3045F PR MOST RECENT HEMOGLOBIN A1C LEVEL 7.0-9.0%: CPT | Mod: CPTII,S$GLB,, | Performed by: INTERNAL MEDICINE

## 2019-01-10 PROCEDURE — 99499 UNLISTED E&M SERVICE: CPT | Mod: S$GLB,,, | Performed by: INTERNAL MEDICINE

## 2019-01-10 PROCEDURE — 99214 OFFICE O/P EST MOD 30 MIN: CPT | Mod: 25,S$GLB,, | Performed by: INTERNAL MEDICINE

## 2019-01-10 PROCEDURE — 3045F PR MOST RECENT HEMOGLOBIN A1C LEVEL 7.0-9.0%: ICD-10-PCS | Mod: CPTII,S$GLB,, | Performed by: INTERNAL MEDICINE

## 2019-01-10 PROCEDURE — 3079F PR MOST RECENT DIASTOLIC BLOOD PRESSURE 80-89 MM HG: ICD-10-PCS | Mod: CPTII,S$GLB,, | Performed by: INTERNAL MEDICINE

## 2019-01-10 PROCEDURE — 99499 RISK ADDL DX/OHS AUDIT: ICD-10-PCS | Mod: S$GLB,,, | Performed by: INTERNAL MEDICINE

## 2019-01-10 PROCEDURE — 3074F PR MOST RECENT SYSTOLIC BLOOD PRESSURE < 130 MM HG: ICD-10-PCS | Mod: CPTII,S$GLB,, | Performed by: INTERNAL MEDICINE

## 2019-01-10 PROCEDURE — 73502 X-RAY EXAM HIP UNI 2-3 VIEWS: CPT | Mod: 26,RT,, | Performed by: RADIOLOGY

## 2019-01-10 PROCEDURE — 99999 PR PBB SHADOW E&M-EST. PATIENT-LVL IV: CPT | Mod: PBBFAC,,, | Performed by: INTERNAL MEDICINE

## 2019-01-10 PROCEDURE — 99214 PR OFFICE/OUTPT VISIT, EST, LEVL IV, 30-39 MIN: ICD-10-PCS | Mod: 25,S$GLB,, | Performed by: INTERNAL MEDICINE

## 2019-01-10 PROCEDURE — 3079F DIAST BP 80-89 MM HG: CPT | Mod: CPTII,S$GLB,, | Performed by: INTERNAL MEDICINE

## 2019-01-10 PROCEDURE — 3074F SYST BP LT 130 MM HG: CPT | Mod: CPTII,S$GLB,, | Performed by: INTERNAL MEDICINE

## 2019-01-10 PROCEDURE — 99999 PR PBB SHADOW E&M-EST. PATIENT-LVL IV: ICD-10-PCS | Mod: PBBFAC,,, | Performed by: INTERNAL MEDICINE

## 2019-01-10 RX ORDER — VALSARTAN 320 MG/1
320 TABLET ORAL DAILY
Qty: 90 TABLET | Refills: 3 | Status: SHIPPED | OUTPATIENT
Start: 2019-01-10 | End: 2020-01-01 | Stop reason: SDUPTHER

## 2019-01-10 NOTE — PROGRESS NOTES
Subjective:       Patient ID: Erick Valdovinos is a 68 y.o. male.    Chief Complaint: Annual Exam  HISTORY OF PRESENT ILLNESS:  A 68-year-old white male patient who is coming in   for his annual exam.  He had blood work done prior to the evaluation showing   glucose of 156, creatinine 2.1.  BUN was 43.  Microalbumin negative.  Urinalysis   negative.  Hemoglobin A1c of 8.2.  PSA normal.  CBC essentially normal.  Lipid   profile, triglycerides 469, which is higher than it had been.    The patient said his blood sugars have been normal at home.  It was surprised   that his blood sugar was elevated at the time of the lab draw.  The patient is   also seeing [facility] and Dr. Murguia for pain control on his back.  He   has increasing numbness when he walks.  He has had hip pain bilaterally, right   greater than left and the abdominal pain I had seen him for the last time has   cleared.  The patient also says his memory is worse, but he is a lot less active   than he was living in [place] now and [place].    PHYSICAL EXAMINATION:  VITAL SIGNS:  Normal, O2 sat of 99.  SKIN:  Fair, healthy.  HEENT:  Clear.  NECK:  Shows no adenopathy, thyroid enlargement or bruit.  CHEST:  Clear.  HEART:  There is a grade II-III systolic murmur going towards his neck.  ABDOMEN:  Obese, nontender, no organomegaly, mass, fullness.  EXTREMITIES:  Show normal muscles, joints, pulses.  Feet were examined.  He has   decreased sensation at the sock level.  SKIN:  Intact and healthy looking good pulses, good capillary filling.  No nail   problems.  NEUROLOGIC:  Otherwise normal.    IMPRESSION:  1.  Hypertension, controlled.  He was switched off of lisinopril to the   valsartan at a dose of 320 mg.  2.  Diabetes with neuropathy, somewhat progressive apparently.  3.  Renal dysfunction, seeing Renal.  4.  History of pancreatitis.  5.  Type 4, hyperlipidemia with an elevation of his triglycerides, which is not   usual and he has  probably gotten off his diet.  We had a discussion about that.  6.  Pain in both of his hip joints, probably as a result of arthritis, though he   is having problems with lumbar radiculopathy.  7.  Decline in his memory.  I told him based on the exam and his history, this   may be due to less activity and less demand, so I have asked him to get more   mentally active and physically active and if that continues to be a problem, we   will look into it further.  The patient's blood sugar was higher at this time,   so I have repeated his blood sugar just prior to this visit and was 132 with a   creatinine of 1.4 and otherwise normal numbers.  8.  Aortic flow murmur, probably has mild aortic stenosis.  I will see him again   in six months.  He is off metformin and that may well be the reason his   abdominal pain has gone.      CHARLIE/CHRISTELLE  dd: 01/31/2019 21:17:41 (CST)  td: 02/01/2019 18:42:00 (CST)  Doc ID   #[id number]  Job ID #[id number]    CC:     [facility] [id number]  HPI  Review of Systems   Constitutional: Negative for activity change, appetite change, fatigue and unexpected weight change.   HENT: Negative for dental problem, hearing loss, mouth sores, postnasal drip and sinus pressure.    Eyes: Negative for discharge and visual disturbance.   Respiratory: Negative for cough and shortness of breath.    Cardiovascular: Negative for chest pain and palpitations.   Gastrointestinal: Positive for abdominal pain. Negative for blood in stool, constipation, diarrhea and nausea.   Genitourinary: Negative for dysuria, hematuria and testicular pain.   Musculoskeletal: Positive for arthralgias, back pain and neck pain. Negative for joint swelling.   Skin: Negative for rash.   Neurological: Positive for weakness and numbness. Negative for headaches.   Psychiatric/Behavioral: Positive for decreased concentration. Negative for agitation and sleep disturbance.       Objective:      Physical Exam   Constitutional: He is oriented to person, place, and  time. He appears well-developed and well-nourished.   HENT:   Head: Normocephalic.   Right Ear: External ear normal.   Left Ear: External ear normal.   Mouth/Throat: Oropharynx is clear and moist.   Eyes: EOM are normal. Pupils are equal, round, and reactive to light. Right eye exhibits no discharge.   Neck: Normal range of motion. No JVD present. No tracheal deviation present. No thyromegaly present.   Cardiovascular: Normal rate, regular rhythm and intact distal pulses. Exam reveals no gallop.   Murmur heard.  Pulmonary/Chest: Effort normal and breath sounds normal. He has no wheezes. He has no rales.   Abdominal: Soft. Bowel sounds are normal. He exhibits no mass. There is no tenderness.   Genitourinary: Prostate normal and penis normal. Rectal exam shows guaiac negative stool.   Musculoskeletal: Normal range of motion. He exhibits no edema.   Lymphadenopathy:     He has no cervical adenopathy.   Neurological: He is oriented to person, place, and time. He displays normal reflexes. A sensory deficit is present. No cranial nerve deficit. Coordination normal.   Skin: Skin is warm. No rash noted. No pallor.   Psychiatric: He has a normal mood and affect.       Assessment:       1. Essential hypertension    2. Annual physical exam    3. Diabetic polyneuropathy associated with type 2 diabetes mellitus    4. Renal dysfunction    5. History of pancreatitis    6. Diabetes mellitus due to underlying condition, controlled, with diabetic neuropathy, without long-term current use of insulin    7. Type 2 diabetes mellitus with microalbuminuria, without long-term current use of insulin    8. Osteoarthritis of spine with radiculopathy, cervical region    9. Chronic kidney disease, stage III (moderate)    10. Pain of both hip joints        Plan:

## 2019-01-15 ENCOUNTER — TELEPHONE (OUTPATIENT)
Dept: INTERNAL MEDICINE | Facility: CLINIC | Age: 69
End: 2019-01-15

## 2019-01-15 DIAGNOSIS — Z12.11 COLON CANCER SCREENING: Primary | ICD-10-CM

## 2019-01-15 NOTE — TELEPHONE ENCOUNTER
----- Message from Verónica Nicole sent at 1/15/2019 10:42 AM CST -----  Contact: Wife/[phone]  Pt is supposed to have a colonoscopy. Pt's previous doctor is now on the [place] and they don't want to travel that far. Pt is looking for recommendations on another doctor in the [place] location. Jimy advise.        Thanks

## 2019-01-15 NOTE — TELEPHONE ENCOUNTER
Dr ordered in sept and order closed now.   he wanted to use only see's patients on [facility] and they want to use [facility].    I put in order as dr villafana approved.    Wife advised colon/rectal phone number to call in 2 days to schedule test.

## 2019-01-16 ENCOUNTER — TELEPHONE (OUTPATIENT)
Dept: GASTROENTEROLOGY | Facility: CLINIC | Age: 69
End: 2019-01-16

## 2019-01-16 NOTE — TELEPHONE ENCOUNTER
----- Message from Pooja Muñoz sent at 1/16/2019  9:05 AM CST -----  Contact: self   Pt needs all his medication to be sent to [facility] : [address] [id number]  [place], [place] [address]  Phone: [phone]   Fax  [phone]    Please remove previous [facility] - [place], [place]

## 2019-01-16 NOTE — TELEPHONE ENCOUNTER
I don't see [facility] in [facility] as an option to do refills. I told her patient I would research this and send other meds.  The [facility] on file billed his [employer] account.  I need to talk to ailyn curry/ alison to figure this out.      2.  Feels like congestion in head mostly but does have bronchial cough.  Using otc diabetic tussin and I told him to switch to mucinex dm twice daily.  Only Congested 3 days.  Feels like improving.  Ok to just continue mucinex dm for now?  See you Monday if not better?   Does get coughing spells.   I told him to work on fluids intake.     Please advise.  Thanks haven

## 2019-01-17 ENCOUNTER — TELEPHONE (OUTPATIENT)
Dept: GASTROENTEROLOGY | Facility: CLINIC | Age: 69
End: 2019-01-17

## 2019-01-17 RX ORDER — GABAPENTIN 300 MG/1
300-600 CAPSULE ORAL 3 TIMES DAILY
Qty: 270 CAPSULE | Refills: 3 | Status: SHIPPED | OUTPATIENT
Start: 2019-01-17 | End: 2019-01-22

## 2019-01-17 NOTE — TELEPHONE ENCOUNTER
Spoke with patient about scheduling a Colonoscopy. Informed patient that an office visit is needed before scheduling Colonoscopy. Patient scheduled an office visit on 2/7/2019 with Becki Fletcher.

## 2019-01-17 NOTE — TELEPHONE ENCOUNTER
Patient called and advised to use otc and let us know if worsening.    Please approve gabapentin.    Thanks haven

## 2019-01-22 ENCOUNTER — OFFICE VISIT (OUTPATIENT)
Dept: INTERNAL MEDICINE | Facility: CLINIC | Age: 69
End: 2019-01-22
Payer: MEDICARE

## 2019-01-22 ENCOUNTER — TELEPHONE (OUTPATIENT)
Dept: INTERNAL MEDICINE | Facility: CLINIC | Age: 69
End: 2019-01-22

## 2019-01-22 VITALS
WEIGHT: 241.38 LBS | BODY MASS INDEX: 36.58 KG/M2 | HEIGHT: 68 IN | SYSTOLIC BLOOD PRESSURE: 110 MMHG | RESPIRATION RATE: 18 BRPM | OXYGEN SATURATION: 98 % | HEART RATE: 88 BPM | TEMPERATURE: 99 F | DIASTOLIC BLOOD PRESSURE: 76 MMHG

## 2019-01-22 DIAGNOSIS — E11.65 UNCONTROLLED TYPE 2 DIABETES MELLITUS WITH HYPERGLYCEMIA: ICD-10-CM

## 2019-01-22 DIAGNOSIS — B96.89 ACUTE BACTERIAL BRONCHITIS: Primary | ICD-10-CM

## 2019-01-22 DIAGNOSIS — J20.8 ACUTE BACTERIAL BRONCHITIS: Primary | ICD-10-CM

## 2019-01-22 PROCEDURE — 99499 RISK ADDL DX/OHS AUDIT: ICD-10-PCS | Mod: S$GLB,,, | Performed by: STUDENT IN AN ORGANIZED HEALTH CARE EDUCATION/TRAINING PROGRAM

## 2019-01-22 PROCEDURE — 1101F PT FALLS ASSESS-DOCD LE1/YR: CPT | Mod: CPTII,GC,S$GLB, | Performed by: STUDENT IN AN ORGANIZED HEALTH CARE EDUCATION/TRAINING PROGRAM

## 2019-01-22 PROCEDURE — 99214 PR OFFICE/OUTPT VISIT, EST, LEVL IV, 30-39 MIN: ICD-10-PCS | Mod: GC,S$GLB,, | Performed by: STUDENT IN AN ORGANIZED HEALTH CARE EDUCATION/TRAINING PROGRAM

## 2019-01-22 PROCEDURE — 3078F PR MOST RECENT DIASTOLIC BLOOD PRESSURE < 80 MM HG: ICD-10-PCS | Mod: CPTII,GC,S$GLB, | Performed by: STUDENT IN AN ORGANIZED HEALTH CARE EDUCATION/TRAINING PROGRAM

## 2019-01-22 PROCEDURE — 3078F DIAST BP <80 MM HG: CPT | Mod: CPTII,GC,S$GLB, | Performed by: STUDENT IN AN ORGANIZED HEALTH CARE EDUCATION/TRAINING PROGRAM

## 2019-01-22 PROCEDURE — 99999 PR PBB SHADOW E&M-EST. PATIENT-LVL V: ICD-10-PCS | Mod: PBBFAC,GC,, | Performed by: STUDENT IN AN ORGANIZED HEALTH CARE EDUCATION/TRAINING PROGRAM

## 2019-01-22 PROCEDURE — 99999 PR PBB SHADOW E&M-EST. PATIENT-LVL V: CPT | Mod: PBBFAC,GC,, | Performed by: STUDENT IN AN ORGANIZED HEALTH CARE EDUCATION/TRAINING PROGRAM

## 2019-01-22 PROCEDURE — 99499 UNLISTED E&M SERVICE: CPT | Mod: S$GLB,,, | Performed by: STUDENT IN AN ORGANIZED HEALTH CARE EDUCATION/TRAINING PROGRAM

## 2019-01-22 PROCEDURE — 3074F SYST BP LT 130 MM HG: CPT | Mod: CPTII,GC,S$GLB, | Performed by: STUDENT IN AN ORGANIZED HEALTH CARE EDUCATION/TRAINING PROGRAM

## 2019-01-22 PROCEDURE — 3074F PR MOST RECENT SYSTOLIC BLOOD PRESSURE < 130 MM HG: ICD-10-PCS | Mod: CPTII,GC,S$GLB, | Performed by: STUDENT IN AN ORGANIZED HEALTH CARE EDUCATION/TRAINING PROGRAM

## 2019-01-22 PROCEDURE — 99214 OFFICE O/P EST MOD 30 MIN: CPT | Mod: GC,S$GLB,, | Performed by: STUDENT IN AN ORGANIZED HEALTH CARE EDUCATION/TRAINING PROGRAM

## 2019-01-22 PROCEDURE — 1101F PR PT FALLS ASSESS DOC 0-1 FALLS W/OUT INJ PAST YR: ICD-10-PCS | Mod: CPTII,GC,S$GLB, | Performed by: STUDENT IN AN ORGANIZED HEALTH CARE EDUCATION/TRAINING PROGRAM

## 2019-01-22 PROCEDURE — 3045F PR MOST RECENT HEMOGLOBIN A1C LEVEL 7.0-9.0%: ICD-10-PCS | Mod: CPTII,GC,S$GLB, | Performed by: STUDENT IN AN ORGANIZED HEALTH CARE EDUCATION/TRAINING PROGRAM

## 2019-01-22 PROCEDURE — 3045F PR MOST RECENT HEMOGLOBIN A1C LEVEL 7.0-9.0%: CPT | Mod: CPTII,GC,S$GLB, | Performed by: STUDENT IN AN ORGANIZED HEALTH CARE EDUCATION/TRAINING PROGRAM

## 2019-01-22 RX ORDER — AMOXICILLIN AND CLAVULANATE POTASSIUM 875; 125 MG/1; MG/1
1 TABLET, FILM COATED ORAL EVERY 12 HOURS
Qty: 20 TABLET | Refills: 0 | Status: SHIPPED | OUTPATIENT
Start: 2019-01-22 | End: 2019-02-01

## 2019-01-22 RX ORDER — BENZONATATE 100 MG/1
100 CAPSULE ORAL 3 TIMES DAILY PRN
Qty: 10 CAPSULE | Refills: 0 | Status: SHIPPED | OUTPATIENT
Start: 2019-01-22 | End: 2019-01-22 | Stop reason: SDUPTHER

## 2019-01-22 RX ORDER — AMOXICILLIN AND CLAVULANATE POTASSIUM 875; 125 MG/1; MG/1
1 TABLET, FILM COATED ORAL EVERY 12 HOURS
Qty: 20 TABLET | Refills: 0 | Status: SHIPPED | OUTPATIENT
Start: 2019-01-22 | End: 2019-01-22 | Stop reason: SDUPTHER

## 2019-01-22 RX ORDER — BENZONATATE 200 MG/1
200 CAPSULE ORAL 3 TIMES DAILY PRN
Qty: 30 CAPSULE | Refills: 0 | Status: SHIPPED | OUTPATIENT
Start: 2019-01-22 | End: 2019-02-01

## 2019-01-22 NOTE — TELEPHONE ENCOUNTER
"----- Message from Leatha Biswas sent at 1/22/2019  9:06 AM CST -----  Contact: [phone]  Patient would like to get medical advice.    Symptoms (please be specific):  Chest congestion, cough    How long has patient had these symptoms:  2 weeks    Pharmacy name and phone # (DON'T enter "on file" or "in chart"):  [facility] [address] [place] [address] [address] AT [facility] [address] [phone] (Phone)  [phone] (Fax)      Any drug allergies:  Yes    Would you prefer a response via [employer]?:  No    Comments:    "

## 2019-01-22 NOTE — TELEPHONE ENCOUNTER
Sick for 10 days now with cough and congestion.  Coughing a lot now.    On mucinex twice daily and does seem to be enough.    he is booked to see  this evening. I urged him to keep that appt since dr villafana is out today.  He expressed understanding

## 2019-01-22 NOTE — PROGRESS NOTES
I have interviewed and examined the patient w/ the resident,   I agree w/ the impression and plan as outlined above by her.  Demetrice Guzmán MD- Staff

## 2019-01-22 NOTE — PROGRESS NOTES
"Subjective:       Patient ID: Erick Valdovinos is a 68 y.o. male.with medical issues of DM-2, esophageal reflux, dysli[idemia, HTN and OA.    Chief Complaint: Chest Congestion (x 10 days); Sinus Problem; Headache; Otalgia; and Sore Throat    Patient presents today to the clinic today complaining for sore throat and nose congestion for 10 days.    Patient reports that he started to have nose congestion, rhinorrhea and sore throat for 10 days. He also reports dry cough and chest tightness that worsens with coughing. He denies fever, change in appetite, confusion, N/V and change in bowel habits. He reports that his wife is also sick with the same symptoms. He reports that he has dizziness and light headedness but denies ear pain, discharge, loss of consciousness and wheezing. He has sinus pressure but denies sinus pain. He denies eye redness and discharge. Patient tried mucinex and reported that it "dries him out"    Smoking history:   He has never smoked      Review of Systems   Constitutional: Positive for fatigue. Negative for activity change, appetite change, chills, diaphoresis, fever and unexpected weight change.   HENT: Positive for congestion, hearing loss (Bilateral decrease hearing), sinus pressure (Around his his eyes), sneezing and voice change. Negative for dental problem, drooling, ear discharge, ear pain, facial swelling, mouth sores, nosebleeds, sinus pain and trouble swallowing.    Eyes: Positive for itching. Negative for photophobia, pain, discharge and redness.   Respiratory: Positive for cough (Dry cough), chest tightness (Tightness across the chest increase by coughing.No radiation or referral. Not tender to touch. Not related to eating.) and shortness of breath. Negative for wheezing.    Cardiovascular: Negative for chest pain (Tightness across the chest increase by coughing.No radiation or referral. Not tender to touch. Not related to eating. ) and leg swelling.   Gastrointestinal: Negative for " abdominal distention, abdominal pain, constipation, diarrhea, nausea and vomiting.   Endocrine: Positive for cold intolerance.   Genitourinary: Positive for decreased urine volume. Negative for dysuria and hematuria.   Musculoskeletal: Negative for myalgias and neck stiffness.   Skin: Negative for rash.   Allergic/Immunologic: Negative for environmental allergies and food allergies.   Neurological: Positive for dizziness and light-headedness. Negative for seizures, syncope and headaches.   Psychiatric/Behavioral: Negative for confusion.       Objective:      Physical Exam   Constitutional: He is oriented to person, place, and time. Vital signs are normal. He appears well-developed and well-nourished.  Non-toxic appearance. No distress.   HENT:   Head: Normocephalic and atraumatic. Not macrocephalic and not microcephalic. Head is without raccoon's eyes, without Longo's sign, without abrasion, without contusion, without laceration, without right periorbital erythema and without left periorbital erythema. Hair is normal.   Right Ear: No drainage, swelling or tenderness. No mastoid tenderness. Decreased hearing is noted.   Left Ear: No drainage, swelling or tenderness. No mastoid tenderness. Decreased hearing is noted.   Mouth/Throat: Posterior oropharyngeal erythema present. No oropharyngeal exudate or tonsillar abscesses. No tonsillar exudate.   Eyes: Right eye exhibits no chemosis, no discharge and no exudate. Left eye exhibits no chemosis, no discharge and no exudate. Right conjunctiva is not injected. Right conjunctiva has no hemorrhage. Left conjunctiva is not injected. Left conjunctiva has no hemorrhage. No scleral icterus.   Neck: Neck supple. No JVD present.   Cardiovascular: Normal rate and regular rhythm. Exam reveals no gallop and no friction rub.   Murmur (Ejection systolic murmur on the aortic area) heard.  Pulmonary/Chest: Effort normal and breath sounds normal. No stridor. No respiratory distress. He  has no wheezes. He has no rales. He exhibits no tenderness.   Abdominal: Soft. Bowel sounds are normal. There is no tenderness.   Musculoskeletal: Normal range of motion. He exhibits no edema, tenderness or deformity.   Lymphadenopathy:     He has no cervical adenopathy.   Neurological: He is alert and oriented to person, place, and time.   Skin: He is not diaphoretic.   Psychiatric: He has a normal mood and affect. His behavior is normal.       Assessment:     1. Acute bacterial bronchitis  2. Uncontrolled DM  Plan:         1. Acute bacterial bronchitis  As the patient has 10 days of dry cough, chest tightness, sneezing, rhinorrhea and sinus pressure. His wife is sick with the same symptoms. He has never smoked. Patient is afebrile. He has oropharyngeal erythema but no exudate. He has bilateral EOM erythema. Chest exam: No wheezing or rales.   - Continue on mucinex  - Amoxiclav BID for 10 days  - Tessalon pearls  - Keep well hydrated    2. Uncontrolled DM  Patient last Hba1c 8.2. He was advised to stay well hydrated. Monitor blood glucose level    RTC PRN; He is a patient of Dr. Kinney

## 2019-01-25 ENCOUNTER — TELEPHONE (OUTPATIENT)
Dept: INTERNAL MEDICINE | Facility: CLINIC | Age: 69
End: 2019-01-25

## 2019-01-25 NOTE — TELEPHONE ENCOUNTER
----- Message from Christine Salazar sent at 1/24/2019  3:31 PM CST -----  Contact: Self [phone]  Pt will like to give information about [facility] who handle prescription [phone] indicate RX policy number RX [id number] [facility] - [place], [place] - [address] AT [facility] to [facility] [phone](Phone) [phone] (Fax).

## 2019-01-25 NOTE — TELEPHONE ENCOUNTER
I already have this information.  He gave me incorrect information at recent call and I have sent refills to correct address as he is now telling me.

## 2019-02-01 NOTE — PROGRESS NOTES
Patient, Erick Valdovinos (MRN #[id number]), presented with a recorded BMI of 37.59 kg/m^2 and a documented comorbidity(s):  - Diabetes Mellitus Type 2  - Hypertension  to which the severe obesity is a contributing factor. This is consistent with the definition of severe obesity (BMI 35.0-39.9) with comorbidity (ICD-10 E66.01, Z68.35). The patient's severe obesity was monitored, evaluated, addressed and/or treated. This addendum to the medical record is made on 01/31/2019.

## 2019-02-07 ENCOUNTER — OFFICE VISIT (OUTPATIENT)
Dept: GASTROENTEROLOGY | Facility: CLINIC | Age: 69
End: 2019-02-07
Payer: MEDICARE

## 2019-02-07 VITALS
HEIGHT: 68 IN | BODY MASS INDEX: 38.35 KG/M2 | WEIGHT: 253.06 LBS | SYSTOLIC BLOOD PRESSURE: 117 MMHG | DIASTOLIC BLOOD PRESSURE: 72 MMHG

## 2019-02-07 DIAGNOSIS — K21.9 GASTROESOPHAGEAL REFLUX DISEASE, ESOPHAGITIS PRESENCE NOT SPECIFIED: Primary | ICD-10-CM

## 2019-02-07 DIAGNOSIS — Z12.11 COLON CANCER SCREENING: ICD-10-CM

## 2019-02-07 DIAGNOSIS — Z86.010 HISTORY OF COLON POLYPS: ICD-10-CM

## 2019-02-07 DIAGNOSIS — Z83.719 FAMILY HISTORY OF COLONIC POLYPS: ICD-10-CM

## 2019-02-07 PROCEDURE — 3074F PR MOST RECENT SYSTOLIC BLOOD PRESSURE < 130 MM HG: ICD-10-PCS | Mod: CPTII,S$GLB,, | Performed by: NURSE PRACTITIONER

## 2019-02-07 PROCEDURE — 3078F DIAST BP <80 MM HG: CPT | Mod: CPTII,S$GLB,, | Performed by: NURSE PRACTITIONER

## 2019-02-07 PROCEDURE — 3078F PR MOST RECENT DIASTOLIC BLOOD PRESSURE < 80 MM HG: ICD-10-PCS | Mod: CPTII,S$GLB,, | Performed by: NURSE PRACTITIONER

## 2019-02-07 PROCEDURE — 99999 PR PBB SHADOW E&M-EST. PATIENT-LVL IV: ICD-10-PCS | Mod: PBBFAC,,, | Performed by: NURSE PRACTITIONER

## 2019-02-07 PROCEDURE — 99999 PR PBB SHADOW E&M-EST. PATIENT-LVL IV: CPT | Mod: PBBFAC,,, | Performed by: NURSE PRACTITIONER

## 2019-02-07 PROCEDURE — 99204 PR OFFICE/OUTPT VISIT, NEW, LEVL IV, 45-59 MIN: ICD-10-PCS | Mod: S$GLB,,, | Performed by: NURSE PRACTITIONER

## 2019-02-07 PROCEDURE — 3074F SYST BP LT 130 MM HG: CPT | Mod: CPTII,S$GLB,, | Performed by: NURSE PRACTITIONER

## 2019-02-07 PROCEDURE — 1101F PT FALLS ASSESS-DOCD LE1/YR: CPT | Mod: CPTII,S$GLB,, | Performed by: NURSE PRACTITIONER

## 2019-02-07 PROCEDURE — 1101F PR PT FALLS ASSESS DOC 0-1 FALLS W/OUT INJ PAST YR: ICD-10-PCS | Mod: CPTII,S$GLB,, | Performed by: NURSE PRACTITIONER

## 2019-02-07 PROCEDURE — 99204 OFFICE O/P NEW MOD 45 MIN: CPT | Mod: S$GLB,,, | Performed by: NURSE PRACTITIONER

## 2019-02-07 RX ORDER — SODIUM, POTASSIUM,MAG SULFATES 17.5-3.13G
1 SOLUTION, RECONSTITUTED, ORAL ORAL DAILY
Qty: 1 KIT | Refills: 0 | Status: SHIPPED | OUTPATIENT
Start: 2019-02-07 | End: 2019-02-09

## 2019-02-07 NOTE — PROGRESS NOTES
Subjective:       Patient ID: Erick Valdovinos is a 68 y.o. male.    Chief Complaint: Colonoscopy    HPI  Presents to discuss colonoscopy.  He has a history of colon polyps removed on multiple colonoscopies which he has been having every three years.  his last colonoscopy was 4 years ago in [place] with recommendations to again repeat at 3 years.  Mother with colon polyps but no family history of colon cancer.   He denies blood with bowel movements or black stools.  He reports abdominal pain, nausea, vomiting and diarrhea that he suspects was food poisoning one month ago.  Symptoms have resolved.  He has returned to his normal bowel pattern which is 3 bowel movements that are soft or mushy each morning and one later in the day.    Denies current complaints of abdominal pain.  Has had chronic acid reflux that has been well controlled with omeprazole daily though notes over the last months has had more breakthrough complaints which are worse at night when lying down and he will use TUMS.    Denies dysphagia.  He has had EGD in the distant past with hiatal hernia noted by his report.      Review of Systems   Constitutional: Negative.  Negative for activity change, appetite change, fatigue, fever and unexpected weight change.   HENT: Positive for congestion, rhinorrhea and voice change. Negative for sore throat and trouble swallowing.    Respiratory: Positive for cough. Negative for choking and shortness of breath.    Cardiovascular: Negative.  Negative for chest pain.   Gastrointestinal: Negative for abdominal pain, blood in stool, constipation, diarrhea, nausea and vomiting.   Genitourinary: Negative.  Negative for difficulty urinating, dysuria and hematuria.   Musculoskeletal: Positive for arthralgias and back pain.   Skin: Negative.    Neurological: Negative.  Negative for dizziness, syncope, weakness and light-headedness.   Psychiatric/Behavioral: Negative.        Objective:      Physical Exam   Constitutional: He  is oriented to person, place, and time. He appears well-developed and well-nourished. No distress.   HENT:   Head: Normocephalic.   Eyes: No scleral icterus.   Neck: Neck supple.   Cardiovascular: Normal rate.   Pulmonary/Chest: Effort normal. No respiratory distress. He exhibits no tenderness.   Abdominal: Soft. He exhibits no distension and no mass. There is no tenderness. There is no guarding.   Musculoskeletal: Normal range of motion.   Neurological: He is alert and oriented to person, place, and time.   Skin: Skin is warm and dry. He is not diaphoretic.   Psychiatric: He has a normal mood and affect. His behavior is normal. Judgment and thought content normal.   Vitals reviewed.      Assessment:       1. Gastroesophageal reflux disease, esophagitis presence not specified    2. Colon cancer screening    3. History of colon polyps    4. Family history of colonic polyps        Plan:         Erick was seen today for colonoscopy.    Diagnoses and all orders for this visit:    Gastroesophageal reflux disease, esophagitis presence not specified    Colon cancer screening    History of colon polyps    Family history of colonic polyps    Other orders  -     sodium,potassium,mag sulfates (SUPREP BOWEL PREP KIT) 17.5-3.13-1.6 gram SolR; Take 177 mLs by mouth once daily. for 2 days  -     Case request GI: ESOPHAGOGASTRODUODENOSCOPY (EGD), COLONOSCOPY         I have explained the planned procedures to the patient.The risks, benefits and alternatives of the procedure were also explained in detail. Patient verbalized understanding, all questions were answered. The patient agrees to proceed as planned    Continue omeprazole.  Have discussed reflux prevention particularly leaving 3-4 hours between eating and lying down.

## 2019-02-07 NOTE — PATIENT INSTRUCTIONS
SUPREP Instructions    You are scheduled for a colonoscopy with Dr. Bañuelos on 3/19/19 at [facility] located at [address] [place] [address].  Check in at the admit desk, first floor of the hospital (which is the building on the left).     You will receive a call 2-3 days before your colonoscopy to tell you the time to arrive.  If you have not received a call by the day before your procedure, call the Endoscopy Lab at [phone].    To ensure that your test is accurate and complete, you MUST follow these instructions listed below.  If you have any questions, please call our office at [phone].  Plan on being at the hospital for your procedure for 3-4 hours.    1.  Follow a CLEAR LIQUID DIET for the entire day before your scheduled colonoscopy.  This means no solid food the entire day starting when you wake.  You may have as much of the clear liquids as you want throughout the day.   CLEAR LIQUID DIET:   - Avoid Red, Orange, Purple, and/or Blue food coloring   - NO DAIRY   - You can have:  Coffee with sugar (no creamer), tea, water, soda, apple or white grape juice, chicken or beef broth/bouillon (no meat, noodles, or veggies), green/yellow popsicles, green/yellow Jell-O, lemonade.    2.  AT 5 pm the evening before your colonoscopy, POUR ONE (1) BOTTLE OF SUPREP INTO THE MIXING CONTAINER, PROVIDED INSIDE THE BOX.  ADD WATER TO THE LINE ON THE CONTAINER AND MIX IT WELL.  DRINK THE ENTIRE CONTAINER AND THEN DRINK TWO (2) MORE CONTAINERS OF WATER OVER THE NEXT 1 HOUR.  This is sometimes easier to drink if this solution is cold, so you can mix the solution a few hours ahead of time and place in the refrigerator prior to drinking.  You have to drink the solution within 24 hours of mixing it.  Do NOT put this solution over ice.  It IS ok to drink with a straw.    3.  The endoscopy department will call you 2 days before your colonoscopy to tell you the exact time to arrive, AND to tell you the exact time to  drink the 2nd portion of your prep (which will be FIVE HOURS BEFORE YOUR ARRIVAL TIME).  At this time given to you, POUR ONE (1) BOTTLE OF SUPREP INTO THE MIXING CONTAINER, PROVIDED INSIDE THE BOX.  ADD WATER TO THE LINE ON THE CONTAINER AND MIX IT WELL.  DRINK THE ENTIRE CONTAINER AND THEN DRINK TWO (2) MORE CONTAINERS OF WATER OVER THE NEXT 1 HOUR.  This is sometimes easier to drink if this solution is cold, so you can mix the solution a few hours ahead of time and place in the refrigerator prior to drinking.  You have to drink the solution within 24 hours of mixing it.  Do NOT put this solution over ice.  It IS ok to drink with a straw. Once this is complete, you may not have ANYTHING else by mouth!    4.  You must have someone with you to DRIVE YOU HOME since you will be receiving IV sedation for the colonoscopy.    5.  It is ok to take your heart, blood pressure, and seizure medications in the morning of your test with a SIP of water.  Hold other medications until after your procedure.  Do NOT have anything else to eat or drink the morning of your colonoscopy.  It is ok to brush your teeth.    6.  If you are on blood thinners THAT YOU HAVE BEEN INSTRUCTED TO HOLD BY YOUR DOCTOR FOR THIS PROCEDURE, then do NOT take this the morning of your colonoscopy.  Do NOT stop these medications on your own, they must be approved to be held by your doctor.  Your colonoscopy can NOT be done if you are on these medications.  Examples of blood thinners include: Coumadin, Aggrenox, Plavix, Pradaxa, Reapro, Pletal, Xarelto, Ticagrelor, Brilinta, Eliquis, and high dose aspirin (325 mg).  You do not have to stop baby aspirin 81 mg.    7.  IF YOU ARE DIABETIC:  NO INSULIN OR ORAL MEDICATIONS THE MORNING OF THE COLONOSCOPY.  TAKE ONLY HALF THE DOSE OF YOUR INSULIN THE DAY BEFORE THE COLONOSCOPY.  DO NOT TAKE ANY ORAL DIABETIC MEDICATIONS THE DAY BEFORE THE COLONOSCOPY.  IF YOU ARE AN INSULIN DEPENDENT DIABETIC WITH UNSTABLE BLOOD  KACEY, NOTIFY YOUR PRIMARY CARE PHYSICIAN FOR INSTRUCTIONS.

## 2019-02-08 NOTE — TELEPHONE ENCOUNTER
I told him i'd call [facility] to inquire.  He has 12 days of pills on lipitor.

## 2019-02-08 NOTE — TELEPHONE ENCOUNTER
----- Message from Priya Randall sent at 2/8/2019  1:25 PM CST -----  Contact: Pt Mobile/Home  [phone]  Patient is calling in regards to letting you know that [facility] will be giving you a call  within 24 to add four more medications to his list.    no

## 2019-02-08 NOTE — TELEPHONE ENCOUNTER
"----- Message from Melia Wagner sent at 2/8/2019 11:08 AM CST -----  Contact: pt [phone]  RX request - refill or new RX.  Is this a refill or new RX:  Refill   RX name and strength: atorvastatin (LIPITOR) 40 MG tablet  Directions:   Is this a 30 day or 90 day RX:    Local pharmacy or mail order pharmacy: mail order    Pharmacy name and phone # (DON'T enter "on file" or "in chart"): [facility] [address] [address] [facility] [place] Phone- [phone] Fax- [phone]   Comments:  Pt states that the prescription has to go to this specific pharmacy due to insurance. Please advise.       "

## 2019-02-08 NOTE — TELEPHONE ENCOUNTER
Is doing much better with cough/congestion.    email sent January to patient -  I also called [facility] quan at [place] and they confirmed they will process your rxs under [facility] and they send them to the [facility] processing center to issue out rx's.     I restated this to patient.  He will call [facility] to resolve.

## 2019-02-11 RX ORDER — SILDENAFIL CITRATE 20 MG/1
20 TABLET ORAL DAILY PRN
Qty: 60 TABLET | Refills: 11 | Status: SHIPPED | OUTPATIENT
Start: 2019-02-11 | End: 2020-01-01

## 2019-02-11 RX ORDER — ATORVASTATIN CALCIUM 40 MG/1
40 TABLET, FILM COATED ORAL NIGHTLY
Qty: 90 TABLET | Refills: 3 | Status: SHIPPED | OUTPATIENT
Start: 2019-02-11 | End: 2019-01-01

## 2019-02-11 RX ORDER — AMLODIPINE BESYLATE 10 MG/1
TABLET ORAL
Qty: 90 TABLET | Refills: 3 | Status: SHIPPED | OUTPATIENT
Start: 2019-02-11 | End: 2020-01-01 | Stop reason: SDUPTHER

## 2019-02-11 RX ORDER — TRIAMCINOLONE ACETONIDE 1 MG/G
CREAM TOPICAL DAILY PRN
Qty: 80 G | Refills: 0 | Status: SHIPPED | OUTPATIENT
Start: 2019-02-11 | End: 2020-01-01 | Stop reason: SDUPTHER

## 2019-02-11 NOTE — TELEPHONE ENCOUNTER
I did faxed request for amlodpine, sildenafil, triamcinolone and atorvastatin.  I called them and confirmed [place] address is correct.    Please approve.    Thanks haven

## 2019-02-13 ENCOUNTER — TELEPHONE (OUTPATIENT)
Dept: GASTROENTEROLOGY | Facility: CLINIC | Age: 69
End: 2019-02-13

## 2019-02-13 ENCOUNTER — TELEPHONE (OUTPATIENT)
Dept: INTERNAL MEDICINE | Facility: CLINIC | Age: 69
End: 2019-02-13

## 2019-02-13 NOTE — TELEPHONE ENCOUNTER
----- Message from Miguel Pham sent at 2/13/2019 10:25 AM CST -----  Contact: self/[phone]  Patient called to get another type of prep for his procedure due to the co pay on the original one.    Please call and advise.

## 2019-02-13 NOTE — TELEPHONE ENCOUNTER
I have responded to [facility] faxes for refills on amlodpine,atorvastatin, rivalto, triamcinolone cream.  We sent electronic rx's on 2/11 to [facility] in [place] because the [facility] address says that is what we have to do.    Patient says they say they never heard from us which is not true.  I told him i'd call.    I called the [facility] phone and they show triamcinolone received and other rx's were cancelled.  Not sure why.  I gave pharmacist PK verbal orders to ship.  I explained he is almost out of the atorvastatin.  She says 4-7 days to receive in mail.    He has 4 pills left. I told him i'd call 7 pills to [facility] on triday so home shipment won't get delayed.  I sent remind me msg to handle Friday.

## 2019-02-13 NOTE — TELEPHONE ENCOUNTER
GOLYTELY/ COLYTE/ NULYTELY Instructions    You are scheduled for a colonoscopy with Dr. Bañuelos on 3/19/19 at [facility] located at [address] [place] [address].  Check in at the admit desk, first floor of the hospital (which is the building on the left).     You will receive a call 2-3 days before your colonoscopy to tell you the time to arrive.  If you have not received a call by the day before your procedure, call the Endoscopy Lab at [phone].      To ensure that your test is accurate and complete, you MUST follow these instructions listed below.  If you have any questions, please call our office at [phone].  Plan on being at the hospital for your procedure for 3-4 hours.    1.  Follow a CLEAR LIQUID DIET for the entire day before your scheduled colonoscopy.  This means no solid food the entire day starting when you wake.  You may have as much of the clear liquids as you want throughout the day.   CLEAR LIQUID DIET:   - Avoid Red, Orange, Purple, and/or Blue food coloring   - NO DAIRY   - You can have:  Coffee with sugar (no creamer), tea, water, soda, apple or white grape juice, chicken or beef broth/bouillon (no meat, noodles, or veggies), green/yellow popsicles, green/yellow Jell-O, lemonade.    2.  MIX GOLYTELY/COLYTE/NULYTELY (all names for same product) WITH ONE (1) GALLON OF WATER.  YOU MAY ADD A FLAVOR PACKET OR YELLOW/GREEN POWDER DRINK MIX TO THIS.  PUT IN REFRIGERATOR.  This is easier to drink if this solution is cold, so you can mix the solution one day ahead of time and place in the refrigerator prior to drinking.  You have to drink the solution within 24-36 hours of mixing it.  Do NOT put this solution over ice.  It IS ok to drink with a straw.    3. AT 5 PM THE DAY BEFORE YOUR COLONOSCOPY, DRINK ONE (1) 8 OUNCE GLASS OF MIXTURE EVERY 10 MINUTES UNTIL HALF OF THE GALLON IS CONSUMED.  Keep this mixture cold and in refrigerator as much as you can while drinking it.  Place the  remaining half of mixture in the refrigerator when you finish the first half.    4.  The endoscopy department will call you 2 days before your colonoscopy to tell you the exact time to arrive, AND to tell you the exact time to drink the 2nd portion of your prep (which will be FIVE HOURS BEFORE YOUR ARRIVAL TIME).  At this time given to you, DRINK ONE (1) 8 OUNCE GLASS OF MIXTURE EVERY 10 MINUTES UNTIL THE OTHER HALF IS CONSUMED. Keep the mixture cold while you are drinking it. Once this is complete, you may not have ANYTHING else by mouth!      5.  You must have someone with you to DRIVE YOU HOME since you will be receiving IV sedation for the colonoscopy.    6.  It is ok to take your heart, blood pressure, and seizure medications in the morning of your test with a SIP of water.  Hold other medications until after your procedure.  Do NOT have anything else to eat or drink the morning of your colonoscopy.  It is ok to brush your teeth.    7.  If you are on blood thinners THAT YOU HAVE BEEN INSTRUCTED TO HOLD BY YOUR DOCTOR FOR THIS PROCEDURE, then do NOT take this the morning of your colonoscopy.  Do NOT stop these medications on your own, they must be approved to be held by your doctor.  Your colonoscopy can NOT be done if you are on these medications.  Examples of blood thinners include: Coumadin, Aggrenox, Plavix, Pradaxa, Reapro, Pletal, Xarelto, Ticagrelor, Brilinta, Eliquis, and high dose aspirin (325 mg).  You do not have to stop baby aspirin 81 mg.    8.  IF YOU ARE DIABETIC:  NO INSULIN OR ORAL MEDICATIONS THE MORNING OF THE COLONOSCOPY.  TAKE ONLY HALF THE DOSE OF YOUR INSULIN THE DAY BEFORE THE COLONOSCOPY.  DO NOT TAKE ANY ORAL DIABETIC MEDICATIONS THE DAY BEFORE THE COLONOSCOPY.  IF YOU ARE AN INSULIN DEPENDENT DIABETIC WITH UNSTABLE BLOOD SUGARDS, NOTIFY YOUR PRIMARY CARE PHYSICIAN FOR INSTRUCTIONS.

## 2019-02-13 NOTE — TELEPHONE ENCOUNTER
----- Message from Melia Kristy sent at 2/13/2019  9:30 AM CST -----  Contact: pt [phone]  Pt would like a call back regarding his medications. He needs to get his prescriptions straightened out with the right pharmacies due to his new insurance. He states that the specific [place] [facility] has been trying to reach out to the office with no response. Please advise.         atorvastatin (LIPITOR) 40 MG tablet

## 2019-02-13 NOTE — TELEPHONE ENCOUNTER
Spoke with patient ans Suprep is not covered. Golytely called in to [facility].Instructions mailed to home address. Verbal Understanding.

## 2019-03-15 ENCOUNTER — TELEPHONE (OUTPATIENT)
Dept: ENDOSCOPY | Facility: HOSPITAL | Age: 69
End: 2019-03-15

## 2019-03-15 NOTE — TELEPHONE ENCOUNTER
Spoke with patient about arrival time @1115 Tuesday..     Prep instructions reviewed: the day before the procedure, follow a clear liquid diet all day, then start the first 1/2 of prep at 5pm and take 2nd 1/2 of prep @0415 tues.  Pt must be completely NPO when prep completed @0615 tues.              Medications: Do not take Insulin or oral diabetic medications the day of the procedure.  Take as prescribed: heart, seizure and blood pressure medication in the morning with a sip of water (less than an ounce).  Take any breathing medications and bring inhalers to hospital with you Leave all valuables and jewelry at home.     Wear comfortable clothes to procedure to change into hospital gown You cannot drive for 24 hours after your procedure because you will receive sedation for your procedure to make you comfortable.  A ride must be provided at discharge.

## 2019-03-19 ENCOUNTER — ANESTHESIA (OUTPATIENT)
Dept: ENDOSCOPY | Facility: HOSPITAL | Age: 69
End: 2019-03-19
Payer: MEDICARE

## 2019-03-19 ENCOUNTER — HOSPITAL ENCOUNTER (OUTPATIENT)
Facility: HOSPITAL | Age: 69
Discharge: HOME | End: 2019-03-19
Attending: INTERNAL MEDICINE | Admitting: INTERNAL MEDICINE
Payer: MEDICARE

## 2019-03-19 ENCOUNTER — ANESTHESIA EVENT (OUTPATIENT)
Dept: ENDOSCOPY | Facility: HOSPITAL | Age: 69
End: 2019-03-19
Payer: MEDICARE

## 2019-03-19 VITALS
WEIGHT: 253 LBS | TEMPERATURE: 99 F | HEIGHT: 68 IN | SYSTOLIC BLOOD PRESSURE: 120 MMHG | BODY MASS INDEX: 38.34 KG/M2 | RESPIRATION RATE: 18 BRPM | DIASTOLIC BLOOD PRESSURE: 68 MMHG | HEART RATE: 66 BPM | OXYGEN SATURATION: 99 %

## 2019-03-19 DIAGNOSIS — Z12.11 SCREENING FOR MALIGNANT NEOPLASM OF COLON: ICD-10-CM

## 2019-03-19 LAB — POCT GLUCOSE: 184 MG/DL (ref 70–110)

## 2019-03-19 PROCEDURE — 88305 TISSUE EXAM BY PATHOLOGIST: CPT | Performed by: PATHOLOGY

## 2019-03-19 PROCEDURE — 37000008 HC ANESTHESIA 1ST 15 MINUTES: Performed by: INTERNAL MEDICINE

## 2019-03-19 PROCEDURE — 43239 PR EGD, FLEX, W/BIOPSY, SGL/MULTI: ICD-10-PCS | Mod: 51,,, | Performed by: INTERNAL MEDICINE

## 2019-03-19 PROCEDURE — 88305 TISSUE SPECIMEN TO PATHOLOGY - SURGERY: ICD-10-PCS | Mod: 26,,, | Performed by: PATHOLOGY

## 2019-03-19 PROCEDURE — G0121 COLON CA SCRN NOT HI RSK IND: HCPCS | Performed by: INTERNAL MEDICINE

## 2019-03-19 PROCEDURE — 25000003 PHARM REV CODE 250: Performed by: INTERNAL MEDICINE

## 2019-03-19 PROCEDURE — 63600175 PHARM REV CODE 636 W HCPCS: Performed by: NURSE ANESTHETIST, CERTIFIED REGISTERED

## 2019-03-19 PROCEDURE — 25000003 PHARM REV CODE 250: Performed by: NURSE ANESTHETIST, CERTIFIED REGISTERED

## 2019-03-19 PROCEDURE — G0105 COLORECTAL SCRN; HI RISK IND: HCPCS | Mod: ,,, | Performed by: INTERNAL MEDICINE

## 2019-03-19 PROCEDURE — 88305 TISSUE EXAM BY PATHOLOGIST: CPT | Mod: 26,,, | Performed by: PATHOLOGY

## 2019-03-19 PROCEDURE — 43239 EGD BIOPSY SINGLE/MULTIPLE: CPT | Performed by: INTERNAL MEDICINE

## 2019-03-19 PROCEDURE — G0105 COLORECTAL SCRN; HI RISK IND: ICD-10-PCS | Mod: ,,, | Performed by: INTERNAL MEDICINE

## 2019-03-19 PROCEDURE — 37000009 HC ANESTHESIA EA ADD 15 MINS: Performed by: INTERNAL MEDICINE

## 2019-03-19 PROCEDURE — 27201012 HC FORCEPS, HOT/COLD, DISP: Performed by: INTERNAL MEDICINE

## 2019-03-19 PROCEDURE — 43239 EGD BIOPSY SINGLE/MULTIPLE: CPT | Mod: 51,,, | Performed by: INTERNAL MEDICINE

## 2019-03-19 PROCEDURE — G0105 COLORECTAL SCRN; HI RISK IND: HCPCS | Performed by: INTERNAL MEDICINE

## 2019-03-19 PROCEDURE — 82962 GLUCOSE BLOOD TEST: CPT | Performed by: INTERNAL MEDICINE

## 2019-03-19 RX ORDER — DEXTROMETHORPHAN/PSEUDOEPHED 2.5-7.5/.8
DROPS ORAL
Status: COMPLETED | OUTPATIENT
Start: 2019-03-19 | End: 2019-03-19

## 2019-03-19 RX ORDER — FENTANYL CITRATE 50 UG/ML
INJECTION, SOLUTION INTRAMUSCULAR; INTRAVENOUS
Status: DISCONTINUED | OUTPATIENT
Start: 2019-03-19 | End: 2019-03-19

## 2019-03-19 RX ORDER — SODIUM CHLORIDE 0.9 % (FLUSH) 0.9 %
3 SYRINGE (ML) INJECTION
Status: DISCONTINUED | OUTPATIENT
Start: 2019-03-19 | End: 2019-03-19 | Stop reason: HOSPADM

## 2019-03-19 RX ORDER — LIDOCAINE HCL/PF 100 MG/5ML
SYRINGE (ML) INTRAVENOUS
Status: DISCONTINUED | OUTPATIENT
Start: 2019-03-19 | End: 2019-03-19

## 2019-03-19 RX ORDER — SODIUM CHLORIDE 9 MG/ML
INJECTION, SOLUTION INTRAVENOUS CONTINUOUS
Status: DISCONTINUED | OUTPATIENT
Start: 2019-03-19 | End: 2019-03-19 | Stop reason: HOSPADM

## 2019-03-19 RX ORDER — SODIUM CHLORIDE 9 MG/ML
INJECTION, SOLUTION INTRAVENOUS CONTINUOUS PRN
Status: DISCONTINUED | OUTPATIENT
Start: 2019-03-19 | End: 2019-03-19

## 2019-03-19 RX ORDER — PROPOFOL 10 MG/ML
VIAL (ML) INTRAVENOUS
Status: DISCONTINUED | OUTPATIENT
Start: 2019-03-19 | End: 2019-03-19

## 2019-03-19 RX ORDER — PROPOFOL 10 MG/ML
VIAL (ML) INTRAVENOUS CONTINUOUS PRN
Status: DISCONTINUED | OUTPATIENT
Start: 2019-03-19 | End: 2019-03-19

## 2019-03-19 RX ADMIN — PROPOFOL 100 MG: 10 INJECTION, EMULSION INTRAVENOUS at 12:03

## 2019-03-19 RX ADMIN — SODIUM CHLORIDE: 0.9 INJECTION, SOLUTION INTRAVENOUS at 11:03

## 2019-03-19 RX ADMIN — SODIUM CHLORIDE: 9 INJECTION, SOLUTION INTRAVENOUS at 12:03

## 2019-03-19 RX ADMIN — FENTANYL CITRATE 100 MCG: 50 INJECTION, SOLUTION INTRAMUSCULAR; INTRAVENOUS at 12:03

## 2019-03-19 RX ADMIN — SIMETHICONE 40 MG: 20 SUSPENSION/ DROPS ORAL at 12:03

## 2019-03-19 RX ADMIN — LIDOCAINE HYDROCHLORIDE 100 MG: 20 INJECTION, SOLUTION INTRAVENOUS at 12:03

## 2019-03-19 RX ADMIN — PROPOFOL 150 MCG/KG/MIN: 10 INJECTION, EMULSION INTRAVENOUS at 12:03

## 2019-03-19 NOTE — [employer] PATIENT INSTRUCTIONS
Discharge Summary/Instructions after an Endoscopic Procedure  Patient Name: Erick Valdovinos  Patient MRN: [id number]  Patient YOB: 1950  Tuesday, March 19, 2019  Italo Bañuelos MD  RESTRICTIONS:  During your procedure today, you received medications for sedation.  These   medications may affect your judgment, balance and coordination.  Therefore,   for 24 hours, you have the following restrictions:   - DO NOT drive a car, operate machinery, make legal/financial decisions,   sign important papers or drink alcohol.    ACTIVITY:  Today: no heavy lifting, straining or running due to procedural   sedation/anesthesia.  The following day: return to full activity including work.  DIET:  Eat and drink normally unless instructed otherwise.     TREATMENT FOR COMMON SIDE EFFECTS:  - Mild abdominal pain, nausea, belching, bloating or excessive gas:  rest,   eat lightly and use a heating pad.  - Sore Throat: treat with throat lozenges and/or gargle with warm salt   water.  - Because air was used during the procedure, expelling large amounts of air   from your rectum or belching is normal.  - If a bowel prep was taken, you may not have a bowel movement for 1-3 days.    This is normal.  SYMPTOMS TO WATCH FOR AND REPORT TO YOUR PHYSICIAN:  1. Abdominal pain or bloating, other than gas cramps.  2. Chest pain.  3. Back pain.  4. Signs of infection such as: chills or fever occurring within 24 hours   after the procedure.  5. Rectal bleeding, which would show as bright red, maroon, or black stools.   (A tablespoon of blood from the rectum is not serious, especially if   hemorrhoids are present.)  6. Vomiting.  7. Weakness or dizziness.  GO DIRECTLY TO THE NEAREST EMERGENCY ROOM IF YOU HAVE ANY OF THE FOLLOWING:      Difficulty breathing              Chills and/or fever over 101 F   Persistent vomiting and/or vomiting blood   Severe abdominal pain   Severe chest pain   Black, tarry stools   Bleeding- more than one  tablespoon   Any other symptom or condition that you feel may need urgent attention  Your doctor recommends these additional instructions:  If any biopsies were taken, your doctors clinic will contact you in 1 to 2   weeks with any results.  - Discharge patient to home (via wheelchair).   - Patient has a contact number available for emergencies.  The signs and   symptoms of potential delayed complications were discussed with the   patient.  Return to normal activities tomorrow.  Written discharge   instructions were provided to the patient.   - Resume previous diet.   - Continue present medications.   - Repeat colonoscopy in 5 years for surveillance.  For questions, problems or results please call your physician - Italo Bañuelos MD at Work:  [phone].  EMERGENCY PHONE NUMBER: [phone],  LAB RESULTS: [phone]  IF A COMPLICATION OR EMERGENCY SITUATION ARISES AND YOU ARE UNABLE TO REACH   YOUR PHYSICIAN - GO DIRECTLY TO THE EMERGENCY ROOM.  Italo Bañuelos MD  3/19/2019 1:04:29 PM  This report has been verified and signed electronically.  [employer]

## 2019-03-19 NOTE — ANESTHESIA PREPROCEDURE EVALUATION
03/19/2019  Erick Valdovinos is a 68 y.o., male for EGD and colonoscopy under MAC    Past Medical History:   Diagnosis Date    Diabetes mellitus     Diabetes mellitus, type 2     Esophageal reflux     Hyperlipidemia     Hypertension     Nephrolithiasis     Osteoarthritis          Anesthesia Evaluation    I have reviewed the Patient Summary Reports.    I have reviewed the Nursing Notes.   I have reviewed the Medications.     Review of Systems  Social:  Non-Smoker        Physical Exam  General:  Obesity    Airway/Jaw/Neck:  Airway Findings: Mallampati: II      Chest/Lungs:  Chest/Lungs Clear    Heart/Vascular:  Heart Findings: Normal          Lab Results   Component Value Date    WBC 7.04 01/03/2019    HGB 12.5 (L) 01/03/2019    HCT 38.6 (L) 01/03/2019     01/03/2019    CHOL 184 01/03/2019    TRIG 469 (H) 01/03/2019    HDL 37 (L) 01/03/2019    ALT 23 01/09/2019    AST 20 01/09/2019     01/09/2019    K 4.7 01/09/2019     01/09/2019    CREATININE 1.4 01/09/2019    BUN 20 01/09/2019    CO2 24 01/09/2019    TSH 3.493 01/03/2019    PSA 0.76 01/03/2019    HGBA1C 8.2 (H) 01/03/2019         Anesthesia Plan  Type of Anesthesia, risks & benefits discussed:  Anesthesia Type:  MAC  Patient's Preference:   Intra-op Monitoring Plan:   Intra-op Monitoring Plan Comments:   Post Op Pain Control Plan:   Post Op Pain Control Plan Comments:   Induction:    Beta Blocker:  Patient is not currently on a Beta-Blocker (No further documentation required).       Informed Consent: Patient understands risks and agrees with Anesthesia plan.  Questions answered. Anesthesia consent signed with patient.  ASA Score: 3     Day of Surgery Review of History & Physical:            Ready For Surgery From Anesthesia Perspective.

## 2019-03-19 NOTE — PROVATION PATIENT INSTRUCTIONS
Discharge Summary/Instructions after an Endoscopic Procedure  Patient Name: Erick Valdovinos  Patient MRN: [id number]  Patient YOB: 1950  Tuesday, March 19, 2019  Italo Bañuelos MD  RESTRICTIONS:  During your procedure today, you received medications for sedation.  These   medications may affect your judgment, balance and coordination.  Therefore,   for 24 hours, you have the following restrictions:   - DO NOT drive a car, operate machinery, make legal/financial decisions,   sign important papers or drink alcohol.    ACTIVITY:  Today: no heavy lifting, straining or running due to procedural   sedation/anesthesia.  The following day: return to full activity including work.  DIET:  Eat and drink normally unless instructed otherwise.     TREATMENT FOR COMMON SIDE EFFECTS:  - Mild abdominal pain, nausea, belching, bloating or excessive gas:  rest,   eat lightly and use a heating pad.  - Sore Throat: treat with throat lozenges and/or gargle with warm salt   water.  - Because air was used during the procedure, expelling large amounts of air   from your rectum or belching is normal.  - If a bowel prep was taken, you may not have a bowel movement for 1-3 days.    This is normal.  SYMPTOMS TO WATCH FOR AND REPORT TO YOUR PHYSICIAN:  1. Abdominal pain or bloating, other than gas cramps.  2. Chest pain.  3. Back pain.  4. Signs of infection such as: chills or fever occurring within 24 hours   after the procedure.  5. Rectal bleeding, which would show as bright red, maroon, or black stools.   (A tablespoon of blood from the rectum is not serious, especially if   hemorrhoids are present.)  6. Vomiting.  7. Weakness or dizziness.  GO DIRECTLY TO THE NEAREST EMERGENCY ROOM IF YOU HAVE ANY OF THE FOLLOWING:      Difficulty breathing              Chills and/or fever over 101 F   Persistent vomiting and/or vomiting blood   Severe abdominal pain   Severe chest pain   Black, tarry stools   Bleeding- more than one  tablespoon   Any other symptom or condition that you feel may need urgent attention  Your doctor recommends these additional instructions:  If any biopsies were taken, your doctors clinic will contact you in 1 to 2   weeks with any results.  - Discharge patient to home (via wheelchair).   - Patient has a contact number available for emergencies.  The signs and   symptoms of potential delayed complications were discussed with the   patient.  Return to normal activities tomorrow.  Written discharge   instructions were provided to the patient.   - Resume previous diet.   - Continue present medications.   - Await pathology results.  For questions, problems or results please call your physician - Italo Bañuelos MD at Work:  [phone].  EMERGENCY PHONE NUMBER: [phone],  LAB RESULTS: [phone]  IF A COMPLICATION OR EMERGENCY SITUATION ARISES AND YOU ARE UNABLE TO REACH   YOUR PHYSICIAN - GO DIRECTLY TO THE EMERGENCY ROOM.  Italo Bañuelos MD  3/19/2019 12:39:21 PM  This report has been verified and signed electronically.  [employer]

## 2019-03-19 NOTE — TRANSFER OF CARE
"Anesthesia Transfer of Care Note    Patient: Erick Valdovinos    Procedure(s) Performed: Procedure(s) (LRB):  ESOPHAGOGASTRODUODENOSCOPY (EGD) (N/A)  COLONOSCOPY Suprep (N/A)    Patient location: GI    Anesthesia Type: MAC    Transport from OR: Transported from OR on room air with adequate spontaneous ventilation    Post pain: adequate analgesia    Post assessment: no apparent anesthetic complications    Post vital signs: stable    Level of consciousness: awake and alert    Nausea/Vomiting: no nausea/vomiting    Complications: none    Transfer of care protocol was followed      Last vitals:   Visit Vitals  BP (!) 155/85 (BP Location: Left arm, Patient Position: Lying)   Pulse 79   Temp 37.1 °C (98.8 °F) (Temporal)   Resp 18   Ht 5' 7.5" (1.715 m)   Wt 114.8 kg (253 lb)   SpO2 98%   BMI 39.04 kg/m²     "

## 2019-03-19 NOTE — ANESTHESIA POSTPROCEDURE EVALUATION
"Anesthesia Post Evaluation    Patient: Erick Valdovinos    Procedure(s) Performed: Procedure(s) (LRB):  ESOPHAGOGASTRODUODENOSCOPY (EGD) (N/A)  COLONOSCOPY Suprep (N/A)    Final Anesthesia Type: MAC  Patient location during evaluation: OPS  Patient participation: Yes- Able to Participate  Level of consciousness: awake and alert  Post-procedure vital signs: reviewed and stable  Airway patency: patent  PONV status at discharge: No PONV  Anesthetic complications: no      Cardiovascular status: blood pressure returned to baseline and hemodynamically stable  Respiratory status: spontaneous ventilation  Hydration status: euvolemic  Follow-up not needed.        Visit Vitals  BP (!) 155/85 (BP Location: Left arm, Patient Position: Lying)   Pulse 79   Temp 37.1 °C (98.8 °F) (Temporal)   Resp 18   Ht 5' 7.5" (1.715 m)   Wt 114.8 kg (253 lb)   SpO2 98%   BMI 39.04 kg/m²       Pain/Gabrielle Score: No Data Recorded      "

## 2019-03-19 NOTE — H&P
[facility]  Gastroenterology  H&P    Patient Name: Erick Valdovinos  MRN: [id number]  Admission Date: 3/19/2019  Code Status: Full Code    Attending Provider: Italo Bañuelos MD   Primary Care Physician: Brandon Kinney MD  Principal Problem:<principal problem not specified>    Subjective:     History of Present Illness: GERD, Colon  Cancer screening  Past Medical History:   Diagnosis Date    Diabetes mellitus     Diabetes mellitus, type 2     Esophageal reflux     Hyperlipidemia     Hypertension     Nephrolithiasis     Osteoarthritis        Past Surgical History:   Procedure Laterality Date    COLONOSCOPY      hemrrhoidectomy      Injection-steroid-epidural-cervical - C7 - T1 N/A 10/30/2018    Performed by Hernan Murguia Jr., MD at [facility] PAIN MGT    WRIST FRACTURE SURGERY      WRIST SURGERY         Review of patient's allergies indicates:   Allergen Reactions    Codeine Nausea Only    Nsaids (non-steroidal anti-inflammatory drug) Other (See Comments)     Kidney issues     Family History     Problem Relation (Age of Onset)    Coronary artery disease Father, Brother    Heart attack Father    Heart disease Father    Hyperlipidemia Father, Brother, Brother    Hypertension Mother    Kidney disease Mother    Kidney failure Mother        Tobacco Use    Smoking status: Never Smoker    Smokeless tobacco: Never Used   Substance and Sexual Activity    Alcohol use: Yes     Comment: socially/     Drug use: No    Sexual activity: No     Review of Systems   Constitutional: Negative for chills and unexpected weight change.   Respiratory: Negative for apnea and chest tightness.    Cardiovascular: Negative for chest pain and palpitations.   Gastrointestinal: Negative for abdominal distention and abdominal pain.     Objective:     Vital Signs (Most Recent):  Temp: 98.8 °F (37.1 °C) (03/19/19 1119)  Pulse: 79 (03/19/19 1119)  Resp: 18 (03/19/19 1119)  BP: (!) 155/85 (03/19/19 1119)  SpO2: 98 %  (03/19/19 1119) Vital Signs (24h Range):  Temp:  [98.8 °F (37.1 °C)] 98.8 °F (37.1 °C)  Pulse:  [79] 79  Resp:  [18] 18  SpO2:  [98 %] 98 %  BP: (155)/(85) 155/85     Weight: 114.8 kg (253 lb) (03/19/19 1119)  Body mass index is 39.04 kg/m².    No intake or output data in the 24 hours ending 03/19/19 1217    Lines/Drains/Airways     Peripheral Intravenous Line                 Peripheral IV - Single Lumen 03/19/19 1122 Right Forearm less than 1 day                Physical Exam   Constitutional: He is oriented to person, place, and time. He appears well-developed and well-nourished. No distress.   HENT:   Head: Normocephalic.   Eyes: Conjunctivae are normal. No scleral icterus.   Neck: No tracheal deviation present. No thyromegaly present.   Cardiovascular: Normal rate, regular rhythm and normal heart sounds. Exam reveals no gallop and no friction rub.   No murmur heard.  Pulmonary/Chest: Effort normal and breath sounds normal. He has no wheezes. He has no rales.   Abdominal: Soft. Bowel sounds are normal. He exhibits no distension. There is no tenderness. There is no rebound and no guarding.   Musculoskeletal: Normal range of motion. He exhibits no edema or tenderness.   Neurological: He is alert and oriented to person, place, and time.   Skin: He is not diaphoretic.   Psychiatric: He has a normal mood and affect. His behavior is normal.           Assessment/Plan:     Active Diagnoses:    Diagnosis Date Noted POA    Screening for malignant neoplasm of colon [Z12.11] 03/19/2019 Not Applicable      Problems Resolved During this Admission:     Screening for colon cancer - colonoscopy  GERD - EGD    Italo Bañuelos MD  Gastroenterology  [facility]

## 2019-03-25 ENCOUNTER — TELEPHONE (OUTPATIENT)
Dept: INTERNAL MEDICINE | Facility: CLINIC | Age: 69
End: 2019-03-25

## 2019-03-25 NOTE — TELEPHONE ENCOUNTER
----- Message from Disha Baker sent at 3/25/2019 12:58 PM CDT -----  Contact: Self [facility] [phone]  RX request - refill or new RX.  Is this a refill or new RX:  REfill  RX name and strength: Amlodipine (NORVASC) 10 MG tablet  Directions (copy/paste from chart):  TAKE 1 TABLET(10 MG) BY MOUTH EVERY EVENING  Is this a 30 day or 90 day RX:  90  Local pharmacy or mail order pharmacy:  Mail Order  Pharmacy name and phone # (copy/paste from chart):   [facility]  [phone] (Phone) [phone] (Fax)  Comments:

## 2019-03-25 NOTE — TELEPHONE ENCOUNTER
Left msg we have sent this rx two times this year.  Once on 1/7 and again on 2/11.   I said I would call [facility] to check on it.    Amlodipine and atorvastatin shipped out 2/11 to [facility].  Patient advised.

## 2019-03-26 ENCOUNTER — TELEPHONE (OUTPATIENT)
Dept: GASTROENTEROLOGY | Facility: CLINIC | Age: 69
End: 2019-03-26

## 2019-03-26 NOTE — TELEPHONE ENCOUNTER
----- Message from Madeleine Pastor sent at 3/26/2019 11:33 AM CDT -----  Contact: [phone]  Type:  Test Results    Who Called: Patient  Name of Test (Lab/Mammo/Etc): Biopsy  Date of Test: 03/19/19  Ordering Provider:   Where the test was performed: N/A  Would the patient rather a call back or a response via [facility]? Call back  Best Call Back Number: [phone]  Additional Information:  N/A

## 2019-04-01 ENCOUNTER — TELEPHONE (OUTPATIENT)
Dept: GASTROENTEROLOGY | Facility: CLINIC | Age: 69
End: 2019-04-01

## 2019-04-01 ENCOUNTER — TELEPHONE (OUTPATIENT)
Dept: PAIN MEDICINE | Facility: CLINIC | Age: 69
End: 2019-04-01

## 2019-04-01 ENCOUNTER — OFFICE VISIT (OUTPATIENT)
Dept: PAIN MEDICINE | Facility: CLINIC | Age: 69
End: 2019-04-01
Payer: MEDICARE

## 2019-04-01 VITALS
SYSTOLIC BLOOD PRESSURE: 130 MMHG | DIASTOLIC BLOOD PRESSURE: 83 MMHG | BODY MASS INDEX: 39.04 KG/M2 | WEIGHT: 253 LBS | HEART RATE: 84 BPM

## 2019-04-01 DIAGNOSIS — G89.29 CHRONIC NECK PAIN: Primary | ICD-10-CM

## 2019-04-01 DIAGNOSIS — M54.12 CERVICAL RADICULOPATHY: Primary | ICD-10-CM

## 2019-04-01 DIAGNOSIS — M54.12 CERVICAL RADICULOPATHY: ICD-10-CM

## 2019-04-01 DIAGNOSIS — M50.30 DDD (DEGENERATIVE DISC DISEASE), CERVICAL: ICD-10-CM

## 2019-04-01 DIAGNOSIS — M47.22 OSTEOARTHRITIS OF SPINE WITH RADICULOPATHY, CERVICAL REGION: ICD-10-CM

## 2019-04-01 DIAGNOSIS — M54.2 CHRONIC NECK PAIN: Primary | ICD-10-CM

## 2019-04-01 PROCEDURE — 3079F DIAST BP 80-89 MM HG: CPT | Mod: CPTII,S$GLB,, | Performed by: PAIN MEDICINE

## 2019-04-01 PROCEDURE — 99214 OFFICE O/P EST MOD 30 MIN: CPT | Mod: S$GLB,,, | Performed by: PAIN MEDICINE

## 2019-04-01 PROCEDURE — 3075F PR MOST RECENT SYSTOLIC BLOOD PRESS GE 130-139MM HG: ICD-10-PCS | Mod: CPTII,S$GLB,, | Performed by: PAIN MEDICINE

## 2019-04-01 PROCEDURE — 3075F SYST BP GE 130 - 139MM HG: CPT | Mod: CPTII,S$GLB,, | Performed by: PAIN MEDICINE

## 2019-04-01 PROCEDURE — 99499 RISK ADDL DX/OHS AUDIT: ICD-10-PCS | Mod: S$GLB,,, | Performed by: PAIN MEDICINE

## 2019-04-01 PROCEDURE — 99999 PR PBB SHADOW E&M-EST. PATIENT-LVL III: CPT | Mod: PBBFAC,,, | Performed by: PAIN MEDICINE

## 2019-04-01 PROCEDURE — 99499 UNLISTED E&M SERVICE: CPT | Mod: S$GLB,,, | Performed by: PAIN MEDICINE

## 2019-04-01 PROCEDURE — 99214 PR OFFICE/OUTPT VISIT, EST, LEVL IV, 30-39 MIN: ICD-10-PCS | Mod: S$GLB,,, | Performed by: PAIN MEDICINE

## 2019-04-01 PROCEDURE — 1101F PR PT FALLS ASSESS DOC 0-1 FALLS W/OUT INJ PAST YR: ICD-10-PCS | Mod: CPTII,S$GLB,, | Performed by: PAIN MEDICINE

## 2019-04-01 PROCEDURE — 1101F PT FALLS ASSESS-DOCD LE1/YR: CPT | Mod: CPTII,S$GLB,, | Performed by: PAIN MEDICINE

## 2019-04-01 PROCEDURE — 3079F PR MOST RECENT DIASTOLIC BLOOD PRESSURE 80-89 MM HG: ICD-10-PCS | Mod: CPTII,S$GLB,, | Performed by: PAIN MEDICINE

## 2019-04-01 PROCEDURE — 99999 PR PBB SHADOW E&M-EST. PATIENT-LVL III: ICD-10-PCS | Mod: PBBFAC,,, | Performed by: PAIN MEDICINE

## 2019-04-01 NOTE — PROGRESS NOTES
[facility] Established Patient Evaluation    Referred by: Brandon Kinney MD   Reason for referral: Chronic neck and back pain     CC:   No chief complaint on file.    Last 3 PDI Scores 11/12/2018 9/28/2018   Pain Disability Index (PDI) 13 62     Interval Update:   4/1/19 - Pt returns for follow up he reports  6/10 neck pain.  He would like to repeat the cervical epidural steroid injection he last received in October 2018 as it provided 4+ months of relief.  He also informs me of recent diagnoses involving his gastrointestinal tract after an EGD showing erosive disease throughout.  These findings are limiting the medications that he can use for managing his pain.    11/12/18 - Pt returns today with 3/10 pain in the neck today.  He reports 85-90% relief of neck and cervical radicular pain following the Cervical KOBE on 10/30/18 and is very pleased with the results.  He also notes intermittent, bilateral hip pain that is worse with strenuous activity and better with rest.  He is not experiencing the pain at this time.    Background:  Erick Valdovinos is a 68 y.o. male referred for chronic neck and low back pain though he wishes to focus on his neck pain.  His history is significant for long term NSAID use followed by onset of CKD.  Current Cr is 1.7 and he is avoiding all nephrotoxic agents.  NSAIDS previously provided him excellent relief of his neck pain and enabled him to perform ADLs and other activities such as working on his cars (hobby).  He would like to explore interventions and other kidney-safe medications for management of his symptoms.    Location: neck, bilateral  Severity: Currently: 8/10   Typical Range: 8/10     Exacerbation: 10/10   Onset: 20 yrs ago, insidious  Quality: mostly Aching though punctuated by sharp pains when he turns his head or looks up  Radiation: into shoulders bilaterally  Axial/Extremity Percentage of Pain: into hands arms bilaterally  Exacerbating Factors: flexion,  standing and straining  Mitigating Factors: medications  Assoc: denies night fever/night sweats, urinary incontinence, bowel incontinence, significant weight loss, significant motor weakness and loss of sensations    Previous Therapies:  PT: Denies for neck  HEP: Denies  TENS:  Injections: Denies  Surgery: Denies spine sugrgery  Medications:   - NSAIDS:   - MSK Relaxants:   - TCAs:   - SNRIs:   - Topicals:   - Anticonvulsants:  - Opioids:     Current Pain Medications:  1. Norco 7.5-325     Full Medication List:    Current Outpatient Medications:     amLODIPine (NORVASC) 10 MG tablet, TAKE 1 TABLET(10 MG) BY MOUTH EVERY EVENING, Disp: 90 tablet, Rfl: 3    aspirin (ECOTRIN) 81 MG EC tablet, Take 81 mg by mouth once daily., Disp: , Rfl:     atorvastatin (LIPITOR) 40 MG tablet, Take 1 tablet (40 mg total) by mouth every evening., Disp: 90 tablet, Rfl: 3    blood sugar diagnostic Strp, 1 strip by Misc.(Non-Drug; Combo Route) route once daily., Disp: 180 strip, Rfl: 3    blood-glucose meter Misc, Use as directed, Disp: 1 each, Rfl: 0    butabarbital (BUTISOL) 30 mg Tab, Take 30 mg by mouth continuous prn., Disp: , Rfl:     coenzyme Q10 200 mg capsule, Take by mouth., Disp: , Rfl:     fenofibrate (TRICOR) 145 MG tablet, Take 1 tablet (145 mg total) by mouth every evening., Disp: 90 tablet, Rfl: 3    glimepiride (AMARYL) 4 MG tablet, Take 1 tablet (4 mg total) by mouth daily with breakfast., Disp: 90 tablet, Rfl: 3    HYDROcodone-acetaminophen (NORCO) 5-325 mg per tablet, Take 1 tablet by mouth every 6 (six) hours as needed for Pain., Disp: 60 tablet, Rfl: 0    lancets Misc, 1 Device by Misc.(Non-Drug; Combo Route) route once daily., Disp: 180 each, Rfl: 3    multivitamin (MULTIVITAMIN) per tablet, Take 1 tablet by mouth once daily., Disp: , Rfl:     omeprazole (PRILOSEC) 20 MG capsule, Take 1 capsule (20 mg total) by mouth once daily., Disp: 90 capsule, Rfl: 3    pen needle, diabetic (RELION PEN NEEDLES) 32  "gauge x 5/32" Ndle, Needs levemir flexpen needles. To use once daily, Disp: 100 each, Rfl: 3    sildenafil (REVATIO) 20 mg Tab, Take 1 tablet (20 mg total) by mouth daily as needed., Disp: 60 tablet, Rfl: 11    SITagliptan-metformin (JANUMET)  mg per tablet, Take 1 tablet by mouth 2 (two) times daily with meals., Disp: 180 tablet, Rfl: 3    triamcinolone acetonide 0.1% (KENALOG) 0.1 % cream, Apply topically daily as needed., Disp: 80 g, Rfl: 0    valsartan (DIOVAN) 320 MG tablet, Take 1 tablet (320 mg total) by mouth once daily., Disp: 90 tablet, Rfl: 3     Review of Systems:  Review of Systems   Constitutional: Negative for chills and fever.   HENT: Negative for nosebleeds.    Eyes: Negative for blurred vision and pain.   Respiratory: Negative for hemoptysis.    Cardiovascular: Negative for chest pain and palpitations.   Gastrointestinal: Negative for heartburn, nausea and vomiting.   Genitourinary: Negative for dysuria and hematuria.   Musculoskeletal: Positive for back pain, joint pain (right shoulder) and neck pain. Negative for myalgias.   Skin: Negative for rash.   Neurological: Negative for seizures and loss of consciousness.   Endo/Heme/Allergies: Does not bruise/bleed easily.   Psychiatric/Behavioral: Negative for hallucinations.       Allergies:  Codeine and Nsaids (non-steroidal anti-inflammatory drug)     Medical History:  Past Medical History:   Diagnosis Date    Diabetes mellitus     Diabetes mellitus, type 2     Esophageal reflux     Hyperlipidemia     Hypertension     Nephrolithiasis     Osteoarthritis         Surgical History:  Past Surgical History:   Procedure Laterality Date    COLONOSCOPY      COLONOSCOPY Suprep N/A 3/19/2019    Performed by [facility] at [facility] ENDO    ESOPHAGOGASTRODUODENOSCOPY (EGD) N/A 3/19/2019    Performed by [facility] at [facility] ENDO    hemrrhoidectomy      Injection-steroid-epidural-cervical - C7 - T1 N/A 10/30/2018    Performed by " Hernan Murguia Jr., MD at [facility] PAIN MGT    WRIST FRACTURE SURGERY      WRIST SURGERY          Social History:  Social History     Socioeconomic History    Marital status:      Spouse name: Not on file    Number of children: Not on file    Years of education: Not on file    Highest education level: Not on file   Occupational History    Not on file   Social Needs    Financial resource strain: Not on file    Food insecurity:     Worry: Not on file     Inability: Not on file    Transportation needs:     Medical: Not on file     Non-medical: Not on file   Tobacco Use    Smoking status: Never Smoker    Smokeless tobacco: Never Used   Substance and Sexual Activity    Alcohol use: Yes     Comment: socially/     Drug use: No    Sexual activity: Never   Lifestyle    Physical activity:     Days per week: Not on file     Minutes per session: Not on file    Stress: Not on file   Relationships    Social connections:     Talks on phone: Not on file     Gets together: Not on file     Attends Islam service: Not on file     Active member of club or organization: Not on file     Attends meetings of clubs or organizations: Not on file     Relationship status: Not on file    Intimate partner violence:     Fear of current or ex partner: Not on file     Emotionally abused: Not on file     Physically abused: Not on file     Forced sexual activity: Not on file   Other Topics Concern    Not on file   Social History Narrative    Not on file       Physical Exam:  There were no vitals filed for this visit.  General    Nursing note and vitals reviewed.  Constitutional: He is oriented to person, place, and time. He appears well-developed and well-nourished. No distress.   HENT:   Head: Normocephalic and atraumatic.   Nose: Nose normal.   Eyes: Conjunctivae and EOM are normal. Pupils are equal, round, and reactive to light. Right eye exhibits no discharge. Left eye exhibits no discharge. No scleral icterus.    Neck: No JVD present.   Cardiovascular: Intact distal pulses.    Pulmonary/Chest: Effort normal. No respiratory distress.   Abdominal: He exhibits no distension.   Neurological: He is alert and oriented to person, place, and time. Coordination normal.   Psychiatric: He has a normal mood and affect. His behavior is normal. Judgment and thought content normal.     General Musculoskeletal Exam   Gait: normal     Back (L-Spine & T-Spine) / Neck (C-Spine) Exam     Tenderness Posterior midline palpation reveals tenderness of the Upper C-Spine and Lower C-Spine. Right paramedian tenderness of the Lower C-Spine and Upper C-Spine. Left paramedian tenderness of the Upper C-Spine and Lower C-Spine.     Back (L-Spine & T-Spine) Range of Motion   Extension: abnormal Back extension: facet loading is positive and exacerabtes/reproduces the patient's typical low back pain    Flexion: abnormal Back flexion: limited ROM but partial relief of low back pain noted.     Neck (C-Spine) Range of Motion   Flexion:     Limited  Extension: Limited  Right Lateral Bend: abnormal  Left Lateral Bend: abnormal  Right Rotation: abnormal  Left Rotation: abnormal    Spinal Sensation   Right Side Sensation  C-Spine Level: normal   L-Spine Level: normal  Left Side Sensation  C-Spine Level: normal  L-Spine Level: normal    Neck (C-Spine) Tests   Spurling's Test   Left:  Negative  Right: negative    Other He has no scoliosis .      Muscle Strength   Right Upper Extremity   Biceps: 5/5/5   Deltoid:  5/5  Triceps:  5/5  Wrist extension: 5/5/5   Wrist flexion: 5/5/5   Finger Flexors:  5/5  Finger Extensors:  5/5  Left Upper Extremity  Biceps: 5/5/5   Deltoid:  5/5  Triceps:  5/5  Wrist extension: 5/5/5   Wrist flexion: 5/5/5   Finger Flexors:  5/5  Finger Extensors:  5/5  Right Lower Extremity   Hip Flexion: 5/5   Hip Extensors: 5/5  Quadriceps:  5/5   Hamstrin/5   Gastrocsoleus:  5/5/5  Left Lower Extremity   Hip Flexion: 5/5   Hip Extensors:  5/5  Quadriceps:  5/5   Hamstrin/5   Gastrocsoleus:  5/5/5    Reflexes     Left Side  Biceps:  2+  Quadriceps:  2+  Achilles:  2+    Right Side   Biceps:  2+  Quadriceps:  2+  Achilles:  2+      Imaging:  MRI outside 2017 (report scanned into media and disc reviewed in clinic)  Multilevel DDD, osteophyte formation, and facet arthropathy contributing to multilevel neuroforaminal stenosis and varying degrees of central stenosis.  There is no central canal stenosis at C7-T1.    Labs:  BMP  Lab Results   Component Value Date     2019    K 4.7 2019     2019    CO2 24 2019    BUN 20 2019    CREATININE 1.4 2019    CALCIUM 9.9 2019    ANIONGAP 7 (L) 2019    ESTGFRAFRICA 59.7 (A) 2019    EGFRNONAA 51.6 (A) 2019     Lab Results   Component Value Date    ALT 23 2019    AST 20 2019    ALKPHOS 54 (L) 2019    BILITOT 0.5 2019       Assessment:  Problem List Items Addressed This Visit     None          18 - This is a 67-year-old male with chronic arthritic pain in multiple locations including the neck, low back, and right shoulder previously well controlled with meloxicam; however, now he is unable to take NSAIDs due to advancing chronic kidney disease.  Chronic kidney disease stabilized with a creatinine of 1.7 but he is seeking an alternate regimen for management of his symptoms that does not include NSAIDs.  I have reviewed his outside imaging and have recommended a combination of therapies including physical therapy, cervical epidural steroid injections to treat radicular symptoms, bilateral medial branch block radiofrequency ablation to treat arthritic pain and optimization of remaining therapies such as gabapentin and Tylenol.  Patient stated that his PCP recommended against additional steroids.  I will reach out to him to discuss this recommendation prior to any interventional procedures utilizing steroids.    18  - patient received 85-90% relief from cervical epidural steroid injection performed on 10/30/2018 and is very pleased with the results.  He was able to return to some activities of daily living such as cutting his grass.  I have encouraged him to focus on abdominal, core, hip, and leg strengthening exercises to recondition his lower back as I believe his hip pain to be primarily due to muscle strain and spasm in the gluteus medius/minimus muscles.    Treatment Plan:   PT/OT/HEP: Patient's son is a PTA and he would like to start with a self-directed HEP guided by his son.  I will provide him a printed packet of Low Back Exercises focused on core strengthening and stretching.  Procedures:  Repeat cervical epidural steroid injection  Medications:    Take tylenol 650 with each Norco for a max of 3,000 mg per day with monitoring on hepatic fxn   Restart gabapentin   Avoid nephrotoxins  Imaging: No additional imaging recommended at this time.  Labs: Reviewed.  Medications are appropriately dosed for current hepatorenal function.    Follow Up: RTC 2-3 weeks after injection    Hernan Murugia Jr, MD  Interventional Pain Medicine / Anesthesiology    Disclaimer: This note was partly generated using dictation software which may occasionally result in transcription errors.

## 2019-04-01 NOTE — TELEPHONE ENCOUNTER
----- Message from Italo Bañuelos MD sent at 3/31/2019 12:05 AM CDT -----  Gastric bx with mild chronic inflammation; h.pylori negative. Esophageal bx with mild inflammation, consistent with mild reflux. Ok to take as needed acid suppression for symptoms. Happy to f/u with him in GI clinic with persistent symptoms

## 2019-04-10 ENCOUNTER — TELEPHONE (OUTPATIENT)
Dept: PAIN MEDICINE | Facility: CLINIC | Age: 69
End: 2019-04-10

## 2019-04-11 ENCOUNTER — TELEPHONE (OUTPATIENT)
Dept: PAIN MEDICINE | Facility: CLINIC | Age: 69
End: 2019-04-11

## 2019-04-11 DIAGNOSIS — M54.12 CERVICAL RADICULOPATHY: Primary | ICD-10-CM

## 2019-04-11 NOTE — H&P (VIEW-ONLY)
Procedure cancelled due to poorly controlled hyperglycemia.    Hernan Murguia Jr, MD  Interventional Pain Medicine / Anesthesiology

## 2019-04-11 NOTE — TELEPHONE ENCOUNTER
Pt spoke with IR and is r/s for 4/30/19 .  ----- Message from Becki Casey sent at 4/11/2019  9:19 AM CDT -----  Contact: Self [phone]  Patient is calling to reschedule his procedure. Please advice

## 2019-04-15 ENCOUNTER — TELEPHONE (OUTPATIENT)
Dept: PAIN MEDICINE | Facility: CLINIC | Age: 69
End: 2019-04-15

## 2019-04-15 DIAGNOSIS — M54.12 CERVICAL RADICULOPATHY: Primary | ICD-10-CM

## 2019-04-24 NOTE — PROGRESS NOTES
Subjective:       Patient ID: Erick Valdovinos is a 68 y.o. male.    Chief Complaint: Follow-up    This is a 68-year-old male here for a follow-up visit regarding abdominal discomfort.  He has noted and upper abdominal discomfort, predominantly the right upper quadrant which is related to eating.  There is no particular dietary relation, however, he does note often times it will worsen with eating but other times can improved by eating.  The symptoms now on a daily basis, localized nonradiating.  Mild occasional nausea but no vomiting.  No dysphagia or odynophagia. EGD did note severe gastritis, moderate inflammation on the biopsies but H pylori negative.  He has not improved remarkably with PPI therapy.  Prior imaging did note cholelithiasis.  No other exacerbating or relieving factors or other associated symptoms.  He denies dark urine, jaundice, unintentional weight loss.  No cough, sputum production.    The following portions of the patient's history were reviewed and updated as appropriate: allergies, current medications, past family history, past medical history, past social history, past surgical history and problem list.    (Portions of this note were dictated using voice recognition software and may contain dictation related errors in spelling/grammar/syntax not found on text review)  HPI  Review of Systems   Constitutional: Negative for appetite change and unexpected weight change.   Respiratory: Negative for apnea and chest tightness.    Cardiovascular: Negative for chest pain and palpitations.   Gastrointestinal: Positive for abdominal distention and abdominal pain.       Objective:      Physical Exam   Constitutional: He is oriented to person, place, and time. He appears well-developed and well-nourished. No distress.   HENT:   Head: Normocephalic and atraumatic.   Eyes: Conjunctivae are normal. No scleral icterus.   Neck: No tracheal deviation present. No thyromegaly present.   Cardiovascular: Normal  rate, regular rhythm and normal heart sounds. Exam reveals no gallop and no friction rub.   No murmur heard.  Pulmonary/Chest: Effort normal and breath sounds normal. He has no wheezes. He has no rales.   Abdominal: Soft. Bowel sounds are normal. He exhibits distension. There is no tenderness. There is no rebound and no guarding.   Musculoskeletal: Normal range of motion. He exhibits no edema or tenderness.   Neurological: He is alert and oriented to person, place, and time.   Skin: He is not diaphoretic.   Psychiatric: He has a normal mood and affect. His behavior is normal.   Nursing note and vitals reviewed.      Labs/imaging; reviewed with him  Assessment:       1. RUQ pain    2. Calculus of gallbladder without cholecystitis without obstruction        Plan:   1.  Reviewed endoscopic findings with him in detail, including pathology reports.  I find his symptoms to be out of proportion to the endoscopic findings.  Similarly he has not improved significantly on PPI therapy  2.  Currently he is doing better, though this is in the setting of narcotic usage for his cervical radiculopathy  3.  I will request a surgery consult regarding the right upper quadrant pain and cholelithiasis, has some symptoms suspicious for symptomatic cholelithiasis and some atypical symptoms

## 2019-04-24 NOTE — TELEPHONE ENCOUNTER
----- Message from Disha Baker sent at 4/24/2019 12:11 PM CDT -----  Contact: Self Call [phone]   Patient would like to get a referral.  Referral to what specialty:  [facility]  Does the patient want the referral with a specific physician:  No  [facility]  Reason (be specific): Diabetic eye   Does the patient already have the specialty clinic appointment scheduled: No   If yes, what date is the appointment scheduled:   N/a  Is the insurance listed in Epic correct? (this is important for a referral):  Yes  Comments:

## 2019-04-24 NOTE — TELEPHONE ENCOUNTER
I put in referral [facility].  Sent yana juaresg to help get appt booked w/  Patient.  Left msg on patient voicemail above was done and someone will call him to book appt..

## 2019-04-29 NOTE — DISCHARGE INSTRUCTIONS
Home Care Instructions Pain Management:    1.  DIET:    You may resume your normal diet today.    2.  BATHING:    You may shower with luke warm water.    3.  DRESSING:    You may remove your bandage today.    4.  ACTIVITY LEVEL:      You may resume your normal activities 24 hours after your procedure.    5.  MEDICATIONS:    You may resume your normal medications today.    6.  SPECIAL INSTRUCTIONS:    No heat to the injection site for 24 hours including bath or shower, heating pad, moist heat or hot tubs.    Use an ice pack to the injection site for any pain or discomfort.  Apply ice packs for 20 minute intervals as needed.    If you have received any sedatives by mouth today, you can not drive for 12 hours.    If you have received sedation through an IV, you can not drive for 24 hours.    PLEASE CALL YOUR DOCTOR FOR THE FOLLOWIN.  Redness or swelling around the injection site.  2.  Fever of 101 degrees.  3.  Drainage (pus) from the injection site.  4.  For any continuous bleeding (some dried blood over the incision is normal.)    FOR EMERGENCIES:    If any unusual problems or difficulties occur during clinic hours, call [phone] or dial [phone].    Follow up with with your physician in 2-3 weeks.

## 2019-04-30 NOTE — DISCHARGE SUMMARY
[facility]  Discharge Note  Short Stay     Admit Date: 4/30/2019    Discharge Date: 4/30/2019     Attending Physician: Hernan Murguia Jr, MD    Diagnoses:  Active Hospital Problems    Diagnosis  POA    *Cervical radiculopathy [M54.12]  Yes    Chronic pain [G89.29]  Yes      Resolved Hospital Problems   No resolved problems to display.     Discharged Condition: Good     Hospital Course: Patient was admitted for an outpatient interventional pain management procedure and tolerated the procedure well with no complications.     Final Diagnoses: Same as principal problem.     Disposition: Home or Self Care     Follow up/Patient Instructions:    Follow-up Information     Hernan Murguia Jr, MD. Go in 1 week.    Specialty:  Pain Medicine  Why:  Post-procedural Follow Up As Scheduled, Call to make an appointment if you do not have one  Contact information:  [address]  [address]  [place] [address]  [phone]                   Reconciled Medications:     Medication List      CONTINUE taking these medications    amLODIPine 10 MG tablet  Commonly known as:  NORVASC  TAKE 1 TABLET(10 MG) BY MOUTH EVERY EVENING     atorvastatin 40 MG tablet  Commonly known as:  LIPITOR  Take 1 tablet (40 mg total) by mouth every evening.     blood sugar diagnostic Strp  1 strip by Misc.(Non-Drug; Combo Route) route once daily.     blood-glucose meter Misc  Use as directed     coenzyme Q10 200 mg capsule  Take by mouth.     fenofibrate 145 MG tablet  Commonly known as:  TRICOR  Take 1 tablet (145 mg total) by mouth every evening.     glimepiride 4 MG tablet  Commonly known as:  AMARYL  Take 1 tablet (4 mg total) by mouth daily with breakfast.     HYDROcodone-acetaminophen 5-325 mg per tablet  Commonly known as:  NORCO  Take 1 tablet by mouth every 6 (six) hours as needed for Pain.     lancets Misc  1 Device by Misc.(Non-Drug; Combo Route) route once daily.     omeprazole 20 MG capsule  Commonly known as:  PRILOSEC  Take  "1 capsule (20 mg total) by mouth once daily.     ONE DAILY MULTIVITAMIN per tablet  Generic drug:  multivitamin  Take 1 tablet by mouth once daily.     pen needle, diabetic 32 gauge x 5/32" Ndle  Commonly known as:  RELION PEN NEEDLES  Needs levemir flexpen needles. To use once daily     sildenafil 20 mg Tab  Commonly known as:  REVATIO  Take 1 tablet (20 mg total) by mouth daily as needed.     SITagliptan-metformin  mg per tablet  Commonly known as:  JANUMET  Take 1 tablet by mouth 2 (two) times daily with meals.     triamcinolone acetonide 0.1% 0.1 % cream  Commonly known as:  KENALOG  Apply topically daily as needed.     valsartan 320 MG tablet  Commonly known as:  DIOVAN  Take 1 tablet (320 mg total) by mouth once daily.           Discharge Procedure Orders (must include Diet, Follow-up, Activity)   Call MD for:  temperature >100.4     Call MD for:  severe uncontrolled pain     Call MD for:  redness, tenderness, or signs of infection (pain, swelling, redness, odor or green/yellow discharge around incision site)     Call MD for:  difficulty breathing or increased cough     Call MD for:  severe persistent headache     Call MD for:  worsening rash     Remove dressing in 24 hours       Hernan Murguia Jr, MD  Interventional Pain Medicine / Anesthesiology    "

## 2019-04-30 NOTE — OP NOTE
"Procedure Note    Pre-operative Diagnosis: Cervical Radiculopathy  Post-operative Diagnosis: Cervical Radiculopathy  Procedure Date: 04/30/2019  Procedure:  (1) Cervical Epidural Steroid Injection at C7-T1    (2) Intraoperative fluoroscopy    (3) IV Moderate Sedation (Midazolam 2 mg, Fentanyl 50 mcg)      Indications: To alleviate pain and suffering, and reduce functional impairment associated with cervical radiculopathy.    The patients history and physical exam were reviewed. The risks, benefits and alternatives to the procedure were discussed, and all questions were answered to the patients satisfaction. The patient agreed to proceed, and written informed consent was verified.    Procedure in Detail: The patient was brought into the procedure room and placed in the prone position on the fluoroscopy table. The area of the cervical spine was prepped with Chloraprep and draped in a sterile manner. The C7-T1 interspace was identified and marked under AP fluoroscopy. The skin and subcutaneous tissues overlying the targeted interspace were anesthetized with 3-5 mL of Lidocaine 1% using a 25G 1.5" needle. A 17G, 3.5" Tuohy epidural needle was inserted through the anesthetized skin and directed toward the interspace under fluoroscopic guidance until T1 lamina was contacted.  The fluoroscope was then adjusted to yield a contralateral oblique view of the C7-T1 interspace.  The epidural needle was incrementally walked cephalad off of the lamina until the ligamentum flavum was engaged. From this point, a loss-of-resistance technique to saline in a glass syringe was used to identify entrance of the needle into the epidural space. Once loss of resistance was observed 0.5 mL of contrast solution was injected. An appropriate epidurogram was noted.    A 3 mL mixture consisting of saline, PF Ropivacaine 0.2% (1 mL), and 15 mg of Dexamethasone was injected slowly and without resistance.  The needle was removed and a bandage " applied to puncture site.    Disposition: The patient tolerated the procedure well, and there were no apparent complications. Vital signs remained stable throughout the procedure. The patient was taken to the recovery area where written discharge instructions for the procedure were given.     Follow-up: In clinic as scheduled      Hernan Murguia Jr, MD  Interventional Pain Medicine / Anesthesiology

## 2019-05-01 NOTE — ED PROVIDER NOTES
Encounter Date: 5/1/2019    SCRIBE #1 NOTE: I, Heather Atwood, am scribing for, and in the presence of,  Dr. Prince. I have scribed the entire note.       History     Chief Complaint   Patient presents with    Chest Pain     pt began having midsternal chest pain, SOB, and dizziness when cutting grass at home pta. EMS went to the house and performed an EKG, and pt chose to come to the hospital by POV with his wife. Pain has started to improve     Erick Valdovinos is a 68 y.o. male who  has a past medical history of Diabetes mellitus, Diabetes mellitus, type 2, Esophageal reflux, Hyperlipidemia, Hypertension, Nephrolithiasis, and Osteoarthritis.    The patient presents to the ED due to mid sternal chest pain that began pta.   The patient states he was cutting grass when he became short of breath.  The patient also reports dizziness.  The patient's wife states the patient's face was red.  The patient states all of his symptoms improved upon EMS arrival.  The patient currently c/o neck pain due to a C7 steroid injection yesterday.  The patient denies N/V/D, fever, or LOC.  The patient reports no other symptoms at this time.    The history is provided by the patient.     Review of patient's allergies indicates:   Allergen Reactions    Codeine Nausea Only    Nsaids (non-steroidal anti-inflammatory drug) Other (See Comments)     Kidney issues     Past Medical History:   Diagnosis Date    Diabetes mellitus     Diabetes mellitus, type 2     Esophageal reflux     Hyperlipidemia     Hypertension     Nephrolithiasis     Osteoarthritis      Past Surgical History:   Procedure Laterality Date    Cervical Epidural Steroid Injection, C7-T1, With Dexamethasone,  and IV Sedation N/A 4/11/2019    Performed by Hernan Murguia Jr., MD at [facility] PAIN MGT    COLONOSCOPY      COLONOSCOPY Suprep N/A 3/19/2019    Performed by Italo Bañuelos MD at [facility] ENDO    ESOPHAGOGASTRODUODENOSCOPY (EGD) N/A 3/19/2019    Performed by Italo  ALIDA Bañuelos MD at [facility] ENDO    hemrrhoidectomy      Injection-steroid-epidural-cervical - C7 - T1 N/A 10/30/2018    Performed by Hernan Murg[facility]ia Jr., MD at [facility] PAIN MGT    Injection-steroid-epidural-cervical--C7-T1 With Dexamethasone, and IV Sedation N/A 4/30/2019    Performed by Hernan Murguia Jr., MD at [facility] PAIN MGT    WRIST FRACTURE SURGERY      WRIST SURGERY       Family History   Problem Relation Age of Onset    Kidney disease Mother     Kidney failure Mother     Hypertension Mother     Heart attack Father     Heart disease Father     Coronary artery disease Father     Hyperlipidemia Father     Coronary artery disease Brother     Hyperlipidemia Brother     Hyperlipidemia Brother      Social History     Tobacco Use    Smoking status: Never Smoker    Smokeless tobacco: Never Used   Substance Use Topics    Alcohol use: Yes     Comment: socially/     Drug use: No     Review of Systems   Constitutional: Negative for chills and fever.   HENT: Negative for congestion, ear pain, rhinorrhea and sore throat.    Respiratory: Positive for shortness of breath. Negative for cough and wheezing.    Cardiovascular: Positive for chest pain. Negative for palpitations.   Gastrointestinal: Negative for abdominal pain, diarrhea, nausea and vomiting.   Genitourinary: Negative for dysuria and hematuria.   Musculoskeletal: Negative for back pain, myalgias and neck pain.   Skin: Negative for rash.   Neurological: Positive for dizziness. Negative for weakness, light-headedness and headaches.   Psychiatric/Behavioral: Negative for confusion.       Physical Exam     Initial Vitals [05/01/19 1837]   BP Pulse Resp Temp SpO2   135/75 97 11 98.8 °F (37.1 °C) 97 %      MAP       --         Physical Exam    Nursing note and vitals reviewed.  Constitutional: He appears well-developed and well-nourished. He is not diaphoretic. No distress.   HENT:   Head: Normocephalic and atraumatic.   Mouth/Throat: Oropharynx is clear  and moist.   Eyes: Conjunctivae and EOM are normal.   Neck: Normal range of motion. Neck supple.   Cardiovascular: Normal rate, regular rhythm and normal heart sounds. Exam reveals no gallop and no friction rub.    No murmur heard.  Pulmonary/Chest: Breath sounds normal. He has no wheezes. He has no rhonchi. He has no rales.   Abdominal: Soft. There is no tenderness. There is no rebound and no guarding.   Musculoskeletal: Normal range of motion. He exhibits no edema or tenderness.   Lymphadenopathy:     He has no cervical adenopathy.   Neurological: He is alert and oriented to person, place, and time. He has normal strength.   Skin: Skin is warm and dry. No rash noted.         ED Course   Procedures  Labs Reviewed   COMPREHENSIVE METABOLIC PANEL - Abnormal; Notable for the following components:       Result Value    Sodium 135 (*)     CO2 16 (*)     Glucose 171 (*)     BUN, Bld 46 (*)     Creatinine 2.1 (*)     Alkaline Phosphatase 50 (*)     eGFR if  37 (*)     eGFR if non  32 (*)     All other components within normal limits   CBC W/ AUTO DIFFERENTIAL - Abnormal; Notable for the following components:    WBC 17.24 (*)     RBC 3.46 (*)     Hemoglobin 10.6 (*)     Hematocrit 31.2 (*)     Gran # (ANC) 13.9 (*)     Mono # 1.6 (*)     Gran% 80.6 (*)     Lymph% 10.0 (*)     All other components within normal limits   TROPONIN I - Abnormal; Notable for the following components:    Troponin I 0.048 (*)     All other components within normal limits   TROPONIN I     EKG Readings: (Independently Interpreted)   Rhythm: Normal Sinus Rhythm. Heart Rate: 78. Ectopy: No Ectopy. Conduction: Normal. ST Segments: Normal ST Segments. T Waves: Normal. Clinical Impression: Normal Sinus Rhythm       Imaging Results          X-Ray Chest PA And Lateral (Final result)  Result time 05/01/19 19:24:03    Final result by Malika Ramirez MD (05/01/19 19:24:03)                 Impression:      Clear  lungs.      Electronically signed by: Mlaika Ramirez MD  Date:    05/01/2019  Time:    19:24             Narrative:    EXAMINATION:  XR CHEST PA AND LATERAL    CLINICAL HISTORY:  Chest pain, unspecified    TECHNIQUE:  PA and lateral views of the chest were performed.    COMPARISON:  Prior dated 12/27/2016    FINDINGS:  The mediastinal structures are midline.  The cardiac silhouette is not enlarged.  There is no evidence of acute pulmonary disease, pleural disease, lymph node enlargement, or cardiac decompensation.    Stable left lateral 3rd 4th and 5th rib deformities consistent with remote fractures.                                 Medical Decision Making:   Initial Assessment:   Erick Valdovinos 68 y.o. presents to the ED today with chest pain. Pt has various risk factors. Will obtain labs, EKG, CXR to further evaluate. Treat symptoms appropriately and reassess.       Differential Diagnosis:   Myocardial ischemia, pericarditis, pulmonary embolus, chest wall pain, pleural inflammation and pulmonary infectious causes.    Clinical Tests:   Lab Tests: Ordered and Reviewed  Radiological Study: Ordered and Reviewed  Medical Tests: Ordered and Reviewed  ED Management:  Pt's second set of troponin was barely elevated so pt will be placed in observation for further management. Discussed with [facility]              Attending Attestation:           Physician Attestation for Scribe:  Physician Attestation Statement for Scribe #1: I, rui prince, reviewed documentation, as scribed by nelsy aguila in my presence, and it is both accurate and complete.                    Clinical Impression:       ICD-10-CM ICD-9-CM   1. Chest pain R07.9 786.50         Disposition:   Disposition: Placed in Observation  Condition: Stable                        Rui Prince MD  05/01/19 2991

## 2019-05-02 PROBLEM — R07.9 CHEST PAIN: Status: ACTIVE | Noted: 2019-01-01

## 2019-05-02 PROBLEM — N17.9 AKI (ACUTE KIDNEY INJURY): Status: ACTIVE | Noted: 2019-01-01

## 2019-05-02 PROBLEM — I21.4 NSTEMI (NON-ST ELEVATED MYOCARDIAL INFARCTION): Status: ACTIVE | Noted: 2019-01-01

## 2019-05-02 NOTE — PLAN OF CARE
Pt lives with spouse and is independent.  He does not have cardiologist at this time.  No needs identified at this time.  This  put name on white board and explained blue discharge folder to patient. Discharge planning brochure and/or business card given to patient.  Patient verbalized understanding.         05/02/19 1204   Discharge Assessment   Assessment Type Discharge Planning Assessment   Confirmed/corrected address and phone number on facesheet? Yes   Assessment information obtained from? Patient   Communicated expected length of stay with patient/caregiver yes   Prior to hospitilization cognitive status: Alert/Oriented;No Deficits   Current cognitive status: Alert/Oriented;No Deficits   Current Functional Status: Independent   Lives With spouse   Able to Return to Prior Arrangements yes   Is patient able to care for self after discharge? Yes   Patient's perception of discharge disposition home or selfcare   Readmission Within the Last 30 Days no previous admission in last 30 days   Patient currently being followed by outpatient case management? No   Patient currently receives any other outside agency services? No   Equipment Currently Used at Home none   Do you have any problems affording any of your prescribed medications? No   Is the patient taking medications as prescribed? no   Does the patient have transportation home? Yes   Transportation Anticipated family or friend will provide   Does the patient receive services at the [facility]? No   Discharge Plan A Home   Discharge Plan B Home;Home with family   DME Needed Upon Discharge  none   Patient/Family in Agreement with Plan yes

## 2019-05-02 NOTE — ED NOTES
Patient stated that he received a steriod injection yesterday and checked his glucose this am and it was approximately 436 at 6 am today. He then went to cut the grass this evening and stated he felt weak, chest pain, lightheaded and slightly dizzy. Patient called ems and when they arrived his glucose was 166. Patient denied chest pain upon examination. Wife at bedside. Patient is AAOX4, Respirations even and unlabored, clear in all fields, skin is warm and dry. Will continue to monitor

## 2019-05-02 NOTE — NURSING
0930  Patient on table, ready for test.  Allergies/history confirmed    Connected to monitor EKG and NIBP-PIV established  0930  Dr. Red         Here to explain procedure and obtain consent  0932  Test started per protocol.  See EKG for VS  0932  Dobutamine started at 10mcg  0935  Dobutamine increased to 20mcg.  Pt tolerating well.  0937  Target HR obtained.  0938  Testing phase complete.  Dobutamine off.  NS up at rapid rate for    recovery phase  0939  Recovery phased complete.  Patient states feels normal, no distress  0940  NC dc'd 200cc infused.  IV d/c.  Pt released to Cardiology.

## 2019-05-02 NOTE — PLAN OF CARE
0121H Patient arrived to unit from ED via wheelchair     Patient is awake and orientedx4. Care plan explained to patient and spouse; they verbalized understanding. On room air, O2 saturation at 99%. Hooked to heart monitor running normal sinus rhythm at 73bpm. Patient has a 20G left forearm that was flushed with blood return and saline locked. Maintained on NPO status. No skin issues. Ambulated to the bathroom with assistance. Complained of neck pain but said that the steroid shot he received was starting to work. The abdomen is obese and rounded. Patient denies shortness of breath and cough. Due medications given. Encouraged to turn every 2 hours as tolerated. Maintained on fall risk/allergy precaution. Bed in lowest position, bed alarm on, call light/personal items within reach and instructed to call for help when needed. Will continue to monitor.

## 2019-05-02 NOTE — H&P
[facility] Medicine H&P Note     Admitting Team: [facility] Hospitalist Team B  Attending Physician: Mina Linder MD  Resident: Rafat  Intern: Bibiana/ Kristina    Date of Admit: 2019    Chief Complaint     Chest pain for 20 minutes     Subjective:      History of Present Illness:  Erick Valdovinos is a 68 y.o.  male who  has a past medical history of Diabetes mellitus, Diabetes mellitus, type 2, Esophageal reflux, Hyperlipidemia, Hypertension, Nephrolithiasis, and Osteoarthritis.. The patient presented to [facility] on 2019 with a primary complaint of chest pain.     The patient was in their usual state of health until earlier in the day of admission when patient began to experience chest pain while cutting the grass. The patient states over the last 3-4 months, he has noticed that he has had to take small breaks while cutting the grass due to his feeling tired. Per patient, during this time, he normally empties his clippings, takes a seat and drinks a glass of water. Patient states he started cutting the grass around 445 pm and at 615 when he went to sit down he began to experience squeezing chest pain. Patient states that he continued to drink water when his wife called him to come inside. At that time, patient arouse and then began to feel dizzy and short of breath. Patient denies losing consciousness or actually falling. Per patient, his wife then called EMS.    Patient states, he has had 2 previous episodes of chest pain on exertion that resolved with rest. In addition, patient states that his father  at 29 of a MI. He also states that his brother  of pancreatitis 2/2 hypertriglyceridemia. Patient denies a hx of smoking. Denies n/v/d, current sob, current cp, rhinorrhea.    Past Medical History:  Past Medical History:   Diagnosis Date    Diabetes mellitus     Diabetes mellitus, type 2     Esophageal reflux     Hyperlipidemia     Hypertension     Nephrolithiasis      Osteoarthritis        Past Surgical History:  Past Surgical History:   Procedure Laterality Date    Cervical Epidural Steroid Injection, C7-T1, With Dexamethasone,  and IV Sedation N/A 4/11/2019    Performed by Hernan Murguia Jr., [facility] at [facility]    COLONOSCOPY      COLONOSCOPY Suprep N/A 3/19/2019    Performed by Italo Bañuelos MD at [facility]    ESOPHAGOGASTRODUODENOSCOPY (EGD) N/A 3/19/2019    Performed by [facility]alo Bañuelos MD at [facility]    hemrrhoidectomy      Injection-steroid-epidural-cervical - C7 - T1 N/A 10/30/2018    Performed by [facility]an [facility]urguia Jr[facility]    Injection-steroid-epidural-cervical--C7-T1 With Dexamethasone, and IV Sedation N/A 4/30/2019    Performed by Hernan [facility]urg[facility]ia Jr., MD at [facility]    WRIST FRACTURE SURGERY      WRIST SURGERY         Allergies:  Review of patient's allergies indicates:   Allergen Reactions    Codeine Nausea Only    Nsaids (non-steroidal anti-inflammatory drug) Other (See Comments)     Kidney issues       Home Medications:  Prior to Admission medications    Medication Sig Start Date End Date Taking? Authorizing Provider   amLODIPine (NORVASC) 10 MG tablet TAKE 1 TABLET(10 MG) BY MOUTH EVERY EVENING 2/11/19   Brandon Kinney MD   atorvastatin (LIPITOR) 40 MG tablet Take 1 tablet (40 mg total) by mouth every evening. 2/11/19   Brandon Kinney MD   blood sugar diagnostic Strp 1 strip by Misc.(Non-Drug; Combo Route) route once daily. 8/28/14   Brandon Kinney MD   blood-glucose meter Misc Use as directed 8/28/14   Brandon Kinney MD   coenzyme Q10 200 mg capsule Take by mouth.    Historical Provider, MD   fenofibrate (TRICOR) 145 MG tablet Take 1 tablet (145 mg total) by mouth every evening. 1/7/19   Brandon Kinney MD   glimepiride (AMARYL) 4 MG tablet Take 1 tablet (4 mg total) by mouth daily with breakfast. 1/7/19   Brandon Kinney MD   HYDROcodone-acetaminophen (NORCO) 5-325 mg per tablet Take 1 tablet by mouth every 6  "(six) hours as needed for Pain. 18   Bradnon Kinney MD   lancets Misc 1 Device by Misc.(Non-Drug; Combo Route) route once daily. 14   Brandon Kinney MD   multivitamin (MULTIVITAMIN) per tablet Take 1 tablet by mouth once daily.    Historical Provider, MD   omeprazole (PRILOSEC) 20 MG capsule Take 1 capsule (20 mg total) by mouth once daily. 19   Brandon Kinney MD   pen needle, diabetic (RELION PEN NEEDLES) 32 gauge x 5/32" Ndle Needs levemir flexpen needles. To use once daily 17   Brandon Kinney MD   sildenafil (REVATIO) 20 mg Tab Take 1 tablet (20 mg total) by mouth daily as needed. 19  Brandon Kinney MD   SITagliptan-metformin (JANUMET)  mg per tablet Take 1 tablet by mouth 2 (two) times daily with meals. 19   Brandon Kinney MD   triamcinolone acetonide 0.1% (KENALOG) 0.1 % cream Apply topically daily as needed. 19   Brandon Kinney MD   valsartan (DIOVAN) 320 MG tablet Take 1 tablet (320 mg total) by mouth once daily. 1/10/19 1/10/20  Brandon Kinney MD       Family History:  Family History   Problem Relation Age of Onset    Kidney disease Mother     Kidney failure Mother     Hypertension Mother     Heart attack Father     Heart disease Father     Coronary artery disease Father     Hyperlipidemia Father     Coronary artery disease Brother     Hyperlipidemia Brother     Hyperlipidemia Brother        Social History:  Social History     Tobacco Use    Smoking status: Never Smoker    Smokeless tobacco: Never Used   Substance Use Topics    Alcohol use: Yes     Comment: socially/     Drug use: No       Review of Systems:  Pertinent ROS in HPI   All other systems are reviewed and are negative.    Health Maintaince :   Primary Care Physician: Nirmal    Immunizations:   Will reassess    Cancer Screening:  Colonoscopy: UTD     Objective:   Last 24 Hour Vital Signs:  BP  Min: 121/66  Max: 162/80  Temp  Av.2 °F (36.8 °C)  Min: 97.5 °F (36.4 °C)  " "Max: 98.8 °F (37.1 °C)  Pulse  Av  Min: 20  Max: 98  Resp  Av  Min: 11  Max: 26  SpO2  Av.8 %  Min: 94 %  Max: 100 %  Height  Av' 7" (170.2 cm)  Min: 5' 7" (170.2 cm)  Max: 5' 7" (170.2 cm)  Weight  Av.2 kg (243 lb)  Min: 110.2 kg (243 lb)  Max: 110.2 kg (243 lb)  Body mass index is 38.06 kg/m².  No intake/output data recorded.    Physical Examination:  General appearance: alert, appears stated age and cooperative  Head: Normocephalic, without obvious abnormality, atraumatic  Eyes: conjunctivae/corneas clear. PERRL, EOM's intact. Fundi benign., .  Throat: lips, mucosa, and tongue normal; teeth and gums normal  Neck: no adenopathy, supple, symmetrical, trachea midline and thyroid not enlarged, symmetric, no tenderness/mass/nodules  Lungs: clear to auscultation bilaterally  Chest wall: no tenderness  Heart: regular rate and rhythm, S1, S2 normal, no murmur, click, rub or gallop and systolic murmur: holosystolic 2/6, blowing .  Abdomen: soft, non-tender; bowel sounds normal; no masses,  no organomegaly  Extremities: edema 2+  and extremities normal, atraumatic, no cyanosis or edema  Lymph nodes: Cervical adenopathy: not present  Neurologic: Grossly normal      Laboratory:  Most Recent Data:  CBC:   Lab Results   Component Value Date    WBC 17.24 (H) 2019    HGB 10.6 (L) 2019    HCT 31.2 (L) 2019     2019    MCV 90 2019    RDW 12.6 2019       BMP:   Lab Results   Component Value Date     (L) 2019    K 4.2 2019     2019    CO2 16 (L) 2019    BUN 46 (H) 2019    CREATININE 2.1 (H) 2019     (H) 2019    CALCIUM 9.7 2019    PHOS 3.8 2018     LFTs:   Lab Results   Component Value Date    PROT 6.4 2019    ALBUMIN 3.9 2019    BILITOT 0.6 2019    AST 16 2019    ALKPHOS 50 (L) 2019    ALT 22 2019     Coags: No results found for: INR, PROTIME, PTT  FLP: "   Lab Results   Component Value Date    CHOL 184 01/03/2019    HDL 37 (L) 01/03/2019    LDLCALC Invalid, Trig>400.0 01/03/2019    TRIG 469 (H) 01/03/2019    CHOLHDL 20.1 01/03/2019     DM:   Lab Results   Component Value Date    HGBA1C 8.2 (H) 01/03/2019    HGBA1C 5.7 (H) 09/27/2018    HGBA1C 5.8 (H) 06/25/2018    LDLCALC Invalid, Trig>400.0 01/03/2019    CREATININE 2.1 (H) 05/01/2019     Thyroid:   Lab Results   Component Value Date    TSH 3.493 01/03/2019    N8MDONY 7.8 02/20/2008     Anemia:   Lab Results   Component Value Date    IRON 74 09/27/2018    TIBC 502 (H) 09/27/2018    FERRITIN 122 09/27/2018    NRLXMJOK33 405 12/13/2017     Cardiac:   Lab Results   Component Value Date    TROPONINI 0.048 (H) 05/01/2019     Urinalysis:   Lab Results   Component Value Date    LABURIN No growth 12/27/2016    COLORU Yellow 01/03/2019    SPECGRAV 1.020 01/03/2019    NITRITE Negative 01/03/2019    KETONESU Negative 01/03/2019    UROBILINOGEN Negative 09/27/2018    WBCUA 2 12/27/2016       Trended Lab Data:  Recent Labs   Lab 05/01/19 1903   WBC 17.24*   HGB 10.6*   HCT 31.2*      MCV 90   RDW 12.6   *   K 4.2      CO2 16*   BUN 46*   CREATININE 2.1*   *   PROT 6.4   ALBUMIN 3.9   BILITOT 0.6   AST 16   ALKPHOS 50*   ALT 22       Trended Cardiac Data:  Recent Labs   Lab 05/01/19  1903 05/01/19  2211   TROPONINI 0.017 0.048*             Microbiology Data:  none    Other Results:  EKG (my interpretation): no st elevation depressions appreciated. NSR    Radiology:  Imaging Results          X-Ray Chest PA And Lateral (Final result)  Result time 05/01/19 19:24:03    Final result by Malika Ramirez MD (05/01/19 19:24:03)                 Impression:      Clear lungs.      Electronically signed by: Malika Ramirez MD  Date:    05/01/2019  Time:    19:24             Narrative:    EXAMINATION:  XR CHEST PA AND LATERAL    CLINICAL HISTORY:  Chest pain, unspecified    TECHNIQUE:  PA and lateral views of  the chest were performed.    COMPARISON:  Prior dated 12/27/2016    FINDINGS:  The mediastinal structures are midline.  The cardiac silhouette is not enlarged.  There is no evidence of acute pulmonary disease, pleural disease, lymph node enlargement, or cardiac decompensation.    Stable left lateral 3rd 4th and 5th rib deformities consistent with remote fractures.                                     Assessment:     Erick Valdovinos is a 68 y.o. male with:  Patient Active Problem List    Diagnosis Date Noted    NSTEMI (non-ST elevated myocardial infarction) 05/02/2019    Chest pain 05/02/2019    Screening for malignant neoplasm of colon 03/19/2019    Renal dysfunction 01/10/2019    Diabetic polyneuropathy associated with type 2 diabetes mellitus 01/10/2019    Deconditioned low back 11/12/2018    Chronic pain 10/30/2018    Myofascial pain syndrome 09/28/2018    DDD (degenerative disc disease), cervical 09/28/2018    Osteoarthritis of spine with radiculopathy, cervical region 09/28/2018    Cervical radiculopathy 09/28/2018    Chronic neck pain 09/28/2018    Chronic kidney disease, stage III (moderate) 09/27/2018    Calculus of gallbladder without cholecystitis without obstruction 09/10/2018    Diabetes mellitus due to underlying condition, controlled, with diabetic neuropathy, without long-term current use of insulin 12/27/2017    History of pancreatitis 12/27/2017    Hyperlipidemia type IV 12/27/2017    Type 2 diabetes mellitus with microalbuminuria, without long-term current use of insulin 12/13/2017    Cough, persistent 12/27/2016    Wrist pain, right 03/03/2014    Phimosis 03/03/2014    Type 2 diabetes mellitus with diabetic polyneuropathy, without long-term current use of insulin 02/11/2013    GERD (gastroesophageal reflux disease) 02/11/2013    Essential hypertension 02/11/2013    Lipid disorder 02/11/2013    Snoring 02/11/2013    Grief reaction 02/11/2013        Plan:       NSTEMI,  type 2  Patient with mildly elevated troponin .048. EKG without acute changes.   Stress echo and echo in AM. Trop and EKG Q4.  Likely stress as patient was working in the yard and developed CP.     JAZMIN  Cr 2.1 from previous baseline of 1.4 Likely 2/2 dehydration. Patient received fluids in ED. Will not further give patient fluids 2/2 concern for possible HF.   Urine electrolytes ordered     Leukocytosis  WBC 17, patient recently discharged from from procedure for steroid shot in back. Likely cause. Will continue to monitor.     HLD; possibly hypertriglyceridemia   Continuing home meds; ordered lipid panel    Normocytic anemia  Iron panel ordered    DM2  Ssi, will continue to monitor and adjust as needed    HCM  a1c 7.9. Will further  patient on tighter glycemic control.     Code Status:     Full    Cristhian Acevedo,   [facility] Internal Medicine HO-I    [facility] Medicine Hospitalist Pager numbers:   [facility] Hospitalist Medicine Team A (Romana/Shine): [phone]  [facility] Hospitalist Medicine Team B (Jc/Niyah):  [phone]

## 2019-05-02 NOTE — ED NOTES
Pt appears to be resting and watching television with spouse at side . Respirations even and unlabored. Equal rise and fall of chest noted. Skin warm and dry. Pt easily aroused to verbal stimulation. Will continue to monitor.

## 2019-05-02 NOTE — PLAN OF CARE
Problem: Adult Inpatient Plan of Care  Goal: Plan of Care Review  Outcome: Ongoing (interventions implemented as appropriate)     05/02/19 5046   Plan of Care Review   Plan of Care Reviewed With patient;spouse   Progress improving   Patient awake, alert and oriented. VSS. Blood glucose monitored ac/hs and covered per sliding scale.Spouse at the bedside. On tele. No ectopy on tele. Bed alarm remains on, call light in reach, non skid socks when out of bed. Questions answered. Care explained.

## 2019-05-03 NOTE — DISCHARGE INSTRUCTIONS
Eating Heart-Healthy Food: Using the DASH Plan    Eating for your heart doesnt have to be hard or boring. You just need to know how to make healthier choices. The DASH eating plan has been developed to help you do just that. DASH stands for Dietary Approaches to Stop Hypertension. It is a plan that has been proven to be healthier for your heart and to lower your risk for high blood pressure. It can also help lower your risk for cancer, heart disease, osteoporosis, and diabetes.  Choosing from each food group  Choose foods from each of the food groups below each day. Try to get the recommended number of servings for each food group. The serving numbers are based on a diet of 2,000 calories a day. Talk to your doctor if youre unsure about your calorie needs. Along with getting the correct servings, the DASH plan also recommends a sodium intake less than 2,300 mg per day.        Grains  Servings: 6 to 8 a day  A serving is:  · 1 slice bread  · 1 ounce dry cereal  · Half a cup cooked rice, pasta or cereal  Best choices: Whole grains and any grains high in fiber. Vegetables  Servings: 4 to 5 a day  A serving is:  · 1 cup raw leafy vegetable  · Half a cup cut-up raw or cooked vegetable  · Half a cup vegetable juice  Best choices: Fresh or frozen vegetables prepared without added salt or fat.   Fruits  Servings: 4 to 5 a day  A serving is:  · 1 medium fruit  · One-quarter cup dried fruit  · Half a cup fresh, frozen, or canned fruit  · Half a cup of 100% fruit juices  Best choices: A variety of fresh fruits of different colors. Whole fruits are a better choice than fruit juices. Low-fat or fat-free dairy  Servings: 2 to 3 a day  A serving is:  · 1 cup milk  · 1 cup yogurt  · One and a half ounces cheese  Best choices: Skim or 1% milk, low-fat or fat-free yogurt or buttermilk, and low-fat cheeses.         Lean meats, poultry, fish  Servings: 6 or fewer a day  A serving is:  · 1 ounce cooked meats, poultry, or fish  · 1  egg  Best choices: Lean poultry and fish. Trim away visible fat. Broil, grill, roast, or boil instead of frying. Remove skin from poultry before eating. Limit how much red meat you eat.  Nuts, seeds, beans  Servings: 4 to 5 a week  A serving is:  · One-third cup nuts (one and a half ounces)  · 2 tablespoons nut butter or seeds  · Half a cup cooked dry beans or legumes  Best choices: Dry roasted nuts with no salt added, lentils, kidney beans, garbanzo beans, and whole goldberg beans.   Fats and oils  Servings: 2 to 3 a day  A serving is:  · 1 teaspoon vegetable oil  · 1 teaspoon soft margarine  · 1 tablespoon mayonnaise  · 2 tablespoons salad dressing  Best choices: Nut and vegetable oils (nontropical vegetable oils), such as olive and canola oil. Sweets  Servings: 5 a week or fewer  A serving is:  · 1 tablespoon sugar, maple syrup, or honey  · 1 tablespoon jam or jelly  · 1 half-ounce jelly beans (about 15)  · 1 cup lemonade  Best choices: Dried fruit can be a satisfying sweet. Choose low-fat sweets. And watch your serving sizes!      For more on the DASH eating plan, visit:  www.nhlbi.nih.gov/health/health-topics/topics/dash   Date Last Reviewed: 6/1/2016  © 3349-2168 [employer]. [address] [place] [address], [place] [address]. All rights reserved. This information is not intended as a substitute for professional medical care. Always follow your healthcare professional's instructions.        Diet: Diabetes  Food is an important tool that you can use to control diabetes and stay healthy. Eating well-balanced meals in the correct amounts will help you control your blood glucose levels and prevent low blood sugar reactions. It will also help you reduce the health risks of diabetes. There is no one specific diet that is right for everyone with diabetes. But there are general guidelines to follow. A registered dietitian (RD) will create a tailored diet approach thats just right for you. He or she will also help  you plan healthy meals and snacks. If you have any questions, call your dietitian for advice.     Guidelines for success  Talk with your healthcare provider before starting a diabetes diet or weight loss program. If you haven't talked with a dietitian yet, ask your provider for a referral. The following guidelines can help you succeed:  · Select foods from the 6 food groups below. Your dietitian will help you find food choices within each group. He or she will also show you serving sizes and how many servings you can have at each meal.  ¨ Grains, beans, and starchy vegetables  ¨ Vegetables  ¨ Fruit  ¨ Milk or yogurt  ¨ Meat, poultry, fish, or tofu  ¨ Healthy fats  · Check your blood sugar levels as directed by your provider. Take any medicine as prescribed by your provider.  · Learn to read food labels and pick the right portion sizes.  · Eat only the amount of food in your meal plan. Eat about the same amount of food at regular times each day. Dont skip meals. Eat meals 4 to 5 hours apart, with snacks in between.  · Limit alcohol. It raises blood sugar levels. Drink water or calorie-free diet drinks that use safe sweeteners.  · Eat less fat to help lower your risk of heart disease. Use nonfat or low-fat dairy products and lean meats. Avoid fried foods. Use cooking oils that are unsaturated, such as olive, canola, or peanut oil.  · Talk with your dietitian about safe sugar substitutes.  · Avoid added salt. It can contribute to high blood pressure, which can cause heart disease. People with diabetes already have a risk of high blood pressure and heart disease.  · Stay at a healthy weight. If you need to lose weight, cut down on your portion sizes. But dont skip meals. Exercise is an important part of any weight management program. Talk with your provider about an exercise program thats right for you.  · For more information about the best diet plan for you, talk with a registered dietitian (RD). To find an RD in  your area, contact:  ¨ Academy of Nutrition and Dietetics www.eatright.org  ¨ The American Diabetes Association 767-071-2796 www.diabetes.org  Date Last Reviewed: 8/1/2016 © 2000-2017 [employer]. [address] [place] [address]. All rights reserved. This information is not intended as a substitute for professional medical care. Always follow your healthcare professional's instructions.        Noncardiac Chest Pain    Based on your visit today, the healthcare provider doesnt know what is causing your chest pain. In most cases, people who come to the emergency department with chest pain dont have a problem with their heart. Instead, the pain is caused by other conditions. It's important for the healthcare team to be sure you are not having a life threatening cause for chest pain such as a heart attack, blood clot in the lungs, collapsed lung, ruptured esophagus, or tearing of the aorta. Once these major causes have been ruled out, you may have further evaluation for non-heart causes of chest pain. These may be problems with the lungs, muscles, bones, digestive tract, nerves, or mental health.  Lung problems  · Inflammation around the lungs (pleurisy)  · Collapsed lung (pneumothorax)  · Fluid around the lungs (pleural effusion)  · Lung cancer (a rare cause of chest pain)  Muscle or bone problems  · Inflamed cartilage between the ribs (costochondritis)  · Fibromyalgia  · Rheumatoid arthritis  · Chest wall strain  Digestive system problems  · Reflux  · Stomach ulcer  · Spasms of the esophagus  · Gall stones  · Gallbladder inflammation  Mental health conditions  · Panic or anxiety attacks  · Emotional distress  Your condition doesnt seem serious and your pain doesnt appear to be coming from your heart. But sometimes the signs of a serious problem take more time to appear. Watch for the warning signs listed below.  Home care  Follow these guidelines when caring for yourself at home:  · Rest  today and avoid strenuous activity.  · Take any prescribed medicine as directed.  Follow-up care  Follow up with your healthcare provider, or as advised, if you dont start to feel better within 24 hours.  When to seek medical advice  Call your healthcare provider right away if any of these occur:  · A change in the type of pain. Call if it feels different, becomes more serious, lasts longer, or begins to spread into your shoulder, arm, neck, jaw, or back.  · Shortness of breath  · You feel more pain when you breathe  · Cough with dark-colored mucus or blood  · Weakness, dizziness, or fainting  · Fever of 100.4ºF (38ºC) or higher, or as directed by your healthcare provider  · Swelling, pain, or redness in one leg  Date Last Reviewed: 12/1/2016  © 1078-0562 [employer]. [address] [place] [address]. All rights reserved. This information is not intended as a substitute for professional medical care. Always follow your healthcare professional's instructions.

## 2019-05-03 NOTE — PLAN OF CARE
Problem: Adult Inpatient Plan of Care  Goal: Plan of Care Review  Outcome: Ongoing (interventions implemented as appropriate)  Plan of care reviewed with patient. Normal sinus rhythm on continuous heart monitor, no true red alarms. Safety maintained at this time. Bed in lowest position, side rails up x 2. Call light in reach. Encouraged patient to use call light for assistance. Urinal at the bedside. NO complaints of chest pain.  Patient remains in stable condition. Will continue to monitor patient for changes.

## 2019-05-03 NOTE — DISCHARGE SUMMARY
[facility] Internal Medicine Discharge Summary    Primary Team: [facility] Internal Medicine Team B  Attending Physician: Mina Linder MD  Resident: Rafat  Intern: Bibiana    Date of Admit: 5/1/2019  Date of Discharge: 5/3/2019    Discharge to: Home  Condition: Stable    Discharge Diagnoses     Patient Active Problem List   Diagnosis    Type 2 diabetes mellitus with diabetic polyneuropathy, without long-term current use of insulin    GERD (gastroesophageal reflux disease)    Essential hypertension    Lipid disorder    Snoring    Grief reaction    Wrist pain, right    Phimosis    Cough, persistent    Type 2 diabetes mellitus with microalbuminuria, without long-term current use of insulin    Diabetes mellitus due to underlying condition, controlled, with diabetic neuropathy, without long-term current use of insulin    History of pancreatitis    Hyperlipidemia type IV    Calculus of gallbladder without cholecystitis without obstruction    Chronic kidney disease, stage III (moderate)    Myofascial pain syndrome    DDD (degenerative disc disease), cervical    Osteoarthritis of spine with radiculopathy, cervical region    Cervical radiculopathy    Chronic neck pain    Chronic pain    Deconditioned low back    Renal dysfunction    Diabetic polyneuropathy associated with type 2 diabetes mellitus    Screening for malignant neoplasm of colon    NSTEMI (non-ST elevated myocardial infarction)    Chest pain    JAZMIN (acute kidney injury)       Consultants and Procedures     Consultants:  none    Procedures:   DSE - neg for ischemia, occasional PVCs    Brief History of Present Illness      Erick Valdovinos is a 68 y.o.  male who  has a past medical history of Anticoagulant long-term use, Diabetes mellitus, Diabetes mellitus, type 2, Esophageal reflux, Hyperlipidemia, Hypertension, Nephrolithiasis, and Osteoarthritis.  The patient presented to [facility] on 5/1/2019 with a primary complaint  of Chest Pain (pt began having midsternal chest pain, SOB, and dizziness when cutting grass at home pta. EMS went to the house and performed an EKG, and pt chose to come to the hospital by POV with his wife. Pain has started to improve)    The patient was in their usual state of health until earlier in the day of admission when patient began to experience chest pain while cutting the grass. The patient states over the last 3-4 months, he has noticed that he has had to take small breaks while cutting the grass due to his feeling tired. Per patient, during this time, he normally empties his clippings, takes a seat and drinks a glass of water. Patient states he started cutting the grass around 445 pm and at 615 when he went to sit down he began to experience squeezing chest pain. Patient states that he continued to drink water when his wife called him to come inside. At that time, patient arouse and then began to feel dizzy and short of breath. Patient denies losing consciousness or actually falling. Per patient, his wife then called EMS.     Patient states, he has had 2 previous episodes of chest pain on exertion that resolved with rest. In addition, patient states that his father  at 29 of a MI. He also states that his brother  of pancreatitis 2/2 hypertriglyceridemia. Patient denies a hx of smoking. Denies n/v/d, current sob, current cp, rhinorrhea.    He was admitted and troponin levels were trended. Corrected with volume resuscitation. EKGs w/ no acute T wave or ST changes. DSE w/ no signs of ischemia, occasional PVC. Needs consideration for outpatient echo for systolic ejection murmur, will defer to cardiology. Needs referral for sleep study as patient positive of STOPBANG questionnaire.     Hospital Course By Problem with Pertinent Findings     Chest pain  Patient with mildly elevated troponin .048. EKG without acute changes.    Trop and EKG Q4.  - trend - 0.01 -> 0.04 -> 0.03  - significant family  history, father passed at 29 with a heart attack. Brother passes away from complications of pancreatitis 2/2 hypertriglyceridemia.   - DSE neg, consider outpatient echo for systolic ejection murmur on exam.     JAZMIN, resolved  Cr 2.1 from previous baseline of 1.4 Likely 2/2 dehydration. Patient received fluids in ED.   - recovering each day, at baseline now.      Leukocytosis, resolving  WBC 17, patient recently discharged from from procedure for steroid shot in back. Likely cause. Will continue to monitor.      HLD; possibly hypertriglyceridemia   Continuing home meds; ordered lipid panel  - cont statin and will restart fenofibrate now that JAZMIN resolved.      Normocytic anemia  Ferritin low normal, iron 77. Colonoscopy recently completed, wnl.      DM2  Ssi, will continue to monitor and adjust as needed     HCM  a1c 7.9. Defer to PCP for tighter control given multiple risk factors for vascular disease.         Discharge Medications        Medication List      CONTINUE taking these medications    amLODIPine 10 MG tablet  Commonly known as:  NORVASC  TAKE 1 TABLET(10 MG) BY MOUTH EVERY EVENING     atorvastatin 40 MG tablet  Commonly known as:  LIPITOR  Take 1 tablet (40 mg total) by mouth every evening.     blood sugar diagnostic Strp  1 strip by Misc.(Non-Drug; Combo Route) route once daily.     blood-glucose meter Misc  Use as directed     coenzyme Q10 200 mg capsule     fenofibrate 145 MG tablet  Commonly known as:  TRICOR  Take 1 tablet (145 mg total) by mouth every evening.     glimepiride 4 MG tablet  Commonly known as:  AMARYL  Take 1 tablet (4 mg total) by mouth daily with breakfast.     HYDROcodone-acetaminophen 5-325 mg per tablet  Commonly known as:  NORCO  Take 1 tablet by mouth every 6 (six) hours as needed for Pain.     lancets Misc  1 Device by Misc.(Non-Drug; Combo Route) route once daily.     omeprazole 20 MG capsule  Commonly known as:  PRILOSEC  Take 1 capsule (20 mg total) by mouth once daily.    "  ONE DAILY MULTIVITAMIN per tablet  Generic drug:  multivitamin     pen needle, diabetic 32 gauge x 5/32" Ndle  Commonly known as:  RELION PEN NEEDLES  Needs levemir flexpen needles. To use once daily     sildenafil 20 mg Tab  Commonly known as:  REVATIO  Take 1 tablet (20 mg total) by mouth daily as needed.     SITagliptan-metformin  mg per tablet  Commonly known as:  JANUMET  Take 1 tablet by mouth 2 (two) times daily with meals.     triamcinolone acetonide 0.1% 0.1 % cream  Commonly known as:  KENALOG  Apply topically daily as needed.     valsartan 320 MG tablet  Commonly known as:  DIOVAN  Take 1 tablet (320 mg total) by mouth once daily.            Discharge Information:   Diet:  Diabetic, Cardiac    Physical Activity:  As tolerated    Follow-Up Appointments:  Follow-up With  Details  Why  Contact Info   Nisha Yeh MD  On 5/14/2019  2:30 pm  [address]  [address]  [place] [address]  [phone]         Darin Duy Douglass  [facility] Internal Medicine, [facility]        "

## 2019-05-03 NOTE — PLAN OF CARE
D/C rounds complete. All questions answered.  Nurse to discuss d/c medications.  Discussed need to keep f/u appts, adherence to medication regimen for health maintenance, verbalized understanding.    Discussed priority clinic appt, pt is interested in attending clinic.  Pt has no DME or home health needs.       05/03/19 1157   Final Note   Assessment Type Final Discharge Note   Anticipated Discharge Disposition Home   What phone number can be called within the next 1-3 days to see how you are doing after discharge?   ([phone])   Hospital Follow Up  Appt(s) scheduled? Yes   Discharge plans and expectations educations in teach back method with documentation complete? Yes   Right Care Referral Info   Post Acute Recommendation No Care       Jacqueline Crisostomo, RN    [phone]

## 2019-05-03 NOTE — NURSING
[facility] RN clinician visited patient at hospital bedside.  Patient and wife were present upon my visit.  Educated patient on purpose of [facility].  Discussed with patient that he would be followed by [facility] physician for approximately 30 days post hospital discharge, rather than [facility] physician.  Also, discussed that he should call clinic for any medical needs or urgent visits while under the care of [facility] physician.  Patient given and agreed on scheduled appointment time and date.  Patient given [facility] introduction sheet containing clinic phone number.  Patient given the opportunity to ask questions, verbalized understanding of all information given as stated above.

## 2019-05-03 NOTE — PLAN OF CARE
Problem: Adult Inpatient Plan of Care  Goal: Plan of Care Review  Outcome: Outcome(s) achieved Date Met: 05/03/19 05/03/19 1218   Plan of Care Review   Plan of Care Reviewed With patient;spouse   Progress improving   Patient awake, alert and oriented. On tele, no ectopy on tele. Blood glucose monitored ac/hs and covered per sliding scale. Bed alarm remains on, call light in reach.

## 2019-05-03 NOTE — PROGRESS NOTES
[facility] Medicine H&P Note     Date of Admit: 2019    Subjective:     Patient reports no continued symptoms. Denies any chest pain or shortness of breath. Awaiting results of the DSE. Hopeful for discharge home today.     Objective:   Last 24 Hour Vital Signs:  BP  Min: 121/70  Max: 148/71  Temp  Av.5 °F (35.8 °C)  Min: 96.1 °F (35.6 °C)  Max: 97 °F (36.1 °C)  Pulse  Av.5  Min: 62  Max: 101  Resp  Av.5  Min: 16  Max: 20  SpO2  Av.7 %  Min: 95 %  Max: 96 %  Weight  Av.4 kg (245 lb 9.5 oz)  Min: 111.4 kg (245 lb 9.5 oz)  Max: 111.4 kg (245 lb 9.5 oz)  Body mass index is 38.47 kg/m².  I/O last 3 completed shifts:  In: 280 [P.O.:280]  Out: 1900 [Urine:1900]    Physical Examination:  General appearance: alert, appears stated age and cooperative  Head: Normocephalic, without obvious abnormality, atraumatic  Eyes: conjunctivae/corneas clear. PERRL, EOM's intact. Fundi benign., .  Throat: lips, mucosa, and tongue normal; teeth and gums normal  Neck: no adenopathy, supple, symmetrical, trachea midline and thyroid not enlarged, symmetric, no tenderness/mass/nodules  Lungs: clear to auscultation bilaterally  Chest wall: no tenderness  Heart: regular rate and rhythm, S1, S2 normal, systolic murmur: holosystolic 2/6, blowing .  Abdomen: soft, non-tender; bowel sounds normal; no masses,  no organomegaly  Extremities: edema 2+  and extremities normal, atraumatic, no cyanosis or edema  Lymph nodes: Cervical adenopathy: not present  Neurologic: Grossly normal      Laboratory:  Most Recent Data:  Trended Lab Data:  Recent Labs   Lab 19  1903 19  0553 19  0618   WBC 17.24* 13.63* 9.37   HGB 10.6* 10.8* 11.4*   HCT 31.2* 32.5* 33.9*    201 204   MCV 90 92 92   RDW 12.6 12.9 12.7   * 137 140   K 4.2 4.1 4.2    109 110   CO2 16* 19* 21*   BUN 46* 38* 26*   CREATININE 2.1* 1.9* 1.4   * 248* 167*   PROT 6.4 6.4 6.6   ALBUMIN 3.9 3.6 3.6   BILITOT 0.6 0.5 0.4    AST 16 17 17   ALKPHOS 50* 52* 54*   ALT 22 21 22       Trended Cardiac Data:  Recent Labs   Lab 05/01/19  2211 05/02/19  0400 05/02/19  0553   TROPONINI 0.048* 0.031* 0.024   BNP  --  56  --        Microbiology Data:  none    Other Results:  EKG (my interpretation): no st elevation depressions appreciated. NSR    Radiology:  Imaging Results          X-Ray Chest PA And Lateral (Final result)  Result time 05/01/19 19:24:03    Final result by Malika Ramirez MD (05/01/19 19:24:03)                 Impression:      Clear lungs.      Electronically signed by: Malika Ramirez MD  Date:    05/01/2019  Time:    19:24             Narrative:    EXAMINATION:  XR CHEST PA AND LATERAL    CLINICAL HISTORY:  Chest pain, unspecified    TECHNIQUE:  PA and lateral views of the chest were performed.    COMPARISON:  Prior dated 12/27/2016    FINDINGS:  The mediastinal structures are midline.  The cardiac silhouette is not enlarged.  There is no evidence of acute pulmonary disease, pleural disease, lymph node enlargement, or cardiac decompensation.    Stable left lateral 3rd 4th and 5th rib deformities consistent with remote fractures.                                Assessment:     Erick Valdovinos is a 68 y.o. male with:  Patient Active Problem List    Diagnosis Date Noted    NSTEMI (non-ST elevated myocardial infarction) 05/02/2019    Chest pain 05/02/2019    JAZMIN (acute kidney injury) 05/02/2019    Screening for malignant neoplasm of colon 03/19/2019    Renal dysfunction 01/10/2019    Diabetic polyneuropathy associated with type 2 diabetes mellitus 01/10/2019    Deconditioned low back 11/12/2018    Chronic pain 10/30/2018    Myofascial pain syndrome 09/28/2018    DDD (degenerative disc disease), cervical 09/28/2018    Osteoarthritis of spine with radiculopathy, cervical region 09/28/2018    Cervical radiculopathy 09/28/2018    Chronic neck pain 09/28/2018    Chronic kidney disease, stage III (moderate)  09/27/2018    Calculus of gallbladder without cholecystitis without obstruction 09/10/2018    Diabetes mellitus due to underlying condition, controlled, with diabetic neuropathy, without long-term current use of insulin 12/27/2017    History of pancreatitis 12/27/2017    Hyperlipidemia type IV 12/27/2017    Type 2 diabetes mellitus with microalbuminuria, without long-term current use of insulin 12/13/2017    Cough, persistent 12/27/2016    Wrist pain, right 03/03/2014    Phimosis 03/03/2014    Type 2 diabetes mellitus with diabetic polyneuropathy, without long-term current use of insulin 02/11/2013    GERD (gastroesophageal reflux disease) 02/11/2013    Essential hypertension 02/11/2013    Lipid disorder 02/11/2013    Snoring 02/11/2013    Grief reaction 02/11/2013        Plan:     Chest pain, concern for cardiac origin  Patient with mildly elevated troponin .048. EKG without acute changes.   Stress echo completed yesterday AM, awaiting read. Trop and EKG Q4.  - trend - 0.01 -> 0.04 -> 0.03  - significant family history, father passed at 29 with a heart attack. Brother passes away from complications of pancreatitis 2/2 hypertriglyceridemia.     JAZMIN, resolved  Cr 2.1 from previous baseline of 1.4 Likely 2/2 dehydration. Patient received fluids in ED. Will not further give patient fluids 2/2 concern for possible HF.   - recovering each day, at baseline now.     Leukocytosis, resolving  WBC 17, patient recently discharged from from procedure for steroid shot in back. Likely cause. Will continue to monitor.     HLD; possibly hypertriglyceridemia   Continuing home meds; ordered lipid panel  - cont statin and will restart fenofibrate now that JAZMIN resolved.     Normocytic anemia  Ferritin low normal, iron 77. Colonoscopy recently completed, wnl.     DM2  Ssi, will continue to monitor and adjust as needed    HCM  a1c 7.9. Will further  patient on tighter glycemic control.     Code Status:      Full    Darin Duy Douglass MD  [facility] Internal Medicine -[facility] Medicine Hospitalist Pager numbers:   [facility] Hospitalist Medicine Team A (Romana/Shine): [phone]  [facility] Hospitalist Medicine Team B (Jc/Niyah):  [phone]

## 2019-05-06 PROBLEM — Z87.19 HISTORY OF PANCREATITIS: Status: RESOLVED | Noted: 2017-12-27 | Resolved: 2019-01-01

## 2019-05-06 NOTE — PROGRESS NOTES
Subjective:      Patient ID: Erick Valdovinos is a 68 y.o. male.    Chief Complaint:  Diabetes    History of Present Illness  Erick Valdovinos presents today for follow up of T2DM. Previous patient of Dr. Huerta. Last seen 12/13/17. This is his first visit with me.     C7 disc compression causing left neck/ back pain.   Steroid injection about 5 months ago. Stopped checking blood sugar prior to this as his fasting levels have been unremarkable.  He went for a second injection 4 months after the first steroid dose and could not get it due to elevated blood sugar.  Second injection was postponed for one month and received it last week. Since then, he has been rechecking his blood sugar and fasting is 170-240s, mostly above 200. Checking about 4-5 times throughout the day, consistently above 200.     With regards to the diabetes:    Diagnosed: early 60s  Known complications:  DKA-  RN-  PN +  Nephropathy +CKD    Current regimen:  Glimepiride 4 mg qam with breakfast  janumet 50/500 bid     Does not miss any doses.    Other medications tried:  Invokana - price  Levemir    Glucose Monitor:  3-4 times a day testing  Log reviewed: yes, oral recall, see above.     Diet/Exercise:  Eats 3 meals a day.   Snacks: barely         Drinks: water  Exercise - tries to stay active     Hypoglycemic event? None    Knows how to correct with 15 grams of carbs- juice, coke, or a peppermint.      Education - last visit: none    Eye exam: 5/6/19    Denies history of pancreatitis & personal/family history of medullary thyroid cancer.     Diabetes Management Status  Statin: Taking  ACE/ARB: Taking  Screening or Prevention Patient's value Goal Complete/Controlled?   HgA1C Testing and Control   Lab Results   Component Value Date    HGBA1C 7.8 (H) 05/02/2019      Annually/Less than 8% Yes   Lipid profile : 05/02/2019 Annually Yes   LDL control Lab Results   Component Value Date    LDLCALC 124.2 05/01/2019    Annually/Less than 100 mg/dl  No    Nephropathy screening Lab Results   Component Value Date    LABMICR 47.0 01/03/2019     Lab Results   Component Value Date    PROTEINUA Negative 01/03/2019    Annually Yes   Blood pressure BP Readings from Last 1 Encounters:   05/06/19 138/86    Less than 140/90 No   Dilated retinal exam : 06/24/2017 Annually No   Foot exam   : 05/06/2019 Annually No     Review of Systems   Constitutional: Negative for unexpected weight change.   Eyes: Negative for visual disturbance.   Respiratory: Negative for shortness of breath.    Cardiovascular: Negative for chest pain.   Gastrointestinal: Negative for abdominal pain.   Endocrine: Negative for cold intolerance, heat intolerance, polydipsia, polyphagia and polyuria.   Musculoskeletal: Negative for arthralgias.   Skin: Negative for wound.   Neurological: Negative for headaches.   Hematological: Does not bruise/bleed easily.   Psychiatric/Behavioral: Negative for sleep disturbance.     Objective:   Physical Exam   Neck: No thyromegaly present.   Cardiovascular: Normal rate.   No edema present   Pulmonary/Chest: Effort normal.   Abdominal: Soft.   Vitals reviewed.  Diabetes Foot Exam:   Feet no cuts or scratches  Shoes appropriate  Sensation intact to vibration and monofilament    Body mass index is 39.05 kg/m².    Lab Review:   Lab Results   Component Value Date    HGBA1C 7.8 (H) 05/02/2019     Lab Results   Component Value Date    CHOL 208 (H) 05/01/2019    HDL 36 (L) 05/01/2019    LDLCALC 124.2 05/01/2019    TRIG 239 (H) 05/01/2019    CHOLHDL 17.3 (L) 05/01/2019     Lab Results   Component Value Date     05/03/2019    K 4.2 05/03/2019     05/03/2019    CO2 21 (L) 05/03/2019     (H) 05/03/2019    BUN 26 (H) 05/03/2019    CREATININE 1.4 05/03/2019    CALCIUM 9.6 05/03/2019    PROT 6.6 05/03/2019    ALBUMIN 3.6 05/03/2019    BILITOT 0.4 05/03/2019    ALKPHOS 54 (L) 05/03/2019    AST 17 05/03/2019    ALT 22 05/03/2019    ANIONGAP 9 05/03/2019    ESTGFRAFRICA 60  05/03/2019    EGFRNONAA 52 (A) 05/03/2019    TSH 3.493 01/03/2019       Assessment and Plan     1. Type 2 diabetes mellitus with diabetic polyneuropathy, without long-term current use of insulin  metFORMIN (GLUCOPHAGE-XR) 500 MG 24 hr tablet    glipiZIDE (GLUCOTROL) 10 MG TR24    Hemoglobin A1c    Comprehensive metabolic panel   2. Chronic kidney disease, stage III (moderate)     3. Essential hypertension     4. Hyperlipidemia type IV     5. Diabetic polyneuropathy associated with type 2 diabetes mellitus     6. Snoring         Type 2 diabetes mellitus with diabetic polyneuropathy, without long-term current use of insulin  -- Labs in 3 months  -- Stop: Janumet and Glimepiride (once you receive all 3 new medications).  -- Start: Glipizide 10 mg XR and Metformin, we are starting a GLP1 medication as well (see below)   -- Patient will call us once he finds out if Trulicity or Ozempic is more affordable.  MOAs and SEs discussed.  -- Reviewed goals of therapy are to get the best control we can without hypoglycemia  -- Advised frequent self blood glucose monitoring.  Patient encouraged to document glucose results and bring them to every clinic visit    -- Hypoglycemia precautions discussed. Instructed on precautions before driving.    -- Call for Bg repeatedly < 90 or > 180.   -- Close adherence to lifestyle changes recommended.   -- Periodic follow ups for eye evaluations, foot care and dental care suggested. UTD    Chronic kidney disease, stage III (moderate)  -- Stable  -- Avoid nephrotoxic medications    Essential hypertension  -- on ARB  -- Controlled  -- Blood pressure goals discussed with patient    Hyperlipidemia type IV  Controlled   On statin per ADA recommendations    Diabetic polyneuropathy associated with type 2 diabetes mellitus  -- Optimize blood glucose control    Snoring  -- Continue CPAP use    Follow up in about 3 months (around 8/6/2019).  Labs in 3 months prior to appt.

## 2019-05-06 NOTE — TELEPHONE ENCOUNTER
I called and he is doing well. Just leaving endocrine and they are changing meds.  We booked labs and 6 mos visit for late July.    He will call us if needs us in meantime.
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
## 2019-05-06 NOTE — LETTER
May 6, 2019      Brandon Kinney MD  [address] [facility] [address]  [place] [address]           [place] [facility]  [address] [facility]  [place] [address]  Phone: [phone]  Fax: [phone]          Patient: Erick Valdovinos   MR Number: [id number]   YOB: 1950   Date of Visit: 5/6/2019       Dear Dr. Brandon Kinney:    Thank you for referring Erick Valdovinos to me for evaluation. Attached you will find relevant portions of my assessment and plan of care.    If you have questions, please do not hesitate to call me. I look forward to following Erick Valdovinos along with you.    Sincerely,    Lucila Zelaya, OD    Enclosure  CC:  No Recipients    If you would like to receive this communication electronically, please contact [email or web address] or [phone] to request more information on [facility] Link access.    For providers and/or their staff who would like to refer a patient to [facility], please contact us through our one-stop-shop provider referral line, [facility] , at [phone].    If you feel you have received this communication in error or would no longer like to receive these types of communications, please e-mail [email or web address]

## 2019-05-06 NOTE — TELEPHONE ENCOUNTER
Scheduled a visit with Elisa Bradshaw this afternoon for 3:00PM.  Instructed to bring in sugar logs.

## 2019-05-06 NOTE — ASSESSMENT & PLAN NOTE
-- Labs in 3 months  -- Stop: Janumet and Glimepiride (once you receive all 3 new medications).  -- Start: Glipizide 10 mg XR and Metformin, we are starting a GLP1 medication as well (see below)   -- Patient will call us once he finds out if Trulicity or Ozempic is more affordable.  MOAs and SEs discussed.  -- Reviewed goals of therapy are to get the best control we can without hypoglycemia  -- Advised frequent self blood glucose monitoring.  Patient encouraged to document glucose results and bring them to every clinic visit    -- Hypoglycemia precautions discussed. Instructed on precautions before driving.    -- Call for Bg repeatedly < 90 or > 180.   -- Close adherence to lifestyle changes recommended.   -- Periodic follow ups for eye evaluations, foot care and dental care suggested. UTD

## 2019-05-06 NOTE — TELEPHONE ENCOUNTER
----- Message from Diana Thomason sent at 5/6/2019  9:59 AM CDT -----  Contact: self [phone]  ..Needs Advice    Reason for call:        Communication Preference:phone     Additional Information:  Pt states he think his steroid injections are making his blood sugars raise states is their something that he can do to get his level back down states he blood sugar was 247 this morning please call back to discuss

## 2019-05-07 NOTE — TELEPHONE ENCOUNTER
----- Message from Alisa Vital sent at 5/7/2019  8:05 AM CDT -----  Contact: pt  Was seen yesterday 5/6   Spoke with Neurelis about tiers    Please   Pick one of the two drugs and order      trulicity or the other        [facility]  Mail order     Please let pt know   And his directions [phone] (M)      Thanks

## 2019-05-08 NOTE — TELEPHONE ENCOUNTER
Who is he due to see for endo and cardiology?  I would like him to stay in [facility] network and not be shoved to [facility].

## 2019-05-10 NOTE — LETTER
May 13, 2019      Italo Bañuelos MD  [address]  [address]  [place] [address]           [facility]  [address]  [address]  [place] [address]  Phone: [phone]          Patient: Erick Valdovinos   MR Number: [id number]   YOB: 1950   Date of Visit: 5/10/2019       Dear Dr. Italo Bañuelos:    Thank you for referring Erick Valdovinos to me for evaluation. Attached you will find relevant portions of my assessment and plan of care.    If you have questions, please do not hesitate to call me. I look forward to following Erick Valdovinos along with you.    Sincerely,    Naeem Castellon  CC:  No Recipients    If you would like to receive this communication electronically, please contact [email or web address] or [phone] to request more information on [employer] [facility] Link access.    For providers and/or their staff who would like to refer a patient to [facility], please contact us through our one-stop-shop provider referral line, [facility], at [phone].    If you feel you have received this communication in error or would no longer like to receive these types of communications, please e-mail [email or web address]

## 2019-05-10 NOTE — TELEPHONE ENCOUNTER
----- Message from Jodi Dobbins sent at 5/10/2019  2:56 PM CDT -----  Contact: Erick  tel:    [phone]  Needs Advice    Reason for call:   Received OZEMPIC today in refridgerated carton.    Asking how to use this?         Communication Preference:  Phone     Additional Information:   pls call.

## 2019-05-14 PROBLEM — R10.11 RUQ PAIN: Status: ACTIVE | Noted: 2019-01-01

## 2019-05-14 NOTE — PROGRESS NOTES
Patient ID: Erick Valdovinos is a 68 y.o. male.    Chief Complaint: No chief complaint on file.      HPI:  68M referred here for RUQ off and on for years that seemed to be worsening. Has been taking prilosec for years with no benefit. Has had EGD recently which showed gastritis. Pain is almost daily, not really worse after eating. Was told in 2018 that he has a gallstone on CT scan.  Denies NV. Last week he says that he stopped taking him meds on an empty stomach and stopped taking aspirin. He has not had the pain since at all.       Review of Systems   Constitutional: Negative for chills, diaphoresis and fever.   HENT: Negative for trouble swallowing.    Respiratory: Negative for cough, shortness of breath, wheezing and stridor.    Cardiovascular: Negative for chest pain and palpitations.   Gastrointestinal: Positive for abdominal pain. Negative for abdominal distention, blood in stool, diarrhea, nausea and vomiting.   Endocrine: Negative for cold intolerance and heat intolerance.   Genitourinary: Negative for difficulty urinating.   Musculoskeletal: Negative for back pain.   Skin: Negative for rash.   Allergic/Immunologic: Negative for immunocompromised state.   Neurological: Positive for dizziness. Negative for syncope and numbness.   Hematological: Negative for adenopathy.   Psychiatric/Behavioral: Negative for agitation.       Current Outpatient Medications   Medication Sig Dispense Refill    amLODIPine (NORVASC) 10 MG tablet TAKE 1 TABLET(10 MG) BY MOUTH EVERY EVENING 90 tablet 3    atorvastatin (LIPITOR) 40 MG tablet Take 1 tablet (40 mg total) by mouth every evening. 90 tablet 3    blood sugar diagnostic Strp 1 strip by Misc.(Non-Drug; Combo Route) route once daily. 180 strip 3    blood-glucose meter Misc Use as directed 1 each 0    coenzyme Q10 200 mg capsule Take by mouth.      fenofibrate (TRICOR) 145 MG tablet Take 1 tablet (145 mg total) by mouth every evening. 90 tablet 3    glipiZIDE  "(GLUCOTROL) 10 MG TR24 Take 1 tablet (10 mg total) by mouth daily with breakfast. 30 tablet 11    HYDROcodone-acetaminophen (NORCO) 5-325 mg per tablet Take 1 tablet by mouth every 6 (six) hours as needed for Pain. 60 tablet 0    lancets Misc 1 Device by Misc.(Non-Drug; Combo Route) route once daily. 180 each 3    metFORMIN (GLUCOPHAGE-XR) 500 MG 24 hr tablet Take 1 tablet (500 mg total) by mouth 2 (two) times daily with meals. 360 tablet 3    multivitamin (MULTIVITAMIN) per tablet Take 1 tablet by mouth once daily.      omeprazole (PRILOSEC) 20 MG capsule Take 1 capsule (20 mg total) by mouth once daily. 90 capsule 3    pen needle, diabetic (RELION PEN NEEDLES) 32 gauge x 5/32" Ndle Needs levemir flexpen needles. To use once daily 100 each 3    semaglutide (OZEMPIC) 0.25 mg or 0.5 mg(2 mg/1.5 mL) PnIj Inject 0.25 mg into the skin every 7 days. Take 0.25 mg for 4 weeks, then increase to 0.5 mg weekly 2 Syringe 11    sildenafil (REVATIO) 20 mg Tab Take 1 tablet (20 mg total) by mouth daily as needed. 60 tablet 11    triamcinolone acetonide 0.1% (KENALOG) 0.1 % cream Apply topically daily as needed. 80 g 0    valsartan (DIOVAN) 320 MG tablet Take 1 tablet (320 mg total) by mouth once daily. 90 tablet 3     No current facility-administered medications for this visit.        Review of patient's allergies indicates:   Allergen Reactions    Codeine Nausea Only    Nsaids (non-steroidal anti-inflammatory drug) Other (See Comments)     Kidney issues       Past Medical History:   Diagnosis Date    Anticoagulant long-term use     Diabetes mellitus     Diabetes mellitus, type 2     Esophageal reflux     Hyperlipidemia     Hypertension     Nephrolithiasis     Osteoarthritis        Past Surgical History:   Procedure Laterality Date    Cervical Epidural Steroid Injection, C7-T1, With Dexamethasone,  and IV Sedation N/A 4/11/2019    Performed by [facility] Jr[facility] PAIN MGT    COLONOSCOPY      " COLONOSCOPY Suprep N/A 3/19/2019    Per[facility] by [facility] at [facility] ENDO    ESOPHAGOGASTRODUODENOSCOPY (EGD) N/A 3/19/2019    Per[facility] at [facility] ENDO    EYE SURGERY      hemrrhoidectomy      Injection-steroid-epidural-cervical - C7 - T1 N/A 10/30/2018    Performed by [facility] Jr[facility] PAIN MGT    Injection-steroid-epidural-cervical--C7-T1 With Dexamethasone, and IV Sedation N/A 4/30/2019    Performed by [facility] Jr[facility] PAIN MGT    TONSILLECTOMY      WRIST FRACTURE SURGERY      WRIST SURGERY         Family History   Problem Relation Age of Onset    Kidney disease Mother     Kidney failure Mother     Hypertension Mother     Heart attack Father     Heart disease Father     Coronary artery disease Father     Hyperlipidemia Father     Coronary artery disease Brother     Hyperlipidemia Brother     Hyperlipidemia Brother        Social History     Socioeconomic History    Marital status:      Spouse name: Not on file    Number of children: Not on file    Years of education: Not on file    Highest education level: Not on file   Occupational History    Not on file   Social Needs    Financial resource strain: Not on file    Food insecurity:     Worry: Not on file     Inability: Not on file    Transportation needs:     Medical: Not on file     Non-medical: Not on file   Tobacco Use    Smoking status: Never Smoker    Smokeless tobacco: Never Used   Substance and Sexual Activity    Alcohol use: Not Currently    Drug use: No    Sexual activity: Yes     Partners: Female   Lifestyle    Physical activity:     Days per week: Not on file     Minutes per session: Not on file    Stress: Not on file   Relationships    Social connections:     Talks on phone: Not on file     Gets together: Not on file     Attends [employer] service: Not on file     Active member of club or organization: Not on file     Attends meetings of clubs or  organizations: Not on file     Relationship status: Not on file   Other Topics Concern    Not on file   Social History Narrative    Not on file       Vitals:    05/10/19 0818   BP: 121/74   Pulse: 85   Temp: 98.6 °F (37 °C)       Physical Exam   Constitutional: He is oriented to person, place, and time. He appears well-nourished. No distress.   HENT:   Head: Normocephalic and atraumatic.   Eyes: No scleral icterus.   Cardiovascular: Normal rate.   Pulmonary/Chest: Effort normal. No stridor. No respiratory distress.   Abdominal: Soft. There is no tenderness.   Diastasis recti. No ventral hernia   Lymphadenopathy:     He has no cervical adenopathy.   Neurological: He is alert and oriented to person, place, and time.   Skin: Skin is warm. No erythema.   Psychiatric: He has a normal mood and affect. His behavior is normal.     CT shows gallstones. No ventral hernia  WBC normal  AST/ALT, Tbili normal  Alk phs borderline low      Assessment & Plan:   68M with RUQ pain, gallstones  Thinks he has determined the cause of his pain. Did not sound typical of biliary colic anyway. Prefers to avoid surgery  Does have known gallstones. Instructed him if pain returns to give us a call and not to ignore it.

## 2019-05-14 NOTE — TELEPHONE ENCOUNTER
"Spoke with patient to confirm reasoning for leaving [facility] before [facility] Dr Yeh. Patient stated " I am frustrated because on the paper it states to arrive 15 mins prior to appointment time. My appointment was for 2:30 and by 3:00 I am still not being seen by the Dr. I was told by her nurse that Dr Yeh is still with another patient and I think that is rude for someone else to be taking up my appointment time." I also am disappointed and don't understand why I have to see a Dr I have never seen before when I would rather go see my PCP that I have been seeing for 30 years." Patient stated you can write all this down if you want to but I am very frustrated with [facility]". Patient thanked me for calling and hung up.   "

## 2019-05-14 NOTE — TELEPHONE ENCOUNTER
----- Message from Diana Thomason sent at 5/14/2019  8:47 AM CDT -----  Contact: self [phone]  .Needs Advice    Reason for call:        Communication Preference:phone     Additional Information:pt states his mediation was sent to the wrong pharmacy states his metFORMIN (GLUCOPHAGE-XR) 500 MG 24 hr tablet and his glipiZIDE (GLUCOTROL) 10 MG TR24 states he would like it to go to [facility] [phone] policy number :[id number]

## 2019-05-22 NOTE — PROGRESS NOTES
[facility] Established Patient Evaluation    Referred by: Brandon Kinney MD   Reason for referral: Chronic neck and back pain     CC:   Chief Complaint   Patient presents with    Follow-up     Last 3 PDI Scores 5/22/2019 4/1/2019 11/12/2018   Pain Disability Index (PDI) 10 41 13     Interval Update:   5/22/19  Pt returns today in follow up s/p C7-T1 KOBE with relief of 90%.  He reports a pain level of 2/10.      4/1/19 - Pt returns for follow up he reports  6/10 neck pain.  He would like to repeat the cervical epidural steroid injection he last received in October 2018 as it provided 4+ months of relief.  He also informs me of recent diagnoses involving his gastrointestinal tract after an EGD showing erosive disease throughout.  These findings are limiting the medications that he can use for managing his pain.    11/12/18 - Pt returns today with 3/10 pain in the neck today.  He reports 85-90% relief of neck and cervical radicular pain following the Cervical KOBE on 10/30/18 and is very pleased with the results.  He also notes intermittent, bilateral hip pain that is worse with strenuous activity and better with rest.  He is not experiencing the pain at this time.    Background:  Erick Valdovinos is a 68 y.o. male referred for chronic neck and low back pain though he wishes to focus on his neck pain.  His history is significant for long term NSAID use followed by onset of CKD.  Current Cr is 1.7 and he is avoiding all nephrotoxic agents.  NSAIDS previously provided him excellent relief of his neck pain and enabled him to perform ADLs and other activities such as working on his cars (hobby).  He would like to explore interventions and other kidney-safe medications for management of his symptoms.    Location: neck, bilateral  Severity: Currently: 8/10   Typical Range: 8/10     Exacerbation: 10/10   Onset: 20 yrs ago, insidious  Quality: mostly Aching though punctuated by sharp pains when he turns his head  "or looks up  Radiation: into shoulders bilaterally  Axial/Extremity Percentage of Pain: into hands arms bilaterally  Exacerbating Factors: flexion, standing and straining  Mitigating Factors: medications  Assoc: denies night fever/night sweats, urinary incontinence, bowel incontinence, significant weight loss, significant motor weakness and loss of sensations    Previous Therapies:  PT: Denies for neck  HEP: Denies  TENS:  Injections: Denies  Surgery: Denies spine sugrgery  Medications:   - NSAIDS:   - MSK Relaxants:   - TCAs:   - SNRIs:   - Topicals:   - Anticonvulsants:  - Opioids:     Current Pain Medications:  1. Norco 7.5-325     Full Medication List:    Current Outpatient Medications:     amLODIPine (NORVASC) 10 MG tablet, TAKE 1 TABLET(10 MG) BY MOUTH EVERY EVENING, Disp: 90 tablet, Rfl: 3    atorvastatin (LIPITOR) 40 MG tablet, Take 1 tablet (40 mg total) by mouth every evening., Disp: 90 tablet, Rfl: 3    blood sugar diagnostic Strp, 1 strip by Misc.(Non-Drug; Combo Route) route once daily., Disp: 180 strip, Rfl: 3    blood-glucose meter Misc, Use as directed, Disp: 1 each, Rfl: 0    coenzyme Q10 200 mg capsule, Take by mouth., Disp: , Rfl:     fenofibrate (TRICOR) 145 MG tablet, Take 1 tablet (145 mg total) by mouth every evening., Disp: 90 tablet, Rfl: 3    glipiZIDE (GLUCOTROL) 10 MG TR24, Take 1 tablet (10 mg total) by mouth daily with breakfast., Disp: 30 tablet, Rfl: 11    lancets Misc, 1 Device by Misc.(Non-Drug; Combo Route) route once daily., Disp: 180 each, Rfl: 3    metFORMIN (GLUCOPHAGE-XR) 500 MG 24 hr tablet, Take 1 tablet (500 mg total) by mouth 2 (two) times daily with meals., Disp: 360 tablet, Rfl: 3    multivitamin (MULTIVITAMIN) per tablet, Take 1 tablet by mouth once daily., Disp: , Rfl:     omeprazole (PRILOSEC) 20 MG capsule, Take 1 capsule (20 mg total) by mouth once daily., Disp: 90 capsule, Rfl: 3    pen needle, diabetic (RELION PEN NEEDLES) 32 gauge x 5/32" Ndle, Needs " levemir flexpen needles. To use once daily, Disp: 100 each, Rfl: 3    sildenafil (REVATIO) 20 mg Tab, Take 1 tablet (20 mg total) by mouth daily as needed., Disp: 60 tablet, Rfl: 11    triamcinolone acetonide 0.1% (KENALOG) 0.1 % cream, Apply topically daily as needed., Disp: 80 g, Rfl: 0    valsartan (DIOVAN) 320 MG tablet, Take 1 tablet (320 mg total) by mouth once daily., Disp: 90 tablet, Rfl: 3    HYDROcodone-acetaminophen (NORCO) 5-325 mg per tablet, Take 1 tablet by mouth every 6 (six) hours as needed for Pain., Disp: 60 tablet, Rfl: 0    semaglutide (OZEMPIC) 0.25 mg or 0.5 mg(2 mg/1.5 mL) PnIj, Inject 0.25 mg into the skin every 7 days. Take 0.25 mg for 4 weeks, then increase to 0.5 mg weekly, Disp: 2 Syringe, Rfl: 11     Review of Systems:  Review of Systems   Constitutional: Negative for chills and fever.   HENT: Negative for nosebleeds.    Eyes: Negative for blurred vision and pain.   Respiratory: Negative for hemoptysis.    Cardiovascular: Negative for chest pain and palpitations.   Gastrointestinal: Negative for heartburn, nausea and vomiting.   Genitourinary: Negative for dysuria and hematuria.   Musculoskeletal: Positive for back pain, joint pain (right shoulder) and neck pain. Negative for myalgias.   Skin: Negative for rash.   Neurological: Negative for seizures and loss of consciousness.   Endo/Heme/Allergies: Does not bruise/bleed easily.   Psychiatric/Behavioral: Negative for hallucinations.       Allergies:  Codeine and Nsaids (non-steroidal anti-inflammatory drug)     Medical History:  Past Medical History:   Diagnosis Date    Anticoagulant long-term use     Diabetes mellitus     Diabetes mellitus, type 2     Esophageal reflux     Hyperlipidemia     Hypertension     Nephrolithiasis     Osteoarthritis         Surgical History:  Past Surgical History:   Procedure Laterality Date    Cervical Epidural Steroid Injection, C7-T1, With Dexamethasone,  and IV Sedation N/A 4/11/2019     Performed by [facility]an [facility]urg[facility]ia Jr., MD at [facility]    COLONOSCOPY      COLONOSCOPY Suprep N/A 3/19/2019    Per[facility] by [facility] at [facility]    ESOPHAGOGASTRODUODENOSCOPY (EGD) N/A 3/19/2019    Performed by [facility] at [facility]    EYE SURGERY      hemrrhoidectomy      Injection-steroid-epidural-cervical - C7 - T1 N/A 10/30/2018    Performed by [facility] Jr[facility] MD at [facility]    Injection-steroid-epidural-cervical--C7-T1 With Dexamethasone, and IV Sedation N/A 4/30/2019    Performed by [facility] Jr., MD at [facility]    TONSILLECTOMY      WRIST FRACTURE SURGERY      WRIST SURGERY          Social History:  Social History     Socioeconomic History    Marital status:      Spouse name: Not on file    Number of children: Not on file    Years of education: Not on file    Highest education level: Not on file   Occupational History    Not on file   Social Needs    Financial resource strain: Not on file    Food insecurity:     Worry: Not on file     Inability: Not on file    Transportation needs:     Medical: Not on file     Non-medical: Not on file   Tobacco Use    Smoking status: Never Smoker    Smokeless tobacco: Never Used   Substance and Sexual Activity    Alcohol use: Not Currently    Drug use: No    Sexual activity: Yes     Partners: Female   Lifestyle    Physical activity:     Days per week: Not on file     Minutes per session: Not on file    Stress: Not on file   Relationships    Social connections:     Talks on phone: Not on file     Gets together: Not on file     Attends Sikh service: Not on file     Active member of club or organization: Not on file     Attends meetings of clubs or organizations: Not on file     Relationship status: Not on file   Other Topics Concern    Not on file   Social History Narrative    Not on file       Physical Exam:  Vitals:    05/22/19 0805   BP: 119/74   Pulse: 80   Weight: 112.1 kg (247 lb 3.9  "oz)   Height: 5' 7" (1.702 m)   PainSc:   2     General    Nursing note and vitals reviewed.  Constitutional: He is oriented to person, place, and time. He appears well-developed and well-nourished. No distress.   HENT:   Head: Normocephalic and atraumatic.   Nose: Nose normal.   Eyes: Conjunctivae and EOM are normal. Pupils are equal, round, and reactive to light. Right eye exhibits no discharge. Left eye exhibits no discharge. No scleral icterus.   Neck: No JVD present.   Cardiovascular: Intact distal pulses.    Pulmonary/Chest: Effort normal. No respiratory distress.   Abdominal: He exhibits no distension.   Neurological: He is alert and oriented to person, place, and time. Coordination normal.   Psychiatric: He has a normal mood and affect. His behavior is normal. Judgment and thought content normal.     General Musculoskeletal Exam   Gait: normal     Back (L-Spine & T-Spine) / Neck (C-Spine) Exam     Tenderness Posterior midline palpation reveals tenderness of the Upper C-Spine and Lower C-Spine. Right paramedian tenderness of the Lower C-Spine and Upper C-Spine. Left paramedian tenderness of the Upper C-Spine and Lower C-Spine.     Back (L-Spine & T-Spine) Range of Motion   Extension: abnormal Back extension: facet loading is positive and exacerabtes/reproduces the patient's typical low back pain    Flexion: abnormal Back flexion: limited ROM but partial relief of low back pain noted.     Neck (C-Spine) Range of Motion   Flexion:     Limited  Extension: Limited  Right Lateral Bend: abnormal  Left Lateral Bend: abnormal  Right Rotation: abnormal  Left Rotation: abnormal    Spinal Sensation   Right Side Sensation  C-Spine Level: normal   L-Spine Level: normal  Left Side Sensation  C-Spine Level: normal  L-Spine Level: normal    Neck (C-Spine) Tests   Spurling's Test   Left:  Negative  Right: negative    Other He has no scoliosis .      Muscle Strength   Right Upper Extremity   Biceps: 5/5/5   Deltoid:  "   Triceps:  5  Wrist extension:    Wrist flexion:    Finger Flexors:    Finger Extensors:    Left Upper Extremity  Biceps:    Deltoid:    Triceps:    Wrist extension:    Wrist flexion:    Finger Flexors:    Finger Extensors:    Right Lower Extremity   Hip Flexion:    Hip Extensors:   Quadriceps:     Hamstrin/5   Gastrocsoleus:    Left Lower Extremity   Hip Flexion:    Hip Extensors:   Quadriceps:     Hamstrin/5   Gastrocsoleus:      Reflexes     Left Side  Biceps:  2+  Quadriceps:  2+  Achilles:  2+    Right Side   Biceps:  2+  Quadriceps:  2+  Achilles:  2+      Imaging:  MRI outside 2017 (report scanned into media and disc reviewed in clinic)  Multilevel DDD, osteophyte formation, and facet arthropathy contributing to multilevel neuroforaminal stenosis and varying degrees of central stenosis.  There is no central canal stenosis at C7-T1.    Labs:  BMP  Lab Results   Component Value Date     2019    K 4.2 2019     2019    CO2 21 (L) 2019    BUN 26 (H) 2019    CREATININE 1.4 2019    CALCIUM 9.6 2019    ANIONGAP 9 2019    ESTGFRAFRICA 60 2019    EGFRNONAA 52 (A) 2019     Lab Results   Component Value Date    ALT 22 2019    AST 17 2019    ALKPHOS 54 (L) 2019    BILITOT 0.4 2019       Assessment:  Problem List Items Addressed This Visit        Neuro    DDD (degenerative disc disease), cervical    Osteoarthritis of spine with radiculopathy, cervical region - Primary    Cervical radiculopathy    Chronic pain       Renal/    Chronic kidney disease, stage III (moderate)       Orthopedic    Chronic neck pain          18 - This is a 67-year-old male with chronic arthritic pain in multiple locations including the neck, low back, and right shoulder previously well controlled with meloxicam; however, now he is unable to take NSAIDs due to  advancing chronic kidney disease.  Chronic kidney disease stabilized with a creatinine of 1.7 but he is seeking an alternate regimen for management of his symptoms that does not include NSAIDs.  I have reviewed his outside imaging and have recommended a combination of therapies including physical therapy, cervical epidural steroid injections to treat radicular symptoms, bilateral medial branch block radiofrequency ablation to treat arthritic pain and optimization of remaining therapies such as gabapentin and Tylenol.  Patient stated that his PCP recommended against additional steroids.  I will reach out to him to discuss this recommendation prior to any interventional procedures utilizing steroids.    11/12/18 - patient received 85-90% relief from cervical epidural steroid injection performed on 10/30/2018 and is very pleased with the results.  He was able to return to some activities of daily living such as cutting his grass.  I have encouraged him to focus on abdominal, core, hip, and leg strengthening exercises to recondition his lower back as I believe his hip pain to be primarily due to muscle strain and spasm in the gluteus medius/minimus muscles.    5/22/2019- 67 y/o male s/p C 7-T1 cervical allegra with reported 90% continued relief, Long discussion today about patient's history of diabetes and increase in blood sugar following cervical ALLEGRA, A1c was reviewed recent BS reviewed patient states Dr. Kinney has been  managing his blood sugar for years and that he is currently manipulating  with new medications to help with better control.  Discussed that we only on a repeat the cervical ALLEGRA 3 to 4 times a year, in addition I would recommend continuing HE plan that was discussed with Dr Murguia,  Currently his pain other control in stable.    Treatment Plan:   PT/OT/HEP: Patient's son is a PTA and he will continue  with a self-directed HEP guided by his son.  Continue using printed packet of Low Back Exercises  focused on core strengthening and stretching.  Procedures:  None at this time.   Medications:    Take tylenol 650 with each Norco for a max of 3,000 mg per day with monitoring on hepatic fxn   Restart gabapentin   Avoid nephrotoxins  Imaging: No additional imaging recommended at this time.  Labs: Reviewed.  Medications are appropriately dosed for current hepatorenal function.    Follow Up: RTC PRN     ALEX GuardadoC  [facility]    Disclaimer: This note was partly generated using dictation software which may occasionally result in transcription errors.

## 2019-05-24 NOTE — TELEPHONE ENCOUNTER
Spoke with patient regarding medication through [facility]. . Patient states all of his medication needs to go through [facility]  With [employer] not [facility]. Will reach out to [facility] to make sure any future prescription go through [facility] with [employer]

## 2019-05-24 NOTE — TELEPHONE ENCOUNTER
"Received a call from pt and he was saying that  received a co-pay from [facility] and he dont want to pay co-pay for his Glipizide and Metformin . Pt got his Ozempic pen on co-pay with same pharmacy but he will have to pay for this 2 medication .  He was very angry on the phone screaming on   Staff and phone staff. Try to  help out pt  to get medicine but pt dont want a listen any word over the phone or any explanation. He was very very  rude and very  bad behave over the phone.  Pt was talking ugly and rude word " Damn " Fucking"  Was  not a professional at all.  Pt was abusing over the phone with screaming and using unprofessional words.  Phone was on speaker and every one in staff was listening his conversation .   Reported to my manager Sloane Colindres.    "

## 2019-05-27 NOTE — TELEPHONE ENCOUNTER
Spoke with [facility] to ensure all prescriptions will be processed through [facility] and NOT [facility]. Marcial at [facility] stated the patient called on 05/23 and had [employer] blocked so [facility]

## 2019-05-27 NOTE — PROGRESS NOTES
HPI     HVF f/u. H/o ONH injury OD.     Last edited by Lucila Zelaya, OD on 5/27/2019 12:26 PM. (History)            Assessment /Plan     For exam results, see Encounter Report.    Optic nerve trauma of right eye, sequela      Educated pt on findings. H/o ONH trauma OD and vision loss since incident. RNFL thinning, OD. HVF performed today to show a superior arcuate like defect OD. No field loss OS. Monitor.    RTC in 1 year for annual DM eye exam or prn.

## 2019-05-27 NOTE — PROGRESS NOTES
24-2  Ss done ou     Rel & Fix =  Good ou       Coop =   Good    Patient denies any allergies to eye patch     Dr Zelaya patient      jthomas

## 2019-06-03 NOTE — TELEPHONE ENCOUNTER
----- Message from Diana Thomason sent at 6/3/2019  9:13 AM CDT -----  Contact: self [phone]  .Needs Advice    Reason for call:        Communication Preference:phone     Additional Information:Glipizde 10mg and metformin was suppose to be sent to [facility] [phone] states they didn't received his prescription states he spoke with the supervisor  at [employer] please call back to discuss.

## 2019-06-03 NOTE — TELEPHONE ENCOUNTER
Spoke with Yulissa and Slime at [facility] to verify Glipizide and metformin prescription was sent in to refill. yulissa stated the glipizide was received but not the metformin. I then spoke with Slime (Pharmacist) to call in metformin (Glucophage-XR) 500mg take 1 tablet BID. RX was called for approval.

## 2019-06-03 NOTE — TELEPHONE ENCOUNTER
Called in Rx for Glipizide 10 mg   to [facility] [phone] . [facility] fax no is [phone] for future reference.  Will take 5-7 days to being dispatch order.     Can you please call pt let him know.     Thank you.

## 2019-06-03 NOTE — TELEPHONE ENCOUNTER
Spoke with patient regarding prescription with [facility]. Informed patient I spoke with [facility], [facility] stated they received  The Glipizide 10mg but not metformin (Glucophage-XR 500mg). Called in metformin (Glucophage-XR) to Slime (pharmacist) at [facility]. Both medication weill be processed through [facility].     Patient stated he received a steroid injection and now since the injection his BS are going down. Informed patien t to call for any problems, questions or concerns

## 2019-06-26 NOTE — TELEPHONE ENCOUNTER
Called and spoke with patient about his concerns with medication ozempic. Patient stated that he always have stomach pain every time he use the medication. Also having diarrhea also. Average blood sugar runs about 146.

## 2019-06-26 NOTE — TELEPHONE ENCOUNTER
----- Message from Sapna Oro sent at 6/26/2019  8:56 AM CDT -----  Contact:    Patient   [phone]  Needs Advice    Reason for call:        Communication Preference:   Phone     Additional Information:   Pt having problems keeping his blood sugar under control ..  Pt seeking further advice .. Call the pt back to discuss

## 2019-07-05 NOTE — TELEPHONE ENCOUNTER
"Spoke with the patient about his DM medication concerns.  The patient was very rude on the phone, using inappropriate language to me.  Patient stated, "I don't like how I have not seen one consistent person in this department".  Explained to the patient that we share care in the endocrinology department due to limited access.  Patient stated "I will only see a medical doctor and no one else, specifically, I would like to see Dr. Huerta".  Patient stated "I received exponentially better care in the [facility] endocrinology department compared to here at [facility]".  Verbalized my apology that he feels this way.  Attempted to go over his medication regimen for DM.  Patient cut me off and was screaming "I am going to take care of myself how I want to until I see an endocrinology medical doctor".      Explained to the patient that I called him twice (6/27/19)  and left a voicemail in response to his expression of not tolerating Ozempic.  As a result, I sent a patient portal message of what changes to make moving forward.  Patient stated "[facility] is not charging me for the Tradjenta and has accepted it back, but I am going to treat myself with my old medication how I want to for now".      I explained to the patient that I am adding a department note to his chart that he prefers to be contacted via telephone instead of the patient portal, and that he would only see a MD.  Patient rudely screaming and cursing over the phone, stating "you all need to let me know who and when I will be seen since I have not been seen in over a year or if I need to find an endocrinologist elsewhere".  I explained to the patient that I will send a message to Dr. Huerta and the staff to see if a MD has any availability to see him for follow up.  Patient stated "okay, that's all, bye" and hung up.   "

## 2019-07-08 NOTE — TELEPHONE ENCOUNTER
unfortunatly I dont have a lot of availably   We can try and put him  With another doc or he can f/u with his pcp

## 2019-07-08 NOTE — TELEPHONE ENCOUNTER
Appointment rescheduled with Dr Aviles on 07/29 at 8:30. Patient confirmed. Appointment reminder will be mailed to him

## 2019-07-29 NOTE — PROGRESS NOTES
Endocrinology Return Visit     68 y.o. male with HTN, HLD, DJD and T2DM diagnosed early 60s    Previously seen by , Elisa Bradshaw, new to me    At last visit regimen changed from Janumet, amaryl to MFM, glipizide and Ozempic  However had issues with abdominal pain related to Ozempic  Due to this he changed his regimen back to prior- Janumet and glimepiride   Had some high sugars with steriod injections, now improved    DM2  Current Diabetes Regimen:  Janumet 50/500 BID  Glimepiride 4 mg daily- takes with breakfast    Omitted doses: denies    Previous Medications:  Invokana - price  Levemir  Ozempic- did not tolerate due to GI upset    Glucose Monitoring:          Recent Hgb A1C:  Lab Results   Component Value Date    HGBA1C 7.1 (H) 07/25/2019       Hypoglycemic Episodes:denies    Screening / DM Complications:    Nephropathy:   Lab Results   Component Value Date    MICALBCREAT 19.0 01/03/2019       Last Lipid Panel:atorvastatin 40 mg daily, fenofibrate  Lab Results   Component Value Date    LDLCALC 103.6 07/25/2019       B12:  Lab Results   Component Value Date    FAKGNEXG80 405 12/13/2017       Neuropathy: +  Diabetic Retinopathy:  5/2019, no DR  CAD/MI: denies    Diet/Exercise:  Eats 3 meals a day. Know how he should eat but reports often cheating on diet- sushi, flan, pasta. Usually dinner worst meal  Snacks: barely         Drinks: water  Exercise - tries to stay active; sees difference in sugar with cutting grass    HLD  Managed by his PCP, on statin and fenofibrate  Strong family history of early cardiac death in dad and brother    MEDICATIONS:    Current Outpatient Medications:     amLODIPine (NORVASC) 10 MG tablet, TAKE 1 TABLET(10 MG) BY MOUTH EVERY EVENING, Disp: 90 tablet, Rfl: 3    blood sugar diagnostic Strp, 1 strip by Misc.(Non-Drug; Combo Route) route once daily., Disp: 180 strip, Rfl: 3    blood-glucose meter Misc, Use as directed, Disp: 1 each, Rfl: 0    coenzyme Q10 200 mg capsule, Take  "by mouth., Disp: , Rfl:     fenofibrate (TRICOR) 145 MG tablet, Take 1 tablet (145 mg total) by mouth every evening., Disp: 90 tablet, Rfl: 3    HYDROcodone-acetaminophen (NORCO) 5-325 mg per tablet, Take 1 tablet by mouth every 6 (six) hours as needed for Pain., Disp: 60 tablet, Rfl: 0    lancets Misc, 1 Device by Misc.(Non-Drug; Combo Route) route once daily., Disp: 180 each, Rfl: 3    multivitamin (MULTIVITAMIN) per tablet, Take 1 tablet by mouth once daily., Disp: , Rfl:     omeprazole (PRILOSEC) 20 MG capsule, Take 1 capsule (20 mg total) by mouth once daily., Disp: 90 capsule, Rfl: 3    pen needle, diabetic (RELION PEN NEEDLES) 32 gauge x 5/32" Ndle, Needs levemir flexpen needles. To use once daily, Disp: 100 each, Rfl: 3    sildenafil (REVATIO) 20 mg Tab, Take 1 tablet (20 mg total) by mouth daily as needed., Disp: 60 tablet, Rfl: 11    triamcinolone acetonide 0.1% (KENALOG) 0.1 % cream, Apply topically daily as needed., Disp: 80 g, Rfl: 0    valsartan (DIOVAN) 320 MG tablet, Take 1 tablet (320 mg total) by mouth once daily., Disp: 90 tablet, Rfl: 3    atorvastatin (LIPITOR) 80 MG tablet, Take 1 tablet (80 mg total) by mouth once daily., Disp: 90 tablet, Rfl: 3    glimepiride (AMARYL) 4 MG tablet, Take 1 tablet (4 mg total) by mouth before breakfast., Disp: 90 tablet, Rfl: 3    SITagliptan-metformin (JANUMET)  mg per tablet, Take 1 tablet by mouth 2 (two) times daily with meals., Disp: 180 tablet, Rfl: 3      ALLERGIES:  Review of patient's allergies indicates:   Allergen Reactions    Codeine Nausea Only    Nsaids (non-steroidal anti-inflammatory drug) Other (See Comments)     Kidney issues       REVIEW  OF SYSTEMS  Constitutional: Weight stable  Temperature: Denies heat/cold intolerance  Eyes: No blurry vision, loss of vision, diplopia.  CV: No chest pain, palpitations, normal blood pressure, no swelling of the lower extremities.  Pulm: No cough, no shortness of breath, no wheezing.  GI: " "mild lower abdominal discomfort  Skin: No skin lesions.  Endo: No polyuria/polydipsia.      PHYSICAL EXAM  /74   Pulse 84   Resp 18   Ht 5' 7" (1.702 m)   Wt 111.4 kg (245 lb 7.7 oz)   BMI 38.45 kg/m²   Body mass index is 38.45 kg/m².  Wt Readings from Last 3 Encounters:   07/29/19 111.4 kg (245 lb 7.7 oz)   05/22/19 112.1 kg (247 lb 3.9 oz)   05/10/19 111.8 kg (246 lb 9.4 oz)     General Appearance:  well appearing, pleasant, NAD  Skin:  no rashes or lesions  HEENT: EOMI, MMM, sclera anicteric  Chest and Lungs:  CTAB, no wheezes/rales/rhonchi  Cardiovascular:  RRR, nml S1, S2.  No m/r/g.    Abdomen:  soft, nontender  Extremities:  no lower extremity edema.  Neurologic:  A&O x3  Psychiatric:  normal mood and affect    Protective Sensation (w/ 10 gram monofilament):7/29/19  Shoes appropriate  Right: Intact  Left: Intact    Visual Inspection:  Normal -  Bilateral    Pedal Pulses:   Right: Present  Left: Present      LABS    Chemistry        Component Value Date/Time     07/25/2019 0738    K 4.7 07/25/2019 0738     07/25/2019 0738    CO2 23 07/25/2019 0738    BUN 25 (H) 07/25/2019 0738    CREATININE 1.6 (H) 07/25/2019 0738     (H) 07/25/2019 0738        Component Value Date/Time    CALCIUM 10.1 07/25/2019 0738    ALKPHOS 52 (L) 07/25/2019 0738    AST 22 07/25/2019 0738    ALT 24 07/25/2019 0738    BILITOT 0.5 07/25/2019 0738    ESTGFRAFRICA 50.8 (A) 07/25/2019 0738    EGFRNONAA 43.9 (A) 07/25/2019 0738              ASSESSMENT/PLAN  Type 2 diabetes mellitus with diabetic polyneuropathy, without long-term current use of insulin  New DM regimen:  Janumet  mg BID  Amaryl 4 mg daily    A1c 7.1% which is reasonable given age, comorbidities   Discussed goal to avoid/minimize hypoglycemia while controlling glucose    Ideally would decrease/avoid SFU but he is resistant to change at this time  If kidney function stabilizes can consider increase to MFM to max dose  Significant room for " improvement in diet, increased activity and this was discussed today    Check glucose twice daily and keep log    Labs in 6 months- A1c, MAC, lipids    Annual eye exam    Chronic kidney disease, stage III (moderate)  Recent GFR stable    Hyperlipidemia type IV  Increase atorvastatin to 80 mg daily  Lipid panel with next labs  Discussed dietary modification    Essential hypertension  BP at goal  Continue current medications      **Does not use patient portal. Prefers phone call**    RTC 6 months    Tessie Aviles MD

## 2019-07-29 NOTE — PATIENT INSTRUCTIONS
Hypoglycemia - Low Blood Sugar    What can you do?  Rule of 15:   1. Test your blood sugar  2. If glucose is between 50-70 mg/dL then ingest 15 grams of fast-acting carbs  3. If glucose is less than 50 mg/dL then ingest 30 grams of fast-acting carbs  4. Ingest 15 grams of fast-acting carbohydrate - such as:   1. 3-4 glucose tablets  2. 4 oz juice  3. ½ can regular soda pop  4. 15 skittles or mini jelly beans   5. Re-check your blood sugar in 15 minutes. If its less than 70mg/dl, repeat steps 2 and 3.  6. If your next meal is more than 1 hour away, eat an additional 15 grams of carbohydrate and 1 ounce of protein (examples include crackers with cheese or one-half of a sandwich with peanut butter). It is important not to eat too much because this can raise your blood sugar above the target level.      After your blood sugar has normalized, think about why you went low. If you notice a pattern of low blood sugars, contact your Diabetes Team. We may need to adjust your medication.    Increase atorvastatin to 80 mg daily. Can take 2 40 mg daily until you run out    Check glucose twice daily, with some after dinner    Make changes to diet we discussed- limiting refined/white carbs

## 2019-07-29 NOTE — TELEPHONE ENCOUNTER
Order for meter with strips and lancets.  To print so we can fax to [facility] for approval.    Please approve.    Thanks haevn

## 2019-07-29 NOTE — TELEPHONE ENCOUNTER
Faxing med necessity form w/ rx's to [facility].    rx for hydrocodone put up at .

## 2019-07-29 NOTE — ASSESSMENT & PLAN NOTE
New DM regimen:  Janumet  mg BID  Amaryl 4 mg daily    A1c 7.1% which is reasonable given age, comorbidities   Discussed goal to avoid/minimize hypoglycemia while controlling glucose    Ideally would decrease/avoid SFU but he is resistant to change at this time  If kidney function stabilizes can consider increase to MFM to max dose  Significant room for improvement in diet, increased activity and this was discussed today    Check glucose twice daily and keep log    Labs in 6 months- A1c, MAC, lipids    Annual eye exam

## 2019-07-30 PROBLEM — E11.65 UNCONTROLLED TYPE 2 DIABETES MELLITUS WITH HYPERGLYCEMIA: Status: ACTIVE | Noted: 2019-01-01

## 2019-07-31 NOTE — PROGRESS NOTES
Subjective:       Patient ID: Erick Valdovinos is a 68 y.o. male.    Chief Complaint: Follow-up (6 month)  HISTORY OF PRESENT ILLNESS:  A 68-year-old white male coming in for periodic   evaluation.  He has a number of medical problems.  These include diabetes and he   needed a new meter since he recently switched to [employer] for his   insurance that was arranged for her.  We also reviewed his chart.  He needed   hepatitis C testing and a tetanus shot.  Those were taken care of.  The patient   has a history of frequent bowel movements, though not diarrhea that being three   to four times a day on occasion.  He has no abdominal problems otherwise.  He is   followed by Endocrine.  He has been getting steroid shots in his neck and sees   general surgeon, Dr. Mayers for gallstones and GI doctor Edda.  He is   currently feeling good because of recent shots in his neck, so he is on his   motorcycle today for a trip.    PHYSICAL EXAMINATION:  VITAL SIGNS:  Normal.  SKIN:  Fair and healthy.  HEENT:  Clear.  NECK:  Shows no stiffness or adenopathy.  He has reduced range of motion   consistent with his cervical disease.  CHEST:  Clear.  HEART:  He has grade 3 aortic outflow murmur, which he has had before.  ABDOMEN:  Nontender without any organomegaly.  EXTREMITIES:  Show normal muscles, joints, and pulses.  NEUROLOGIC:  Appears normal.    He does have diabetic neuropathy, but I did not evaluate his feet at this time.    He has trace edema in his ankle and he is obese though he appears like he has   lost some weight.    PLAN:  I will see him again in six months for a full exam.  The patient has been   getting his lab work through Endocrine, so I ordered none at this time.      CHARLIE/HN  dd: 07/30/2019 19:49:55 (CDT)  td: 07/31/2019 09:53:23 (CDT)  Doc ID   #[id number]  Job ID #[id number]    CC:     Dict [id number]  HPI  Review of Systems   Constitutional: Negative for activity change, appetite change, fatigue and unexpected  weight change.   HENT: Negative for dental problem, hearing loss, mouth sores, postnasal drip and sinus pressure.    Eyes: Negative for discharge and visual disturbance.   Respiratory: Negative for cough and shortness of breath.    Cardiovascular: Negative for chest pain and palpitations.   Gastrointestinal: Positive for diarrhea. Negative for abdominal pain, blood in stool, constipation and nausea.   Genitourinary: Negative for dysuria, hematuria and testicular pain.   Musculoskeletal: Positive for arthralgias and back pain. Negative for joint swelling and neck pain.   Skin: Negative for rash.   Neurological: Negative for weakness and headaches.   Psychiatric/Behavioral: Negative for agitation and sleep disturbance.       Objective:      Physical Exam   Constitutional: He is oriented to person, place, and time. He appears well-developed and well-nourished.   HENT:   Head: Normocephalic.   Right Ear: External ear normal.   Left Ear: External ear normal.   Mouth/Throat: Oropharynx is clear and moist.   Eyes: Pupils are equal, round, and reactive to light. EOM are normal. Right eye exhibits no discharge.   Neck: Normal range of motion. No JVD present. No tracheal deviation present. No thyromegaly present.   Cardiovascular: Normal rate, regular rhythm and intact distal pulses. Exam reveals no gallop.   Murmur heard.  Pulmonary/Chest: Effort normal and breath sounds normal. He has no wheezes. He has no rales.   Abdominal: Soft. Bowel sounds are normal. He exhibits no mass. There is no tenderness.   Genitourinary: Prostate normal and penis normal. Rectal exam shows guaiac negative stool.   Musculoskeletal: Normal range of motion. He exhibits edema.   Lymphadenopathy:     He has no cervical adenopathy.   Neurological: He is oriented to person, place, and time. He displays normal reflexes. No cranial nerve deficit. Coordination normal.   Skin: Skin is warm. No rash noted. No pallor.   Psychiatric: He has a normal mood and  affect.       Assessment:       1. Annual physical exam        Plan:

## 2019-08-26 NOTE — PROGRESS NOTES
Pt called stated he has been experiencing chest pain whenever he cuts the grass. He had an episode Sat and Sun this past weekend while cutting the front and back lawn. He rates the pain 8/10 that last no longer than 3 min and he does have some dizziness and lightheadedness, along with difficulty breathing when it occurs. He said once he stop and go inside and rest under ac it resolves. Pt denies any chest pain at present time.  Pt educated per care advice and will be coming in today.

## 2019-08-26 NOTE — TELEPHONE ENCOUNTER
Reason for Disposition   Intermittent chest pains persist > 3 days    Additional Information   Negative: Severe difficulty breathing (e.g., struggling for each breath, speaks in single words)   Negative: Passed out (i.e., fainted, collapsed and was not responding)   Negative: Chest pain lasting longer than 5 minutes and ANY of the following:* Over 50 years old* Over 30 years old and at least one cardiac risk factor (i.e., high blood pressure, diabetes, high cholesterol, obesity, smoker or strong family history of heart disease)* Pain is crushing, pressure-like, or heavy * Took nitroglycerin and chest pain was not relieved* History of heart disease (i.e., angina, heart attack, bypass surgery, angioplasty, CHF)   Negative: Visible sweat on face or sweat dripping down face   Negative: Sounds like a life-threatening emergency to the triager   Negative: SEVERE chest pain   Negative: Pain also present in shoulder(s) or arm(s) or jaw   Negative: Difficulty breathing   Negative: Cocaine use within last 3 days   Negative: History of prior 'blood clot' in leg or lungs (i.e., deep vein thrombosis, pulmonary embolism)   Negative: Recent illness requiring prolonged bed rest (i.e., immobilization)   Negative: Hip or leg fracture in past 2 months (e.g, or had cast on leg or ankle)   Negative: Major surgery in the past month   Negative: Recent long-distance travel with prolonged time in car, bus, plane, or train (i.e., within past 2 weeks; 6 or more hours duration)   Negative: Heart beating irregularly or very rapidly   Negative: Chest pain lasting longer than 5 minutes   Negative: Intermittent chest pain and pain has been increasing in severity or frequency   Negative: Dizziness or lightheadedness   Negative: Coughing up blood   Negative: Patient sounds very sick or weak to the triager   Negative: Fever > 100.5 F (38.1 C)    Protocols used: CHEST PAIN-A-OH    Pt called stated he has been experiencing  chest pain whenever he cuts the grass. He had an episode Sat and Sun this past weekend while cutting the front and back lawn. He rates the pain 8/10 that last no longer than 3 min and he does have some dizziness and lightheadedness, along with difficulty breathing when it occurs. He said once he stop and go inside and rest under ac it resolves. Pt denies any chest pain at present time.  Pt educated per care advice and will be coming in today.

## 2019-08-26 NOTE — PROGRESS NOTES
Subjective:       Patient ID: Erick Valdovinos is a 68 y.o. male.    Chief Complaint: Chest Pain (intermittent- while cutting grass x 2-3 weeks) and Shortness of Breath  HISTORY OF PRESENT ILLNESS:  A 68-year-old white male who I had seen recently,   comes in now with his neck improved, but the last few times he has been out to   cut his grass over the last few weeks, he has had chest pain and shortness of   breath that caused him to quit working and the last occasion, it took him two   days to cut the lawn.  The patient had a vascular scan done, one of those   outpatient services that showed some carotid disease, but apparently not   classified as severe.  He brought that in with him.  He does not have any   problems with chest pain unless he is out in the heat cutting the lawn.    PHYSICAL EXAMINATION:  VITAL SIGNS:  Normal.  SKIN:  Fair and healthy.  GENERAL:  He does not look in distress.  CHEST:  Clear.  HEART:  There is no murmur or gallop.  NECK:  I hear no bruit in his neck.    I did lab work on him, which shows mild anemia, which he has had for some time.    His chemistries are normal including creatinine is 1.3 now.  Troponin was   normal.  I sent him for a calcium scoring test that was done and showed a score   of 401.  He also had dense calcifications of the aortic valve, gallstones and he   is being sent to Cardiology.  He is currently on atorvastatin 80 mg every day   and fenofibrate 145 q.p.m.  I gave him jury duty excuse.  I did not give him   nitroglycerin, told him to take an aspirin once a day.        CHARLIE/CHRISTELLE  dd: 08/31/2019 17:50:02 (CDT)  td: 09/01/2019 05:32:58 (CDT)  Doc ID   #[id number]  Job ID #[id number]    CC:     [facility] [id number]  [facility]  Review of Systems   Constitutional: Negative for activity change, appetite change, fatigue and unexpected weight change.   HENT: Negative for dental problem, hearing loss, mouth sores, postnasal drip and sinus pressure.    Eyes: Negative for discharge and visual  disturbance.   Respiratory: Negative for cough and shortness of breath.    Cardiovascular: Positive for chest pain. Negative for palpitations.   Gastrointestinal: Negative for abdominal pain, blood in stool, constipation, diarrhea and nausea.   Genitourinary: Negative for dysuria, hematuria and testicular pain.   Musculoskeletal: Positive for arthralgias and back pain. Negative for joint swelling and neck pain.   Skin: Negative for rash.   Neurological: Negative for weakness and headaches.   Psychiatric/Behavioral: Negative for agitation and sleep disturbance.       Objective:      Physical Exam   Constitutional: He is oriented to person, place, and time. He appears well-developed and well-nourished.   HENT:   Head: Normocephalic.   Right Ear: External ear normal.   Left Ear: External ear normal.   Mouth/Throat: Oropharynx is clear and moist.   Eyes: Pupils are equal, round, and reactive to light. EOM are normal. Right eye exhibits no discharge.   Neck: Normal range of motion. No JVD present. No tracheal deviation present. No thyromegaly present.   Cardiovascular: Normal rate, regular rhythm, normal heart sounds and intact distal pulses. Exam reveals no gallop.   No murmur heard.  Pulmonary/Chest: Effort normal and breath sounds normal. He has no wheezes. He has no rales.   Abdominal: Soft. Bowel sounds are normal. He exhibits no mass. There is no tenderness.   Genitourinary: Prostate normal and penis normal. Rectal exam shows guaiac negative stool.   Musculoskeletal: Normal range of motion. He exhibits no edema.   Lymphadenopathy:     He has no cervical adenopathy.   Neurological: He is oriented to person, place, and time. He displays normal reflexes. No cranial nerve deficit. Coordination normal.   Skin: Skin is warm. No rash noted. No pallor.   Psychiatric: He has a normal mood and affect.       Assessment:       1. Precordial pain    2. Hyperlipidemia type IV    3. Uncontrolled type 2 diabetes mellitus with  hyperglycemia    4. Essential hypertension    5. History of pancreatitis    6. Gastroesophageal reflux disease without esophagitis    7. Right lower quadrant abdominal pain    8. At risk for coronary artery disease        Plan:

## 2019-08-26 NOTE — LETTER
August 26, 2019    Re:  Erick Valdovinos  [address] [place] [address]       [facility]                                                                                                                                                       [address].  [place] [address]  Phone: [phone]  Fax: [phone] To Whom It May Concern:    Please excuse patient from jury duty .  He has medical limitations  That would hender him from being a dependable juror.    Sincerely,        Dr Brandon Kinney  lb

## 2019-09-04 PROBLEM — I21.4 NSTEMI (NON-ST ELEVATED MYOCARDIAL INFARCTION): Status: RESOLVED | Noted: 2019-01-01 | Resolved: 2019-01-01

## 2019-09-04 PROBLEM — E66.01 SEVERE OBESITY (BMI 35.0-39.9) WITH COMORBIDITY: Status: ACTIVE | Noted: 2019-01-01

## 2019-09-04 NOTE — PROGRESS NOTES
Subjective:   Patient ID:  Erick Valdovinos is a 68 y.o. male who presents for evaluation of Chest Pain and Abnormal ECG      HPI: Very pleasant man here with his wife for evaluation of chest discomfort.  He has DM (last A1c 7.1), HTN, dyslipidemia, obesity, CKD3, and according to him, gastritis/gastropathy.  A few weeks ago, he had sharp chest pain that recurred several times while trying to mow the grass in the heat.    He went to [place] in mid to late August and walked all over with no problem.    He had a stress echo in May at [place] after having had significant lightheadedness, again after mowing the lawn.    Past Medical History:   Diagnosis Date    Anticoagulant long-term use     Diabetes mellitus     Diabetes mellitus, type 2     Esophageal reflux     Hyperlipidemia     Hypertension     Nephrolithiasis     Osteoarthritis        Past Surgical History:   Procedure Laterality Date    Cervical Epidural Steroid Injection, C7-T1, With Dexamethasone,  and IV Sedation N/A 4/11/2019    Performed by [facility]an [facility]urg[facility]ia Jr., [facility] at [facility]    COLONOSCOPY      COLONOSCOPY Suprep N/A 3/19/2019    [facility] at [facility]    ESOPHAGOGASTRODUODENOSCOPY (EGD) N/A 3/19/2019    [facility]    EYE SURGERY      hemrrhoidectomy      Injection-steroid-epidural-cervical - C7 - T1 N/A 10/30/2018    Performed [facility] Jr[facility] at [facility]    Injection-steroid-epidural-cervical--C7-T1 With Dexamethasone, and IV Sedation N/A 4/30/2019    Performed by [facility] Jr[facility] at [facility]    TONSILLECTOMY      WRIST FRACTURE SURGERY      WRIST SURGERY         Social History     Tobacco Use    Smoking status: Never Smoker    Smokeless tobacco: Never Used   Substance Use Topics    Alcohol use: Not Currently    Drug use: No       Family History   Problem Relation Age of Onset    Kidney disease Mother     Kidney failure Mother      "Hypertension Mother     Heart attack Father     Heart disease Father     Coronary artery disease Father     Hyperlipidemia Father     Coronary artery disease Brother     Hyperlipidemia Brother     Hyperlipidemia Brother        Current Outpatient Medications   Medication Sig    amLODIPine (NORVASC) 10 MG tablet TAKE 1 TABLET(10 MG) BY MOUTH EVERY EVENING    atorvastatin (LIPITOR) 80 MG tablet Take 1 tablet (80 mg total) by mouth once daily.    blood sugar diagnostic Strp 1 strip by Misc.(Non-Drug; Combo Route) route once daily. Test strips for meter covered by insurance.    blood-glucose meter Misc Meter covered by insurance, Use as directed    butabarbital (BUTISOL) 30 mg Tab Take 30 mg by mouth daily as needed.    coenzyme Q10 200 mg capsule Take by mouth once daily.     fenofibrate (TRICOR) 145 MG tablet Take 1 tablet (145 mg total) by mouth every evening.    glimepiride (AMARYL) 4 MG tablet TAKE 1 TABLET BY MOUTH BEFORE BREAKFAST    gluc jacobo/chondro jacobo A/vit C/Mn (GLUCOSAMINE 1500 COMPLEX ORAL) Take by mouth once daily.    HYDROcodone-acetaminophen (NORCO) 5-325 mg per tablet Take 1 tablet by mouth every 6 (six) hours as needed for Pain.    lancets [facility] To check BG once daily, to use with insurance preferred meter    lancets Misc 1 Device by Misc.(Non-Drug; Combo Route) route once daily.    multivitamin (MULTIVITAMIN) per tablet Take 1 tablet by mouth once daily.    omeprazole (PRILOSEC) 20 MG capsule Take 1 capsule (20 mg total) by mouth once daily.    pen needle, diabetic (RELION PEN NEEDLES) 32 gauge x 5/32" Ndle Needs levemir flexpen needles. To use once daily    sildenafil (REVATIO) 20 mg Tab Take 1 tablet (20 mg total) by mouth daily as needed.    SITagliptan-metformin (JANUMET)  mg per tablet Take 1 tablet by mouth 2 (two) times daily with meals.    triamcinolone acetonide 0.1% (KENALOG) 0.1 % cream Apply topically daily as needed.    valsartan (DIOVAN) 320 MG tablet Take 1 " tablet (320 mg total) by mouth once daily.     No current facility-administered medications for this visit.        Review of patient's allergies indicates:   Allergen Reactions    Codeine Nausea Only    Nsaids (non-steroidal anti-inflammatory drug) Other (See Comments)     Kidney issues       Review of Systems   Constitution: Negative.   HENT: Negative.    Eyes: Negative.    Cardiovascular: Positive for chest pain. Negative for dyspnea on exertion, near-syncope, orthopnea and palpitations.   Respiratory: Negative.  Negative for cough, hemoptysis and shortness of breath.    Endocrine: Negative.    Hematologic/Lymphatic: Negative.    Skin: Negative.    Musculoskeletal: Negative.    Gastrointestinal: Negative.    Genitourinary: Negative.    Neurological: Negative.    Psychiatric/Behavioral: Negative.      Objective:   Physical Exam   Constitutional: He is oriented to person, place, and time. He appears well-developed and well-nourished.   HENT:   Head: Normocephalic and atraumatic.   Mouth/Throat: Oropharynx is clear and moist.   Eyes: Conjunctivae and EOM are normal. No scleral icterus.   Neck: Normal range of motion. Neck supple. No JVD present.   Cardiovascular: Normal rate, regular rhythm and intact distal pulses. Exam reveals no gallop and no friction rub.   Murmur (II/VI early systolic murmur at the base) heard.  Pulmonary/Chest: Effort normal and breath sounds normal. He has no wheezes. He has no rales.   Abdominal: Soft. Bowel sounds are normal. He exhibits no distension. There is no tenderness.   Musculoskeletal: Normal range of motion. He exhibits no edema.   Neurological: He is alert and oriented to person, place, and time.   Skin: Skin is warm and dry. No rash noted. No erythema.   Psychiatric: He has a normal mood and affect. His behavior is normal. Judgment and thought content normal.   Vitals reviewed.      Lab Results   Component Value Date    WBC 9.19 08/26/2019    HGB 11.4 (L) 08/26/2019    HCT 35.7  (L) 08/26/2019    MCV 97 08/26/2019     08/26/2019         Chemistry        Component Value Date/Time     08/26/2019 1105    K 4.6 08/26/2019 1105     08/26/2019 1105    CO2 21 (L) 08/26/2019 1105    BUN 19 08/26/2019 1105    CREATININE 1.3 08/26/2019 1105     08/26/2019 1105        Component Value Date/Time    CALCIUM 9.8 08/26/2019 1105    ALKPHOS 52 (L) 07/25/2019 0738    AST 22 07/25/2019 0738    ALT 24 07/25/2019 0738    BILITOT 0.5 07/25/2019 0738    ESTGFRAFRICA >60.0 08/26/2019 1105    EGFRNONAA 56.5 (A) 08/26/2019 1105            Lab Results   Component Value Date    CHOL 192 07/25/2019    CHOL 208 (H) 05/01/2019    CHOL 184 01/03/2019     Lab Results   Component Value Date    HDL 38 (L) 07/25/2019    HDL 36 (L) 05/01/2019    HDL 37 (L) 01/03/2019     Lab Results   Component Value Date    LDLCALC 103.6 07/25/2019    LDLCALC 124.2 05/01/2019    LDLCALC Invalid, Trig>400.0 01/03/2019     Lab Results   Component Value Date    TRIG 252 (H) 07/25/2019    TRIG 239 (H) 05/01/2019    TRIG 469 (H) 01/03/2019     Lab Results   Component Value Date    CHOLHDL 19.8 (L) 07/25/2019    CHOLHDL 17.3 (L) 05/01/2019    CHOLHDL 20.1 01/03/2019       Lab Results   Component Value Date    TSH 3.493 01/03/2019       Lab Results   Component Value Date    HGBA1C 7.1 (H) 07/25/2019         Assessment:     1. Precordial pain    2. Essential hypertension    3. Hyperlipidemia type IV    4. Diabetes mellitus due to underlying condition, controlled, with diabetic neuropathy, without long-term current use of insulin    5. Chronic kidney disease, stage III (moderate)    6. Severe obesity (BMI 35.0-39.9) with comorbidity        Plan:     PET stress test given chest pain with lawn mowing, DM, CKD3, obesity (class II).    Stressed Lipitor compliance daily and the need for excellent DM control.    911 if chest pain that doesn't resolve within a few minutes.    Plavix 75mg daily.      F/U 4 months to make sure we've  gotten his LDL down under 70.  If not, switch to Crestor 40 and/or discuss PCSK-9 inhibitor.

## 2019-09-04 NOTE — LETTER
September 4, 2019      Brandon Kinney MD  [address]  [place] [address]           [place] [facility]  [address]  [place] [address]  Phone: [phone]          Patient: Erick Valdovinos   MR Number: [id number]   YOB: 1950   Date of Visit: 9/4/2019       Dear Dr. Brandon Kinney:    Thank you for referring Erick Valdovinos to me for evaluation. Attached you will find relevant portions of my assessment and plan of care.    If you have questions, please do not hesitate to call me. I look forward to following Erick Valdovinos along with you.    Sincerely,    Dyu Jiménez MD    Enclosure  CC:  No Recipients    If you would like to receive this communication electronically, please contact [email or web address] or [phone] to request more information on [facility] access.    For providers and/or their staff who would like to refer a patient to [facility], please contact us through our one-stop-shop provider referral line, [facility], at [phone].    If you feel you have received this communication in error or would no longer like to receive these types of communications, please e-mail [email or web address]

## 2019-09-04 NOTE — TELEPHONE ENCOUNTER
I sent email and called him because he likes to be called.    He did see card this am and discussed ct results and another test is planned.  He will also fol up with card about change in meds/3-4 mos fol up.    Other test is scheduled 10/1/19 / will be started on plavix

## 2019-09-11 NOTE — TELEPHONE ENCOUNTER
----- Message from Christine Salazar sent at 9/11/2019  9:18 AM CDT -----  Doctor appointment and lab have been scheduled.  Please link lab orders to the lab appointment.  Date of doctor appointment: 1/13   Date of lab appointment:  1/8  Physical or EP: Physical  Comments:

## 2019-10-25 NOTE — TELEPHONE ENCOUNTER
----- Message from Luz Maria Daly sent at 10/25/2019 10:14 AM CDT -----  Contact: [phone]/self  Type:  Patient Returning Call    Who Called: pt  Who Left Message for Patient: unknown  Does the patient know what this is regarding?: unknown  Would the patient rather a call back or a response via [facility]? Call back  Best Call Back Number: [phone]  Additional Information: leave vm if no answer

## 2019-11-12 NOTE — TELEPHONE ENCOUNTER
----- Message from Sue Go sent at 11/12/2019  8:04 AM CST -----  Contact: [phone]  Patient is requesting a call from the office regarding a medication that is not on his medication list.   He would like to speak nurse about it to see if the doctor would prescribe the medication.    Please advise, thank you.

## 2019-11-12 NOTE — TELEPHONE ENCOUNTER
1. Hydrocodone last refilled 7/29/19 and needs refill.  rx pended?  [facility].    2. Also wanted a handicap license to help him when he is not up to walking long distance. Hip acts up some days.  Says will not use if can handle walk    License ok?  Need to see to discuss?  ( he seeeing you in January for 6 mos)    Please advise.  Thanks haven    Complained of congestion some days- I told him ok to take mucinex as need w water.

## 2019-11-26 PROBLEM — B96.89 ACUTE BACTERIAL BRONCHITIS: Status: ACTIVE | Noted: 2019-01-01

## 2019-11-26 PROBLEM — J20.8 ACUTE BACTERIAL BRONCHITIS: Status: ACTIVE | Noted: 2019-01-01

## 2019-11-26 PROBLEM — J11.1 FLU SYNDROME: Status: ACTIVE | Noted: 2019-01-01

## 2019-11-26 NOTE — PROGRESS NOTES
Subjective:       Patient ID: Erick Valdovinos is a 68 y.o. male.    Chief Complaint: Cough (congested since 11/10 thought was improving 5 days ago and then worsening.  producing a lot of phlem, yellowish. , [place] 12[place]7 to [place] )  Dictation #1  MRN:[id number]  CSN:[id number]  Dict 68-year-old white male patient well known to me who is diabetic comes in with a cough that began approximately 2 weeks ago.  He was getting better until about 5 days ago and then he got much worse.  Patient's wife is not sick.  Patient has very little outside contact.  Patient got the flu shot [facility] several months ago.  His blood sugars have been running in the low 100s.    The  illness has consisted of a lot of sinus drainage, cough productive of colored sputum, shortness of breath with the coughing spells, fever, fatigue, sore throat.  He does not have chest pain.    Physical exam:  Skin-no rash  HEENT-lot of nasal congestive change ,ears are clear, throat is clear  Neck-no stiffness or adenopathy  Chest-good breath sounds, rales in the left lower lung field, no egophony, no rub      Impression:  1.  Probable bacterial bronchitis  2.  Bacterial sinusitis  3.  Flu like illness-I have checked him for flu  4.  Rales in the left lung base some grating a chest x-ray for that new    Plan:  1.  Patient was given Kenalog 40 mg IM with the explanation this will not change his sugars for long time 2.  Doxycycline 100 mg b.i.d. for 1 week 3.  Chest x-ray 4.  Advised to flush his nose with saline and use Mucinex as he is already doing  HPI  Review of Systems   Constitutional: Positive for fatigue and fever. Negative for activity change, appetite change and unexpected weight change.   HENT: Positive for congestion, postnasal drip, sinus pressure and sore throat. Negative for dental problem, hearing loss and mouth sores.    Eyes: Negative for discharge and visual disturbance.   Respiratory: Positive for cough, chest tightness and shortness of  breath.    Cardiovascular: Negative for chest pain and palpitations.   Gastrointestinal: Negative for abdominal pain, blood in stool, constipation, diarrhea and nausea.   Genitourinary: Negative for dysuria, hematuria and testicular pain.   Musculoskeletal: Negative for arthralgias, back pain, joint swelling and neck pain.   Skin: Negative for rash.   Neurological: Negative for weakness and headaches.   Psychiatric/Behavioral: Negative for agitation and sleep disturbance.       Objective:      Physical Exam   Constitutional: He is oriented to person, place, and time. He appears well-developed and well-nourished.   HENT:   Head: Normocephalic.   Right Ear: External ear normal.   Left Ear: External ear normal.   Mouth/Throat: Oropharynx is clear and moist.   Eyes: Pupils are equal, round, and reactive to light. EOM are normal. Right eye exhibits no discharge.   Neck: Normal range of motion. No JVD present. No tracheal deviation present. No thyromegaly present.   Cardiovascular: Normal rate, regular rhythm, normal heart sounds and intact distal pulses. Exam reveals no gallop.   No murmur heard.  Pulmonary/Chest: Effort normal. He has no wheezes. He has rales.   Abdominal: Soft. Bowel sounds are normal. He exhibits no mass. There is no tenderness.   Genitourinary: Prostate normal and penis normal. Rectal exam shows guaiac negative stool.   Musculoskeletal: Normal range of motion. He exhibits no edema.   Lymphadenopathy:     He has no cervical adenopathy.   Neurological: He is oriented to person, place, and time. He displays normal reflexes. No cranial nerve deficit. Coordination normal.   Skin: Skin is warm. No rash noted. No pallor.   Psychiatric: He has a normal mood and affect.       Assessment:       1. Bacterial sinusitis    2. Acute bacterial bronchitis    3. Flu syndrome    4. Hyperlipidemia type IV    5. Uncontrolled type 2 diabetes mellitus with hyperglycemia    6. Chronic kidney disease, stage III (moderate)         Plan:

## 2019-12-02 NOTE — TELEPHONE ENCOUNTER
I spoke to pt, he is still coughing up yellow mucus and have nasal congestion. He have 1 dose of Doxy to take today.   He called his pharm and was told he can  refill of Doxy at 11:40.  Is it ok to take another round of antibiotics?    Please advise

## 2019-12-02 NOTE — TELEPHONE ENCOUNTER
----- Message from Loulou Murphy sent at 12/2/2019  7:33 AM CST -----  Contact: Pt [phone]  Patient would like to get medical advice.  Symptoms (please be specific):  Nasal congestion, ears stuffy, and still coughing  How long has patient had these symptoms:  11/26/19  Pharmacy name and phone # [id number] - [place] - [id number] AT [facility] [address] [phone] (Phone)  [phone] (Fax)     Comments:  His symptome aren't improving from last visit

## 2019-12-31 NOTE — OP NOTE
"Procedure Note    Pre-operative Diagnosis: Cervical Radiculopathy  Post-operative Diagnosis: Cervical Radiculopathy  Procedure Date: 10/30/2018  Procedure:  (1) Cervical Epidural Steroid Injection at C7-T1    (2) Intraoperative fluoroscopy    (3) IV Moderate Sedation (Midazolam 1 mg, Fentanyl 25 mcg)        Indications: To alleviate pain and suffering, and reduce functional impairment associated with cervical radiculopathy.    The patients history and physical exam were reviewed. The risks, benefits and alternatives to the procedure were discussed, and all questions were answered to the patients satisfaction. The patient agreed to proceed, and written informed consent was verified.    Procedure in Detail: The patient was brought into the procedure room and placed in the prone position on the fluoroscopy table. The area of the cervical spine was prepped with Chloraprep and draped in a sterile manner. The C7-T1 interspace was identified and marked under AP fluoroscopy. The skin and subcutaneous tissues overlying the targeted interspace were anesthetized with 3-5 mL of Lidocaine 1% using a 25G 1.5" needle. A 17G, 3.5" Tuohy epidural needle was inserted through the anesthetized skin and directed toward the interspace under fluoroscopic guidance until T1 lamina was contacted.  The fluoroscope was then adjusted to yield a contralateral oblique view of the C7-T1 interspace.  The epidural needle was incrementally walked cephalad off of the lamina until the ligamentum flavum was engaged. From this point, a loss-of-resistance technique to saline in a glass syringe was used to identify entrance of the needle into the epidural space. Once loss of resistance was observed 0.5 mL of contrast solution was injected. An appropriate epidurogram was noted.    A 3 mL mixture consisting of saline and 15 mg of Dexamethasone was injected slowly and without resistance.  The needle was removed and a bandage applied to puncture " Last office visit 12/27/19, last CMP, CBC, and Digoxin level 12/27/19-refilled per standing orders.    site.    Disposition: The patient tolerated the procedure well, and there were no apparent complications. Vital signs remained stable throughout the procedure. The patient was taken to the recovery area where written discharge instructions for the procedure were given.     Follow-up: In clinic as scheduled      Hernan Murguia Jr, MD  Interventional Pain Medicine / Anesthesiology

## 2020-01-01 ENCOUNTER — TELEPHONE (OUTPATIENT)
Dept: INTERNAL MEDICINE | Facility: CLINIC | Age: 70
End: 2020-01-01

## 2020-01-01 ENCOUNTER — HOSPITAL ENCOUNTER (INPATIENT)
Facility: HOSPITAL | Age: 70
LOS: 1 days | Discharge: ANOTHER HOSPITAL | DRG: 208 | End: 2020-03-28
Attending: EMERGENCY MEDICINE | Admitting: INTERNAL MEDICINE
Payer: MEDICARE

## 2020-01-01 ENCOUNTER — TELEPHONE (OUTPATIENT)
Dept: CARDIOLOGY | Facility: CLINIC | Age: 70
End: 2020-01-01

## 2020-01-01 ENCOUNTER — LAB VISIT (OUTPATIENT)
Dept: LAB | Facility: HOSPITAL | Age: 70
End: 2020-01-01
Attending: INTERNAL MEDICINE
Payer: MEDICARE

## 2020-01-01 ENCOUNTER — TELEPHONE (OUTPATIENT)
Dept: CRITICAL CARE MEDICINE | Facility: HOSPITAL | Age: 70
End: 2020-01-01

## 2020-01-01 ENCOUNTER — PATIENT MESSAGE (OUTPATIENT)
Dept: INTERNAL MEDICINE | Facility: CLINIC | Age: 70
End: 2020-01-01

## 2020-01-01 ENCOUNTER — PATIENT MESSAGE (OUTPATIENT)
Dept: CARDIOLOGY | Facility: CLINIC | Age: 70
End: 2020-01-01

## 2020-01-01 ENCOUNTER — OFFICE VISIT (OUTPATIENT)
Dept: INTERNAL MEDICINE | Facility: CLINIC | Age: 70
End: 2020-01-01
Payer: MEDICARE

## 2020-01-01 ENCOUNTER — HOSPITAL ENCOUNTER (INPATIENT)
Facility: HOSPITAL | Age: 70
LOS: 19 days | DRG: 870 | End: 2020-04-16
Attending: INTERNAL MEDICINE | Admitting: INTERNAL MEDICINE
Payer: MEDICARE

## 2020-01-01 ENCOUNTER — OFFICE VISIT (OUTPATIENT)
Dept: CARDIOLOGY | Facility: CLINIC | Age: 70
End: 2020-01-01
Payer: MEDICARE

## 2020-01-01 ENCOUNTER — DOCUMENTATION ONLY (OUTPATIENT)
Dept: CARDIOLOGY | Facility: CLINIC | Age: 70
End: 2020-01-01

## 2020-01-01 ENCOUNTER — NURSE TRIAGE (OUTPATIENT)
Dept: ADMINISTRATIVE | Facility: CLINIC | Age: 70
End: 2020-01-01

## 2020-01-01 VITALS
BODY MASS INDEX: 37.67 KG/M2 | SYSTOLIC BLOOD PRESSURE: 127 MMHG | HEIGHT: 67 IN | TEMPERATURE: 100 F | DIASTOLIC BLOOD PRESSURE: 76 MMHG | OXYGEN SATURATION: 100 % | HEART RATE: 91 BPM | RESPIRATION RATE: 23 BRPM | WEIGHT: 240 LBS

## 2020-01-01 VITALS
SYSTOLIC BLOOD PRESSURE: 146 MMHG | HEIGHT: 68 IN | HEART RATE: 86 BPM | DIASTOLIC BLOOD PRESSURE: 75 MMHG | BODY MASS INDEX: 37.65 KG/M2 | WEIGHT: 248.44 LBS

## 2020-01-01 VITALS
OXYGEN SATURATION: 65 % | DIASTOLIC BLOOD PRESSURE: 44 MMHG | HEART RATE: 111 BPM | HEIGHT: 67 IN | WEIGHT: 246.5 LBS | TEMPERATURE: 100 F | BODY MASS INDEX: 38.69 KG/M2 | RESPIRATION RATE: 47 BRPM | SYSTOLIC BLOOD PRESSURE: 66 MMHG

## 2020-01-01 VITALS
WEIGHT: 245 LBS | HEIGHT: 68 IN | DIASTOLIC BLOOD PRESSURE: 78 MMHG | TEMPERATURE: 100 F | HEART RATE: 94 BPM | BODY MASS INDEX: 37.13 KG/M2 | SYSTOLIC BLOOD PRESSURE: 130 MMHG | OXYGEN SATURATION: 99 %

## 2020-01-01 VITALS
RESPIRATION RATE: 16 BRPM | SYSTOLIC BLOOD PRESSURE: 122 MMHG | DIASTOLIC BLOOD PRESSURE: 82 MMHG | HEIGHT: 68 IN | WEIGHT: 239 LBS | TEMPERATURE: 98 F | BODY MASS INDEX: 36.22 KG/M2 | HEART RATE: 82 BPM

## 2020-01-01 DIAGNOSIS — R50.9 FEBRILE ILLNESS: Primary | ICD-10-CM

## 2020-01-01 DIAGNOSIS — E11.65 UNCONTROLLED TYPE 2 DIABETES MELLITUS WITH HYPERGLYCEMIA: ICD-10-CM

## 2020-01-01 DIAGNOSIS — Z87.19 HISTORY OF PANCREATITIS: ICD-10-CM

## 2020-01-01 DIAGNOSIS — R93.1 AGATSTON CORONARY ARTERY CALCIUM SCORE GREATER THAN 400: ICD-10-CM

## 2020-01-01 DIAGNOSIS — K76.0 NONALCOHOLIC FATTY LIVER DISEASE: ICD-10-CM

## 2020-01-01 DIAGNOSIS — R07.9 CHEST PAIN, UNSPECIFIED TYPE: Primary | ICD-10-CM

## 2020-01-01 DIAGNOSIS — N18.30 TYPE 2 DIABETES MELLITUS WITH STAGE 3 CHRONIC KIDNEY DISEASE, WITHOUT LONG-TERM CURRENT USE OF INSULIN: ICD-10-CM

## 2020-01-01 DIAGNOSIS — I25.118 ATHEROSCLEROSIS OF NATIVE CORONARY ARTERY OF NATIVE HEART WITH STABLE ANGINA PECTORIS: Primary | ICD-10-CM

## 2020-01-01 DIAGNOSIS — E11.29 TYPE 2 DIABETES MELLITUS WITH MICROALBUMINURIA, WITHOUT LONG-TERM CURRENT USE OF INSULIN: ICD-10-CM

## 2020-01-01 DIAGNOSIS — E08.40 DIABETES MELLITUS DUE TO UNDERLYING CONDITION, CONTROLLED, WITH DIABETIC NEUROPATHY, WITHOUT LONG-TERM CURRENT USE OF INSULIN: ICD-10-CM

## 2020-01-01 DIAGNOSIS — R53.1 WEAKNESS: ICD-10-CM

## 2020-01-01 DIAGNOSIS — J96.01 ACUTE RESPIRATORY FAILURE WITH HYPOXIA: ICD-10-CM

## 2020-01-01 DIAGNOSIS — R80.9 TYPE 2 DIABETES MELLITUS WITH MICROALBUMINURIA, WITHOUT LONG-TERM CURRENT USE OF INSULIN: ICD-10-CM

## 2020-01-01 DIAGNOSIS — I10 ESSENTIAL HYPERTENSION: ICD-10-CM

## 2020-01-01 DIAGNOSIS — U07.1 COVID-19 VIRUS DETECTED: Primary | ICD-10-CM

## 2020-01-01 DIAGNOSIS — E11.42 DIABETIC POLYNEUROPATHY ASSOCIATED WITH TYPE 2 DIABETES MELLITUS: ICD-10-CM

## 2020-01-01 DIAGNOSIS — G93.41 ACUTE METABOLIC ENCEPHALOPATHY: ICD-10-CM

## 2020-01-01 DIAGNOSIS — E11.42 TYPE 2 DIABETES MELLITUS WITH DIABETIC POLYNEUROPATHY, WITHOUT LONG-TERM CURRENT USE OF INSULIN: ICD-10-CM

## 2020-01-01 DIAGNOSIS — R09.02 HYPOXIA: ICD-10-CM

## 2020-01-01 DIAGNOSIS — I48.91 ATRIAL FIBRILLATION, UNSPECIFIED TYPE: ICD-10-CM

## 2020-01-01 DIAGNOSIS — Z97.8 ENDOTRACHEALLY INTUBATED: ICD-10-CM

## 2020-01-01 DIAGNOSIS — N17.9 ACUTE RENAL FAILURE SUPERIMPOSED ON STAGE 3 CHRONIC KIDNEY DISEASE, UNSPECIFIED ACUTE RENAL FAILURE TYPE: ICD-10-CM

## 2020-01-01 DIAGNOSIS — Z00.00 ANNUAL PHYSICAL EXAM: Primary | ICD-10-CM

## 2020-01-01 DIAGNOSIS — E78.2 MIXED HYPERLIPIDEMIA: ICD-10-CM

## 2020-01-01 DIAGNOSIS — N18.30 CHRONIC KIDNEY DISEASE, STAGE III (MODERATE): ICD-10-CM

## 2020-01-01 DIAGNOSIS — Z01.818 ENCOUNTER FOR INTUBATION: ICD-10-CM

## 2020-01-01 DIAGNOSIS — R45.1 AGITATION: ICD-10-CM

## 2020-01-01 DIAGNOSIS — Z00.00 ANNUAL PHYSICAL EXAM: ICD-10-CM

## 2020-01-01 DIAGNOSIS — U07.1 COVID-19 VIRUS INFECTION: ICD-10-CM

## 2020-01-01 DIAGNOSIS — K80.20 CALCULUS OF GALLBLADDER WITHOUT CHOLECYSTITIS WITHOUT OBSTRUCTION: ICD-10-CM

## 2020-01-01 DIAGNOSIS — R00.0 TACHYCARDIA: ICD-10-CM

## 2020-01-01 DIAGNOSIS — E66.01 SEVERE OBESITY (BMI 35.0-39.9) WITH COMORBIDITY: ICD-10-CM

## 2020-01-01 DIAGNOSIS — R07.2 PRECORDIAL PAIN: ICD-10-CM

## 2020-01-01 DIAGNOSIS — R41.0 CONFUSION: ICD-10-CM

## 2020-01-01 DIAGNOSIS — U07.1 ACUTE RESPIRATORY DISTRESS SYNDROME (ARDS) DUE TO COVID-19 VIRUS: ICD-10-CM

## 2020-01-01 DIAGNOSIS — M47.22 OSTEOARTHRITIS OF SPINE WITH RADICULOPATHY, CERVICAL REGION: ICD-10-CM

## 2020-01-01 DIAGNOSIS — R94.31 QT PROLONGATION: ICD-10-CM

## 2020-01-01 DIAGNOSIS — I21.4 NSTEMI (NON-ST ELEVATED MYOCARDIAL INFARCTION): ICD-10-CM

## 2020-01-01 DIAGNOSIS — U07.1 COVID-19 VIRUS DETECTED: ICD-10-CM

## 2020-01-01 DIAGNOSIS — K76.0 FATTY LIVER: ICD-10-CM

## 2020-01-01 DIAGNOSIS — Z79.01 ANTICOAGULATED: ICD-10-CM

## 2020-01-01 DIAGNOSIS — E87.5 HYPERKALEMIA: ICD-10-CM

## 2020-01-01 DIAGNOSIS — K21.9 GASTROESOPHAGEAL REFLUX DISEASE WITHOUT ESOPHAGITIS: ICD-10-CM

## 2020-01-01 DIAGNOSIS — E78.1 HYPERLIPIDEMIA TYPE IV: ICD-10-CM

## 2020-01-01 DIAGNOSIS — J80 ACUTE RESPIRATORY DISTRESS SYNDROME (ARDS) DUE TO 2019 NOVEL CORONAVIRUS: ICD-10-CM

## 2020-01-01 DIAGNOSIS — J80 ACUTE RESPIRATORY DISTRESS SYNDROME (ARDS) DUE TO COVID-19 VIRUS: ICD-10-CM

## 2020-01-01 DIAGNOSIS — R57.9 SHOCK, UNSPECIFIED: ICD-10-CM

## 2020-01-01 DIAGNOSIS — J96.01 ACUTE HYPOXEMIC RESPIRATORY FAILURE: Primary | ICD-10-CM

## 2020-01-01 DIAGNOSIS — R50.9 FEBRILE ILLNESS: ICD-10-CM

## 2020-01-01 DIAGNOSIS — E11.22 TYPE 2 DIABETES MELLITUS WITH STAGE 3 CHRONIC KIDNEY DISEASE, WITHOUT LONG-TERM CURRENT USE OF INSULIN: ICD-10-CM

## 2020-01-01 DIAGNOSIS — N18.3 ACUTE RENAL FAILURE SUPERIMPOSED ON STAGE 3 CHRONIC KIDNEY DISEASE, UNSPECIFIED ACUTE RENAL FAILURE TYPE: ICD-10-CM

## 2020-01-01 DIAGNOSIS — U07.1 ACUTE RESPIRATORY DISTRESS SYNDROME (ARDS) DUE TO 2019 NOVEL CORONAVIRUS: ICD-10-CM

## 2020-01-01 LAB
ALBUMIN SERPL BCP-MCNC: 1.2 G/DL (ref 3.5–5.2)
ALBUMIN SERPL BCP-MCNC: 1.3 G/DL (ref 3.5–5.2)
ALBUMIN SERPL BCP-MCNC: 1.4 G/DL (ref 3.5–5.2)
ALBUMIN SERPL BCP-MCNC: 1.4 G/DL (ref 3.5–5.2)
ALBUMIN SERPL BCP-MCNC: 1.5 G/DL (ref 3.5–5.2)
ALBUMIN SERPL BCP-MCNC: 1.6 G/DL (ref 3.5–5.2)
ALBUMIN SERPL BCP-MCNC: 1.7 G/DL (ref 3.5–5.2)
ALBUMIN SERPL BCP-MCNC: 1.8 G/DL (ref 3.5–5.2)
ALBUMIN SERPL BCP-MCNC: 1.9 G/DL (ref 3.5–5.2)
ALBUMIN SERPL BCP-MCNC: 2.1 G/DL (ref 3.5–5.2)
ALBUMIN SERPL BCP-MCNC: 2.6 G/DL (ref 3.5–5.2)
ALBUMIN/CREAT UR: 13.4 UG/MG (ref 0–30)
ALLENS TEST: ABNORMAL
ALP SERPL-CCNC: 103 U/L (ref 55–135)
ALP SERPL-CCNC: 107 U/L (ref 55–135)
ALP SERPL-CCNC: 108 U/L (ref 55–135)
ALP SERPL-CCNC: 117 U/L (ref 55–135)
ALP SERPL-CCNC: 118 U/L (ref 55–135)
ALP SERPL-CCNC: 119 U/L (ref 55–135)
ALP SERPL-CCNC: 120 U/L (ref 55–135)
ALP SERPL-CCNC: 124 U/L (ref 55–135)
ALP SERPL-CCNC: 133 U/L (ref 55–135)
ALP SERPL-CCNC: 135 U/L (ref 55–135)
ALP SERPL-CCNC: 143 U/L (ref 55–135)
ALP SERPL-CCNC: 146 U/L (ref 55–135)
ALP SERPL-CCNC: 51 U/L (ref 55–135)
ALP SERPL-CCNC: 52 U/L (ref 55–135)
ALP SERPL-CCNC: 54 U/L (ref 55–135)
ALP SERPL-CCNC: 55 U/L (ref 55–135)
ALP SERPL-CCNC: 60 U/L (ref 55–135)
ALP SERPL-CCNC: 91 U/L (ref 55–135)
ALP SERPL-CCNC: 98 U/L (ref 55–135)
ALT SERPL W/O P-5'-P-CCNC: 28 U/L (ref 10–44)
ALT SERPL W/O P-5'-P-CCNC: 30 U/L (ref 10–44)
ALT SERPL W/O P-5'-P-CCNC: 32 U/L (ref 10–44)
ALT SERPL W/O P-5'-P-CCNC: 33 U/L (ref 10–44)
ALT SERPL W/O P-5'-P-CCNC: 34 U/L (ref 10–44)
ALT SERPL W/O P-5'-P-CCNC: 41 U/L (ref 10–44)
ALT SERPL W/O P-5'-P-CCNC: 42 U/L (ref 10–44)
ALT SERPL W/O P-5'-P-CCNC: 43 U/L (ref 10–44)
ALT SERPL W/O P-5'-P-CCNC: 46 U/L (ref 10–44)
ALT SERPL W/O P-5'-P-CCNC: 47 U/L (ref 10–44)
ALT SERPL W/O P-5'-P-CCNC: 52 U/L (ref 10–44)
ALT SERPL W/O P-5'-P-CCNC: 53 U/L (ref 10–44)
ALT SERPL W/O P-5'-P-CCNC: 54 U/L (ref 10–44)
ALT SERPL W/O P-5'-P-CCNC: 55 U/L (ref 10–44)
ALT SERPL W/O P-5'-P-CCNC: 56 U/L (ref 10–44)
ALT SERPL W/O P-5'-P-CCNC: 60 U/L (ref 10–44)
ALT SERPL W/O P-5'-P-CCNC: 70 U/L (ref 10–44)
AMORPH CRY UR QL COMP ASSIST: ABNORMAL
AMORPH CRY URNS QL MICRO: NORMAL
ANION GAP SERPL CALC-SCNC: 10 MMOL/L (ref 8–16)
ANION GAP SERPL CALC-SCNC: 11 MMOL/L (ref 8–16)
ANION GAP SERPL CALC-SCNC: 12 MMOL/L (ref 8–16)
ANION GAP SERPL CALC-SCNC: 13 MMOL/L (ref 8–16)
ANION GAP SERPL CALC-SCNC: 14 MMOL/L (ref 8–16)
ANION GAP SERPL CALC-SCNC: 15 MMOL/L (ref 8–16)
ANION GAP SERPL CALC-SCNC: 16 MMOL/L (ref 8–16)
ANION GAP SERPL CALC-SCNC: 17 MMOL/L (ref 8–16)
ANION GAP SERPL CALC-SCNC: 6 MMOL/L (ref 8–16)
ANION GAP SERPL CALC-SCNC: 6 MMOL/L (ref 8–16)
ANION GAP SERPL CALC-SCNC: 7 MMOL/L (ref 8–16)
ANION GAP SERPL CALC-SCNC: 8 MMOL/L (ref 8–16)
ANION GAP SERPL CALC-SCNC: 9 MMOL/L (ref 8–16)
ANISOCYTOSIS BLD QL SMEAR: SLIGHT
APTT BLDCRRT: 26.8 SEC (ref 21–32)
APTT BLDCRRT: 26.9 SEC (ref 21–32)
APTT BLDCRRT: 27.3 SEC (ref 21–32)
APTT BLDCRRT: 28.1 SEC (ref 21–32)
APTT BLDCRRT: 30.7 SEC (ref 21–32)
APTT BLDCRRT: 31 SEC (ref 21–32)
APTT BLDCRRT: 32.4 SEC (ref 21–32)
APTT BLDCRRT: 32.5 SEC (ref 21–32)
APTT BLDCRRT: 33.4 SEC (ref 21–32)
APTT BLDCRRT: 36.5 SEC (ref 21–32)
APTT BLDCRRT: 37.3 SEC (ref 21–32)
APTT BLDCRRT: 38.6 SEC (ref 21–32)
APTT BLDCRRT: 39.4 SEC (ref 21–32)
APTT BLDCRRT: 40.4 SEC (ref 21–32)
APTT BLDCRRT: 41.2 SEC (ref 21–32)
APTT BLDCRRT: 42.4 SEC (ref 21–32)
APTT BLDCRRT: 43.8 SEC (ref 21–32)
APTT BLDCRRT: 47.7 SEC (ref 21–32)
APTT BLDCRRT: 51.6 SEC (ref 21–32)
APTT BLDCRRT: 52.3 SEC (ref 21–32)
APTT BLDCRRT: 54.8 SEC (ref 21–32)
APTT BLDCRRT: 54.9 SEC (ref 21–32)
APTT BLDCRRT: 55.6 SEC (ref 21–32)
APTT BLDCRRT: 55.8 SEC (ref 21–32)
APTT BLDCRRT: 56.9 SEC (ref 21–32)
APTT BLDCRRT: 58.2 SEC (ref 21–32)
APTT BLDCRRT: 67.8 SEC (ref 21–32)
APTT BLDCRRT: 68.5 SEC (ref 21–32)
APTT BLDCRRT: 79.5 SEC (ref 21–32)
APTT BLDCRRT: 84 SEC (ref 21–32)
APTT BLDCRRT: 85 SEC (ref 21–32)
APTT BLDCRRT: 89 SEC (ref 21–32)
APTT BLDCRRT: 92.7 SEC (ref 21–32)
APTT BLDCRRT: 92.7 SEC (ref 21–32)
APTT BLDCRRT: >150 SEC (ref 21–32)
APTT BLDCRRT: >150 SEC (ref 21–32)
AST SERPL-CCNC: 116 U/L (ref 10–40)
AST SERPL-CCNC: 29 U/L (ref 10–40)
AST SERPL-CCNC: 37 U/L (ref 10–40)
AST SERPL-CCNC: 40 U/L (ref 10–40)
AST SERPL-CCNC: 44 U/L (ref 10–40)
AST SERPL-CCNC: 46 U/L (ref 10–40)
AST SERPL-CCNC: 51 U/L (ref 10–40)
AST SERPL-CCNC: 53 U/L (ref 10–40)
AST SERPL-CCNC: 57 U/L (ref 10–40)
AST SERPL-CCNC: 57 U/L (ref 10–40)
AST SERPL-CCNC: 58 U/L (ref 10–40)
AST SERPL-CCNC: 63 U/L (ref 10–40)
AST SERPL-CCNC: 65 U/L (ref 10–40)
AST SERPL-CCNC: 66 U/L (ref 10–40)
AST SERPL-CCNC: 66 U/L (ref 10–40)
AST SERPL-CCNC: 67 U/L (ref 10–40)
AST SERPL-CCNC: 67 U/L (ref 10–40)
AST SERPL-CCNC: 68 U/L (ref 10–40)
AST SERPL-CCNC: 91 U/L (ref 10–40)
B-OH-BUTYR BLD STRIP-SCNC: 0 MMOL/L (ref 0–0.5)
B-OH-BUTYR BLD STRIP-SCNC: 4.7 MMOL/L (ref 0–0.5)
BACTERIA #/AREA URNS AUTO: ABNORMAL /HPF
BACTERIA #/AREA URNS AUTO: NORMAL /HPF
BACTERIA #/AREA URNS HPF: NORMAL /HPF
BACTERIA BLD CULT: ABNORMAL
BACTERIA BLD CULT: NORMAL
BACTERIA SPEC AEROBE CULT: NORMAL
BASO STIPL BLD QL SMEAR: ABNORMAL
BASOPHILS # BLD AUTO: 0 K/UL (ref 0–0.2)
BASOPHILS # BLD AUTO: 0 K/UL (ref 0–0.2)
BASOPHILS # BLD AUTO: 0.01 K/UL (ref 0–0.2)
BASOPHILS # BLD AUTO: 0.03 K/UL (ref 0–0.2)
BASOPHILS # BLD AUTO: 0.04 K/UL (ref 0–0.2)
BASOPHILS # BLD AUTO: ABNORMAL K/UL (ref 0–0.2)
BASOPHILS NFR BLD: 0 % (ref 0–1.9)
BASOPHILS NFR BLD: 0.1 % (ref 0–1.9)
BASOPHILS NFR BLD: 0.2 % (ref 0–1.9)
BASOPHILS NFR BLD: 0.2 % (ref 0–1.9)
BILIRUB SERPL-MCNC: 0.3 MG/DL (ref 0.1–1)
BILIRUB SERPL-MCNC: 0.4 MG/DL (ref 0.1–1)
BILIRUB SERPL-MCNC: 0.5 MG/DL (ref 0.1–1)
BILIRUB SERPL-MCNC: 0.5 MG/DL (ref 0.1–1)
BILIRUB SERPL-MCNC: 0.6 MG/DL (ref 0.1–1)
BILIRUB SERPL-MCNC: 0.6 MG/DL (ref 0.1–1)
BILIRUB SERPL-MCNC: 0.7 MG/DL (ref 0.1–1)
BILIRUB SERPL-MCNC: 0.7 MG/DL (ref 0.1–1)
BILIRUB SERPL-MCNC: 0.8 MG/DL (ref 0.1–1)
BILIRUB SERPL-MCNC: 0.9 MG/DL (ref 0.1–1)
BILIRUB SERPL-MCNC: 1 MG/DL (ref 0.1–1)
BILIRUB SERPL-MCNC: 1.2 MG/DL (ref 0.1–1)
BILIRUB SERPL-MCNC: 1.4 MG/DL (ref 0.1–1)
BILIRUB UR QL STRIP: NEGATIVE
BNP SERPL-MCNC: 41 PG/ML (ref 0–99)
BNP SERPL-MCNC: 67 PG/ML (ref 0–99)
BUN SERPL-MCNC: 109 MG/DL (ref 8–23)
BUN SERPL-MCNC: 110 MG/DL (ref 8–23)
BUN SERPL-MCNC: 110 MG/DL (ref 8–23)
BUN SERPL-MCNC: 117 MG/DL (ref 8–23)
BUN SERPL-MCNC: 117 MG/DL (ref 8–23)
BUN SERPL-MCNC: 121 MG/DL (ref 8–23)
BUN SERPL-MCNC: 26 MG/DL (ref 8–23)
BUN SERPL-MCNC: 26 MG/DL (ref 8–23)
BUN SERPL-MCNC: 27 MG/DL (ref 8–23)
BUN SERPL-MCNC: 28 MG/DL (ref 8–23)
BUN SERPL-MCNC: 30 MG/DL (ref 8–23)
BUN SERPL-MCNC: 30 MG/DL (ref 8–23)
BUN SERPL-MCNC: 31 MG/DL (ref 8–23)
BUN SERPL-MCNC: 31 MG/DL (ref 8–23)
BUN SERPL-MCNC: 32 MG/DL (ref 8–23)
BUN SERPL-MCNC: 33 MG/DL (ref 8–23)
BUN SERPL-MCNC: 34 MG/DL (ref 8–23)
BUN SERPL-MCNC: 37 MG/DL (ref 8–23)
BUN SERPL-MCNC: 38 MG/DL (ref 8–23)
BUN SERPL-MCNC: 39 MG/DL (ref 8–23)
BUN SERPL-MCNC: 39 MG/DL (ref 8–23)
BUN SERPL-MCNC: 41 MG/DL (ref 8–23)
BUN SERPL-MCNC: 43 MG/DL (ref 8–23)
BUN SERPL-MCNC: 43 MG/DL (ref 8–23)
BUN SERPL-MCNC: 44 MG/DL (ref 8–23)
BUN SERPL-MCNC: 45 MG/DL (ref 8–23)
BUN SERPL-MCNC: 46 MG/DL (ref 8–23)
BUN SERPL-MCNC: 47 MG/DL (ref 8–23)
BUN SERPL-MCNC: 48 MG/DL (ref 8–23)
BUN SERPL-MCNC: 51 MG/DL (ref 8–23)
BUN SERPL-MCNC: 52 MG/DL (ref 8–23)
BUN SERPL-MCNC: 57 MG/DL (ref 8–23)
BUN SERPL-MCNC: 57 MG/DL (ref 8–23)
BUN SERPL-MCNC: 59 MG/DL (ref 8–23)
BUN SERPL-MCNC: 60 MG/DL (ref 8–23)
BUN SERPL-MCNC: 62 MG/DL (ref 8–23)
BUN SERPL-MCNC: 62 MG/DL (ref 8–23)
BUN SERPL-MCNC: 64 MG/DL (ref 8–23)
BUN SERPL-MCNC: 64 MG/DL (ref 8–23)
BUN SERPL-MCNC: 67 MG/DL (ref 8–23)
BUN SERPL-MCNC: 67 MG/DL (ref 8–23)
BUN SERPL-MCNC: 76 MG/DL (ref 8–23)
BUN SERPL-MCNC: 89 MG/DL (ref 8–23)
CALCIUM SERPL-MCNC: 7.7 MG/DL (ref 8.7–10.5)
CALCIUM SERPL-MCNC: 7.9 MG/DL (ref 8.7–10.5)
CALCIUM SERPL-MCNC: 7.9 MG/DL (ref 8.7–10.5)
CALCIUM SERPL-MCNC: 8 MG/DL (ref 8.7–10.5)
CALCIUM SERPL-MCNC: 8.1 MG/DL (ref 8.7–10.5)
CALCIUM SERPL-MCNC: 8.2 MG/DL (ref 8.7–10.5)
CALCIUM SERPL-MCNC: 8.3 MG/DL (ref 8.7–10.5)
CALCIUM SERPL-MCNC: 8.4 MG/DL (ref 8.7–10.5)
CALCIUM SERPL-MCNC: 8.5 MG/DL (ref 8.7–10.5)
CALCIUM SERPL-MCNC: 8.6 MG/DL (ref 8.7–10.5)
CALCIUM SERPL-MCNC: 8.7 MG/DL (ref 8.7–10.5)
CALCIUM SERPL-MCNC: 8.7 MG/DL (ref 8.7–10.5)
CALCIUM SERPL-MCNC: 8.9 MG/DL (ref 8.7–10.5)
CALCIUM SERPL-MCNC: 9.2 MG/DL (ref 8.7–10.5)
CHLORIDE SERPL-SCNC: 100 MMOL/L (ref 95–110)
CHLORIDE SERPL-SCNC: 100 MMOL/L (ref 95–110)
CHLORIDE SERPL-SCNC: 101 MMOL/L (ref 95–110)
CHLORIDE SERPL-SCNC: 102 MMOL/L (ref 95–110)
CHLORIDE SERPL-SCNC: 102 MMOL/L (ref 95–110)
CHLORIDE SERPL-SCNC: 103 MMOL/L (ref 95–110)
CHLORIDE SERPL-SCNC: 103 MMOL/L (ref 95–110)
CHLORIDE SERPL-SCNC: 104 MMOL/L (ref 95–110)
CHLORIDE SERPL-SCNC: 105 MMOL/L (ref 95–110)
CHLORIDE SERPL-SCNC: 106 MMOL/L (ref 95–110)
CHLORIDE SERPL-SCNC: 106 MMOL/L (ref 95–110)
CHLORIDE SERPL-SCNC: 107 MMOL/L (ref 95–110)
CHLORIDE SERPL-SCNC: 108 MMOL/L (ref 95–110)
CHLORIDE SERPL-SCNC: 109 MMOL/L (ref 95–110)
CHLORIDE SERPL-SCNC: 110 MMOL/L (ref 95–110)
CHLORIDE SERPL-SCNC: 110 MMOL/L (ref 95–110)
CHLORIDE SERPL-SCNC: 111 MMOL/L (ref 95–110)
CHLORIDE SERPL-SCNC: 111 MMOL/L (ref 95–110)
CHLORIDE SERPL-SCNC: 112 MMOL/L (ref 95–110)
CHLORIDE SERPL-SCNC: 113 MMOL/L (ref 95–110)
CHLORIDE SERPL-SCNC: 113 MMOL/L (ref 95–110)
CHLORIDE SERPL-SCNC: 98 MMOL/L (ref 95–110)
CHLORIDE SERPL-SCNC: 99 MMOL/L (ref 95–110)
CLARITY UR REFRACT.AUTO: ABNORMAL
CLARITY UR REFRACT.AUTO: ABNORMAL
CLARITY UR REFRACT.AUTO: CLEAR
CLARITY UR: CLEAR
CO2 SERPL-SCNC: 12 MMOL/L (ref 23–29)
CO2 SERPL-SCNC: 12 MMOL/L (ref 23–29)
CO2 SERPL-SCNC: 13 MMOL/L (ref 23–29)
CO2 SERPL-SCNC: 14 MMOL/L (ref 23–29)
CO2 SERPL-SCNC: 18 MMOL/L (ref 23–29)
CO2 SERPL-SCNC: 19 MMOL/L (ref 23–29)
CO2 SERPL-SCNC: 20 MMOL/L (ref 23–29)
CO2 SERPL-SCNC: 21 MMOL/L (ref 23–29)
CO2 SERPL-SCNC: 22 MMOL/L (ref 23–29)
CO2 SERPL-SCNC: 23 MMOL/L (ref 23–29)
CO2 SERPL-SCNC: 24 MMOL/L (ref 23–29)
CO2 SERPL-SCNC: 25 MMOL/L (ref 23–29)
CO2 SERPL-SCNC: 26 MMOL/L (ref 23–29)
CO2 SERPL-SCNC: 27 MMOL/L (ref 23–29)
CO2 SERPL-SCNC: 28 MMOL/L (ref 23–29)
COLOR UR AUTO: YELLOW
COLOR UR: YELLOW
CREAT SERPL-MCNC: 1.1 MG/DL (ref 0.5–1.4)
CREAT SERPL-MCNC: 1.2 MG/DL (ref 0.5–1.4)
CREAT SERPL-MCNC: 1.3 MG/DL (ref 0.5–1.4)
CREAT SERPL-MCNC: 1.4 MG/DL (ref 0.5–1.4)
CREAT SERPL-MCNC: 1.5 MG/DL (ref 0.5–1.4)
CREAT SERPL-MCNC: 1.6 MG/DL (ref 0.5–1.4)
CREAT SERPL-MCNC: 1.7 MG/DL (ref 0.5–1.4)
CREAT SERPL-MCNC: 2 MG/DL (ref 0.5–1.4)
CREAT SERPL-MCNC: 2.1 MG/DL (ref 0.5–1.4)
CREAT SERPL-MCNC: 2.6 MG/DL (ref 0.5–1.4)
CREAT SERPL-MCNC: 2.6 MG/DL (ref 0.5–1.4)
CREAT SERPL-MCNC: 2.9 MG/DL (ref 0.5–1.4)
CREAT SERPL-MCNC: 3 MG/DL (ref 0.5–1.4)
CREAT SERPL-MCNC: 3.4 MG/DL (ref 0.5–1.4)
CREAT SERPL-MCNC: 3.4 MG/DL (ref 0.5–1.4)
CREAT SERPL-MCNC: 3.6 MG/DL (ref 0.5–1.4)
CREAT SERPL-MCNC: 3.6 MG/DL (ref 0.5–1.4)
CREAT SERPL-MCNC: 3.7 MG/DL (ref 0.5–1.4)
CREAT SERPL-MCNC: 3.9 MG/DL (ref 0.5–1.4)
CREAT SERPL-MCNC: 3.9 MG/DL (ref 0.5–1.4)
CREAT SERPL-MCNC: 4 MG/DL (ref 0.5–1.4)
CREAT SERPL-MCNC: 4 MG/DL (ref 0.5–1.4)
CREAT SERPL-MCNC: 4.1 MG/DL (ref 0.5–1.4)
CREAT SERPL-MCNC: 4.2 MG/DL (ref 0.5–1.4)
CREAT UR-MCNC: 95 MG/DL (ref 23–375)
CREAT UR-MCNC: 97 MG/DL (ref 23–375)
CRP SERPL-MCNC: 101 MG/L (ref 0–8.2)
CRP SERPL-MCNC: 147.5 MG/L (ref 0–8.2)
CRP SERPL-MCNC: 167.7 MG/L (ref 0–8.2)
CRP SERPL-MCNC: 195.2 MG/L (ref 0–8.2)
CRP SERPL-MCNC: 197.6 MG/L (ref 0–8.2)
CRP SERPL-MCNC: 220.2 MG/L (ref 0–8.2)
CRP SERPL-MCNC: 221.6 MG/L (ref 0–8.2)
CRP SERPL-MCNC: 223.9 MG/L (ref 0–8.2)
CRP SERPL-MCNC: 256.6 MG/L (ref 0–8.2)
CRP SERPL-MCNC: 263.4 MG/L (ref 0–8.2)
CRP SERPL-MCNC: 263.4 MG/L (ref 0–8.2)
CRP SERPL-MCNC: 277.9 MG/L (ref 0–8.2)
CRP SERPL-MCNC: 284.4 MG/L (ref 0–8.2)
CRP SERPL-MCNC: 285 MG/L (ref 0–8.2)
CRP SERPL-MCNC: 340.3 MG/L (ref 0–8.2)
CRP SERPL-MCNC: 344.2 MG/L (ref 0–8.2)
CRP SERPL-MCNC: 351 MG/L (ref 0–8.2)
CRP SERPL-MCNC: 80.7 MG/L (ref 0–8.2)
CRP SERPL-MCNC: 87.7 MG/L (ref 0–8.2)
CRP SERPL-MCNC: 89.6 MG/L (ref 0–8.2)
D DIMER PPP IA.FEU-MCNC: 1.32 MG/L FEU
D DIMER PPP IA.FEU-MCNC: 1.52 MG/L FEU
D DIMER PPP IA.FEU-MCNC: 1.53 MG/L FEU
D DIMER PPP IA.FEU-MCNC: 1.59 MG/L FEU
D DIMER PPP IA.FEU-MCNC: 1.76 MG/L FEU
D DIMER PPP IA.FEU-MCNC: 1.78 MG/L FEU
D DIMER PPP IA.FEU-MCNC: 1.81 MG/L FEU
D DIMER PPP IA.FEU-MCNC: 1.86 MG/L FEU
D DIMER PPP IA.FEU-MCNC: 1.99 MG/L FEU
D DIMER PPP IA.FEU-MCNC: 2.35 MG/L FEU
D DIMER PPP IA.FEU-MCNC: 2.36 MG/L FEU
D DIMER PPP IA.FEU-MCNC: 2.43 MG/L FEU
D DIMER PPP IA.FEU-MCNC: 2.6 MG/L FEU
D DIMER PPP IA.FEU-MCNC: 3.63 MG/L FEU
D DIMER PPP IA.FEU-MCNC: 3.65 MG/L FEU
D DIMER PPP IA.FEU-MCNC: 4.18 MG/L FEU
D DIMER PPP IA.FEU-MCNC: 5.69 MG/L FEU
D DIMER PPP IA.FEU-MCNC: 7.6 MG/L FEU
DELSYS: ABNORMAL
DIFFERENTIAL METHOD: ABNORMAL
DOHLE BOD BLD QL SMEAR: PRESENT
DOHLE BOD BLD QL SMEAR: PRESENT
EOSINOPHIL # BLD AUTO: 0 K/UL (ref 0–0.5)
EOSINOPHIL # BLD AUTO: 0.1 K/UL (ref 0–0.5)
EOSINOPHIL # BLD AUTO: ABNORMAL K/UL (ref 0–0.5)
EOSINOPHIL NFR BLD: 0 % (ref 0–8)
EOSINOPHIL NFR BLD: 0.1 % (ref 0–8)
EOSINOPHIL NFR BLD: 0.3 % (ref 0–8)
EOSINOPHIL NFR BLD: 1 % (ref 0–8)
EOSINOPHIL NFR BLD: 1.5 % (ref 0–8)
EOSINOPHIL NFR BLD: 2 % (ref 0–8)
ERYTHROCYTE [DISTWIDTH] IN BLOOD BY AUTOMATED COUNT: 12.9 % (ref 11.5–14.5)
ERYTHROCYTE [DISTWIDTH] IN BLOOD BY AUTOMATED COUNT: 13 % (ref 11.5–14.5)
ERYTHROCYTE [DISTWIDTH] IN BLOOD BY AUTOMATED COUNT: 13.1 % (ref 11.5–14.5)
ERYTHROCYTE [DISTWIDTH] IN BLOOD BY AUTOMATED COUNT: 13.2 % (ref 11.5–14.5)
ERYTHROCYTE [DISTWIDTH] IN BLOOD BY AUTOMATED COUNT: 13.5 % (ref 11.5–14.5)
ERYTHROCYTE [DISTWIDTH] IN BLOOD BY AUTOMATED COUNT: 13.5 % (ref 11.5–14.5)
ERYTHROCYTE [DISTWIDTH] IN BLOOD BY AUTOMATED COUNT: 13.6 % (ref 11.5–14.5)
ERYTHROCYTE [DISTWIDTH] IN BLOOD BY AUTOMATED COUNT: 13.9 % (ref 11.5–14.5)
ERYTHROCYTE [DISTWIDTH] IN BLOOD BY AUTOMATED COUNT: 14 % (ref 11.5–14.5)
ERYTHROCYTE [DISTWIDTH] IN BLOOD BY AUTOMATED COUNT: 14 % (ref 11.5–14.5)
ERYTHROCYTE [DISTWIDTH] IN BLOOD BY AUTOMATED COUNT: 14.1 % (ref 11.5–14.5)
ERYTHROCYTE [DISTWIDTH] IN BLOOD BY AUTOMATED COUNT: 14.2 % (ref 11.5–14.5)
ERYTHROCYTE [DISTWIDTH] IN BLOOD BY AUTOMATED COUNT: 14.3 % (ref 11.5–14.5)
ERYTHROCYTE [DISTWIDTH] IN BLOOD BY AUTOMATED COUNT: 14.4 % (ref 11.5–14.5)
ERYTHROCYTE [DISTWIDTH] IN BLOOD BY AUTOMATED COUNT: 14.5 % (ref 11.5–14.5)
ERYTHROCYTE [DISTWIDTH] IN BLOOD BY AUTOMATED COUNT: 14.6 % (ref 11.5–14.5)
ERYTHROCYTE [DISTWIDTH] IN BLOOD BY AUTOMATED COUNT: 14.7 % (ref 11.5–14.5)
ERYTHROCYTE [DISTWIDTH] IN BLOOD BY AUTOMATED COUNT: 14.8 % (ref 11.5–14.5)
ERYTHROCYTE [SEDIMENTATION RATE] IN BLOOD BY WESTERGREN METHOD: 20 MM/H
ERYTHROCYTE [SEDIMENTATION RATE] IN BLOOD BY WESTERGREN METHOD: 22 MM/H
ERYTHROCYTE [SEDIMENTATION RATE] IN BLOOD BY WESTERGREN METHOD: 24 MM/H
ERYTHROCYTE [SEDIMENTATION RATE] IN BLOOD BY WESTERGREN METHOD: 28 MM/H
ERYTHROCYTE [SEDIMENTATION RATE] IN BLOOD BY WESTERGREN METHOD: 28 MM/H
ERYTHROCYTE [SEDIMENTATION RATE] IN BLOOD BY WESTERGREN METHOD: 460 MM/H
EST. GFR  (AFRICAN AMERICAN): 15.7 ML/MIN/1.73 M^2
EST. GFR  (AFRICAN AMERICAN): 16.2 ML/MIN/1.73 M^2
EST. GFR  (AFRICAN AMERICAN): 16.7 ML/MIN/1.73 M^2
EST. GFR  (AFRICAN AMERICAN): 16.7 ML/MIN/1.73 M^2
EST. GFR  (AFRICAN AMERICAN): 17.2 ML/MIN/1.73 M^2
EST. GFR  (AFRICAN AMERICAN): 17.2 ML/MIN/1.73 M^2
EST. GFR  (AFRICAN AMERICAN): 18.3 ML/MIN/1.73 M^2
EST. GFR  (AFRICAN AMERICAN): 18.9 ML/MIN/1.73 M^2
EST. GFR  (AFRICAN AMERICAN): 18.9 ML/MIN/1.73 M^2
EST. GFR  (AFRICAN AMERICAN): 20.3 ML/MIN/1.73 M^2
EST. GFR  (AFRICAN AMERICAN): 20.3 ML/MIN/1.73 M^2
EST. GFR  (AFRICAN AMERICAN): 23.6 ML/MIN/1.73 M^2
EST. GFR  (AFRICAN AMERICAN): 24.6 ML/MIN/1.73 M^2
EST. GFR  (AFRICAN AMERICAN): 28 ML/MIN/1.73 M^2
EST. GFR  (AFRICAN AMERICAN): 28 ML/MIN/1.73 M^2
EST. GFR  (AFRICAN AMERICAN): 36.3 ML/MIN/1.73 M^2
EST. GFR  (AFRICAN AMERICAN): 38.5 ML/MIN/1.73 M^2
EST. GFR  (AFRICAN AMERICAN): 46.9 ML/MIN/1.73 M^2
EST. GFR  (AFRICAN AMERICAN): 50.4 ML/MIN/1.73 M^2
EST. GFR  (AFRICAN AMERICAN): 54.5 ML/MIN/1.73 M^2
EST. GFR  (AFRICAN AMERICAN): 59.3 ML/MIN/1.73 M^2
EST. GFR  (AFRICAN AMERICAN): >60 ML/MIN/1.73 M^2
EST. GFR  (NON AFRICAN AMERICAN): 13.6 ML/MIN/1.73 M^2
EST. GFR  (NON AFRICAN AMERICAN): 14 ML/MIN/1.73 M^2
EST. GFR  (NON AFRICAN AMERICAN): 14.4 ML/MIN/1.73 M^2
EST. GFR  (NON AFRICAN AMERICAN): 14.4 ML/MIN/1.73 M^2
EST. GFR  (NON AFRICAN AMERICAN): 14.9 ML/MIN/1.73 M^2
EST. GFR  (NON AFRICAN AMERICAN): 14.9 ML/MIN/1.73 M^2
EST. GFR  (NON AFRICAN AMERICAN): 15.8 ML/MIN/1.73 M^2
EST. GFR  (NON AFRICAN AMERICAN): 16.4 ML/MIN/1.73 M^2
EST. GFR  (NON AFRICAN AMERICAN): 16.4 ML/MIN/1.73 M^2
EST. GFR  (NON AFRICAN AMERICAN): 17.5 ML/MIN/1.73 M^2
EST. GFR  (NON AFRICAN AMERICAN): 17.5 ML/MIN/1.73 M^2
EST. GFR  (NON AFRICAN AMERICAN): 20.4 ML/MIN/1.73 M^2
EST. GFR  (NON AFRICAN AMERICAN): 21.3 ML/MIN/1.73 M^2
EST. GFR  (NON AFRICAN AMERICAN): 24 ML/MIN/1.73 M^2
EST. GFR  (NON AFRICAN AMERICAN): 24.3 ML/MIN/1.73 M^2
EST. GFR  (NON AFRICAN AMERICAN): 31.4 ML/MIN/1.73 M^2
EST. GFR  (NON AFRICAN AMERICAN): 33.3 ML/MIN/1.73 M^2
EST. GFR  (NON AFRICAN AMERICAN): 40.5 ML/MIN/1.73 M^2
EST. GFR  (NON AFRICAN AMERICAN): 43.6 ML/MIN/1.73 M^2
EST. GFR  (NON AFRICAN AMERICAN): 47.2 ML/MIN/1.73 M^2
EST. GFR  (NON AFRICAN AMERICAN): 51.3 ML/MIN/1.73 M^2
EST. GFR  (NON AFRICAN AMERICAN): 56.1 ML/MIN/1.73 M^2
EST. GFR  (NON AFRICAN AMERICAN): >60 ML/MIN/1.73 M^2
ETCO2: 23
FACT X PPP CHRO-ACNC: 0.11 IU/ML (ref 0.3–0.7)
FACT X PPP CHRO-ACNC: 0.16 IU/ML (ref 0.3–0.7)
FACT X PPP CHRO-ACNC: 0.43 IU/ML (ref 0.3–0.7)
FACT X PPP CHRO-ACNC: 0.52 IU/ML (ref 0.3–0.7)
FACT X PPP CHRO-ACNC: 0.58 IU/ML (ref 0.3–0.7)
FACT X PPP CHRO-ACNC: 0.69 IU/ML (ref 0.3–0.7)
FACT X PPP CHRO-ACNC: 0.74 IU/ML (ref 0.3–0.7)
FACT X PPP CHRO-ACNC: 0.8 IU/ML (ref 0.3–0.7)
FACT X PPP CHRO-ACNC: 0.85 IU/ML (ref 0.3–0.7)
FACT X PPP CHRO-ACNC: 0.91 IU/ML (ref 0.3–0.7)
FACT X PPP CHRO-ACNC: 1.26 IU/ML (ref 0.3–0.7)
FACT X PPP CHRO-ACNC: 1.3 IU/ML (ref 0.3–0.7)
FACT X PPP CHRO-ACNC: <0.1 IU/ML (ref 0.3–0.7)
FACT X PPP CHRO-ACNC: <0.1 IU/ML (ref 0.3–0.7)
FERRITIN SERPL-MCNC: 1837 NG/ML (ref 20–300)
FERRITIN SERPL-MCNC: 1977 NG/ML (ref 20–300)
FERRITIN SERPL-MCNC: 2174 NG/ML (ref 20–300)
FERRITIN SERPL-MCNC: 2202 NG/ML (ref 20–300)
FERRITIN SERPL-MCNC: 2230 NG/ML (ref 20–300)
FERRITIN SERPL-MCNC: 2392 NG/ML (ref 20–300)
FERRITIN SERPL-MCNC: 2488 NG/ML (ref 20–300)
FERRITIN SERPL-MCNC: 2520 NG/ML (ref 20–300)
FERRITIN SERPL-MCNC: 2613 NG/ML (ref 20–300)
FERRITIN SERPL-MCNC: 2650 NG/ML (ref 20–300)
FERRITIN SERPL-MCNC: 2718 NG/ML (ref 20–300)
FERRITIN SERPL-MCNC: 2726 NG/ML (ref 20–300)
FERRITIN SERPL-MCNC: 2798 NG/ML (ref 20–300)
FERRITIN SERPL-MCNC: 2833 NG/ML (ref 20–300)
FERRITIN SERPL-MCNC: 2874 NG/ML (ref 20–300)
FERRITIN SERPL-MCNC: 2929 NG/ML (ref 20–300)
FERRITIN SERPL-MCNC: 2998 NG/ML (ref 20–300)
FERRITIN SERPL-MCNC: 3187 NG/ML (ref 20–300)
FERRITIN SERPL-MCNC: 3284 NG/ML (ref 20–300)
FERRITIN SERPL-MCNC: 3385 NG/ML (ref 20–300)
FIO2: 40
FIO2: 40
FIO2: 50
FIO2: 60
FIO2: 70
FIO2: 90
GIANT PLATELETS BLD QL SMEAR: PRESENT
GLUCOSE SERPL-MCNC: 106 MG/DL (ref 70–110)
GLUCOSE SERPL-MCNC: 129 MG/DL (ref 70–110)
GLUCOSE SERPL-MCNC: 135 MG/DL (ref 70–110)
GLUCOSE SERPL-MCNC: 144 MG/DL (ref 70–110)
GLUCOSE SERPL-MCNC: 150 MG/DL (ref 70–110)
GLUCOSE SERPL-MCNC: 159 MG/DL (ref 70–110)
GLUCOSE SERPL-MCNC: 164 MG/DL (ref 70–110)
GLUCOSE SERPL-MCNC: 165 MG/DL (ref 70–110)
GLUCOSE SERPL-MCNC: 179 MG/DL (ref 70–110)
GLUCOSE SERPL-MCNC: 179 MG/DL (ref 70–110)
GLUCOSE SERPL-MCNC: 186 MG/DL (ref 70–110)
GLUCOSE SERPL-MCNC: 190 MG/DL (ref 70–110)
GLUCOSE SERPL-MCNC: 192 MG/DL (ref 70–110)
GLUCOSE SERPL-MCNC: 195 MG/DL (ref 70–110)
GLUCOSE SERPL-MCNC: 200 MG/DL (ref 70–110)
GLUCOSE SERPL-MCNC: 202 MG/DL (ref 70–110)
GLUCOSE SERPL-MCNC: 207 MG/DL (ref 70–110)
GLUCOSE SERPL-MCNC: 207 MG/DL (ref 70–110)
GLUCOSE SERPL-MCNC: 209 MG/DL (ref 70–110)
GLUCOSE SERPL-MCNC: 211 MG/DL (ref 70–110)
GLUCOSE SERPL-MCNC: 213 MG/DL (ref 70–110)
GLUCOSE SERPL-MCNC: 213 MG/DL (ref 70–110)
GLUCOSE SERPL-MCNC: 214 MG/DL (ref 70–110)
GLUCOSE SERPL-MCNC: 215 MG/DL (ref 70–110)
GLUCOSE SERPL-MCNC: 218 MG/DL (ref 70–110)
GLUCOSE SERPL-MCNC: 221 MG/DL (ref 70–110)
GLUCOSE SERPL-MCNC: 226 MG/DL (ref 70–110)
GLUCOSE SERPL-MCNC: 227 MG/DL (ref 70–110)
GLUCOSE SERPL-MCNC: 230 MG/DL (ref 70–110)
GLUCOSE SERPL-MCNC: 234 MG/DL (ref 70–110)
GLUCOSE SERPL-MCNC: 237 MG/DL (ref 70–110)
GLUCOSE SERPL-MCNC: 238 MG/DL (ref 70–110)
GLUCOSE SERPL-MCNC: 239 MG/DL (ref 70–110)
GLUCOSE SERPL-MCNC: 240 MG/DL (ref 70–110)
GLUCOSE SERPL-MCNC: 260 MG/DL (ref 70–110)
GLUCOSE SERPL-MCNC: 287 MG/DL (ref 70–110)
GLUCOSE SERPL-MCNC: 297 MG/DL (ref 70–110)
GLUCOSE SERPL-MCNC: 321 MG/DL (ref 70–110)
GLUCOSE SERPL-MCNC: 338 MG/DL (ref 70–110)
GLUCOSE SERPL-MCNC: 342 MG/DL (ref 70–110)
GLUCOSE SERPL-MCNC: 388 MG/DL (ref 70–110)
GLUCOSE SERPL-MCNC: 425 MG/DL (ref 70–110)
GLUCOSE SERPL-MCNC: 437 MG/DL (ref 70–110)
GLUCOSE SERPL-MCNC: 491 MG/DL (ref 70–110)
GLUCOSE SERPL-MCNC: 491 MG/DL (ref 70–110)
GLUCOSE SERPL-MCNC: 497 MG/DL (ref 70–110)
GLUCOSE SERPL-MCNC: 498 MG/DL (ref 70–110)
GLUCOSE SERPL-MCNC: 530 MG/DL (ref 70–110)
GLUCOSE SERPL-MCNC: 568 MG/DL (ref 70–110)
GLUCOSE SERPL-MCNC: 623 MG/DL (ref 70–110)
GLUCOSE UR QL STRIP: ABNORMAL
GLUCOSE UR QL STRIP: ABNORMAL
GLUCOSE UR QL STRIP: NEGATIVE
GLUCOSE UR QL STRIP: NEGATIVE
GRAM STN SPEC: NORMAL
HCO3 UR-SCNC: 14.6 MMOL/L (ref 24–28)
HCO3 UR-SCNC: 15 MMOL/L (ref 24–28)
HCO3 UR-SCNC: 23 MMOL/L (ref 24–28)
HCO3 UR-SCNC: 23.3 MMOL/L (ref 24–28)
HCO3 UR-SCNC: 23.7 MMOL/L (ref 24–28)
HCO3 UR-SCNC: 23.8 MMOL/L (ref 24–28)
HCO3 UR-SCNC: 26.6 MMOL/L (ref 24–28)
HCO3 UR-SCNC: 27.4 MMOL/L (ref 24–28)
HCO3 UR-SCNC: 27.5 MMOL/L (ref 24–28)
HCO3 UR-SCNC: 29.4 MMOL/L (ref 24–28)
HCT VFR BLD AUTO: 24.4 % (ref 40–54)
HCT VFR BLD AUTO: 26.3 % (ref 40–54)
HCT VFR BLD AUTO: 26.6 % (ref 40–54)
HCT VFR BLD AUTO: 26.8 % (ref 40–54)
HCT VFR BLD AUTO: 27.4 % (ref 40–54)
HCT VFR BLD AUTO: 27.5 % (ref 40–54)
HCT VFR BLD AUTO: 27.5 % (ref 40–54)
HCT VFR BLD AUTO: 27.6 % (ref 40–54)
HCT VFR BLD AUTO: 27.6 % (ref 40–54)
HCT VFR BLD AUTO: 27.9 % (ref 40–54)
HCT VFR BLD AUTO: 28 % (ref 40–54)
HCT VFR BLD AUTO: 28.2 % (ref 40–54)
HCT VFR BLD AUTO: 28.9 % (ref 40–54)
HCT VFR BLD AUTO: 29.5 % (ref 40–54)
HCT VFR BLD AUTO: 29.6 % (ref 40–54)
HCT VFR BLD AUTO: 30.2 % (ref 40–54)
HCT VFR BLD AUTO: 31.5 % (ref 40–54)
HCT VFR BLD AUTO: 31.8 % (ref 40–54)
HCT VFR BLD AUTO: 31.9 % (ref 40–54)
HCT VFR BLD AUTO: 34 % (ref 40–54)
HCT VFR BLD CALC: 28 %PCV (ref 36–54)
HGB BLD-MCNC: 10.1 G/DL (ref 14–18)
HGB BLD-MCNC: 10.2 G/DL (ref 14–18)
HGB BLD-MCNC: 11.1 G/DL (ref 14–18)
HGB BLD-MCNC: 7.2 G/DL (ref 14–18)
HGB BLD-MCNC: 7.7 G/DL (ref 14–18)
HGB BLD-MCNC: 7.7 G/DL (ref 14–18)
HGB BLD-MCNC: 7.9 G/DL (ref 14–18)
HGB BLD-MCNC: 7.9 G/DL (ref 14–18)
HGB BLD-MCNC: 8 G/DL (ref 14–18)
HGB BLD-MCNC: 8 G/DL (ref 14–18)
HGB BLD-MCNC: 8.1 G/DL (ref 14–18)
HGB BLD-MCNC: 8.3 G/DL (ref 14–18)
HGB BLD-MCNC: 8.3 G/DL (ref 14–18)
HGB BLD-MCNC: 8.5 G/DL (ref 14–18)
HGB BLD-MCNC: 8.7 G/DL (ref 14–18)
HGB BLD-MCNC: 8.8 G/DL (ref 14–18)
HGB BLD-MCNC: 9 G/DL (ref 14–18)
HGB BLD-MCNC: 9.2 G/DL (ref 14–18)
HGB BLD-MCNC: 9.3 G/DL (ref 14–18)
HGB BLD-MCNC: 9.6 G/DL (ref 14–18)
HGB UR QL STRIP: ABNORMAL
HGB UR QL STRIP: NEGATIVE
HOWELL-JOLLY BOD BLD QL SMEAR: ABNORMAL
HYALINE CASTS #/AREA URNS LPF: 0 /LPF
HYPOCHROMIA BLD QL SMEAR: ABNORMAL
IL6 SERPL-MCNC: 83.9 PG/ML
IMM GRANULOCYTES # BLD AUTO: 0.05 K/UL (ref 0–0.04)
IMM GRANULOCYTES # BLD AUTO: 0.06 K/UL (ref 0–0.04)
IMM GRANULOCYTES # BLD AUTO: 0.09 K/UL (ref 0–0.04)
IMM GRANULOCYTES # BLD AUTO: 0.1 K/UL (ref 0–0.04)
IMM GRANULOCYTES # BLD AUTO: 0.12 K/UL (ref 0–0.04)
IMM GRANULOCYTES # BLD AUTO: 0.19 K/UL (ref 0–0.04)
IMM GRANULOCYTES # BLD AUTO: 0.36 K/UL (ref 0–0.04)
IMM GRANULOCYTES # BLD AUTO: 0.38 K/UL (ref 0–0.04)
IMM GRANULOCYTES # BLD AUTO: 0.5 K/UL (ref 0–0.04)
IMM GRANULOCYTES # BLD AUTO: 0.79 K/UL (ref 0–0.04)
IMM GRANULOCYTES # BLD AUTO: ABNORMAL K/UL (ref 0–0.04)
IMM GRANULOCYTES NFR BLD AUTO: 0.8 % (ref 0–0.5)
IMM GRANULOCYTES NFR BLD AUTO: 0.8 % (ref 0–0.5)
IMM GRANULOCYTES NFR BLD AUTO: 1.3 % (ref 0–0.5)
IMM GRANULOCYTES NFR BLD AUTO: 1.5 % (ref 0–0.5)
IMM GRANULOCYTES NFR BLD AUTO: 1.9 % (ref 0–0.5)
IMM GRANULOCYTES NFR BLD AUTO: 2 % (ref 0–0.5)
IMM GRANULOCYTES NFR BLD AUTO: 2.6 % (ref 0–0.5)
IMM GRANULOCYTES NFR BLD AUTO: 3.7 % (ref 0–0.5)
IMM GRANULOCYTES NFR BLD AUTO: 4.3 % (ref 0–0.5)
IMM GRANULOCYTES NFR BLD AUTO: 4.6 % (ref 0–0.5)
IMM GRANULOCYTES NFR BLD AUTO: ABNORMAL % (ref 0–0.5)
INFLUENZA A, MOLECULAR: NEGATIVE
INFLUENZA B, MOLECULAR: NEGATIVE
INR PPP: 1 (ref 0.8–1.2)
INR PPP: 1.1 (ref 0.8–1.2)
KETONES UR QL STRIP: NEGATIVE
LACTATE SERPL-SCNC: 0.5 MMOL/L (ref 0.5–2.2)
LACTATE SERPL-SCNC: 0.9 MMOL/L (ref 0.5–2.2)
LACTATE SERPL-SCNC: 1.5 MMOL/L (ref 0.5–2.2)
LACTATE SERPL-SCNC: 1.5 MMOL/L (ref 0.5–2.2)
LDH SERPL L TO P-CCNC: 349 U/L (ref 110–260)
LDH SERPL L TO P-CCNC: 382 U/L (ref 110–260)
LDH SERPL L TO P-CCNC: 393 U/L (ref 110–260)
LDH SERPL L TO P-CCNC: 395 U/L (ref 110–260)
LDH SERPL L TO P-CCNC: 397 U/L (ref 110–260)
LDH SERPL L TO P-CCNC: 398 U/L (ref 110–260)
LDH SERPL L TO P-CCNC: 434 U/L (ref 110–260)
LDH SERPL L TO P-CCNC: 451 U/L (ref 110–260)
LDH SERPL L TO P-CCNC: 489 U/L (ref 110–260)
LDH SERPL L TO P-CCNC: 492 U/L (ref 110–260)
LDH SERPL L TO P-CCNC: 506 U/L (ref 110–260)
LDH SERPL L TO P-CCNC: 524 U/L (ref 110–260)
LDH SERPL L TO P-CCNC: 542 U/L (ref 110–260)
LDH SERPL L TO P-CCNC: 613 U/L (ref 110–260)
LDH SERPL L TO P-CCNC: 632 U/L (ref 110–260)
LDH SERPL L TO P-CCNC: 679 U/L (ref 110–260)
LDH SERPL L TO P-CCNC: 740 U/L (ref 110–260)
LDH SERPL L TO P-CCNC: 824 U/L (ref 110–260)
LDH SERPL L TO P-CCNC: 959 U/L (ref 110–260)
LEUKOCYTE ESTERASE UR QL STRIP: NEGATIVE
LYMPHOCYTES # BLD AUTO: 0.4 K/UL (ref 1–4.8)
LYMPHOCYTES # BLD AUTO: 0.5 K/UL (ref 1–4.8)
LYMPHOCYTES # BLD AUTO: 0.6 K/UL (ref 1–4.8)
LYMPHOCYTES # BLD AUTO: 0.7 K/UL (ref 1–4.8)
LYMPHOCYTES # BLD AUTO: 0.7 K/UL (ref 1–4.8)
LYMPHOCYTES # BLD AUTO: 0.8 K/UL (ref 1–4.8)
LYMPHOCYTES # BLD AUTO: 1.2 K/UL (ref 1–4.8)
LYMPHOCYTES # BLD AUTO: 1.6 K/UL (ref 1–4.8)
LYMPHOCYTES # BLD AUTO: ABNORMAL K/UL (ref 1–4.8)
LYMPHOCYTES NFR BLD: 10.5 % (ref 18–48)
LYMPHOCYTES NFR BLD: 11 % (ref 18–48)
LYMPHOCYTES NFR BLD: 11.6 % (ref 18–48)
LYMPHOCYTES NFR BLD: 12 % (ref 18–48)
LYMPHOCYTES NFR BLD: 12.7 % (ref 18–48)
LYMPHOCYTES NFR BLD: 15 % (ref 18–48)
LYMPHOCYTES NFR BLD: 3 % (ref 18–48)
LYMPHOCYTES NFR BLD: 4.5 % (ref 18–48)
LYMPHOCYTES NFR BLD: 5 % (ref 18–48)
LYMPHOCYTES NFR BLD: 5 % (ref 18–48)
LYMPHOCYTES NFR BLD: 5.7 % (ref 18–48)
LYMPHOCYTES NFR BLD: 6.1 % (ref 18–48)
LYMPHOCYTES NFR BLD: 6.7 % (ref 18–48)
LYMPHOCYTES NFR BLD: 7 % (ref 18–48)
LYMPHOCYTES NFR BLD: 7.2 % (ref 18–48)
LYMPHOCYTES NFR BLD: 7.3 % (ref 18–48)
LYMPHOCYTES NFR BLD: 8 % (ref 18–48)
LYMPHOCYTES NFR BLD: 8.7 % (ref 18–48)
LYMPHOCYTES NFR BLD: 9 % (ref 18–48)
LYMPHOCYTES NFR BLD: 9.9 % (ref 18–48)
MAGNESIUM SERPL-MCNC: 1.8 MG/DL (ref 1.6–2.6)
MAGNESIUM SERPL-MCNC: 1.9 MG/DL (ref 1.6–2.6)
MAGNESIUM SERPL-MCNC: 1.9 MG/DL (ref 1.6–2.6)
MAGNESIUM SERPL-MCNC: 2 MG/DL (ref 1.6–2.6)
MAGNESIUM SERPL-MCNC: 2.1 MG/DL (ref 1.6–2.6)
MAGNESIUM SERPL-MCNC: 2.1 MG/DL (ref 1.6–2.6)
MAGNESIUM SERPL-MCNC: 2.2 MG/DL (ref 1.6–2.6)
MAGNESIUM SERPL-MCNC: 2.3 MG/DL (ref 1.6–2.6)
MAGNESIUM SERPL-MCNC: 2.4 MG/DL (ref 1.6–2.6)
MAGNESIUM SERPL-MCNC: 2.4 MG/DL (ref 1.6–2.6)
MAGNESIUM SERPL-MCNC: 2.5 MG/DL (ref 1.6–2.6)
MAGNESIUM SERPL-MCNC: 2.6 MG/DL (ref 1.6–2.6)
MAGNESIUM SERPL-MCNC: 2.6 MG/DL (ref 1.6–2.6)
MCH RBC QN AUTO: 29.4 PG (ref 27–31)
MCH RBC QN AUTO: 29.7 PG (ref 27–31)
MCH RBC QN AUTO: 30 PG (ref 27–31)
MCH RBC QN AUTO: 30.1 PG (ref 27–31)
MCH RBC QN AUTO: 30.2 PG (ref 27–31)
MCH RBC QN AUTO: 30.3 PG (ref 27–31)
MCH RBC QN AUTO: 30.4 PG (ref 27–31)
MCH RBC QN AUTO: 30.4 PG (ref 27–31)
MCH RBC QN AUTO: 30.5 PG (ref 27–31)
MCH RBC QN AUTO: 31 PG (ref 27–31)
MCH RBC QN AUTO: 31 PG (ref 27–31)
MCH RBC QN AUTO: 31.1 PG (ref 27–31)
MCH RBC QN AUTO: 32.1 PG (ref 27–31)
MCHC RBC AUTO-ENTMCNC: 28.6 G/DL (ref 32–36)
MCHC RBC AUTO-ENTMCNC: 28.7 G/DL (ref 32–36)
MCHC RBC AUTO-ENTMCNC: 28.9 G/DL (ref 32–36)
MCHC RBC AUTO-ENTMCNC: 29.1 G/DL (ref 32–36)
MCHC RBC AUTO-ENTMCNC: 29.2 G/DL (ref 32–36)
MCHC RBC AUTO-ENTMCNC: 29.3 G/DL (ref 32–36)
MCHC RBC AUTO-ENTMCNC: 29.5 G/DL (ref 32–36)
MCHC RBC AUTO-ENTMCNC: 29.6 G/DL (ref 32–36)
MCHC RBC AUTO-ENTMCNC: 29.7 G/DL (ref 32–36)
MCHC RBC AUTO-ENTMCNC: 29.8 G/DL (ref 32–36)
MCHC RBC AUTO-ENTMCNC: 29.9 G/DL (ref 32–36)
MCHC RBC AUTO-ENTMCNC: 30.1 G/DL (ref 32–36)
MCHC RBC AUTO-ENTMCNC: 30.4 G/DL (ref 32–36)
MCHC RBC AUTO-ENTMCNC: 30.4 G/DL (ref 32–36)
MCHC RBC AUTO-ENTMCNC: 30.5 G/DL (ref 32–36)
MCHC RBC AUTO-ENTMCNC: 30.9 G/DL (ref 32–36)
MCHC RBC AUTO-ENTMCNC: 31.8 G/DL (ref 32–36)
MCHC RBC AUTO-ENTMCNC: 32.1 G/DL (ref 32–36)
MCHC RBC AUTO-ENTMCNC: 32.6 G/DL (ref 32–36)
MCHC RBC AUTO-ENTMCNC: 33.4 G/DL (ref 32–36)
MCV RBC AUTO: 100 FL (ref 82–98)
MCV RBC AUTO: 101 FL (ref 82–98)
MCV RBC AUTO: 102 FL (ref 82–98)
MCV RBC AUTO: 103 FL (ref 82–98)
MCV RBC AUTO: 93 FL (ref 82–98)
MCV RBC AUTO: 96 FL (ref 82–98)
MCV RBC AUTO: 97 FL (ref 82–98)
MCV RBC AUTO: 97 FL (ref 82–98)
MCV RBC AUTO: 98 FL (ref 82–98)
MCV RBC AUTO: 99 FL (ref 82–98)
METAMYELOCYTES NFR BLD MANUAL: 0.5 %
METAMYELOCYTES NFR BLD MANUAL: 0.5 %
METAMYELOCYTES NFR BLD MANUAL: 2 %
METAMYELOCYTES NFR BLD MANUAL: 2.5 %
METAMYELOCYTES NFR BLD MANUAL: 3 %
METAMYELOCYTES NFR BLD MANUAL: 3 %
METAMYELOCYTES NFR BLD MANUAL: 4 %
MICROALBUMIN UR DL<=1MG/L-MCNC: 13 UG/ML
MICROSCOPIC COMMENT: ABNORMAL
MICROSCOPIC COMMENT: NORMAL
MICROSCOPIC COMMENT: NORMAL
MIN VOL: 10.1
MIN VOL: 12
MIN VOL: 15.3
MODE: ABNORMAL
MONOCYTES # BLD AUTO: 0.2 K/UL (ref 0.3–1)
MONOCYTES # BLD AUTO: 0.3 K/UL (ref 0.3–1)
MONOCYTES # BLD AUTO: 0.4 K/UL (ref 0.3–1)
MONOCYTES # BLD AUTO: 0.5 K/UL (ref 0.3–1)
MONOCYTES # BLD AUTO: 0.6 K/UL (ref 0.3–1)
MONOCYTES # BLD AUTO: 0.6 K/UL (ref 0.3–1)
MONOCYTES # BLD AUTO: 0.7 K/UL (ref 0.3–1)
MONOCYTES # BLD AUTO: 0.8 K/UL (ref 0.3–1)
MONOCYTES # BLD AUTO: ABNORMAL K/UL (ref 0.3–1)
MONOCYTES NFR BLD: 2 % (ref 4–15)
MONOCYTES NFR BLD: 2.7 % (ref 4–15)
MONOCYTES NFR BLD: 2.8 % (ref 4–15)
MONOCYTES NFR BLD: 3 % (ref 4–15)
MONOCYTES NFR BLD: 3 % (ref 4–15)
MONOCYTES NFR BLD: 3.2 % (ref 4–15)
MONOCYTES NFR BLD: 3.4 % (ref 4–15)
MONOCYTES NFR BLD: 3.5 % (ref 4–15)
MONOCYTES NFR BLD: 4 % (ref 4–15)
MONOCYTES NFR BLD: 4.3 % (ref 4–15)
MONOCYTES NFR BLD: 4.9 % (ref 4–15)
MONOCYTES NFR BLD: 5 % (ref 4–15)
MONOCYTES NFR BLD: 5 % (ref 4–15)
MONOCYTES NFR BLD: 5.2 % (ref 4–15)
MONOCYTES NFR BLD: 6 % (ref 4–15)
MONOCYTES NFR BLD: 6.5 % (ref 4–15)
MONOCYTES NFR BLD: 6.8 % (ref 4–15)
MONOCYTES NFR BLD: 7.7 % (ref 4–15)
MYELOCYTES NFR BLD MANUAL: 0.5 %
MYELOCYTES NFR BLD MANUAL: 1 %
MYELOCYTES NFR BLD MANUAL: 1.5 %
MYELOCYTES NFR BLD MANUAL: 2 %
MYELOCYTES NFR BLD MANUAL: 2 %
MYELOCYTES NFR BLD MANUAL: 3 %
NEUTROPHILS # BLD AUTO: 16.8 K/UL (ref 1.8–7.7)
NEUTROPHILS # BLD AUTO: 18.2 K/UL (ref 1.8–7.7)
NEUTROPHILS # BLD AUTO: 4.9 K/UL (ref 1.8–7.7)
NEUTROPHILS # BLD AUTO: 4.9 K/UL (ref 1.8–7.7)
NEUTROPHILS # BLD AUTO: 5 K/UL (ref 1.8–7.7)
NEUTROPHILS # BLD AUTO: 6.2 K/UL (ref 1.8–7.7)
NEUTROPHILS # BLD AUTO: 6.4 K/UL (ref 1.8–7.7)
NEUTROPHILS # BLD AUTO: 6.4 K/UL (ref 1.8–7.7)
NEUTROPHILS # BLD AUTO: 6.7 K/UL (ref 1.8–7.7)
NEUTROPHILS # BLD AUTO: 9.8 K/UL (ref 1.8–7.7)
NEUTROPHILS NFR BLD: 74 % (ref 38–73)
NEUTROPHILS NFR BLD: 74 % (ref 38–73)
NEUTROPHILS NFR BLD: 75 % (ref 38–73)
NEUTROPHILS NFR BLD: 80.3 % (ref 38–73)
NEUTROPHILS NFR BLD: 80.8 % (ref 38–73)
NEUTROPHILS NFR BLD: 82.3 % (ref 38–73)
NEUTROPHILS NFR BLD: 82.4 % (ref 38–73)
NEUTROPHILS NFR BLD: 83 % (ref 38–73)
NEUTROPHILS NFR BLD: 84 % (ref 38–73)
NEUTROPHILS NFR BLD: 85 % (ref 38–73)
NEUTROPHILS NFR BLD: 85.3 % (ref 38–73)
NEUTROPHILS NFR BLD: 85.4 % (ref 38–73)
NEUTROPHILS NFR BLD: 86.4 % (ref 38–73)
NEUTROPHILS NFR BLD: 86.5 % (ref 38–73)
NEUTROPHILS NFR BLD: 86.5 % (ref 38–73)
NEUTROPHILS NFR BLD: 87.5 % (ref 38–73)
NEUTROPHILS NFR BLD: 88 % (ref 38–73)
NEUTROPHILS NFR BLD: 88 % (ref 38–73)
NEUTROPHILS NFR BLD: 89 % (ref 38–73)
NEUTROPHILS NFR BLD: 91 % (ref 38–73)
NEUTS BAND NFR BLD MANUAL: 1 %
NEUTS BAND NFR BLD MANUAL: 1 %
NEUTS BAND NFR BLD MANUAL: 1.5 %
NEUTS BAND NFR BLD MANUAL: 2 %
NEUTS BAND NFR BLD MANUAL: 3.5 %
NEUTS BAND NFR BLD MANUAL: 6 %
NITRITE UR QL STRIP: NEGATIVE
NRBC BLD-RTO: 0 /100 WBC
NRBC BLD-RTO: 1 /100 WBC
OVALOCYTES BLD QL SMEAR: ABNORMAL
PCO2 BLDA: 30 MMHG (ref 35–45)
PCO2 BLDA: 33.8 MMHG (ref 35–45)
PCO2 BLDA: 35.7 MMHG (ref 35–45)
PCO2 BLDA: 36.3 MMHG (ref 35–45)
PCO2 BLDA: 37.1 MMHG (ref 35–45)
PCO2 BLDA: 38.8 MMHG (ref 35–45)
PCO2 BLDA: 38.9 MMHG (ref 35–45)
PCO2 BLDA: 43.2 MMHG (ref 35–45)
PCO2 BLDA: 51.5 MMHG (ref 35–45)
PCO2 BLDA: 59.4 MMHG (ref 35–45)
PEEP: 10
PEEP: 10
PEEP: 12
PEEP: 12
PEEP: 14
PEEP: 16
PEEPH: 30
PEEPL: 10
PH SMN: 7.2 [PH] (ref 7.35–7.45)
PH SMN: 7.27 [PH] (ref 7.35–7.45)
PH SMN: 7.3 [PH] (ref 7.35–7.45)
PH SMN: 7.35 [PH] (ref 7.35–7.45)
PH SMN: 7.37 [PH] (ref 7.35–7.45)
PH SMN: 7.39 [PH] (ref 7.35–7.45)
PH SMN: 7.42 [PH] (ref 7.35–7.45)
PH SMN: 7.42 [PH] (ref 7.35–7.45)
PH SMN: 7.46 [PH] (ref 7.35–7.45)
PH SMN: 7.52 [PH] (ref 7.35–7.45)
PH UR STRIP: 5 [PH] (ref 5–8)
PH UR STRIP: 6 [PH] (ref 5–8)
PHOSPHATE SERPL-MCNC: 1.6 MG/DL (ref 2.7–4.5)
PHOSPHATE SERPL-MCNC: 2.2 MG/DL (ref 2.7–4.5)
PHOSPHATE SERPL-MCNC: 2.3 MG/DL (ref 2.7–4.5)
PHOSPHATE SERPL-MCNC: 2.4 MG/DL (ref 2.7–4.5)
PHOSPHATE SERPL-MCNC: 2.4 MG/DL (ref 2.7–4.5)
PHOSPHATE SERPL-MCNC: 2.6 MG/DL (ref 2.7–4.5)
PHOSPHATE SERPL-MCNC: 2.8 MG/DL (ref 2.7–4.5)
PHOSPHATE SERPL-MCNC: 3.3 MG/DL (ref 2.7–4.5)
PHOSPHATE SERPL-MCNC: 3.8 MG/DL (ref 2.7–4.5)
PHOSPHATE SERPL-MCNC: 4 MG/DL (ref 2.7–4.5)
PHOSPHATE SERPL-MCNC: 4.4 MG/DL (ref 2.7–4.5)
PHOSPHATE SERPL-MCNC: 5.6 MG/DL (ref 2.7–4.5)
PHOSPHATE SERPL-MCNC: 6.2 MG/DL (ref 2.7–4.5)
PHOSPHATE SERPL-MCNC: 7.7 MG/DL (ref 2.7–4.5)
PHOSPHATE SERPL-MCNC: 8.7 MG/DL (ref 2.7–4.5)
PHOSPHATE SERPL-MCNC: 8.9 MG/DL (ref 2.7–4.5)
PHOSPHATE SERPL-MCNC: 9.1 MG/DL (ref 2.7–4.5)
PHOSPHATE SERPL-MCNC: 9.5 MG/DL (ref 2.7–4.5)
PIP: 24
PIP: 29
PIP: 30
PLATELET # BLD AUTO: 233 K/UL (ref 150–350)
PLATELET # BLD AUTO: 250 K/UL (ref 150–350)
PLATELET # BLD AUTO: 253 K/UL (ref 150–350)
PLATELET # BLD AUTO: 264 K/UL (ref 150–350)
PLATELET # BLD AUTO: 265 K/UL (ref 150–350)
PLATELET # BLD AUTO: 280 K/UL (ref 150–350)
PLATELET # BLD AUTO: 283 K/UL (ref 150–350)
PLATELET # BLD AUTO: 286 K/UL (ref 150–350)
PLATELET # BLD AUTO: 309 K/UL (ref 150–350)
PLATELET # BLD AUTO: 310 K/UL (ref 150–350)
PLATELET # BLD AUTO: 319 K/UL (ref 150–350)
PLATELET # BLD AUTO: 329 K/UL (ref 150–350)
PLATELET # BLD AUTO: 333 K/UL (ref 150–350)
PLATELET # BLD AUTO: 341 K/UL (ref 150–350)
PLATELET # BLD AUTO: 354 K/UL (ref 150–350)
PLATELET # BLD AUTO: 363 K/UL (ref 150–350)
PLATELET # BLD AUTO: 378 K/UL (ref 150–350)
PLATELET # BLD AUTO: 394 K/UL (ref 150–350)
PLATELET # BLD AUTO: 396 K/UL (ref 150–350)
PLATELET # BLD AUTO: 406 K/UL (ref 150–350)
PLATELET BLD QL SMEAR: ABNORMAL
PMV BLD AUTO: 10.2 FL (ref 9.2–12.9)
PMV BLD AUTO: 10.9 FL (ref 9.2–12.9)
PMV BLD AUTO: 10.9 FL (ref 9.2–12.9)
PMV BLD AUTO: 11 FL (ref 9.2–12.9)
PMV BLD AUTO: 11 FL (ref 9.2–12.9)
PMV BLD AUTO: 11.1 FL (ref 9.2–12.9)
PMV BLD AUTO: 11.1 FL (ref 9.2–12.9)
PMV BLD AUTO: 11.2 FL (ref 9.2–12.9)
PMV BLD AUTO: 11.3 FL (ref 9.2–12.9)
PMV BLD AUTO: 11.5 FL (ref 9.2–12.9)
PMV BLD AUTO: 11.6 FL (ref 9.2–12.9)
PMV BLD AUTO: 11.7 FL (ref 9.2–12.9)
PMV BLD AUTO: 11.8 FL (ref 9.2–12.9)
PMV BLD AUTO: 12.1 FL (ref 9.2–12.9)
PMV BLD AUTO: 12.2 FL (ref 9.2–12.9)
PMV BLD AUTO: 12.4 FL (ref 9.2–12.9)
PMV BLD AUTO: 9.8 FL (ref 9.2–12.9)
PMV BLD AUTO: 9.8 FL (ref 9.2–12.9)
PO2 BLDA: 108 MMHG (ref 80–100)
PO2 BLDA: 57 MMHG (ref 80–100)
PO2 BLDA: 61 MMHG (ref 80–100)
PO2 BLDA: 77 MMHG (ref 80–100)
PO2 BLDA: 77 MMHG (ref 80–100)
PO2 BLDA: 78 MMHG (ref 80–100)
PO2 BLDA: 80 MMHG (ref 80–100)
PO2 BLDA: 81 MMHG (ref 80–100)
PO2 BLDA: 86 MMHG (ref 80–100)
PO2 BLDA: 98 MMHG (ref 80–100)
POC BE: -1 MMOL/L
POC BE: -12 MMOL/L
POC BE: -13 MMOL/L
POC BE: -2 MMOL/L
POC BE: 1 MMOL/L
POC BE: 3 MMOL/L
POC BE: 4 MMOL/L
POC BE: 5 MMOL/L
POC IONIZED CALCIUM: 1.23 MMOL/L (ref 1.06–1.42)
POC SATURATED O2: 89 % (ref 95–100)
POC SATURATED O2: 92 % (ref 95–100)
POC SATURATED O2: 93 % (ref 95–100)
POC SATURATED O2: 94 % (ref 95–100)
POC SATURATED O2: 95 % (ref 95–100)
POC SATURATED O2: 96 % (ref 95–100)
POC SATURATED O2: 97 % (ref 95–100)
POC SATURATED O2: 98 % (ref 95–100)
POC TCO2: 16 MMOL/L (ref 23–27)
POC TCO2: 16 MMOL/L (ref 23–27)
POC TCO2: 24 MMOL/L (ref 23–27)
POC TCO2: 24 MMOL/L (ref 23–27)
POC TCO2: 25 MMOL/L (ref 23–27)
POC TCO2: 25 MMOL/L (ref 23–27)
POC TCO2: 28 MMOL/L (ref 23–27)
POC TCO2: 28 MMOL/L (ref 23–27)
POC TCO2: 29 MMOL/L (ref 23–27)
POC TCO2: 31 MMOL/L (ref 23–27)
POCT GLUCOSE: 113 MG/DL (ref 70–110)
POCT GLUCOSE: 113 MG/DL (ref 70–110)
POCT GLUCOSE: 116 MG/DL (ref 70–110)
POCT GLUCOSE: 124 MG/DL (ref 70–110)
POCT GLUCOSE: 127 MG/DL (ref 70–110)
POCT GLUCOSE: 128 MG/DL (ref 70–110)
POCT GLUCOSE: 132 MG/DL (ref 70–110)
POCT GLUCOSE: 136 MG/DL (ref 70–110)
POCT GLUCOSE: 142 MG/DL (ref 70–110)
POCT GLUCOSE: 144 MG/DL (ref 70–110)
POCT GLUCOSE: 146 MG/DL (ref 70–110)
POCT GLUCOSE: 146 MG/DL (ref 70–110)
POCT GLUCOSE: 148 MG/DL (ref 70–110)
POCT GLUCOSE: 150 MG/DL (ref 70–110)
POCT GLUCOSE: 151 MG/DL (ref 70–110)
POCT GLUCOSE: 151 MG/DL (ref 70–110)
POCT GLUCOSE: 154 MG/DL (ref 70–110)
POCT GLUCOSE: 156 MG/DL (ref 70–110)
POCT GLUCOSE: 157 MG/DL (ref 70–110)
POCT GLUCOSE: 158 MG/DL (ref 70–110)
POCT GLUCOSE: 158 MG/DL (ref 70–110)
POCT GLUCOSE: 160 MG/DL (ref 70–110)
POCT GLUCOSE: 161 MG/DL (ref 70–110)
POCT GLUCOSE: 162 MG/DL (ref 70–110)
POCT GLUCOSE: 162 MG/DL (ref 70–110)
POCT GLUCOSE: 163 MG/DL (ref 70–110)
POCT GLUCOSE: 163 MG/DL (ref 70–110)
POCT GLUCOSE: 164 MG/DL (ref 70–110)
POCT GLUCOSE: 164 MG/DL (ref 70–110)
POCT GLUCOSE: 165 MG/DL (ref 70–110)
POCT GLUCOSE: 166 MG/DL (ref 70–110)
POCT GLUCOSE: 166 MG/DL (ref 70–110)
POCT GLUCOSE: 173 MG/DL (ref 70–110)
POCT GLUCOSE: 173 MG/DL (ref 70–110)
POCT GLUCOSE: 175 MG/DL (ref 70–110)
POCT GLUCOSE: 178 MG/DL (ref 70–110)
POCT GLUCOSE: 180 MG/DL (ref 70–110)
POCT GLUCOSE: 180 MG/DL (ref 70–110)
POCT GLUCOSE: 186 MG/DL (ref 70–110)
POCT GLUCOSE: 186 MG/DL (ref 70–110)
POCT GLUCOSE: 187 MG/DL (ref 70–110)
POCT GLUCOSE: 187 MG/DL (ref 70–110)
POCT GLUCOSE: 188 MG/DL (ref 70–110)
POCT GLUCOSE: 190 MG/DL (ref 70–110)
POCT GLUCOSE: 192 MG/DL (ref 70–110)
POCT GLUCOSE: 192 MG/DL (ref 70–110)
POCT GLUCOSE: 194 MG/DL (ref 70–110)
POCT GLUCOSE: 194 MG/DL (ref 70–110)
POCT GLUCOSE: 195 MG/DL (ref 70–110)
POCT GLUCOSE: 196 MG/DL (ref 70–110)
POCT GLUCOSE: 198 MG/DL (ref 70–110)
POCT GLUCOSE: 199 MG/DL (ref 70–110)
POCT GLUCOSE: 202 MG/DL (ref 70–110)
POCT GLUCOSE: 203 MG/DL (ref 70–110)
POCT GLUCOSE: 203 MG/DL (ref 70–110)
POCT GLUCOSE: 204 MG/DL (ref 70–110)
POCT GLUCOSE: 205 MG/DL (ref 70–110)
POCT GLUCOSE: 209 MG/DL (ref 70–110)
POCT GLUCOSE: 209 MG/DL (ref 70–110)
POCT GLUCOSE: 210 MG/DL (ref 70–110)
POCT GLUCOSE: 212 MG/DL (ref 70–110)
POCT GLUCOSE: 213 MG/DL (ref 70–110)
POCT GLUCOSE: 214 MG/DL (ref 70–110)
POCT GLUCOSE: 215 MG/DL (ref 70–110)
POCT GLUCOSE: 216 MG/DL (ref 70–110)
POCT GLUCOSE: 217 MG/DL (ref 70–110)
POCT GLUCOSE: 219 MG/DL (ref 70–110)
POCT GLUCOSE: 219 MG/DL (ref 70–110)
POCT GLUCOSE: 221 MG/DL (ref 70–110)
POCT GLUCOSE: 221 MG/DL (ref 70–110)
POCT GLUCOSE: 222 MG/DL (ref 70–110)
POCT GLUCOSE: 223 MG/DL (ref 70–110)
POCT GLUCOSE: 224 MG/DL (ref 70–110)
POCT GLUCOSE: 224 MG/DL (ref 70–110)
POCT GLUCOSE: 228 MG/DL (ref 70–110)
POCT GLUCOSE: 230 MG/DL (ref 70–110)
POCT GLUCOSE: 231 MG/DL (ref 70–110)
POCT GLUCOSE: 232 MG/DL (ref 70–110)
POCT GLUCOSE: 232 MG/DL (ref 70–110)
POCT GLUCOSE: 233 MG/DL (ref 70–110)
POCT GLUCOSE: 237 MG/DL (ref 70–110)
POCT GLUCOSE: 237 MG/DL (ref 70–110)
POCT GLUCOSE: 238 MG/DL (ref 70–110)
POCT GLUCOSE: 239 MG/DL (ref 70–110)
POCT GLUCOSE: 243 MG/DL (ref 70–110)
POCT GLUCOSE: 243 MG/DL (ref 70–110)
POCT GLUCOSE: 247 MG/DL (ref 70–110)
POCT GLUCOSE: 248 MG/DL (ref 70–110)
POCT GLUCOSE: 254 MG/DL (ref 70–110)
POCT GLUCOSE: 254 MG/DL (ref 70–110)
POCT GLUCOSE: 259 MG/DL (ref 70–110)
POCT GLUCOSE: 260 MG/DL (ref 70–110)
POCT GLUCOSE: 264 MG/DL (ref 70–110)
POCT GLUCOSE: 264 MG/DL (ref 70–110)
POCT GLUCOSE: 266 MG/DL (ref 70–110)
POCT GLUCOSE: 266 MG/DL (ref 70–110)
POCT GLUCOSE: 270 MG/DL (ref 70–110)
POCT GLUCOSE: 272 MG/DL (ref 70–110)
POCT GLUCOSE: 273 MG/DL (ref 70–110)
POCT GLUCOSE: 275 MG/DL (ref 70–110)
POCT GLUCOSE: 278 MG/DL (ref 70–110)
POCT GLUCOSE: 286 MG/DL (ref 70–110)
POCT GLUCOSE: 289 MG/DL (ref 70–110)
POCT GLUCOSE: 291 MG/DL (ref 70–110)
POCT GLUCOSE: 293 MG/DL (ref 70–110)
POCT GLUCOSE: 305 MG/DL (ref 70–110)
POCT GLUCOSE: 306 MG/DL (ref 70–110)
POCT GLUCOSE: 310 MG/DL (ref 70–110)
POCT GLUCOSE: 311 MG/DL (ref 70–110)
POCT GLUCOSE: 315 MG/DL (ref 70–110)
POCT GLUCOSE: 324 MG/DL (ref 70–110)
POCT GLUCOSE: 334 MG/DL (ref 70–110)
POCT GLUCOSE: 338 MG/DL (ref 70–110)
POCT GLUCOSE: 344 MG/DL (ref 70–110)
POCT GLUCOSE: 376 MG/DL (ref 70–110)
POCT GLUCOSE: 384 MG/DL (ref 70–110)
POCT GLUCOSE: 391 MG/DL (ref 70–110)
POCT GLUCOSE: 391 MG/DL (ref 70–110)
POCT GLUCOSE: 398 MG/DL (ref 70–110)
POCT GLUCOSE: 401 MG/DL (ref 70–110)
POCT GLUCOSE: 406 MG/DL (ref 70–110)
POCT GLUCOSE: 407 MG/DL (ref 70–110)
POCT GLUCOSE: 414 MG/DL (ref 70–110)
POCT GLUCOSE: 417 MG/DL (ref 70–110)
POCT GLUCOSE: 433 MG/DL (ref 70–110)
POCT GLUCOSE: 459 MG/DL (ref 70–110)
POCT GLUCOSE: 461 MG/DL (ref 70–110)
POCT GLUCOSE: 467 MG/DL (ref 70–110)
POCT GLUCOSE: 468 MG/DL (ref 70–110)
POCT GLUCOSE: 495 MG/DL (ref 70–110)
POCT GLUCOSE: 77 MG/DL (ref 70–110)
POCT GLUCOSE: 80 MG/DL (ref 70–110)
POCT GLUCOSE: 83 MG/DL (ref 70–110)
POCT GLUCOSE: 92 MG/DL (ref 70–110)
POCT GLUCOSE: >500 MG/DL (ref 70–110)
POIKILOCYTOSIS BLD QL SMEAR: SLIGHT
POLYCHROMASIA BLD QL SMEAR: ABNORMAL
POTASSIUM BLD-SCNC: 4.2 MMOL/L (ref 3.5–5.1)
POTASSIUM SERPL-SCNC: 3.9 MMOL/L (ref 3.5–5.1)
POTASSIUM SERPL-SCNC: 4.1 MMOL/L (ref 3.5–5.1)
POTASSIUM SERPL-SCNC: 4.2 MMOL/L (ref 3.5–5.1)
POTASSIUM SERPL-SCNC: 4.2 MMOL/L (ref 3.5–5.1)
POTASSIUM SERPL-SCNC: 4.4 MMOL/L (ref 3.5–5.1)
POTASSIUM SERPL-SCNC: 4.5 MMOL/L (ref 3.5–5.1)
POTASSIUM SERPL-SCNC: 4.6 MMOL/L (ref 3.5–5.1)
POTASSIUM SERPL-SCNC: 4.7 MMOL/L (ref 3.5–5.1)
POTASSIUM SERPL-SCNC: 4.8 MMOL/L (ref 3.5–5.1)
POTASSIUM SERPL-SCNC: 4.9 MMOL/L (ref 3.5–5.1)
POTASSIUM SERPL-SCNC: 4.9 MMOL/L (ref 3.5–5.1)
POTASSIUM SERPL-SCNC: 5 MMOL/L (ref 3.5–5.1)
POTASSIUM SERPL-SCNC: 5.1 MMOL/L (ref 3.5–5.1)
POTASSIUM SERPL-SCNC: 5.1 MMOL/L (ref 3.5–5.1)
POTASSIUM SERPL-SCNC: 5.2 MMOL/L (ref 3.5–5.1)
POTASSIUM SERPL-SCNC: 5.2 MMOL/L (ref 3.5–5.1)
POTASSIUM SERPL-SCNC: 5.3 MMOL/L (ref 3.5–5.1)
POTASSIUM SERPL-SCNC: 5.3 MMOL/L (ref 3.5–5.1)
POTASSIUM SERPL-SCNC: 5.4 MMOL/L (ref 3.5–5.1)
POTASSIUM SERPL-SCNC: 5.5 MMOL/L (ref 3.5–5.1)
POTASSIUM SERPL-SCNC: 5.8 MMOL/L (ref 3.5–5.1)
POTASSIUM SERPL-SCNC: 6.9 MMOL/L (ref 3.5–5.1)
POTASSIUM SERPL-SCNC: 7.7 MMOL/L (ref 3.5–5.1)
PROCALCITONIN SERPL IA-MCNC: 1.29 NG/ML
PROCALCITONIN SERPL IA-MCNC: 3.35 NG/ML
PROMYELOCYTES NFR BLD MANUAL: 0.5 %
PROT SERPL-MCNC: 5.8 G/DL (ref 6–8.4)
PROT SERPL-MCNC: 6 G/DL (ref 6–8.4)
PROT SERPL-MCNC: 6 G/DL (ref 6–8.4)
PROT SERPL-MCNC: 6.1 G/DL (ref 6–8.4)
PROT SERPL-MCNC: 6.2 G/DL (ref 6–8.4)
PROT SERPL-MCNC: 6.3 G/DL (ref 6–8.4)
PROT SERPL-MCNC: 6.4 G/DL (ref 6–8.4)
PROT SERPL-MCNC: 6.5 G/DL (ref 6–8.4)
PROT SERPL-MCNC: 6.7 G/DL (ref 6–8.4)
PROT SERPL-MCNC: 6.7 G/DL (ref 6–8.4)
PROT SERPL-MCNC: 6.9 G/DL (ref 6–8.4)
PROT SERPL-MCNC: 7.1 G/DL (ref 6–8.4)
PROT UR QL STRIP: ABNORMAL
PROT UR QL STRIP: NEGATIVE
PROT UR-MCNC: 45 MG/DL (ref 0–15)
PROT/CREAT UR: 0.47 MG/G{CREAT} (ref 0–0.2)
PROTHROMBIN TIME: 10.5 SEC (ref 9–12.5)
PROTHROMBIN TIME: 11.3 SEC (ref 9–12.5)
PS: 0
RBC # BLD AUTO: 2.4 M/UL (ref 4.6–6.2)
RBC # BLD AUTO: 2.56 M/UL (ref 4.6–6.2)
RBC # BLD AUTO: 2.59 M/UL (ref 4.6–6.2)
RBC # BLD AUTO: 2.62 M/UL (ref 4.6–6.2)
RBC # BLD AUTO: 2.66 M/UL (ref 4.6–6.2)
RBC # BLD AUTO: 2.66 M/UL (ref 4.6–6.2)
RBC # BLD AUTO: 2.69 M/UL (ref 4.6–6.2)
RBC # BLD AUTO: 2.73 M/UL (ref 4.6–6.2)
RBC # BLD AUTO: 2.73 M/UL (ref 4.6–6.2)
RBC # BLD AUTO: 2.77 M/UL (ref 4.6–6.2)
RBC # BLD AUTO: 2.8 M/UL (ref 4.6–6.2)
RBC # BLD AUTO: 2.87 M/UL (ref 4.6–6.2)
RBC # BLD AUTO: 2.92 M/UL (ref 4.6–6.2)
RBC # BLD AUTO: 2.97 M/UL (ref 4.6–6.2)
RBC # BLD AUTO: 3 M/UL (ref 4.6–6.2)
RBC # BLD AUTO: 3.1 M/UL (ref 4.6–6.2)
RBC # BLD AUTO: 3.13 M/UL (ref 4.6–6.2)
RBC # BLD AUTO: 3.15 M/UL (ref 4.6–6.2)
RBC # BLD AUTO: 3.28 M/UL (ref 4.6–6.2)
RBC # BLD AUTO: 3.67 M/UL (ref 4.6–6.2)
RBC #/AREA URNS AUTO: 0 /HPF (ref 0–4)
RBC #/AREA URNS AUTO: 14 /HPF (ref 0–4)
RBC #/AREA URNS HPF: 1 /HPF (ref 0–4)
SAMPLE: ABNORMAL
SARS-COV-2 RNA RESP QL NAA+PROBE: DETECTED
SCHISTOCYTES BLD QL SMEAR: ABNORMAL
SCHISTOCYTES BLD QL SMEAR: PRESENT
SET RATE: 28
SITE: ABNORMAL
SODIUM BLD-SCNC: 149 MMOL/L (ref 136–145)
SODIUM SERPL-SCNC: 130 MMOL/L (ref 136–145)
SODIUM SERPL-SCNC: 131 MMOL/L (ref 136–145)
SODIUM SERPL-SCNC: 131 MMOL/L (ref 136–145)
SODIUM SERPL-SCNC: 132 MMOL/L (ref 136–145)
SODIUM SERPL-SCNC: 133 MMOL/L (ref 136–145)
SODIUM SERPL-SCNC: 134 MMOL/L (ref 136–145)
SODIUM SERPL-SCNC: 134 MMOL/L (ref 136–145)
SODIUM SERPL-SCNC: 135 MMOL/L (ref 136–145)
SODIUM SERPL-SCNC: 136 MMOL/L (ref 136–145)
SODIUM SERPL-SCNC: 136 MMOL/L (ref 136–145)
SODIUM SERPL-SCNC: 137 MMOL/L (ref 136–145)
SODIUM SERPL-SCNC: 138 MMOL/L (ref 136–145)
SODIUM SERPL-SCNC: 139 MMOL/L (ref 136–145)
SODIUM SERPL-SCNC: 140 MMOL/L (ref 136–145)
SODIUM SERPL-SCNC: 141 MMOL/L (ref 136–145)
SODIUM SERPL-SCNC: 142 MMOL/L (ref 136–145)
SODIUM SERPL-SCNC: 143 MMOL/L (ref 136–145)
SODIUM SERPL-SCNC: 143 MMOL/L (ref 136–145)
SODIUM SERPL-SCNC: 144 MMOL/L (ref 136–145)
SODIUM SERPL-SCNC: 144 MMOL/L (ref 136–145)
SODIUM SERPL-SCNC: 145 MMOL/L (ref 136–145)
SODIUM SERPL-SCNC: 145 MMOL/L (ref 136–145)
SODIUM SERPL-SCNC: 146 MMOL/L (ref 136–145)
SODIUM SERPL-SCNC: 149 MMOL/L (ref 136–145)
SODIUM SERPL-SCNC: 149 MMOL/L (ref 136–145)
SP GR UR STRIP: 1.01 (ref 1–1.03)
SP GR UR STRIP: 1.02 (ref 1–1.03)
SP02: 92
SP02: 94
SP02: 95
SP02: 95
SP02: 98
SPECIMEN SOURCE: NORMAL
SPONT RATE: 0
SQUAMOUS #/AREA URNS HPF: 3 /HPF
TOXIC GRANULES BLD QL SMEAR: PRESENT
TRIGL SERPL-MCNC: 280 MG/DL (ref 30–150)
TRIGL SERPL-MCNC: 298 MG/DL (ref 30–150)
TRIGL SERPL-MCNC: 351 MG/DL (ref 30–150)
TRIGL SERPL-MCNC: 509 MG/DL (ref 30–150)
TRIGL SERPL-MCNC: 532 MG/DL (ref 30–150)
TRIGL SERPL-MCNC: 539 MG/DL (ref 30–150)
TRIGL SERPL-MCNC: 552 MG/DL (ref 30–150)
TRIGL SERPL-MCNC: 591 MG/DL (ref 30–150)
TRIGL SERPL-MCNC: 600 MG/DL (ref 30–150)
TRIGL SERPL-MCNC: 628 MG/DL (ref 30–150)
TRIGL SERPL-MCNC: 643 MG/DL (ref 30–150)
TRIGL SERPL-MCNC: 818 MG/DL (ref 30–150)
TROPONIN I SERPL DL<=0.01 NG/ML-MCNC: 0.04 NG/ML (ref 0–0.03)
TROPONIN I SERPL DL<=0.01 NG/ML-MCNC: 0.09 NG/ML (ref 0–0.03)
TSH SERPL DL<=0.005 MIU/L-ACNC: 1.49 UIU/ML (ref 0.4–4)
URN SPEC COLLECT METH UR: ABNORMAL
URN SPEC COLLECT METH UR: NORMAL
UROBILINOGEN UR STRIP-ACNC: NEGATIVE EU/DL
VANCOMYCIN SERPL-MCNC: 12.6 UG/ML
VANCOMYCIN SERPL-MCNC: 17.4 UG/ML
VANCOMYCIN SERPL-MCNC: 18.2 UG/ML
VANCOMYCIN SERPL-MCNC: 21.7 UG/ML
VANCOMYCIN SERPL-MCNC: 23.5 UG/ML
VANCOMYCIN TROUGH SERPL-MCNC: 32.7 UG/ML (ref 10–22)
VT: 400
VT: 450
VT: 460
VT: 569
WBC # BLD AUTO: 11.36 K/UL (ref 3.9–12.7)
WBC # BLD AUTO: 11.65 K/UL (ref 3.9–12.7)
WBC # BLD AUTO: 12.43 K/UL (ref 3.9–12.7)
WBC # BLD AUTO: 13.07 K/UL (ref 3.9–12.7)
WBC # BLD AUTO: 13.27 K/UL (ref 3.9–12.7)
WBC # BLD AUTO: 13.63 K/UL (ref 3.9–12.7)
WBC # BLD AUTO: 13.97 K/UL (ref 3.9–12.7)
WBC # BLD AUTO: 14.25 K/UL (ref 3.9–12.7)
WBC # BLD AUTO: 15.07 K/UL (ref 3.9–12.7)
WBC # BLD AUTO: 19.2 K/UL (ref 3.9–12.7)
WBC # BLD AUTO: 21.32 K/UL (ref 3.9–12.7)
WBC # BLD AUTO: 23.24 K/UL (ref 3.9–12.7)
WBC # BLD AUTO: 27.57 K/UL (ref 3.9–12.7)
WBC # BLD AUTO: 5.92 K/UL (ref 3.9–12.7)
WBC # BLD AUTO: 5.97 K/UL (ref 3.9–12.7)
WBC # BLD AUTO: 6.21 K/UL (ref 3.9–12.7)
WBC # BLD AUTO: 7.33 K/UL (ref 3.9–12.7)
WBC # BLD AUTO: 7.37 K/UL (ref 3.9–12.7)
WBC # BLD AUTO: 7.49 K/UL (ref 3.9–12.7)
WBC # BLD AUTO: 8.32 K/UL (ref 3.9–12.7)
WBC #/AREA URNS AUTO: 1 /HPF (ref 0–5)
WBC #/AREA URNS AUTO: 7 /HPF (ref 0–5)
WBC #/AREA URNS HPF: 0 /HPF (ref 0–5)

## 2020-01-01 PROCEDURE — 85027 COMPLETE CBC AUTOMATED: CPT

## 2020-01-01 PROCEDURE — 25000003 PHARM REV CODE 250: Performed by: CLINICAL NURSE SPECIALIST

## 2020-01-01 PROCEDURE — 94640 AIRWAY INHALATION TREATMENT: CPT

## 2020-01-01 PROCEDURE — 85730 THROMBOPLASTIN TIME PARTIAL: CPT

## 2020-01-01 PROCEDURE — 99233 SBSQ HOSP IP/OBS HIGH 50: CPT | Mod: ,,, | Performed by: NURSE PRACTITIONER

## 2020-01-01 PROCEDURE — 83615 LACTATE (LD) (LDH) ENZYME: CPT

## 2020-01-01 PROCEDURE — 84443 ASSAY THYROID STIM HORMONE: CPT

## 2020-01-01 PROCEDURE — 25000003 PHARM REV CODE 250

## 2020-01-01 PROCEDURE — 84100 ASSAY OF PHOSPHORUS: CPT

## 2020-01-01 PROCEDURE — 63600175 PHARM REV CODE 636 W HCPCS: Performed by: INTERNAL MEDICINE

## 2020-01-01 PROCEDURE — 27000221 HC OXYGEN, UP TO 24 HOURS

## 2020-01-01 PROCEDURE — 85007 BL SMEAR W/DIFF WBC COUNT: CPT

## 2020-01-01 PROCEDURE — 83615 LACTATE (LD) (LDH) ENZYME: CPT | Mod: 91

## 2020-01-01 PROCEDURE — 85520 HEPARIN ASSAY: CPT

## 2020-01-01 PROCEDURE — 99291 PR CRITICAL CARE, E/M 30-74 MINUTES: ICD-10-PCS | Mod: ,,, | Performed by: INTERNAL MEDICINE

## 2020-01-01 PROCEDURE — 1101F PR PT FALLS ASSESS DOC 0-1 FALLS W/OUT INJ PAST YR: ICD-10-PCS | Mod: CPTII,S$GLB,, | Performed by: FAMILY MEDICINE

## 2020-01-01 PROCEDURE — 83735 ASSAY OF MAGNESIUM: CPT

## 2020-01-01 PROCEDURE — 99232 SBSQ HOSP IP/OBS MODERATE 35: CPT | Mod: ,,, | Performed by: INTERNAL MEDICINE

## 2020-01-01 PROCEDURE — 63600175 PHARM REV CODE 636 W HCPCS: Performed by: NURSE PRACTITIONER

## 2020-01-01 PROCEDURE — 86140 C-REACTIVE PROTEIN: CPT

## 2020-01-01 PROCEDURE — 96365 THER/PROPH/DIAG IV INF INIT: CPT | Mod: 59

## 2020-01-01 PROCEDURE — 63600175 PHARM REV CODE 636 W HCPCS: Performed by: STUDENT IN AN ORGANIZED HEALTH CARE EDUCATION/TRAINING PROGRAM

## 2020-01-01 PROCEDURE — 85025 COMPLETE CBC W/AUTO DIFF WBC: CPT

## 2020-01-01 PROCEDURE — 25000003 PHARM REV CODE 250: Performed by: STUDENT IN AN ORGANIZED HEALTH CARE EDUCATION/TRAINING PROGRAM

## 2020-01-01 PROCEDURE — 25000242 PHARM REV CODE 250 ALT 637 W/ HCPCS: Performed by: STUDENT IN AN ORGANIZED HEALTH CARE EDUCATION/TRAINING PROGRAM

## 2020-01-01 PROCEDURE — 99291 CRITICAL CARE FIRST HOUR: CPT | Mod: GC,,, | Performed by: PEDIATRICS

## 2020-01-01 PROCEDURE — 20000000 HC ICU ROOM

## 2020-01-01 PROCEDURE — 84478 ASSAY OF TRIGLYCERIDES: CPT

## 2020-01-01 PROCEDURE — 99900026 HC AIRWAY MAINTENANCE (STAT)

## 2020-01-01 PROCEDURE — 3051F PR MOST RECENT HEMOGLOBIN A1C LEVEL 7.0 - < 8.0%: ICD-10-PCS | Mod: CPTII,S$GLB,, | Performed by: NURSE PRACTITIONER

## 2020-01-01 PROCEDURE — 80202 ASSAY OF VANCOMYCIN: CPT

## 2020-01-01 PROCEDURE — 99292 CRITICAL CARE ADDL 30 MIN: CPT | Mod: ,,, | Performed by: NURSE PRACTITIONER

## 2020-01-01 PROCEDURE — 80053 COMPREHEN METABOLIC PANEL: CPT

## 2020-01-01 PROCEDURE — C1751 CATH, INF, PER/CENT/MIDLINE: HCPCS

## 2020-01-01 PROCEDURE — 95700 EEG CONT REC W/VID EEG TECH: CPT

## 2020-01-01 PROCEDURE — 36556 INSERT NON-TUNNEL CV CATH: CPT | Mod: ,,, | Performed by: ANESTHESIOLOGY

## 2020-01-01 PROCEDURE — 99233 PR SUBSEQUENT HOSPITAL CARE,LEVL III: ICD-10-PCS | Mod: ,,, | Performed by: INTERNAL MEDICINE

## 2020-01-01 PROCEDURE — 25000242 PHARM REV CODE 250 ALT 637 W/ HCPCS: Performed by: NURSE PRACTITIONER

## 2020-01-01 PROCEDURE — 85730 THROMBOPLASTIN TIME PARTIAL: CPT | Mod: 91

## 2020-01-01 PROCEDURE — 94770 HC EXHALED C02 TEST: CPT

## 2020-01-01 PROCEDURE — 94003 VENT MGMT INPAT SUBQ DAY: CPT

## 2020-01-01 PROCEDURE — 63700000 PHARM REV CODE 250 ALT 637 W/O HCPCS: Performed by: STUDENT IN AN ORGANIZED HEALTH CARE EDUCATION/TRAINING PROGRAM

## 2020-01-01 PROCEDURE — 93010 ELECTROCARDIOGRAM REPORT: CPT | Mod: ,,, | Performed by: INTERNAL MEDICINE

## 2020-01-01 PROCEDURE — 87205 SMEAR GRAM STAIN: CPT

## 2020-01-01 PROCEDURE — 63600175 PHARM REV CODE 636 W HCPCS: Performed by: CLINICAL NURSE SPECIALIST

## 2020-01-01 PROCEDURE — 63600175 PHARM REV CODE 636 W HCPCS: Performed by: HOSPITALIST

## 2020-01-01 PROCEDURE — 93005 ELECTROCARDIOGRAM TRACING: CPT

## 2020-01-01 PROCEDURE — 83880 ASSAY OF NATRIURETIC PEPTIDE: CPT | Mod: 91

## 2020-01-01 PROCEDURE — 25000003 PHARM REV CODE 250: Performed by: INTERNAL MEDICINE

## 2020-01-01 PROCEDURE — 94761 N-INVAS EAR/PLS OXIMETRY MLT: CPT

## 2020-01-01 PROCEDURE — C9113 INJ PANTOPRAZOLE SODIUM, VIA: HCPCS | Performed by: CLINICAL NURSE SPECIALIST

## 2020-01-01 PROCEDURE — S0028 INJECTION, FAMOTIDINE, 20 MG: HCPCS | Performed by: CLINICAL NURSE SPECIALIST

## 2020-01-01 PROCEDURE — 25000003 PHARM REV CODE 250: Performed by: NURSE PRACTITIONER

## 2020-01-01 PROCEDURE — 99900035 HC TECH TIME PER 15 MIN (STAT)

## 2020-01-01 PROCEDURE — 99233 SBSQ HOSP IP/OBS HIGH 50: CPT | Mod: ,,, | Performed by: INTERNAL MEDICINE

## 2020-01-01 PROCEDURE — 82728 ASSAY OF FERRITIN: CPT

## 2020-01-01 PROCEDURE — 1159F MED LIST DOCD IN RCRD: CPT | Mod: 95,,, | Performed by: INTERNAL MEDICINE

## 2020-01-01 PROCEDURE — 82803 BLOOD GASES ANY COMBINATION: CPT

## 2020-01-01 PROCEDURE — 99291 PR CRITICAL CARE, E/M 30-74 MINUTES: ICD-10-PCS | Mod: ,,, | Performed by: ANESTHESIOLOGY

## 2020-01-01 PROCEDURE — 87070 CULTURE OTHR SPECIMN AEROBIC: CPT

## 2020-01-01 PROCEDURE — 99292 CRITICAL CARE ADDL 30 MIN: CPT | Mod: ,,, | Performed by: PEDIATRICS

## 2020-01-01 PROCEDURE — C9399 UNCLASSIFIED DRUGS OR BIOLOG: HCPCS | Performed by: STUDENT IN AN ORGANIZED HEALTH CARE EDUCATION/TRAINING PROGRAM

## 2020-01-01 PROCEDURE — 80048 BASIC METABOLIC PNL TOTAL CA: CPT | Mod: 91

## 2020-01-01 PROCEDURE — 31500 INTUBATION: ICD-10-PCS | Mod: ,,, | Performed by: EMERGENCY MEDICINE

## 2020-01-01 PROCEDURE — 85379 FIBRIN DEGRADATION QUANT: CPT

## 2020-01-01 PROCEDURE — 3075F SYST BP GE 130 - 139MM HG: CPT | Mod: CPTII,S$GLB,, | Performed by: FAMILY MEDICINE

## 2020-01-01 PROCEDURE — 36600 WITHDRAWAL OF ARTERIAL BLOOD: CPT

## 2020-01-01 PROCEDURE — S0028 INJECTION, FAMOTIDINE, 20 MG: HCPCS | Performed by: INTERNAL MEDICINE

## 2020-01-01 PROCEDURE — 99291 CRITICAL CARE FIRST HOUR: CPT | Mod: GC,,, | Performed by: INTERNAL MEDICINE

## 2020-01-01 PROCEDURE — 99291 PR CRITICAL CARE, E/M 30-74 MINUTES: ICD-10-PCS | Mod: GC,,, | Performed by: INTERNAL MEDICINE

## 2020-01-01 PROCEDURE — 3051F HG A1C>EQUAL 7.0%<8.0%: CPT | Mod: CPTII,95,S$GLB, | Performed by: INTERNAL MEDICINE

## 2020-01-01 PROCEDURE — 99291 PR CRITICAL CARE, E/M 30-74 MINUTES: ICD-10-PCS | Mod: ,,, | Performed by: NURSE PRACTITIONER

## 2020-01-01 PROCEDURE — 27200966 HC CLOSED SUCTION SYSTEM

## 2020-01-01 PROCEDURE — 83520 IMMUNOASSAY QUANT NOS NONAB: CPT

## 2020-01-01 PROCEDURE — 99223 1ST HOSP IP/OBS HIGH 75: CPT | Mod: ,,, | Performed by: INTERNAL MEDICINE

## 2020-01-01 PROCEDURE — 99233 PR SUBSEQUENT HOSPITAL CARE,LEVL III: ICD-10-PCS | Mod: ,,, | Performed by: NURSE PRACTITIONER

## 2020-01-01 PROCEDURE — 99291 CRITICAL CARE FIRST HOUR: CPT | Mod: ,,, | Performed by: CLINICAL NURSE SPECIALIST

## 2020-01-01 PROCEDURE — 84145 PROCALCITONIN (PCT): CPT

## 2020-01-01 PROCEDURE — 99232 PR SUBSEQUENT HOSPITAL CARE,LEVL II: ICD-10-PCS | Mod: ,,, | Performed by: INTERNAL MEDICINE

## 2020-01-01 PROCEDURE — 87102 FUNGUS ISOLATION CULTURE: CPT

## 2020-01-01 PROCEDURE — 3074F SYST BP LT 130 MM HG: CPT | Mod: CPTII,S$GLB,, | Performed by: INTERNAL MEDICINE

## 2020-01-01 PROCEDURE — 1125F PR PAIN SEVERITY QUANTIFIED, PAIN PRESENT: ICD-10-PCS | Mod: S$GLB,,, | Performed by: NURSE PRACTITIONER

## 2020-01-01 PROCEDURE — 83605 ASSAY OF LACTIC ACID: CPT

## 2020-01-01 PROCEDURE — 36620 ARTERIAL: ICD-10-PCS | Mod: ,,, | Performed by: ANESTHESIOLOGY

## 2020-01-01 PROCEDURE — 27100171 HC OXYGEN HIGH FLOW UP TO 24 HOURS

## 2020-01-01 PROCEDURE — 99233 PR SUBSEQUENT HOSPITAL CARE,LEVL III: ICD-10-PCS | Mod: ,,, | Performed by: PEDIATRICS

## 2020-01-01 PROCEDURE — 99999 PR PBB SHADOW E&M-EST. PATIENT-LVL III: ICD-10-PCS | Mod: PBBFAC,,, | Performed by: NURSE PRACTITIONER

## 2020-01-01 PROCEDURE — 31500 INSERT EMERGENCY AIRWAY: CPT

## 2020-01-01 PROCEDURE — 83605 ASSAY OF LACTIC ACID: CPT | Mod: 91

## 2020-01-01 PROCEDURE — 12000002 HC ACUTE/MED SURGE SEMI-PRIVATE ROOM

## 2020-01-01 PROCEDURE — 99291 CRITICAL CARE FIRST HOUR: CPT | Mod: ,,, | Performed by: NURSE PRACTITIONER

## 2020-01-01 PROCEDURE — 1101F PR PT FALLS ASSESS DOC 0-1 FALLS W/OUT INJ PAST YR: ICD-10-PCS | Mod: CPTII,S$GLB,, | Performed by: NURSE PRACTITIONER

## 2020-01-01 PROCEDURE — 84484 ASSAY OF TROPONIN QUANT: CPT

## 2020-01-01 PROCEDURE — 1159F PR MEDICATION LIST DOCUMENTED IN MEDICAL RECORD: ICD-10-PCS | Mod: 95,,, | Performed by: INTERNAL MEDICINE

## 2020-01-01 PROCEDURE — 3075F PR MOST RECENT SYSTOLIC BLOOD PRESS GE 130-139MM HG: ICD-10-PCS | Mod: CPTII,S$GLB,, | Performed by: FAMILY MEDICINE

## 2020-01-01 PROCEDURE — 3077F PR MOST RECENT SYSTOLIC BLOOD PRESSURE >= 140 MM HG: ICD-10-PCS | Mod: CPTII,S$GLB,, | Performed by: NURSE PRACTITIONER

## 2020-01-01 PROCEDURE — 63600175 PHARM REV CODE 636 W HCPCS

## 2020-01-01 PROCEDURE — 99233 SBSQ HOSP IP/OBS HIGH 50: CPT | Mod: ,,, | Performed by: PEDIATRICS

## 2020-01-01 PROCEDURE — 96367 TX/PROPH/DG ADDL SEQ IV INF: CPT

## 2020-01-01 PROCEDURE — C9113 INJ PANTOPRAZOLE SODIUM, VIA: HCPCS | Performed by: STUDENT IN AN ORGANIZED HEALTH CARE EDUCATION/TRAINING PROGRAM

## 2020-01-01 PROCEDURE — 80048 BASIC METABOLIC PNL TOTAL CA: CPT

## 2020-01-01 PROCEDURE — 99497 ADVNCD CARE PLAN 30 MIN: CPT | Mod: ,,, | Performed by: INTERNAL MEDICINE

## 2020-01-01 PROCEDURE — 93010 EKG 12-LEAD: ICD-10-PCS | Mod: ,,, | Performed by: INTERNAL MEDICINE

## 2020-01-01 PROCEDURE — 99223 PR INITIAL HOSPITAL CARE,LEVL III: ICD-10-PCS | Mod: AI,GC,, | Performed by: INTERNAL MEDICINE

## 2020-01-01 PROCEDURE — 81000 URINALYSIS NONAUTO W/SCOPE: CPT

## 2020-01-01 PROCEDURE — 3077F SYST BP >= 140 MM HG: CPT | Mod: CPTII,S$GLB,, | Performed by: NURSE PRACTITIONER

## 2020-01-01 PROCEDURE — 1159F PR MEDICATION LIST DOCUMENTED IN MEDICAL RECORD: ICD-10-PCS | Mod: S$GLB,,, | Performed by: FAMILY MEDICINE

## 2020-01-01 PROCEDURE — 99214 OFFICE O/P EST MOD 30 MIN: CPT | Mod: 95,,, | Performed by: INTERNAL MEDICINE

## 2020-01-01 PROCEDURE — 82010 KETONE BODYS QUAN: CPT

## 2020-01-01 PROCEDURE — 82043 UR ALBUMIN QUANTITATIVE: CPT

## 2020-01-01 PROCEDURE — 82728 ASSAY OF FERRITIN: CPT | Mod: 91

## 2020-01-01 PROCEDURE — 27201109 HC SYSTEM FECAL MANAGEMENT

## 2020-01-01 PROCEDURE — 99291 PR CRITICAL CARE, E/M 30-74 MINUTES: ICD-10-PCS | Mod: ,,, | Performed by: CLINICAL NURSE SPECIALIST

## 2020-01-01 PROCEDURE — 1159F PR MEDICATION LIST DOCUMENTED IN MEDICAL RECORD: ICD-10-PCS | Mod: S$GLB,,, | Performed by: NURSE PRACTITIONER

## 2020-01-01 PROCEDURE — 87040 BLOOD CULTURE FOR BACTERIA: CPT

## 2020-01-01 PROCEDURE — 99291 PR CRITICAL CARE, E/M 30-74 MINUTES: ICD-10-PCS | Mod: GC,,, | Performed by: PEDIATRICS

## 2020-01-01 PROCEDURE — 96366 THER/PROPH/DIAG IV INF ADDON: CPT | Mod: 59

## 2020-01-01 PROCEDURE — 95720 EEG PHY/QHP EA INCR W/VEEG: CPT | Mod: ,,, | Performed by: PSYCHIATRY & NEUROLOGY

## 2020-01-01 PROCEDURE — 36415 COLL VENOUS BLD VENIPUNCTURE: CPT

## 2020-01-01 PROCEDURE — 99999 PR PBB SHADOW E&M-EST. PATIENT-LVL III: CPT | Mod: PBBFAC,,, | Performed by: INTERNAL MEDICINE

## 2020-01-01 PROCEDURE — 37799 UNLISTED PX VASCULAR SURGERY: CPT

## 2020-01-01 PROCEDURE — 99214 PR OFFICE/OUTPT VISIT, EST, LEVL IV, 30-39 MIN: ICD-10-PCS | Mod: S$GLB,,, | Performed by: FAMILY MEDICINE

## 2020-01-01 PROCEDURE — 1125F AMNT PAIN NOTED PAIN PRSNT: CPT | Mod: S$GLB,,, | Performed by: NURSE PRACTITIONER

## 2020-01-01 PROCEDURE — 99292 CRITICAL CARE ADDL 30 MIN: CPT | Mod: GC,,, | Performed by: INTERNAL MEDICINE

## 2020-01-01 PROCEDURE — 99214 PR OFFICE/OUTPT VISIT, EST, LEVL IV, 30-39 MIN: ICD-10-PCS | Mod: S$GLB,,, | Performed by: NURSE PRACTITIONER

## 2020-01-01 PROCEDURE — 95714 VEEG EA 12-26 HR UNMNTR: CPT

## 2020-01-01 PROCEDURE — 95720 PR EEG, W/VIDEO, CONT RECORD, I&R, >12<26 HRS: ICD-10-PCS | Mod: ,,, | Performed by: PSYCHIATRY & NEUROLOGY

## 2020-01-01 PROCEDURE — 99232 SBSQ HOSP IP/OBS MODERATE 35: CPT | Mod: GC,,, | Performed by: PSYCHIATRY & NEUROLOGY

## 2020-01-01 PROCEDURE — 99292 PR CRITICAL CARE, ADDL 30 MIN: ICD-10-PCS | Mod: ,,, | Performed by: PEDIATRICS

## 2020-01-01 PROCEDURE — 99499 UNLISTED E&M SERVICE: CPT | Mod: 95,,, | Performed by: INTERNAL MEDICINE

## 2020-01-01 PROCEDURE — 99292 PR CRITICAL CARE, ADDL 30 MIN: ICD-10-PCS | Mod: GC,,, | Performed by: INTERNAL MEDICINE

## 2020-01-01 PROCEDURE — 85007 BL SMEAR W/DIFF WBC COUNT: CPT | Mod: 91

## 2020-01-01 PROCEDURE — 99497 PR ADVNCD CARE PLAN 30 MIN: ICD-10-PCS | Mod: ,,, | Performed by: INTERNAL MEDICINE

## 2020-01-01 PROCEDURE — 87502 INFLUENZA DNA AMP PROBE: CPT | Mod: PO

## 2020-01-01 PROCEDURE — 51701 INSERT BLADDER CATHETER: CPT

## 2020-01-01 PROCEDURE — 99291 CRITICAL CARE FIRST HOUR: CPT | Mod: ,,, | Performed by: INTERNAL MEDICINE

## 2020-01-01 PROCEDURE — 99214 PR OFFICE/OUTPT VISIT, EST, LEVL IV, 30-39 MIN: ICD-10-PCS | Mod: 95,,, | Performed by: INTERNAL MEDICINE

## 2020-01-01 PROCEDURE — 63600175 PHARM REV CODE 636 W HCPCS: Performed by: PEDIATRICS

## 2020-01-01 PROCEDURE — 99231 PR SUBSEQUENT HOSPITAL CARE,LEVL I: ICD-10-PCS | Mod: GC,,, | Performed by: PSYCHIATRY & NEUROLOGY

## 2020-01-01 PROCEDURE — 94002 VENT MGMT INPAT INIT DAY: CPT

## 2020-01-01 PROCEDURE — 3078F PR MOST RECENT DIASTOLIC BLOOD PRESSURE < 80 MM HG: ICD-10-PCS | Mod: CPTII,S$GLB,, | Performed by: NURSE PRACTITIONER

## 2020-01-01 PROCEDURE — 81001 URINALYSIS AUTO W/SCOPE: CPT

## 2020-01-01 PROCEDURE — 99291 CRITICAL CARE FIRST HOUR: CPT | Mod: ,,, | Performed by: ANESTHESIOLOGY

## 2020-01-01 PROCEDURE — 3078F PR MOST RECENT DIASTOLIC BLOOD PRESSURE < 80 MM HG: ICD-10-PCS | Mod: CPTII,S$GLB,, | Performed by: FAMILY MEDICINE

## 2020-01-01 PROCEDURE — 99214 OFFICE O/P EST MOD 30 MIN: CPT | Mod: S$GLB,,, | Performed by: FAMILY MEDICINE

## 2020-01-01 PROCEDURE — U0002 COVID-19 LAB TEST NON-CDC: HCPCS

## 2020-01-01 PROCEDURE — 76937 US GUIDE VASCULAR ACCESS: CPT | Mod: 26,,, | Performed by: ANESTHESIOLOGY

## 2020-01-01 PROCEDURE — 27000190 HC CPAP FULL FACE MASK W/VALVE

## 2020-01-01 PROCEDURE — 3051F HG A1C>EQUAL 7.0%<8.0%: CPT | Mod: CPTII,S$GLB,, | Performed by: NURSE PRACTITIONER

## 2020-01-01 PROCEDURE — 99231 SBSQ HOSP IP/OBS SF/LOW 25: CPT | Mod: GC,,, | Performed by: PSYCHIATRY & NEUROLOGY

## 2020-01-01 PROCEDURE — 36556 INSERT NON-TUNNEL CV CATH: CPT

## 2020-01-01 PROCEDURE — C1752 CATH,HEMODIALYSIS,SHORT-TERM: HCPCS

## 2020-01-01 PROCEDURE — 99292 PR CRITICAL CARE, ADDL 30 MIN: ICD-10-PCS | Mod: ,,, | Performed by: NURSE PRACTITIONER

## 2020-01-01 PROCEDURE — 1159F MED LIST DOCD IN RCRD: CPT | Mod: S$GLB,,, | Performed by: NURSE PRACTITIONER

## 2020-01-01 PROCEDURE — 99232 PR SUBSEQUENT HOSPITAL CARE,LEVL II: ICD-10-PCS | Mod: GC,,, | Performed by: PSYCHIATRY & NEUROLOGY

## 2020-01-01 PROCEDURE — 31500 INSERT EMERGENCY AIRWAY: CPT | Mod: ,,, | Performed by: EMERGENCY MEDICINE

## 2020-01-01 PROCEDURE — 25000003 PHARM REV CODE 250: Performed by: HOSPITALIST

## 2020-01-01 PROCEDURE — 36556 CENTRAL LINE: ICD-10-PCS | Mod: ,,, | Performed by: ANESTHESIOLOGY

## 2020-01-01 PROCEDURE — 3074F PR MOST RECENT SYSTOLIC BLOOD PRESSURE < 130 MM HG: ICD-10-PCS | Mod: CPTII,S$GLB,, | Performed by: INTERNAL MEDICINE

## 2020-01-01 PROCEDURE — 99999 PR PBB SHADOW E&M-EST. PATIENT-LVL III: ICD-10-PCS | Mod: PBBFAC,,, | Performed by: FAMILY MEDICINE

## 2020-01-01 PROCEDURE — 83880 ASSAY OF NATRIURETIC PEPTIDE: CPT

## 2020-01-01 PROCEDURE — 87040 BLOOD CULTURE FOR BACTERIA: CPT | Mod: 59

## 2020-01-01 PROCEDURE — 1101F PR PT FALLS ASSESS DOC 0-1 FALLS W/OUT INJ PAST YR: ICD-10-PCS | Mod: CPTII,95,, | Performed by: INTERNAL MEDICINE

## 2020-01-01 PROCEDURE — 84484 ASSAY OF TROPONIN QUANT: CPT | Mod: 91

## 2020-01-01 PROCEDURE — 3078F DIAST BP <80 MM HG: CPT | Mod: CPTII,S$GLB,, | Performed by: FAMILY MEDICINE

## 2020-01-01 PROCEDURE — 1159F MED LIST DOCD IN RCRD: CPT | Mod: S$GLB,,, | Performed by: FAMILY MEDICINE

## 2020-01-01 PROCEDURE — 99221 1ST HOSP IP/OBS SF/LOW 40: CPT | Mod: ,,, | Performed by: PSYCHIATRY & NEUROLOGY

## 2020-01-01 PROCEDURE — S0028 INJECTION, FAMOTIDINE, 20 MG: HCPCS | Performed by: STUDENT IN AN ORGANIZED HEALTH CARE EDUCATION/TRAINING PROGRAM

## 2020-01-01 PROCEDURE — 3079F DIAST BP 80-89 MM HG: CPT | Mod: CPTII,S$GLB,, | Performed by: INTERNAL MEDICINE

## 2020-01-01 PROCEDURE — 99214 OFFICE O/P EST MOD 30 MIN: CPT | Mod: S$GLB,,, | Performed by: INTERNAL MEDICINE

## 2020-01-01 PROCEDURE — 99999 PR PBB SHADOW E&M-EST. PATIENT-LVL III: ICD-10-PCS | Mod: PBBFAC,,, | Performed by: INTERNAL MEDICINE

## 2020-01-01 PROCEDURE — 36620 INSERTION CATHETER ARTERY: CPT | Mod: ,,, | Performed by: ANESTHESIOLOGY

## 2020-01-01 PROCEDURE — 36620 INSERTION CATHETER ARTERY: CPT

## 2020-01-01 PROCEDURE — 99291 CRITICAL CARE FIRST HOUR: CPT | Mod: 25

## 2020-01-01 PROCEDURE — 3051F PR MOST RECENT HEMOGLOBIN A1C LEVEL 7.0 - < 8.0%: ICD-10-PCS | Mod: CPTII,95,S$GLB, | Performed by: INTERNAL MEDICINE

## 2020-01-01 PROCEDURE — 76937 US GUIDE VASCULAR ACCESS: CPT | Performed by: ANESTHESIOLOGY

## 2020-01-01 PROCEDURE — 99499 UNLISTED E&M SERVICE: CPT | Mod: S$GLB,,, | Performed by: INTERNAL MEDICINE

## 2020-01-01 PROCEDURE — 51702 INSERT TEMP BLADDER CATH: CPT

## 2020-01-01 PROCEDURE — 94660 CPAP INITIATION&MGMT: CPT

## 2020-01-01 PROCEDURE — 99999 PR PBB SHADOW E&M-EST. PATIENT-LVL III: CPT | Mod: PBBFAC,,, | Performed by: FAMILY MEDICINE

## 2020-01-01 PROCEDURE — 27200188 HC TRANSDUCER, ART ADULT/PEDS

## 2020-01-01 PROCEDURE — 3079F PR MOST RECENT DIASTOLIC BLOOD PRESSURE 80-89 MM HG: ICD-10-PCS | Mod: CPTII,S$GLB,, | Performed by: INTERNAL MEDICINE

## 2020-01-01 PROCEDURE — 1101F PT FALLS ASSESS-DOCD LE1/YR: CPT | Mod: CPTII,95,, | Performed by: INTERNAL MEDICINE

## 2020-01-01 PROCEDURE — 99499 RISK ADDL DX/OHS AUDIT: ICD-10-PCS | Mod: 95,,, | Performed by: INTERNAL MEDICINE

## 2020-01-01 PROCEDURE — 99214 OFFICE O/P EST MOD 30 MIN: CPT | Mod: S$GLB,,, | Performed by: NURSE PRACTITIONER

## 2020-01-01 PROCEDURE — 63600175 PHARM REV CODE 636 W HCPCS: Performed by: EMERGENCY MEDICINE

## 2020-01-01 PROCEDURE — 85610 PROTHROMBIN TIME: CPT

## 2020-01-01 PROCEDURE — 99499 RISK ADDL DX/OHS AUDIT: ICD-10-PCS | Mod: S$GLB,,, | Performed by: INTERNAL MEDICINE

## 2020-01-01 PROCEDURE — 1101F PT FALLS ASSESS-DOCD LE1/YR: CPT | Mod: CPTII,S$GLB,, | Performed by: FAMILY MEDICINE

## 2020-01-01 PROCEDURE — 43752 NASAL/OROGASTRIC W/TUBE PLMT: CPT

## 2020-01-01 PROCEDURE — 99999 PR PBB SHADOW E&M-EST. PATIENT-LVL III: CPT | Mod: PBBFAC,,, | Performed by: NURSE PRACTITIONER

## 2020-01-01 PROCEDURE — 99223 PR INITIAL HOSPITAL CARE,LEVL III: ICD-10-PCS | Mod: ,,, | Performed by: INTERNAL MEDICINE

## 2020-01-01 PROCEDURE — 99221 PR INITIAL HOSPITAL CARE,LEVL I: ICD-10-PCS | Mod: ,,, | Performed by: PSYCHIATRY & NEUROLOGY

## 2020-01-01 PROCEDURE — 99214 PR OFFICE/OUTPT VISIT, EST, LEVL IV, 30-39 MIN: ICD-10-PCS | Mod: S$GLB,,, | Performed by: INTERNAL MEDICINE

## 2020-01-01 PROCEDURE — 1101F PT FALLS ASSESS-DOCD LE1/YR: CPT | Mod: CPTII,S$GLB,, | Performed by: NURSE PRACTITIONER

## 2020-01-01 PROCEDURE — 99223 1ST HOSP IP/OBS HIGH 75: CPT | Mod: AI,GC,, | Performed by: INTERNAL MEDICINE

## 2020-01-01 PROCEDURE — 76937 CENTRAL LINE: ICD-10-PCS | Mod: 26,,, | Performed by: ANESTHESIOLOGY

## 2020-01-01 PROCEDURE — 25000003 PHARM REV CODE 250: Performed by: EMERGENCY MEDICINE

## 2020-01-01 PROCEDURE — 3078F DIAST BP <80 MM HG: CPT | Mod: CPTII,S$GLB,, | Performed by: NURSE PRACTITIONER

## 2020-01-01 PROCEDURE — 84156 ASSAY OF PROTEIN URINE: CPT

## 2020-01-01 RX ORDER — ETOMIDATE 2 MG/ML
0.3 INJECTION INTRAVENOUS ONCE
Status: COMPLETED | OUTPATIENT
Start: 2020-01-01 | End: 2020-01-01

## 2020-01-01 RX ORDER — ROCURONIUM BROMIDE 10 MG/ML
100 INJECTION, SOLUTION INTRAVENOUS ONCE
Status: COMPLETED | OUTPATIENT
Start: 2020-01-01 | End: 2020-01-01

## 2020-01-01 RX ORDER — AMLODIPINE BESYLATE 10 MG/1
TABLET ORAL
Qty: 90 TABLET | Refills: 3 | Status: SHIPPED | OUTPATIENT
Start: 2020-01-01 | End: 2020-01-01

## 2020-01-01 RX ORDER — ROSUVASTATIN CALCIUM 40 MG/1
40 TABLET, COATED ORAL NIGHTLY
Status: DISCONTINUED | OUTPATIENT
Start: 2020-01-01 | End: 2020-01-01

## 2020-01-01 RX ORDER — AZITHROMYCIN 250 MG/1
500 TABLET, FILM COATED ORAL ONCE
Status: COMPLETED | OUTPATIENT
Start: 2020-01-01 | End: 2020-01-01

## 2020-01-01 RX ORDER — DEXMEDETOMIDINE HYDROCHLORIDE 4 UG/ML
0.2 INJECTION, SOLUTION INTRAVENOUS CONTINUOUS
Status: DISCONTINUED | OUTPATIENT
Start: 2020-01-01 | End: 2020-01-01

## 2020-01-01 RX ORDER — FENTANYL CITRATE-0.9 % NACL/PF 10 MCG/ML
25 PLASTIC BAG, INJECTION (ML) INTRAVENOUS CONTINUOUS
Status: DISCONTINUED | OUTPATIENT
Start: 2020-01-01 | End: 2020-01-01

## 2020-01-01 RX ORDER — PROPOFOL 10 MG/ML
20 INJECTION, EMULSION INTRAVENOUS
Status: COMPLETED | OUTPATIENT
Start: 2020-01-01 | End: 2020-01-01

## 2020-01-01 RX ORDER — HYDROXYCHLOROQUINE SULFATE 200 MG/1
400 TABLET, FILM COATED ORAL DAILY
Status: COMPLETED | OUTPATIENT
Start: 2020-01-01 | End: 2020-01-01

## 2020-01-01 RX ORDER — MAGNESIUM SULFATE HEPTAHYDRATE 40 MG/ML
2 INJECTION, SOLUTION INTRAVENOUS ONCE
Status: COMPLETED | OUTPATIENT
Start: 2020-01-01 | End: 2020-01-01

## 2020-01-01 RX ORDER — HYDROMORPHONE HYDROCHLORIDE 1 MG/ML
1 INJECTION, SOLUTION INTRAMUSCULAR; INTRAVENOUS; SUBCUTANEOUS EVERY 4 HOURS PRN
Status: DISCONTINUED | OUTPATIENT
Start: 2020-01-01 | End: 2020-01-01 | Stop reason: HOSPADM

## 2020-01-01 RX ORDER — ROCURONIUM BROMIDE 10 MG/ML
1 INJECTION, SOLUTION INTRAVENOUS ONCE
Status: COMPLETED | OUTPATIENT
Start: 2020-01-01 | End: 2020-01-01

## 2020-01-01 RX ORDER — FENTANYL CITRATE-0.9 % NACL/PF 10 MCG/ML
PLASTIC BAG, INJECTION (ML) INTRAVENOUS CONTINUOUS
Status: DISCONTINUED | OUTPATIENT
Start: 2020-01-01 | End: 2020-01-01

## 2020-01-01 RX ORDER — FENTANYL CITRATE 50 UG/ML
50 INJECTION, SOLUTION INTRAMUSCULAR; INTRAVENOUS
Status: DISPENSED | OUTPATIENT
Start: 2020-01-01 | End: 2020-01-01

## 2020-01-01 RX ORDER — SUCCINYLCHOLINE CHLORIDE 20 MG/ML
INJECTION INTRAMUSCULAR; INTRAVENOUS
Status: DISPENSED
Start: 2020-01-01 | End: 2020-01-01

## 2020-01-01 RX ORDER — OXYCODONE HYDROCHLORIDE 5 MG/1
5 TABLET ORAL EVERY 6 HOURS
Status: DISCONTINUED | OUTPATIENT
Start: 2020-01-01 | End: 2020-01-01

## 2020-01-01 RX ORDER — HYDROCODONE BITARTRATE AND ACETAMINOPHEN 5; 325 MG/1; MG/1
1 TABLET ORAL EVERY 6 HOURS PRN
Qty: 60 TABLET | Refills: 0 | Status: SHIPPED | OUTPATIENT
Start: 2020-01-01 | End: 2020-01-01 | Stop reason: SDUPTHER

## 2020-01-01 RX ORDER — OMEPRAZOLE 20 MG/1
20 CAPSULE, DELAYED RELEASE ORAL DAILY
Qty: 90 CAPSULE | Refills: 3 | Status: SHIPPED | OUTPATIENT
Start: 2020-01-01

## 2020-01-01 RX ORDER — MORPHINE SULFATE/0.9% NACL/PF 1 MG/ML
1 PLASTIC BAG, INJECTION (ML) INTRAVENOUS CONTINUOUS
Status: DISCONTINUED | OUTPATIENT
Start: 2020-01-01 | End: 2020-01-01

## 2020-01-01 RX ORDER — ROCURONIUM BROMIDE 10 MG/ML
INJECTION, SOLUTION INTRAVENOUS
Status: DISPENSED
Start: 2020-01-01 | End: 2020-01-01

## 2020-01-01 RX ORDER — ALBUTEROL SULFATE 90 UG/1
2 AEROSOL, METERED RESPIRATORY (INHALATION) EVERY 6 HOURS PRN
Status: DISCONTINUED | OUTPATIENT
Start: 2020-01-01 | End: 2020-01-01

## 2020-01-01 RX ORDER — GLUCAGON 1 MG
1 KIT INJECTION
Status: DISCONTINUED | OUTPATIENT
Start: 2020-01-01 | End: 2020-01-01

## 2020-01-01 RX ORDER — NOREPINEPHRINE BITARTRATE/D5W 4MG/250ML
0.02 PLASTIC BAG, INJECTION (ML) INTRAVENOUS CONTINUOUS
Status: DISCONTINUED | OUTPATIENT
Start: 2020-01-01 | End: 2020-01-01

## 2020-01-01 RX ORDER — HYDROXYCHLOROQUINE SULFATE 200 MG/1
400 TABLET, FILM COATED ORAL 2 TIMES DAILY
Status: COMPLETED | OUTPATIENT
Start: 2020-01-01 | End: 2020-01-01

## 2020-01-01 RX ORDER — IPRATROPIUM BROMIDE AND ALBUTEROL SULFATE 2.5; .5 MG/3ML; MG/3ML
3 SOLUTION RESPIRATORY (INHALATION) EVERY 6 HOURS
Status: DISCONTINUED | OUTPATIENT
Start: 2020-01-01 | End: 2020-01-01

## 2020-01-01 RX ORDER — QUETIAPINE FUMARATE 25 MG/1
100 TABLET, FILM COATED ORAL NIGHTLY
Status: DISCONTINUED | OUTPATIENT
Start: 2020-01-01 | End: 2020-01-01

## 2020-01-01 RX ORDER — FAMOTIDINE 20 MG/1
20 TABLET, FILM COATED ORAL DAILY
Status: DISCONTINUED | OUTPATIENT
Start: 2020-01-01 | End: 2020-01-01

## 2020-01-01 RX ORDER — POLYETHYLENE GLYCOL 3350 17 G/17G
17 POWDER, FOR SOLUTION ORAL DAILY
Status: DISCONTINUED | OUTPATIENT
Start: 2020-01-01 | End: 2020-01-01

## 2020-01-01 RX ORDER — ROSUVASTATIN CALCIUM 20 MG/1
40 TABLET, COATED ORAL NIGHTLY
Qty: 180 TABLET | Refills: 3 | Status: SHIPPED | OUTPATIENT
Start: 2020-01-01 | End: 2020-01-01 | Stop reason: SDUPTHER

## 2020-01-01 RX ORDER — PROPOFOL 10 MG/ML
20 VIAL (ML) INTRAVENOUS ONCE
Status: COMPLETED | OUTPATIENT
Start: 2020-01-01 | End: 2020-01-01

## 2020-01-01 RX ORDER — MIDAZOLAM HYDROCHLORIDE 5 MG/ML
4 INJECTION INTRAMUSCULAR; INTRAVENOUS ONCE
Status: DISCONTINUED | OUTPATIENT
Start: 2020-01-01 | End: 2020-01-01

## 2020-01-01 RX ORDER — FAMOTIDINE 10 MG/ML
20 INJECTION INTRAVENOUS NIGHTLY
Status: DISCONTINUED | OUTPATIENT
Start: 2020-01-01 | End: 2020-01-01

## 2020-01-01 RX ORDER — FUROSEMIDE 10 MG/ML
60 INJECTION INTRAMUSCULAR; INTRAVENOUS ONCE
Status: COMPLETED | OUTPATIENT
Start: 2020-01-01 | End: 2020-01-01

## 2020-01-01 RX ORDER — CEFEPIME HYDROCHLORIDE 2 G/1
2 INJECTION, POWDER, FOR SOLUTION INTRAVENOUS
Status: DISCONTINUED | OUTPATIENT
Start: 2020-01-01 | End: 2020-01-01

## 2020-01-01 RX ORDER — ACETAMINOPHEN 650 MG/1
650 SUPPOSITORY RECTAL EVERY 6 HOURS PRN
Status: DISCONTINUED | OUTPATIENT
Start: 2020-01-01 | End: 2020-01-01

## 2020-01-01 RX ORDER — LORAZEPAM 2 MG/ML
2 INJECTION INTRAMUSCULAR ONCE
Status: COMPLETED | OUTPATIENT
Start: 2020-01-01 | End: 2020-01-01

## 2020-01-01 RX ORDER — AMIODARONE HYDROCHLORIDE 200 MG/1
200 TABLET ORAL DAILY
Status: DISCONTINUED | OUTPATIENT
Start: 2020-01-01 | End: 2020-01-01

## 2020-01-01 RX ORDER — LORAZEPAM 2 MG/ML
INJECTION INTRAMUSCULAR
Status: COMPLETED
Start: 2020-01-01 | End: 2020-01-01

## 2020-01-01 RX ORDER — CLOPIDOGREL BISULFATE 75 MG/1
75 TABLET ORAL DAILY
Status: DISCONTINUED | OUTPATIENT
Start: 2020-01-01 | End: 2020-01-01 | Stop reason: HOSPADM

## 2020-01-01 RX ORDER — MIDAZOLAM HYDROCHLORIDE 1 MG/ML
2 INJECTION INTRAMUSCULAR; INTRAVENOUS
Status: DISCONTINUED | OUTPATIENT
Start: 2020-01-01 | End: 2020-01-01 | Stop reason: HOSPADM

## 2020-01-01 RX ORDER — HEPARIN SODIUM,PORCINE/D5W 25000/250
25 INTRAVENOUS SOLUTION INTRAVENOUS CONTINUOUS
Status: DISCONTINUED | OUTPATIENT
Start: 2020-01-01 | End: 2020-01-01

## 2020-01-01 RX ORDER — FENTANYL CITRATE 50 UG/ML
50 INJECTION, SOLUTION INTRAMUSCULAR; INTRAVENOUS
Status: DISCONTINUED | OUTPATIENT
Start: 2020-01-01 | End: 2020-01-01

## 2020-01-01 RX ORDER — FAMOTIDINE 10 MG/ML
20 INJECTION INTRAVENOUS EVERY 12 HOURS
Status: DISCONTINUED | OUTPATIENT
Start: 2020-01-01 | End: 2020-01-01

## 2020-01-01 RX ORDER — INSULIN ASPART 100 [IU]/ML
0-5 INJECTION, SOLUTION INTRAVENOUS; SUBCUTANEOUS EVERY 6 HOURS PRN
Status: DISCONTINUED | OUTPATIENT
Start: 2020-01-01 | End: 2020-01-01 | Stop reason: HOSPADM

## 2020-01-01 RX ORDER — TRIAMCINOLONE ACETONIDE 1 MG/G
CREAM TOPICAL DAILY PRN
Qty: 80 G | Refills: 0 | Status: SHIPPED | OUTPATIENT
Start: 2020-01-01

## 2020-01-01 RX ORDER — LORAZEPAM 2 MG/ML
INJECTION INTRAMUSCULAR
Status: DISPENSED
Start: 2020-01-01 | End: 2020-01-01

## 2020-01-01 RX ORDER — QUETIAPINE FUMARATE 25 MG/1
50 TABLET, FILM COATED ORAL 2 TIMES DAILY
Status: DISCONTINUED | OUTPATIENT
Start: 2020-01-01 | End: 2020-01-01

## 2020-01-01 RX ORDER — HYDROCODONE BITARTRATE AND ACETAMINOPHEN 5; 325 MG/1; MG/1
1 TABLET ORAL EVERY 6 HOURS PRN
Qty: 60 TABLET | Refills: 0 | Status: CANCELLED | OUTPATIENT
Start: 2020-01-01

## 2020-01-01 RX ORDER — ACETAMINOPHEN 325 MG/1
650 TABLET ORAL EVERY 6 HOURS PRN
Status: DISCONTINUED | OUTPATIENT
Start: 2020-01-01 | End: 2020-01-01

## 2020-01-01 RX ORDER — VALSARTAN 320 MG/1
320 TABLET ORAL DAILY
Qty: 90 TABLET | Refills: 0 | Status: SHIPPED | OUTPATIENT
Start: 2020-01-01 | End: 2021-01-01

## 2020-01-01 RX ORDER — INSULIN ASPART 100 [IU]/ML
0-5 INJECTION, SOLUTION INTRAVENOUS; SUBCUTANEOUS EVERY 6 HOURS PRN
Status: DISCONTINUED | OUTPATIENT
Start: 2020-01-01 | End: 2020-01-01

## 2020-01-01 RX ORDER — ROSUVASTATIN CALCIUM 40 MG/1
40 TABLET, COATED ORAL NIGHTLY
Qty: 90 TABLET | Refills: 3 | Status: SHIPPED | OUTPATIENT
Start: 2020-01-01 | End: 2021-03-09

## 2020-01-01 RX ORDER — PANTOPRAZOLE SODIUM 40 MG/10ML
40 INJECTION, POWDER, LYOPHILIZED, FOR SOLUTION INTRAVENOUS DAILY
Status: DISCONTINUED | OUTPATIENT
Start: 2020-01-01 | End: 2020-01-01

## 2020-01-01 RX ORDER — SODIUM CHLORIDE 0.9 % (FLUSH) 0.9 %
10 SYRINGE (ML) INJECTION
Status: DISCONTINUED | OUTPATIENT
Start: 2020-01-01 | End: 2020-01-01 | Stop reason: HOSPADM

## 2020-01-01 RX ORDER — PROPOFOL 10 MG/ML
5 INJECTION, EMULSION INTRAVENOUS CONTINUOUS
Status: DISCONTINUED | OUTPATIENT
Start: 2020-01-01 | End: 2020-01-01

## 2020-01-01 RX ORDER — AMIODARONE HYDROCHLORIDE 200 MG/1
400 TABLET ORAL EVERY 8 HOURS
Status: COMPLETED | OUTPATIENT
Start: 2020-01-01 | End: 2020-01-01

## 2020-01-01 RX ORDER — FLUTICASONE FUROATE AND VILANTEROL 200; 25 UG/1; UG/1
1 POWDER RESPIRATORY (INHALATION) DAILY
Status: DISCONTINUED | OUTPATIENT
Start: 2020-01-01 | End: 2020-01-01

## 2020-01-01 RX ORDER — FENOFIBRATE 145 MG/1
145 TABLET, FILM COATED ORAL NIGHTLY
Qty: 90 TABLET | Refills: 3 | Status: SHIPPED | OUTPATIENT
Start: 2020-01-01

## 2020-01-01 RX ORDER — OXYCODONE HYDROCHLORIDE 10 MG/1
10 TABLET ORAL
Status: DISCONTINUED | OUTPATIENT
Start: 2020-01-01 | End: 2020-01-01

## 2020-01-01 RX ORDER — FUROSEMIDE 10 MG/ML
60 INJECTION INTRAMUSCULAR; INTRAVENOUS
Status: DISCONTINUED | OUTPATIENT
Start: 2020-01-01 | End: 2020-01-01

## 2020-01-01 RX ORDER — MORPHINE SULFATE 2 MG/ML
1 INJECTION, SOLUTION INTRAMUSCULAR; INTRAVENOUS ONCE
Status: COMPLETED | OUTPATIENT
Start: 2020-01-01 | End: 2020-01-01

## 2020-01-01 RX ORDER — HEPARIN SODIUM 5000 [USP'U]/ML
5000 INJECTION, SOLUTION INTRAVENOUS; SUBCUTANEOUS EVERY 8 HOURS
Status: DISCONTINUED | OUTPATIENT
Start: 2020-01-01 | End: 2020-01-01

## 2020-01-01 RX ORDER — HYDROXYCHLOROQUINE SULFATE 200 MG/1
400 TABLET, FILM COATED ORAL 2 TIMES DAILY
Status: DISCONTINUED | OUTPATIENT
Start: 2020-01-01 | End: 2020-01-01 | Stop reason: HOSPADM

## 2020-01-01 RX ORDER — CLOPIDOGREL BISULFATE 75 MG/1
75 TABLET ORAL DAILY
Status: DISCONTINUED | OUTPATIENT
Start: 2020-01-01 | End: 2020-01-01

## 2020-01-01 RX ORDER — MAGNESIUM SULFATE HEPTAHYDRATE 40 MG/ML
4 INJECTION, SOLUTION INTRAVENOUS
Status: DISCONTINUED | OUTPATIENT
Start: 2020-01-01 | End: 2020-01-01

## 2020-01-01 RX ORDER — INSULIN ASPART 100 [IU]/ML
1-10 INJECTION, SOLUTION INTRAVENOUS; SUBCUTANEOUS EVERY 4 HOURS PRN
Status: DISCONTINUED | OUTPATIENT
Start: 2020-01-01 | End: 2020-01-01

## 2020-01-01 RX ORDER — HYDROXYCHLOROQUINE SULFATE 200 MG/1
200 TABLET, FILM COATED ORAL DAILY
Status: DISCONTINUED | OUTPATIENT
Start: 2020-01-01 | End: 2020-01-01

## 2020-01-01 RX ORDER — FENTANYL CITRATE-0.9 % NACL/PF 10 MCG/ML
PLASTIC BAG, INJECTION (ML) INTRAVENOUS CONTINUOUS
Status: DISCONTINUED | OUTPATIENT
Start: 2020-01-01 | End: 2020-01-01 | Stop reason: HOSPADM

## 2020-01-01 RX ORDER — MIDAZOLAM HYDROCHLORIDE 1 MG/ML
INJECTION INTRAMUSCULAR; INTRAVENOUS
Status: COMPLETED
Start: 2020-01-01 | End: 2020-01-01

## 2020-01-01 RX ORDER — IPRATROPIUM BROMIDE AND ALBUTEROL SULFATE 2.5; .5 MG/3ML; MG/3ML
3 SOLUTION RESPIRATORY (INHALATION) EVERY 8 HOURS
Status: DISCONTINUED | OUTPATIENT
Start: 2020-01-01 | End: 2020-01-01

## 2020-01-01 RX ORDER — HYDROXYCHLOROQUINE SULFATE 200 MG/1
400 TABLET, FILM COATED ORAL DAILY
Status: DISCONTINUED | OUTPATIENT
Start: 2020-01-01 | End: 2020-01-01 | Stop reason: HOSPADM

## 2020-01-01 RX ORDER — HYDROCODONE BITARTRATE AND ACETAMINOPHEN 5; 325 MG/1; MG/1
1 TABLET ORAL EVERY 6 HOURS PRN
Qty: 60 TABLET | Refills: 0 | Status: SHIPPED | OUTPATIENT
Start: 2020-01-01

## 2020-01-01 RX ORDER — KETAMINE HCL IN 0.9 % NACL 50 MG/5 ML
1 SYRINGE (ML) INTRAVENOUS
Status: COMPLETED | OUTPATIENT
Start: 2020-01-01 | End: 2020-01-01

## 2020-01-01 RX ORDER — LABETALOL HCL 20 MG/4 ML
10 SYRINGE (ML) INTRAVENOUS EVERY 8 HOURS PRN
Status: DISCONTINUED | OUTPATIENT
Start: 2020-01-01 | End: 2020-01-01

## 2020-01-01 RX ORDER — ATORVASTATIN CALCIUM 80 MG/1
80 TABLET, FILM COATED ORAL DAILY
Qty: 90 TABLET | Refills: 3 | Status: SHIPPED | OUTPATIENT
Start: 2020-01-01 | End: 2020-01-01 | Stop reason: ALTCHOICE

## 2020-01-01 RX ORDER — HALOPERIDOL 5 MG/ML
INJECTION INTRAMUSCULAR
Status: DISPENSED
Start: 2020-01-01 | End: 2020-01-01

## 2020-01-01 RX ORDER — LORAZEPAM 2 MG/ML
4 INJECTION INTRAMUSCULAR EVERY 4 HOURS PRN
Status: DISCONTINUED | OUTPATIENT
Start: 2020-01-01 | End: 2020-01-01 | Stop reason: HOSPADM

## 2020-01-01 RX ORDER — FUROSEMIDE 10 MG/ML
40 INJECTION INTRAMUSCULAR; INTRAVENOUS
Status: DISCONTINUED | OUTPATIENT
Start: 2020-01-01 | End: 2020-01-01

## 2020-01-01 RX ORDER — TRIAMCINOLONE ACETONIDE 1 MG/G
CREAM TOPICAL DAILY PRN
Qty: 80 G | Refills: 0 | Status: SHIPPED | OUTPATIENT
Start: 2020-01-01 | End: 2020-01-01 | Stop reason: SDUPTHER

## 2020-01-01 RX ORDER — PHENYLEPHRINE HCL IN 0.9% NACL 1 MG/10 ML
SYRINGE (ML) INTRAVENOUS
Status: DISPENSED
Start: 2020-01-01 | End: 2020-01-01

## 2020-01-01 RX ORDER — ONDANSETRON 2 MG/ML
4 INJECTION INTRAMUSCULAR; INTRAVENOUS EVERY 8 HOURS PRN
Status: DISCONTINUED | OUTPATIENT
Start: 2020-01-01 | End: 2020-01-01 | Stop reason: HOSPADM

## 2020-01-01 RX ORDER — NITROGLYCERIN 0.4 MG/1
0.4 TABLET SUBLINGUAL EVERY 5 MIN PRN
Qty: 25 TABLET | Refills: 5 | Status: SHIPPED | OUTPATIENT
Start: 2020-01-01 | End: 2021-03-16

## 2020-01-01 RX ORDER — CEFEPIME HYDROCHLORIDE 2 G/1
2 INJECTION, POWDER, FOR SOLUTION INTRAVENOUS
Status: COMPLETED | OUTPATIENT
Start: 2020-01-01 | End: 2020-01-01

## 2020-01-01 RX ORDER — AZITHROMYCIN 250 MG/1
250 TABLET, FILM COATED ORAL DAILY
Status: COMPLETED | OUTPATIENT
Start: 2020-01-01 | End: 2020-01-01

## 2020-01-01 RX ORDER — ROSUVASTATIN CALCIUM 10 MG/1
40 TABLET, COATED ORAL NIGHTLY
Status: DISCONTINUED | OUTPATIENT
Start: 2020-01-01 | End: 2020-01-01 | Stop reason: HOSPADM

## 2020-01-01 RX ORDER — VANCOMYCIN 2 GRAM/500 ML IN 0.9 % SODIUM CHLORIDE INTRAVENOUS
20 ONCE
Status: COMPLETED | OUTPATIENT
Start: 2020-01-01 | End: 2020-01-01

## 2020-01-01 RX ORDER — FUROSEMIDE 10 MG/ML
60 INJECTION INTRAMUSCULAR; INTRAVENOUS EVERY 8 HOURS
Status: DISCONTINUED | OUTPATIENT
Start: 2020-01-01 | End: 2020-01-01

## 2020-01-01 RX ORDER — FUROSEMIDE 10 MG/ML
80 INJECTION INTRAMUSCULAR; INTRAVENOUS ONCE
Status: COMPLETED | OUTPATIENT
Start: 2020-01-01 | End: 2020-01-01

## 2020-01-01 RX ORDER — HEPARIN SODIUM 5000 [USP'U]/ML
5000 INJECTION, SOLUTION INTRAVENOUS; SUBCUTANEOUS EVERY 8 HOURS
Status: DISCONTINUED | OUTPATIENT
Start: 2020-01-01 | End: 2020-01-01 | Stop reason: HOSPADM

## 2020-01-01 RX ORDER — FAMOTIDINE 20 MG/1
20 TABLET, FILM COATED ORAL DAILY
Status: DISCONTINUED | OUTPATIENT
Start: 2020-01-01 | End: 2020-01-01 | Stop reason: HOSPADM

## 2020-01-01 RX ORDER — MORPHINE SULFATE 2 MG/ML
4 INJECTION, SOLUTION INTRAMUSCULAR; INTRAVENOUS EVERY 4 HOURS PRN
Status: DISCONTINUED | OUTPATIENT
Start: 2020-01-01 | End: 2020-01-01 | Stop reason: HOSPADM

## 2020-01-01 RX ORDER — HALOPERIDOL 5 MG/ML
5 INJECTION INTRAMUSCULAR ONCE
Status: COMPLETED | OUTPATIENT
Start: 2020-01-01 | End: 2020-01-01

## 2020-01-01 RX ORDER — METOPROLOL TARTRATE 1 MG/ML
5 INJECTION, SOLUTION INTRAVENOUS ONCE
Status: COMPLETED | OUTPATIENT
Start: 2020-01-01 | End: 2020-01-01

## 2020-01-01 RX ORDER — EZETIMIBE 10 MG/1
10 TABLET ORAL NIGHTLY
Qty: 90 TABLET | Refills: 3 | Status: SHIPPED | OUTPATIENT
Start: 2020-01-01 | End: 2021-03-04

## 2020-01-01 RX ORDER — GLIMEPIRIDE 4 MG/1
4 TABLET ORAL
Qty: 90 TABLET | Refills: 3 | Status: SHIPPED | OUTPATIENT
Start: 2020-01-01 | End: 2020-01-01

## 2020-01-01 RX ORDER — ACETAMINOPHEN 650 MG/20.3ML
650 LIQUID ORAL EVERY 6 HOURS PRN
Status: DISCONTINUED | OUTPATIENT
Start: 2020-01-01 | End: 2020-01-01

## 2020-01-01 RX ORDER — CHLORHEXIDINE GLUCONATE ORAL RINSE 1.2 MG/ML
15 SOLUTION DENTAL 2 TIMES DAILY
Status: DISCONTINUED | OUTPATIENT
Start: 2020-01-01 | End: 2020-01-01

## 2020-01-01 RX ORDER — PROPOFOL 10 MG/ML
INJECTION, EMULSION INTRAVENOUS
Status: DISPENSED
Start: 2020-01-01 | End: 2020-01-01

## 2020-01-01 RX ORDER — PANTOPRAZOLE SODIUM 40 MG/1
40 FOR SUSPENSION ORAL DAILY
Status: DISCONTINUED | OUTPATIENT
Start: 2020-01-01 | End: 2020-01-01

## 2020-01-01 RX ORDER — FAMOTIDINE 10 MG/ML
20 INJECTION INTRAVENOUS DAILY
Status: DISCONTINUED | OUTPATIENT
Start: 2020-01-01 | End: 2020-01-01

## 2020-01-01 RX ORDER — ROCURONIUM BROMIDE 10 MG/ML
50 INJECTION, SOLUTION INTRAVENOUS ONCE
Status: COMPLETED | OUTPATIENT
Start: 2020-01-01 | End: 2020-01-01

## 2020-01-01 RX ORDER — LORAZEPAM 2 MG/ML
2 INJECTION INTRAMUSCULAR EVERY 4 HOURS PRN
Status: DISCONTINUED | OUTPATIENT
Start: 2020-01-01 | End: 2020-01-01

## 2020-01-01 RX ORDER — ROCURONIUM BROMIDE 10 MG/ML
INJECTION, SOLUTION INTRAVENOUS
Status: COMPLETED
Start: 2020-01-01 | End: 2020-01-01

## 2020-01-01 RX ORDER — OXYCODONE HYDROCHLORIDE 5 MG/1
5 TABLET ORAL
Status: DISCONTINUED | OUTPATIENT
Start: 2020-01-01 | End: 2020-01-01

## 2020-01-01 RX ORDER — MIDAZOLAM HYDROCHLORIDE 5 MG/ML
5 INJECTION INTRAMUSCULAR; INTRAVENOUS ONCE
Status: COMPLETED | OUTPATIENT
Start: 2020-01-01 | End: 2020-01-01

## 2020-01-01 RX ORDER — FAMOTIDINE 10 MG/ML
20 INJECTION INTRAVENOUS 2 TIMES DAILY
Status: DISCONTINUED | OUTPATIENT
Start: 2020-01-01 | End: 2020-01-01

## 2020-01-01 RX ORDER — SODIUM CHLORIDE, SODIUM LACTATE, POTASSIUM CHLORIDE, CALCIUM CHLORIDE 600; 310; 30; 20 MG/100ML; MG/100ML; MG/100ML; MG/100ML
INJECTION, SOLUTION INTRAVENOUS CONTINUOUS
Status: ACTIVE | OUTPATIENT
Start: 2020-01-01 | End: 2020-01-01

## 2020-01-01 RX ORDER — FUROSEMIDE 10 MG/ML
80 INJECTION INTRAMUSCULAR; INTRAVENOUS EVERY 8 HOURS
Status: DISCONTINUED | OUTPATIENT
Start: 2020-01-01 | End: 2020-01-01

## 2020-01-01 RX ORDER — MORPHINE SULFATE 1 MG/ML
10 INJECTION, SOLUTION INTRAVENOUS CONTINUOUS
Status: DISCONTINUED | OUTPATIENT
Start: 2020-01-01 | End: 2020-01-01 | Stop reason: HOSPADM

## 2020-01-01 RX ORDER — GLUCAGON 1 MG
1 KIT INJECTION
Status: DISCONTINUED | OUTPATIENT
Start: 2020-01-01 | End: 2020-01-01 | Stop reason: HOSPADM

## 2020-01-01 RX ORDER — FUROSEMIDE 10 MG/ML
40 INJECTION INTRAMUSCULAR; INTRAVENOUS EVERY 8 HOURS
Status: DISCONTINUED | OUTPATIENT
Start: 2020-01-01 | End: 2020-01-01

## 2020-01-01 RX ORDER — POTASSIUM CHLORIDE 14.9 MG/ML
60 INJECTION INTRAVENOUS
Status: DISCONTINUED | OUTPATIENT
Start: 2020-01-01 | End: 2020-01-01

## 2020-01-01 RX ORDER — LORAZEPAM 2 MG/ML
1 INJECTION INTRAMUSCULAR ONCE
Status: COMPLETED | OUTPATIENT
Start: 2020-01-01 | End: 2020-01-01

## 2020-01-01 RX ORDER — ACETAMINOPHEN 325 MG/1
TABLET ORAL
Status: COMPLETED
Start: 2020-01-01 | End: 2020-01-01

## 2020-01-01 RX ORDER — PROPOFOL 10 MG/ML
INJECTION, EMULSION INTRAVENOUS
Status: COMPLETED
Start: 2020-01-01 | End: 2020-01-01

## 2020-01-01 RX ORDER — POTASSIUM CHLORIDE 29.8 MG/ML
80 INJECTION INTRAVENOUS
Status: DISCONTINUED | OUTPATIENT
Start: 2020-01-01 | End: 2020-01-01

## 2020-01-01 RX ORDER — SCOLOPAMINE TRANSDERMAL SYSTEM 1 MG/1
1 PATCH, EXTENDED RELEASE TRANSDERMAL
Status: DISCONTINUED | OUTPATIENT
Start: 2020-01-01 | End: 2020-01-01 | Stop reason: HOSPADM

## 2020-01-01 RX ORDER — CLOPIDOGREL BISULFATE 75 MG/1
75 TABLET ORAL DAILY
Qty: 90 TABLET | Refills: 3 | Status: SHIPPED | OUTPATIENT
Start: 2020-01-01 | End: 2020-01-01

## 2020-01-01 RX ORDER — PANTOPRAZOLE SODIUM 40 MG/10ML
40 INJECTION, POWDER, LYOPHILIZED, FOR SOLUTION INTRAVENOUS 2 TIMES DAILY
Status: DISCONTINUED | OUTPATIENT
Start: 2020-01-01 | End: 2020-01-01

## 2020-01-01 RX ORDER — POTASSIUM CHLORIDE 29.8 MG/ML
40 INJECTION INTRAVENOUS
Status: DISCONTINUED | OUTPATIENT
Start: 2020-01-01 | End: 2020-01-01

## 2020-01-01 RX ORDER — ETOMIDATE 2 MG/ML
INJECTION INTRAVENOUS
Status: COMPLETED
Start: 2020-01-01 | End: 2020-01-01

## 2020-01-01 RX ORDER — MAGNESIUM SULFATE HEPTAHYDRATE 40 MG/ML
2 INJECTION, SOLUTION INTRAVENOUS
Status: DISCONTINUED | OUTPATIENT
Start: 2020-01-01 | End: 2020-01-01

## 2020-01-01 RX ORDER — POLYETHYLENE GLYCOL 3350 17 G/17G
17 POWDER, FOR SOLUTION ORAL 2 TIMES DAILY
Status: DISCONTINUED | OUTPATIENT
Start: 2020-01-01 | End: 2020-01-01

## 2020-01-01 RX ADMIN — CLOPIDOGREL BISULFATE 75 MG: 75 TABLET, FILM COATED ORAL at 08:04

## 2020-01-01 RX ADMIN — ROCURONIUM BROMIDE 109 MG: 10 INJECTION INTRAVENOUS at 06:03

## 2020-01-01 RX ADMIN — MICAFUNGIN SODIUM 100 MG: 20 INJECTION, POWDER, LYOPHILIZED, FOR SOLUTION INTRAVENOUS at 08:04

## 2020-01-01 RX ADMIN — DEXMEDETOMIDINE HYDROCHLORIDE 1.4 MCG/KG/HR: 100 INJECTION, SOLUTION, CONCENTRATE INTRAVENOUS at 02:04

## 2020-01-01 RX ADMIN — VANCOMYCIN HYDROCHLORIDE 1500 MG: 1.5 INJECTION, POWDER, LYOPHILIZED, FOR SOLUTION INTRAVENOUS at 02:04

## 2020-01-01 RX ADMIN — VANCOMYCIN HYDROCHLORIDE 1500 MG: 1.5 INJECTION, POWDER, LYOPHILIZED, FOR SOLUTION INTRAVENOUS at 05:04

## 2020-01-01 RX ADMIN — DEXMEDETOMIDINE HYDROCHLORIDE 0.4 MCG/KG/HR: 100 INJECTION, SOLUTION, CONCENTRATE INTRAVENOUS at 09:04

## 2020-01-01 RX ADMIN — HEPARIN SODIUM 25 UNITS/KG/HR: 10000 INJECTION, SOLUTION INTRAVENOUS at 04:04

## 2020-01-01 RX ADMIN — IPRATROPIUM BROMIDE AND ALBUTEROL SULFATE 3 ML: .5; 3 SOLUTION RESPIRATORY (INHALATION) at 12:04

## 2020-01-01 RX ADMIN — PROPOFOL 50 MCG/KG/MIN: 10 INJECTION, EMULSION INTRAVENOUS at 11:04

## 2020-01-01 RX ADMIN — CEFTRIAXONE 2 G: 2 INJECTION, SOLUTION INTRAVENOUS at 06:03

## 2020-01-01 RX ADMIN — FENTANYL CITRATE 2500 MCG: 50 INJECTION INTRAMUSCULAR; INTRAVENOUS at 06:04

## 2020-01-01 RX ADMIN — FENTANYL CITRATE 2500 MCG: 50 INJECTION INTRAMUSCULAR; INTRAVENOUS at 09:04

## 2020-01-01 RX ADMIN — NOREPINEPHRINE BITARTRATE 0.1 MCG/KG/MIN: 1 INJECTION, SOLUTION, CONCENTRATE INTRAVENOUS at 03:04

## 2020-01-01 RX ADMIN — DEXMEDETOMIDINE HYDROCHLORIDE 1.4 MCG/KG/HR: 100 INJECTION, SOLUTION, CONCENTRATE INTRAVENOUS at 12:04

## 2020-01-01 RX ADMIN — CLOPIDOGREL BISULFATE 75 MG: 75 TABLET, FILM COATED ORAL at 09:04

## 2020-01-01 RX ADMIN — PROPOFOL 50 MCG/KG/MIN: 10 INJECTION, EMULSION INTRAVENOUS at 03:04

## 2020-01-01 RX ADMIN — AMIODARONE HYDROCHLORIDE 200 MG: 200 TABLET ORAL at 08:04

## 2020-01-01 RX ADMIN — DEXMEDETOMIDINE HYDROCHLORIDE 0.7 MCG/KG/HR: 100 INJECTION, SOLUTION, CONCENTRATE INTRAVENOUS at 09:03

## 2020-01-01 RX ADMIN — SODIUM CHLORIDE, SODIUM LACTATE, POTASSIUM CHLORIDE, AND CALCIUM CHLORIDE 1000 ML: .6; .31; .03; .02 INJECTION, SOLUTION INTRAVENOUS at 01:03

## 2020-01-01 RX ADMIN — REMIFENTANIL HYDROCHLORIDE 0.15 MCG/KG/MIN: 1 INJECTION, POWDER, LYOPHILIZED, FOR SOLUTION INTRAVENOUS at 12:04

## 2020-01-01 RX ADMIN — POLYETHYLENE GLYCOL 3350 17 G: 17 POWDER, FOR SOLUTION ORAL at 08:04

## 2020-01-01 RX ADMIN — PROPOFOL 50 MCG/KG/MIN: 10 INJECTION, EMULSION INTRAVENOUS at 04:04

## 2020-01-01 RX ADMIN — CEFEPIME 2 G: 2 INJECTION, POWDER, FOR SOLUTION INTRAVENOUS at 12:04

## 2020-01-01 RX ADMIN — PANTOPRAZOLE SODIUM 40 MG: 40 GRANULE, DELAYED RELEASE ORAL at 08:04

## 2020-01-01 RX ADMIN — HEPARIN SODIUM 8 UNITS/KG/HR: 10000 INJECTION, SOLUTION INTRAVENOUS at 12:03

## 2020-01-01 RX ADMIN — FAMOTIDINE 20 MG: 10 INJECTION INTRAVENOUS at 08:04

## 2020-01-01 RX ADMIN — SODIUM CHLORIDE 4 UNITS/HR: 9 INJECTION, SOLUTION INTRAVENOUS at 04:03

## 2020-01-01 RX ADMIN — IPRATROPIUM BROMIDE AND ALBUTEROL SULFATE 3 ML: .5; 3 SOLUTION RESPIRATORY (INHALATION) at 08:04

## 2020-01-01 RX ADMIN — LORAZEPAM 2 MG: 2 INJECTION, SOLUTION INTRAMUSCULAR; INTRAVENOUS at 08:04

## 2020-01-01 RX ADMIN — HEPARIN SODIUM 25 UNITS/KG/HR: 10000 INJECTION, SOLUTION INTRAVENOUS at 02:04

## 2020-01-01 RX ADMIN — PIPERACILLIN AND TAZOBACTAM 4.5 G: 4; .5 INJECTION, POWDER, LYOPHILIZED, FOR SOLUTION INTRAVENOUS; PARENTERAL at 06:04

## 2020-01-01 RX ADMIN — LORAZEPAM 4 MG/HR: 2 INJECTION INTRAMUSCULAR; INTRAVENOUS at 11:04

## 2020-01-01 RX ADMIN — Medication 300 MCG/HR: at 04:03

## 2020-01-01 RX ADMIN — DEXMEDETOMIDINE HYDROCHLORIDE 1 MCG/KG/HR: 100 INJECTION, SOLUTION, CONCENTRATE INTRAVENOUS at 01:04

## 2020-01-01 RX ADMIN — ROCURONIUM BROMIDE 100 MG: 10 INJECTION, SOLUTION INTRAVENOUS at 02:04

## 2020-01-01 RX ADMIN — DEXTROSE 1 MCG/KG/MIN: 50 INJECTION, SOLUTION INTRAVENOUS at 05:03

## 2020-01-01 RX ADMIN — FUROSEMIDE 40 MG: 10 INJECTION, SOLUTION INTRAMUSCULAR; INTRAVENOUS at 08:04

## 2020-01-01 RX ADMIN — FUROSEMIDE 60 MG: 10 INJECTION, SOLUTION INTRAVENOUS at 08:04

## 2020-01-01 RX ADMIN — QUETIAPINE FUMARATE 100 MG: 25 TABLET ORAL at 08:04

## 2020-01-01 RX ADMIN — Medication 50 MCG/HR: at 04:04

## 2020-01-01 RX ADMIN — ACETAMINOPHEN 650 MG: 160 SOLUTION ORAL at 06:04

## 2020-01-01 RX ADMIN — DEXMEDETOMIDINE HYDROCHLORIDE 1.4 MCG/KG/HR: 100 INJECTION, SOLUTION, CONCENTRATE INTRAVENOUS at 04:04

## 2020-01-01 RX ADMIN — FENTANYL CITRATE 250 MCG/HR: 50 INJECTION INTRAVENOUS at 10:04

## 2020-01-01 RX ADMIN — IPRATROPIUM BROMIDE AND ALBUTEROL SULFATE 3 ML: .5; 3 SOLUTION RESPIRATORY (INHALATION) at 09:04

## 2020-01-01 RX ADMIN — LORAZEPAM 4 MG/HR: 2 INJECTION INTRAMUSCULAR; INTRAVENOUS at 09:04

## 2020-01-01 RX ADMIN — PROPOFOL 50 MCG/KG/MIN: 10 INJECTION, EMULSION INTRAVENOUS at 05:04

## 2020-01-01 RX ADMIN — ROCURONIUM BROMIDE 109 MG: 10 INJECTION, SOLUTION INTRAVENOUS at 01:04

## 2020-01-01 RX ADMIN — FENTANYL CITRATE: 50 INJECTION INTRAMUSCULAR; INTRAVENOUS at 07:03

## 2020-01-01 RX ADMIN — CHLORHEXIDINE GLUCONATE 0.12% ORAL RINSE 15 ML: 1.2 LIQUID ORAL at 01:04

## 2020-01-01 RX ADMIN — FUROSEMIDE 60 MG: 10 INJECTION, SOLUTION INTRAVENOUS at 04:04

## 2020-01-01 RX ADMIN — HEPARIN SODIUM 10 UNITS/KG/HR: 10000 INJECTION, SOLUTION INTRAVENOUS at 05:04

## 2020-01-01 RX ADMIN — PROPOFOL 20 MCG/KG/MIN: 10 INJECTION, EMULSION INTRAVENOUS at 01:04

## 2020-01-01 RX ADMIN — SODIUM CHLORIDE 4 UNITS/HR: 9 INJECTION, SOLUTION INTRAVENOUS at 06:04

## 2020-01-01 RX ADMIN — PROPOFOL 50 MCG/KG/MIN: 10 INJECTION, EMULSION INTRAVENOUS at 01:04

## 2020-01-01 RX ADMIN — HYDROXYCHLOROQUINE SULFATE 400 MG: 200 TABLET, FILM COATED ORAL at 08:04

## 2020-01-01 RX ADMIN — PANTOPRAZOLE SODIUM 40 MG: 40 INJECTION, POWDER, FOR SOLUTION INTRAVENOUS at 08:04

## 2020-01-01 RX ADMIN — PIPERACILLIN AND TAZOBACTAM 4.5 G: 4; .5 INJECTION, POWDER, LYOPHILIZED, FOR SOLUTION INTRAVENOUS; PARENTERAL at 07:04

## 2020-01-01 RX ADMIN — SODIUM CHLORIDE 14.6 UNITS/HR: 9 INJECTION, SOLUTION INTRAVENOUS at 06:03

## 2020-01-01 RX ADMIN — AMIODARONE HYDROCHLORIDE 1 MG/MIN: 1.8 INJECTION, SOLUTION INTRAVENOUS at 09:03

## 2020-01-01 RX ADMIN — PROPOFOL 40 MCG/KG/MIN: 10 INJECTION, EMULSION INTRAVENOUS at 02:04

## 2020-01-01 RX ADMIN — AZITHROMYCIN MONOHYDRATE 250 MG: 250 TABLET ORAL at 08:03

## 2020-01-01 RX ADMIN — OXYCODONE HYDROCHLORIDE 5 MG: 5 TABLET ORAL at 05:04

## 2020-01-01 RX ADMIN — ROCURONIUM BROMIDE 50 MG: 10 INJECTION, SOLUTION INTRAVENOUS at 06:04

## 2020-01-01 RX ADMIN — AMIODARONE HYDROCHLORIDE 400 MG: 200 TABLET ORAL at 06:03

## 2020-01-01 RX ADMIN — PROPOFOL 35 MCG/KG/MIN: 10 INJECTION, EMULSION INTRAVENOUS at 01:04

## 2020-01-01 RX ADMIN — PIPERACILLIN AND TAZOBACTAM 4.5 G: 4; .5 INJECTION, POWDER, LYOPHILIZED, FOR SOLUTION INTRAVENOUS; PARENTERAL at 10:04

## 2020-01-01 RX ADMIN — NOREPINEPHRINE BITARTRATE 0.28 MCG/KG/MIN: 1 INJECTION, SOLUTION, CONCENTRATE INTRAVENOUS at 09:03

## 2020-01-01 RX ADMIN — AMIODARONE HYDROCHLORIDE 400 MG: 200 TABLET ORAL at 04:03

## 2020-01-01 RX ADMIN — PROPOFOL 50 MCG/KG/MIN: 10 INJECTION, EMULSION INTRAVENOUS at 06:04

## 2020-01-01 RX ADMIN — FENTANYL CITRATE 200 MCG/HR: 50 INJECTION INTRAVENOUS at 09:04

## 2020-01-01 RX ADMIN — CHLORHEXIDINE GLUCONATE 0.12% ORAL RINSE 15 ML: 1.2 LIQUID ORAL at 08:04

## 2020-01-01 RX ADMIN — HEPARIN SODIUM 25 UNITS/KG/HR: 10000 INJECTION, SOLUTION INTRAVENOUS at 03:04

## 2020-01-01 RX ADMIN — Medication 0.22 MCG/KG/MIN: at 08:03

## 2020-01-01 RX ADMIN — HYDROMORPHONE HYDROCHLORIDE 2 MG/HR: 2 INJECTION INTRAMUSCULAR; INTRAVENOUS; SUBCUTANEOUS at 01:04

## 2020-01-01 RX ADMIN — MINERAL OIL AND PETROLATUM: 150; 830 OINTMENT OPHTHALMIC at 08:04

## 2020-01-01 RX ADMIN — SODIUM CHLORIDE, SODIUM LACTATE, POTASSIUM CHLORIDE, AND CALCIUM CHLORIDE 500 ML: .6; .31; .03; .02 INJECTION, SOLUTION INTRAVENOUS at 05:03

## 2020-01-01 RX ADMIN — IPRATROPIUM BROMIDE AND ALBUTEROL SULFATE 3 ML: .5; 3 SOLUTION RESPIRATORY (INHALATION) at 01:04

## 2020-01-01 RX ADMIN — DEXMEDETOMIDINE HYDROCHLORIDE 1 MCG/KG/HR: 100 INJECTION, SOLUTION, CONCENTRATE INTRAVENOUS at 11:04

## 2020-01-01 RX ADMIN — NOREPINEPHRINE BITARTRATE 0.07 MCG/KG/MIN: 1 INJECTION, SOLUTION, CONCENTRATE INTRAVENOUS at 09:04

## 2020-01-01 RX ADMIN — NOREPINEPHRINE BITARTRATE 0.06 MCG/KG/MIN: 1 INJECTION, SOLUTION, CONCENTRATE INTRAVENOUS at 09:03

## 2020-01-01 RX ADMIN — CHLORHEXIDINE GLUCONATE 0.12% ORAL RINSE 15 ML: 1.2 LIQUID ORAL at 08:03

## 2020-01-01 RX ADMIN — OXYCODONE HYDROCHLORIDE 5 MG: 5 TABLET ORAL at 08:04

## 2020-01-01 RX ADMIN — DEXMEDETOMIDINE HYDROCHLORIDE 1.4 MCG/KG/HR: 100 INJECTION, SOLUTION, CONCENTRATE INTRAVENOUS at 01:03

## 2020-01-01 RX ADMIN — HEPARIN SODIUM 16 UNITS/KG/HR: 10000 INJECTION, SOLUTION INTRAVENOUS at 05:04

## 2020-01-01 RX ADMIN — FUROSEMIDE 60 MG: 10 INJECTION, SOLUTION INTRAMUSCULAR; INTRAVENOUS at 04:04

## 2020-01-01 RX ADMIN — CHLORHEXIDINE GLUCONATE 0.12% ORAL RINSE 15 ML: 1.2 LIQUID ORAL at 09:04

## 2020-01-01 RX ADMIN — PROPOFOL 40 MCG/KG/MIN: 10 INJECTION, EMULSION INTRAVENOUS at 01:04

## 2020-01-01 RX ADMIN — HYDROMORPHONE HYDROCHLORIDE 4 MG/HR: 2 INJECTION INTRAMUSCULAR; INTRAVENOUS; SUBCUTANEOUS at 10:04

## 2020-01-01 RX ADMIN — REMIFENTANIL HYDROCHLORIDE 0.15 MCG/KG/MIN: 1 INJECTION, POWDER, LYOPHILIZED, FOR SOLUTION INTRAVENOUS at 05:04

## 2020-01-01 RX ADMIN — PROPOFOL 40 MCG/KG/MIN: 10 INJECTION, EMULSION INTRAVENOUS at 06:04

## 2020-01-01 RX ADMIN — POLYETHYLENE GLYCOL 3350 17 G: 17 POWDER, FOR SOLUTION ORAL at 09:04

## 2020-01-01 RX ADMIN — CALCIUM GLUCONATE 1 G: 98 INJECTION, SOLUTION INTRAVENOUS at 11:03

## 2020-01-01 RX ADMIN — AZITHROMYCIN MONOHYDRATE 250 MG: 250 TABLET ORAL at 08:04

## 2020-01-01 RX ADMIN — DEXMEDETOMIDINE HYDROCHLORIDE 1.4 MCG/KG/HR: 100 INJECTION, SOLUTION, CONCENTRATE INTRAVENOUS at 05:03

## 2020-01-01 RX ADMIN — CLOPIDOGREL BISULFATE 75 MG: 75 TABLET, FILM COATED ORAL at 08:03

## 2020-01-01 RX ADMIN — HEPARIN SODIUM 25 UNITS/KG/HR: 10000 INJECTION, SOLUTION INTRAVENOUS at 11:04

## 2020-01-01 RX ADMIN — PROPOFOL 30 MCG/KG/MIN: 10 INJECTION, EMULSION INTRAVENOUS at 06:04

## 2020-01-01 RX ADMIN — PROPOFOL 50 MCG/KG/MIN: 10 INJECTION, EMULSION INTRAVENOUS at 03:03

## 2020-01-01 RX ADMIN — OXYCODONE HYDROCHLORIDE 5 MG: 5 TABLET ORAL at 08:03

## 2020-01-01 RX ADMIN — FUROSEMIDE 40 MG: 10 INJECTION, SOLUTION INTRAMUSCULAR; INTRAVENOUS at 05:04

## 2020-01-01 RX ADMIN — IPRATROPIUM BROMIDE AND ALBUTEROL SULFATE 3 ML: .5; 3 SOLUTION RESPIRATORY (INHALATION) at 07:04

## 2020-01-01 RX ADMIN — PIPERACILLIN AND TAZOBACTAM 4.5 G: 4; .5 INJECTION, POWDER, LYOPHILIZED, FOR SOLUTION INTRAVENOUS; PARENTERAL at 12:04

## 2020-01-01 RX ADMIN — PROPOFOL 50 MCG/KG/MIN: 10 INJECTION, EMULSION INTRAVENOUS at 07:04

## 2020-01-01 RX ADMIN — LORAZEPAM 1 MG: 2 INJECTION INTRAMUSCULAR; INTRAVENOUS at 11:04

## 2020-01-01 RX ADMIN — NOREPINEPHRINE BITARTRATE 0.05 MCG/KG/MIN: 1 INJECTION, SOLUTION, CONCENTRATE INTRAVENOUS at 02:04

## 2020-01-01 RX ADMIN — Medication 50 MCG/HR: at 04:03

## 2020-01-01 RX ADMIN — DEXMEDETOMIDINE HYDROCHLORIDE 1.4 MCG/KG/HR: 100 INJECTION, SOLUTION, CONCENTRATE INTRAVENOUS at 05:04

## 2020-01-01 RX ADMIN — PROPOFOL 50 MCG/KG/MIN: 10 INJECTION, EMULSION INTRAVENOUS at 12:03

## 2020-01-01 RX ADMIN — DEXMEDETOMIDINE HYDROCHLORIDE 1.4 MCG/KG/HR: 100 INJECTION, SOLUTION, CONCENTRATE INTRAVENOUS at 11:04

## 2020-01-01 RX ADMIN — DEXMEDETOMIDINE HYDROCHLORIDE 0.5 MCG/KG/HR: 100 INJECTION, SOLUTION, CONCENTRATE INTRAVENOUS at 04:04

## 2020-01-01 RX ADMIN — AMIODARONE HYDROCHLORIDE 400 MG: 200 TABLET ORAL at 01:04

## 2020-01-01 RX ADMIN — AMIODARONE HYDROCHLORIDE 200 MG: 200 TABLET ORAL at 09:04

## 2020-01-01 RX ADMIN — ACETAMINOPHEN 650 MG: 160 SOLUTION ORAL at 12:04

## 2020-01-01 RX ADMIN — DEXMEDETOMIDINE HYDROCHLORIDE 0.7 MCG/KG/HR: 100 INJECTION, SOLUTION, CONCENTRATE INTRAVENOUS at 05:04

## 2020-01-01 RX ADMIN — HEPARIN SODIUM 12 UNITS/KG/HR: 10000 INJECTION, SOLUTION INTRAVENOUS at 12:03

## 2020-01-01 RX ADMIN — SODIUM CHLORIDE 7 UNITS/HR: 9 INJECTION, SOLUTION INTRAVENOUS at 11:04

## 2020-01-01 RX ADMIN — AMIODARONE HYDROCHLORIDE 400 MG: 200 TABLET ORAL at 05:04

## 2020-01-01 RX ADMIN — NOREPINEPHRINE BITARTRATE 0.09 MCG/KG/MIN: 1 INJECTION, SOLUTION, CONCENTRATE INTRAVENOUS at 09:04

## 2020-01-01 RX ADMIN — HYDROXYCHLOROQUINE SULFATE 400 MG: 200 TABLET, FILM COATED ORAL at 09:03

## 2020-01-01 RX ADMIN — Medication 108.9 MG: at 05:03

## 2020-01-01 RX ADMIN — NOREPINEPHRINE BITARTRATE 0.07 MCG/KG/MIN: 1 INJECTION, SOLUTION, CONCENTRATE INTRAVENOUS at 12:04

## 2020-01-01 RX ADMIN — PROPOFOL 20 MCG/KG/MIN: 10 INJECTION, EMULSION INTRAVENOUS at 10:03

## 2020-01-01 RX ADMIN — PROPOFOL 50 MCG/KG/MIN: 10 INJECTION, EMULSION INTRAVENOUS at 06:03

## 2020-01-01 RX ADMIN — DEXMEDETOMIDINE HYDROCHLORIDE 1.4 MCG/KG/HR: 100 INJECTION, SOLUTION, CONCENTRATE INTRAVENOUS at 06:03

## 2020-01-01 RX ADMIN — DEXMEDETOMIDINE HYDROCHLORIDE 0.8 MCG/KG/HR: 100 INJECTION, SOLUTION, CONCENTRATE INTRAVENOUS at 08:04

## 2020-01-01 RX ADMIN — PROPOFOL 50 MCG/KG/MIN: 10 INJECTION, EMULSION INTRAVENOUS at 09:03

## 2020-01-01 RX ADMIN — MIDAZOLAM HYDROCHLORIDE 2 MG: 1 INJECTION, SOLUTION INTRAMUSCULAR; INTRAVENOUS at 11:04

## 2020-01-01 RX ADMIN — Medication 50 MCG/HR: at 06:04

## 2020-01-01 RX ADMIN — INSULIN ASPART 2 UNITS: 100 INJECTION, SOLUTION INTRAVENOUS; SUBCUTANEOUS at 11:04

## 2020-01-01 RX ADMIN — DEXMEDETOMIDINE HYDROCHLORIDE 1 MCG/KG/HR: 100 INJECTION, SOLUTION, CONCENTRATE INTRAVENOUS at 09:04

## 2020-01-01 RX ADMIN — VASOPRESSIN 0.04 UNITS/MIN: 20 INJECTION INTRAVENOUS at 03:03

## 2020-01-01 RX ADMIN — CLOPIDOGREL BISULFATE 75 MG: 75 TABLET, FILM COATED ORAL at 09:03

## 2020-01-01 RX ADMIN — CEFEPIME 2 G: 2 INJECTION, POWDER, FOR SOLUTION INTRAVENOUS at 04:04

## 2020-01-01 RX ADMIN — SODIUM CHLORIDE 8.8 UNITS/HR: 9 INJECTION, SOLUTION INTRAVENOUS at 04:03

## 2020-01-01 RX ADMIN — FENTANYL CITRATE 200 MCG/HR: 50 INJECTION INTRAMUSCULAR; INTRAVENOUS at 03:04

## 2020-01-01 RX ADMIN — VASOPRESSIN 0.04 UNITS/MIN: 20 INJECTION INTRAVENOUS at 10:03

## 2020-01-01 RX ADMIN — SODIUM CHLORIDE 8.5 UNITS/HR: 9 INJECTION, SOLUTION INTRAVENOUS at 04:04

## 2020-01-01 RX ADMIN — PROPOFOL 30 MCG/KG/MIN: 10 INJECTION, EMULSION INTRAVENOUS at 03:03

## 2020-01-01 RX ADMIN — IPRATROPIUM BROMIDE AND ALBUTEROL SULFATE 3 ML: .5; 3 SOLUTION RESPIRATORY (INHALATION) at 04:04

## 2020-01-01 RX ADMIN — ACETAMINOPHEN 650 MG: 160 SOLUTION ORAL at 06:03

## 2020-01-01 RX ADMIN — METHYLPREDNISOLONE SODIUM SUCCINATE 40 MG: 40 INJECTION, POWDER, FOR SOLUTION INTRAMUSCULAR; INTRAVENOUS at 08:04

## 2020-01-01 RX ADMIN — FUROSEMIDE 60 MG: 10 INJECTION, SOLUTION INTRAVENOUS at 12:04

## 2020-01-01 RX ADMIN — ACETAMINOPHEN 650 MG: 325 TABLET ORAL at 06:03

## 2020-01-01 RX ADMIN — INSULIN ASPART 6 UNITS: 100 INJECTION, SOLUTION INTRAVENOUS; SUBCUTANEOUS at 09:04

## 2020-01-01 RX ADMIN — PROPOFOL 40 MCG/KG/MIN: 10 INJECTION, EMULSION INTRAVENOUS at 11:04

## 2020-01-01 RX ADMIN — OXYCODONE HYDROCHLORIDE 5 MG: 5 TABLET ORAL at 12:04

## 2020-01-01 RX ADMIN — PANTOPRAZOLE SODIUM 40 MG: 40 INJECTION, POWDER, FOR SOLUTION INTRAVENOUS at 09:04

## 2020-01-01 RX ADMIN — MIDAZOLAM HYDROCHLORIDE 4 MG: 1 INJECTION, SOLUTION INTRAMUSCULAR; INTRAVENOUS at 04:04

## 2020-01-01 RX ADMIN — CEFEPIME 2 G: 2 INJECTION, POWDER, FOR SOLUTION INTRAVENOUS at 09:04

## 2020-01-01 RX ADMIN — PROPOFOL 50 MCG/KG/MIN: 10 INJECTION, EMULSION INTRAVENOUS at 04:03

## 2020-01-01 RX ADMIN — HYDROMORPHONE HYDROCHLORIDE 2 MG/HR: 2 INJECTION INTRAMUSCULAR; INTRAVENOUS; SUBCUTANEOUS at 09:04

## 2020-01-01 RX ADMIN — ROCURONIUM BROMIDE 100 MG: 10 INJECTION, SOLUTION INTRAVENOUS at 06:04

## 2020-01-01 RX ADMIN — DEXMEDETOMIDINE HYDROCHLORIDE 1 MCG/KG/HR: 100 INJECTION, SOLUTION, CONCENTRATE INTRAVENOUS at 05:04

## 2020-01-01 RX ADMIN — AMIODARONE HYDROCHLORIDE 400 MG: 200 TABLET ORAL at 01:03

## 2020-01-01 RX ADMIN — DEXMEDETOMIDINE HYDROCHLORIDE 1.4 MCG/KG/HR: 100 INJECTION, SOLUTION, CONCENTRATE INTRAVENOUS at 09:03

## 2020-01-01 RX ADMIN — POLYETHYLENE GLYCOL 3350 17 G: 17 POWDER, FOR SOLUTION ORAL at 08:03

## 2020-01-01 RX ADMIN — PROPOFOL 50 MCG/KG/MIN: 10 INJECTION, EMULSION INTRAVENOUS at 02:04

## 2020-01-01 RX ADMIN — PROPOFOL 40 MCG/KG/MIN: 10 INJECTION, EMULSION INTRAVENOUS at 05:04

## 2020-01-01 RX ADMIN — FENTANYL CITRATE 25 MCG/HR: 50 INJECTION INTRAVENOUS at 06:04

## 2020-01-01 RX ADMIN — FAMOTIDINE 20 MG: 10 INJECTION INTRAVENOUS at 09:04

## 2020-01-01 RX ADMIN — Medication 3 MG/HR: at 11:04

## 2020-01-01 RX ADMIN — INSULIN ASPART 10 UNITS: 100 INJECTION, SOLUTION INTRAVENOUS; SUBCUTANEOUS at 11:04

## 2020-01-01 RX ADMIN — PANTOPRAZOLE SODIUM 40 MG: 40 INJECTION, POWDER, FOR SOLUTION INTRAVENOUS at 11:03

## 2020-01-01 RX ADMIN — MINERAL OIL AND PETROLATUM: 150; 830 OINTMENT OPHTHALMIC at 09:04

## 2020-01-01 RX ADMIN — MIDAZOLAM HYDROCHLORIDE 2 MG: 1 INJECTION, SOLUTION INTRAMUSCULAR; INTRAVENOUS at 10:04

## 2020-01-01 RX ADMIN — DEXMEDETOMIDINE HYDROCHLORIDE 1.4 MCG/KG/HR: 100 INJECTION, SOLUTION, CONCENTRATE INTRAVENOUS at 06:04

## 2020-01-01 RX ADMIN — FENTANYL CITRATE 50 MCG: 50 INJECTION INTRAMUSCULAR; INTRAVENOUS at 03:04

## 2020-01-01 RX ADMIN — SODIUM PHOSPHATE, MONOBASIC, MONOHYDRATE 15 MMOL: 276; 142 INJECTION, SOLUTION INTRAVENOUS at 09:04

## 2020-01-01 RX ADMIN — CEFEPIME 2 G: 2 INJECTION, POWDER, FOR SOLUTION INTRAVENOUS at 03:04

## 2020-01-01 RX ADMIN — DEXMEDETOMIDINE HYDROCHLORIDE 1.4 MCG/KG/HR: 100 INJECTION, SOLUTION, CONCENTRATE INTRAVENOUS at 08:04

## 2020-01-01 RX ADMIN — VASOPRESSIN 0.04 UNITS/MIN: 20 INJECTION INTRAVENOUS at 09:04

## 2020-01-01 RX ADMIN — DEXTROSE 1.5 MCG/KG/MIN: 50 INJECTION, SOLUTION INTRAVENOUS at 06:03

## 2020-01-01 RX ADMIN — FUROSEMIDE 40 MG: 10 INJECTION, SOLUTION INTRAMUSCULAR; INTRAVENOUS at 11:04

## 2020-01-01 RX ADMIN — PROPOFOL 30 MCG/KG/MIN: 10 INJECTION, EMULSION INTRAVENOUS at 03:04

## 2020-01-01 RX ADMIN — MIDAZOLAM HYDROCHLORIDE 5 MG/HR: 5 INJECTION, SOLUTION INTRAMUSCULAR; INTRAVENOUS at 06:03

## 2020-01-01 RX ADMIN — HEPARIN SODIUM 16 UNITS/KG/HR: 10000 INJECTION, SOLUTION INTRAVENOUS at 08:04

## 2020-01-01 RX ADMIN — METHYLPREDNISOLONE SODIUM SUCCINATE 40 MG: 40 INJECTION, POWDER, FOR SOLUTION INTRAMUSCULAR; INTRAVENOUS at 04:04

## 2020-01-01 RX ADMIN — LORAZEPAM 2 MG: 2 INJECTION, SOLUTION INTRAMUSCULAR; INTRAVENOUS at 04:04

## 2020-01-01 RX ADMIN — HALOPERIDOL LACTATE 5 MG: 5 INJECTION, SOLUTION INTRAMUSCULAR at 11:04

## 2020-01-01 RX ADMIN — FLUTICASONE FUROATE AND VILANTEROL TRIFENATATE 1 PUFF: 200; 25 POWDER RESPIRATORY (INHALATION) at 04:04

## 2020-01-01 RX ADMIN — ACETAMINOPHEN 650 MG: 160 SOLUTION ORAL at 05:03

## 2020-01-01 RX ADMIN — AMIODARONE HYDROCHLORIDE 400 MG: 200 TABLET ORAL at 09:03

## 2020-01-01 RX ADMIN — VASOPRESSIN 0.04 UNITS/MIN: 20 INJECTION INTRAVENOUS at 12:03

## 2020-01-01 RX ADMIN — MAGNESIUM SULFATE HEPTAHYDRATE 2 G: 40 INJECTION, SOLUTION INTRAVENOUS at 08:03

## 2020-01-01 RX ADMIN — DEXTROSE 1.5 MCG/KG/MIN: 50 INJECTION, SOLUTION INTRAVENOUS at 02:04

## 2020-01-01 RX ADMIN — VASOPRESSIN 0.04 UNITS/MIN: 20 INJECTION INTRAVENOUS at 09:03

## 2020-01-01 RX ADMIN — DEXMEDETOMIDINE HYDROCHLORIDE 0.7 MCG/KG/HR: 100 INJECTION, SOLUTION, CONCENTRATE INTRAVENOUS at 11:03

## 2020-01-01 RX ADMIN — PROPOFOL 50 MCG/KG/MIN: 10 INJECTION, EMULSION INTRAVENOUS at 08:04

## 2020-01-01 RX ADMIN — SODIUM CHLORIDE 6.5 UNITS/HR: 9 INJECTION, SOLUTION INTRAVENOUS at 11:04

## 2020-01-01 RX ADMIN — PIPERACILLIN AND TAZOBACTAM 4.5 G: 4; .5 INJECTION, POWDER, LYOPHILIZED, FOR SOLUTION INTRAVENOUS; PARENTERAL at 02:04

## 2020-01-01 RX ADMIN — DEXMEDETOMIDINE HYDROCHLORIDE 1.4 MCG/KG/HR: 100 INJECTION, SOLUTION, CONCENTRATE INTRAVENOUS at 11:03

## 2020-01-01 RX ADMIN — ETOMIDATE 23.6 MG: 2 INJECTION INTRAVENOUS at 01:04

## 2020-01-01 RX ADMIN — HEPARIN SODIUM 5000 UNITS: 5000 INJECTION, SOLUTION INTRAVENOUS; SUBCUTANEOUS at 05:03

## 2020-01-01 RX ADMIN — INSULIN ASPART 5 UNITS: 100 INJECTION, SOLUTION INTRAVENOUS; SUBCUTANEOUS at 05:04

## 2020-01-01 RX ADMIN — AMIODARONE HYDROCHLORIDE 0.5 MG/MIN: 1.8 INJECTION, SOLUTION INTRAVENOUS at 08:03

## 2020-01-01 RX ADMIN — PROPOFOL 20 MCG/KG/MIN: 10 INJECTION, EMULSION INTRAVENOUS at 10:04

## 2020-01-01 RX ADMIN — FUROSEMIDE 60 MG: 10 INJECTION, SOLUTION INTRAMUSCULAR; INTRAVENOUS at 01:04

## 2020-01-01 RX ADMIN — OXYCODONE HYDROCHLORIDE 5 MG: 5 TABLET ORAL at 03:03

## 2020-01-01 RX ADMIN — PROPOFOL 30 MCG/KG/MIN: 10 INJECTION, EMULSION INTRAVENOUS at 08:04

## 2020-01-01 RX ADMIN — MORPHINE SULFATE 4 MG: 2 INJECTION, SOLUTION INTRAMUSCULAR; INTRAVENOUS at 09:04

## 2020-01-01 RX ADMIN — VASOPRESSIN 0.04 UNITS/MIN: 20 INJECTION INTRAVENOUS at 08:04

## 2020-01-01 RX ADMIN — FENTANYL CITRATE 150 MCG: 50 INJECTION INTRAMUSCULAR; INTRAVENOUS at 03:04

## 2020-01-01 RX ADMIN — HYDROMORPHONE HYDROCHLORIDE 4 MG/HR: 2 INJECTION INTRAMUSCULAR; INTRAVENOUS; SUBCUTANEOUS at 06:04

## 2020-01-01 RX ADMIN — PIPERACILLIN AND TAZOBACTAM 4.5 G: 4; .5 INJECTION, POWDER, LYOPHILIZED, FOR SOLUTION INTRAVENOUS; PARENTERAL at 09:04

## 2020-01-01 RX ADMIN — SODIUM CHLORIDE 4.25 UNITS/HR: 9 INJECTION, SOLUTION INTRAVENOUS at 06:04

## 2020-01-01 RX ADMIN — SODIUM CHLORIDE 1000 ML: 0.9 INJECTION, SOLUTION INTRAVENOUS at 05:03

## 2020-01-01 RX ADMIN — PROPOFOL 20 MCG/KG/MIN: 10 INJECTION, EMULSION INTRAVENOUS at 05:03

## 2020-01-01 RX ADMIN — FUROSEMIDE 60 MG: 10 INJECTION, SOLUTION INTRAVENOUS at 02:04

## 2020-01-01 RX ADMIN — IPRATROPIUM BROMIDE AND ALBUTEROL SULFATE 3 ML: .5; 3 SOLUTION RESPIRATORY (INHALATION) at 11:04

## 2020-01-01 RX ADMIN — INSULIN HUMAN 5 UNITS: 100 INJECTION, SOLUTION PARENTERAL at 12:03

## 2020-01-01 RX ADMIN — OXYCODONE HYDROCHLORIDE 5 MG: 5 TABLET ORAL at 11:03

## 2020-01-01 RX ADMIN — REMIFENTANIL HYDROCHLORIDE 0.15 MCG/KG/MIN: 1 INJECTION, POWDER, LYOPHILIZED, FOR SOLUTION INTRAVENOUS at 08:04

## 2020-01-01 RX ADMIN — CHLORHEXIDINE GLUCONATE 0.12% ORAL RINSE 15 ML: 1.2 LIQUID ORAL at 09:03

## 2020-01-01 RX ADMIN — FAMOTIDINE 20 MG: 10 INJECTION INTRAVENOUS at 08:03

## 2020-01-01 RX ADMIN — Medication 15 MG/HR: at 08:04

## 2020-01-01 RX ADMIN — PIPERACILLIN AND TAZOBACTAM 4.5 G: 4; .5 INJECTION, POWDER, LYOPHILIZED, FOR SOLUTION INTRAVENOUS; PARENTERAL at 03:04

## 2020-01-01 RX ADMIN — OXYCODONE HYDROCHLORIDE 5 MG: 5 TABLET ORAL at 11:04

## 2020-01-01 RX ADMIN — DEXMEDETOMIDINE HYDROCHLORIDE 1.4 MCG/KG/HR: 100 INJECTION, SOLUTION, CONCENTRATE INTRAVENOUS at 10:04

## 2020-01-01 RX ADMIN — AMIODARONE HYDROCHLORIDE 400 MG: 200 TABLET ORAL at 10:03

## 2020-01-01 RX ADMIN — SODIUM CHLORIDE 4.2 UNITS/HR: 9 INJECTION, SOLUTION INTRAVENOUS at 09:03

## 2020-01-01 RX ADMIN — FUROSEMIDE 80 MG: 10 INJECTION, SOLUTION INTRAVENOUS at 06:03

## 2020-01-01 RX ADMIN — DEXMEDETOMIDINE HYDROCHLORIDE 0.7 MCG/KG/HR: 100 INJECTION, SOLUTION, CONCENTRATE INTRAVENOUS at 03:04

## 2020-01-01 RX ADMIN — HEPARIN SODIUM 23 UNITS/KG/HR: 10000 INJECTION, SOLUTION INTRAVENOUS at 09:04

## 2020-01-01 RX ADMIN — SODIUM CHLORIDE 6 UNITS/HR: 9 INJECTION, SOLUTION INTRAVENOUS at 01:04

## 2020-01-01 RX ADMIN — PROPOFOL 10 MCG/KG/MIN: 10 INJECTION, EMULSION INTRAVENOUS at 12:04

## 2020-01-01 RX ADMIN — REMIFENTANIL HYDROCHLORIDE 0.05 MCG/KG/MIN: 1 INJECTION, POWDER, LYOPHILIZED, FOR SOLUTION INTRAVENOUS at 04:04

## 2020-01-01 RX ADMIN — REMIFENTANIL HYDROCHLORIDE 0.15 MCG/KG/MIN: 1 INJECTION, POWDER, LYOPHILIZED, FOR SOLUTION INTRAVENOUS at 06:04

## 2020-01-01 RX ADMIN — PROPOFOL 20 MG: 10 INJECTION, EMULSION INTRAVENOUS at 06:03

## 2020-01-01 RX ADMIN — FENTANYL CITRATE 25 MCG/HR: 50 INJECTION INTRAVENOUS at 01:04

## 2020-01-01 RX ADMIN — DEXMEDETOMIDINE HYDROCHLORIDE 1.4 MCG/KG/HR: 100 INJECTION, SOLUTION, CONCENTRATE INTRAVENOUS at 09:04

## 2020-01-01 RX ADMIN — LORAZEPAM: 2 INJECTION INTRAMUSCULAR; INTRAVENOUS at 04:04

## 2020-01-01 RX ADMIN — CEFTRIAXONE 2 G: 2 INJECTION, SOLUTION INTRAVENOUS at 05:03

## 2020-01-01 RX ADMIN — FUROSEMIDE 40 MG: 10 INJECTION, SOLUTION INTRAMUSCULAR; INTRAVENOUS at 04:04

## 2020-01-01 RX ADMIN — VANCOMYCIN 2 GRAM/500 ML IN 0.9 % SODIUM CHLORIDE INTRAVENOUS 2000 MG: at 01:04

## 2020-01-01 RX ADMIN — Medication 0.32 MCG/KG/MIN: at 11:03

## 2020-01-01 RX ADMIN — SODIUM CHLORIDE, SODIUM LACTATE, POTASSIUM CHLORIDE, AND CALCIUM CHLORIDE: .6; .31; .03; .02 INJECTION, SOLUTION INTRAVENOUS at 10:04

## 2020-01-01 RX ADMIN — HALOPERIDOL LACTATE 5 MG: 5 INJECTION, SOLUTION INTRAMUSCULAR at 12:04

## 2020-01-01 RX ADMIN — HEPARIN SODIUM 13 UNITS/KG/HR: 10000 INJECTION, SOLUTION INTRAVENOUS at 03:04

## 2020-01-01 RX ADMIN — FENTANYL CITRATE 300 MCG/HR: 50 INJECTION INTRAVENOUS at 12:04

## 2020-01-01 RX ADMIN — OXYCODONE HYDROCHLORIDE 5 MG: 5 TABLET ORAL at 06:04

## 2020-01-01 RX ADMIN — AMIODARONE HYDROCHLORIDE 400 MG: 200 TABLET ORAL at 09:04

## 2020-01-01 RX ADMIN — INSULIN ASPART 4 UNITS: 100 INJECTION, SOLUTION INTRAVENOUS; SUBCUTANEOUS at 05:04

## 2020-01-01 RX ADMIN — LORAZEPAM 4 MG: 2 INJECTION INTRAMUSCULAR; INTRAVENOUS at 07:04

## 2020-01-01 RX ADMIN — NOREPINEPHRINE BITARTRATE 0.04 MCG/KG/MIN: 1 INJECTION, SOLUTION, CONCENTRATE INTRAVENOUS at 05:04

## 2020-01-01 RX ADMIN — FENTANYL CITRATE 50 MCG: 50 INJECTION INTRAMUSCULAR; INTRAVENOUS at 12:04

## 2020-01-01 RX ADMIN — PROPOFOL: 10 INJECTION, EMULSION INTRAVENOUS at 06:03

## 2020-01-01 RX ADMIN — SODIUM BICARBONATE: 84 INJECTION, SOLUTION INTRAVENOUS at 05:03

## 2020-01-01 RX ADMIN — HEPARIN SODIUM 23 UNITS/KG/HR: 10000 INJECTION, SOLUTION INTRAVENOUS at 05:04

## 2020-01-01 RX ADMIN — MINERAL OIL, WHITE PETROLATUM: .03; .94 OINTMENT OPHTHALMIC at 08:04

## 2020-01-01 RX ADMIN — FUROSEMIDE 60 MG: 10 INJECTION, SOLUTION INTRAMUSCULAR; INTRAVENOUS at 03:04

## 2020-01-01 RX ADMIN — DEXMEDETOMIDINE HYDROCHLORIDE 1.4 MCG/KG/HR: 100 INJECTION, SOLUTION, CONCENTRATE INTRAVENOUS at 01:04

## 2020-01-01 RX ADMIN — PANTOPRAZOLE SODIUM 40 MG: 40 INJECTION, POWDER, FOR SOLUTION INTRAVENOUS at 08:03

## 2020-01-01 RX ADMIN — HEPARIN SODIUM 22 UNITS/KG/HR: 10000 INJECTION, SOLUTION INTRAVENOUS at 08:04

## 2020-01-01 RX ADMIN — LORAZEPAM 4 MG: 2 INJECTION INTRAMUSCULAR; INTRAVENOUS at 06:04

## 2020-01-01 RX ADMIN — PROPOFOL 40 MCG/KG/MIN: 10 INJECTION, EMULSION INTRAVENOUS at 10:04

## 2020-01-01 RX ADMIN — MIDAZOLAM HYDROCHLORIDE 2 MG: 1 INJECTION, SOLUTION INTRAMUSCULAR; INTRAVENOUS at 01:04

## 2020-01-01 RX ADMIN — Medication 0.02 MCG/KG/MIN: at 05:03

## 2020-01-01 RX ADMIN — VASOPRESSIN 0.04 UNITS/MIN: 20 INJECTION INTRAVENOUS at 02:04

## 2020-01-01 RX ADMIN — PIPERACILLIN AND TAZOBACTAM 4.5 G: 4; .5 INJECTION, POWDER, LYOPHILIZED, FOR SOLUTION INTRAVENOUS; PARENTERAL at 05:04

## 2020-01-01 RX ADMIN — SODIUM CHLORIDE 11 UNITS/HR: 9 INJECTION, SOLUTION INTRAVENOUS at 02:04

## 2020-01-01 RX ADMIN — FENTANYL CITRATE 250 MCG/HR: 50 INJECTION INTRAMUSCULAR; INTRAVENOUS at 04:04

## 2020-01-01 RX ADMIN — PROPOFOL 5 MCG/KG/MIN: 10 INJECTION, EMULSION INTRAVENOUS at 03:04

## 2020-01-01 RX ADMIN — MINERAL OIL, WHITE PETROLATUM: .03; .94 OINTMENT OPHTHALMIC at 02:04

## 2020-01-01 RX ADMIN — PROPOFOL 20 MCG/KG/MIN: 10 INJECTION, EMULSION INTRAVENOUS at 03:04

## 2020-01-01 RX ADMIN — PROPOFOL 30 MCG/KG/MIN: 10 INJECTION, EMULSION INTRAVENOUS at 10:04

## 2020-01-01 RX ADMIN — HEPARIN SODIUM 22 UNITS/KG/HR: 10000 INJECTION, SOLUTION INTRAVENOUS at 10:04

## 2020-01-01 RX ADMIN — FENTANYL CITRATE 250 MCG/HR: 50 INJECTION INTRAMUSCULAR; INTRAVENOUS at 02:04

## 2020-01-01 RX ADMIN — Medication 250 MCG/HR: at 11:04

## 2020-01-01 RX ADMIN — NOREPINEPHRINE BITARTRATE 0.01 MCG/KG/MIN: 1 INJECTION, SOLUTION, CONCENTRATE INTRAVENOUS at 01:04

## 2020-01-01 RX ADMIN — INSULIN HUMAN 5 UNITS: 100 INJECTION, SOLUTION PARENTERAL at 05:04

## 2020-01-01 RX ADMIN — Medication 250 MCG/HR: at 09:03

## 2020-01-01 RX ADMIN — PIPERACILLIN AND TAZOBACTAM 4.5 G: 4; .5 INJECTION, POWDER, LYOPHILIZED, FOR SOLUTION INTRAVENOUS; PARENTERAL at 01:04

## 2020-01-01 RX ADMIN — VANCOMYCIN HYDROCHLORIDE 2250 MG: 1.25 INJECTION, POWDER, LYOPHILIZED, FOR SOLUTION INTRAVENOUS at 02:04

## 2020-01-01 RX ADMIN — SODIUM CHLORIDE, SODIUM LACTATE, POTASSIUM CHLORIDE, AND CALCIUM CHLORIDE 500 ML: .6; .31; .03; .02 INJECTION, SOLUTION INTRAVENOUS at 08:03

## 2020-01-01 RX ADMIN — HEPARIN SODIUM 15 UNITS/KG/HR: 10000 INJECTION, SOLUTION INTRAVENOUS at 12:04

## 2020-01-01 RX ADMIN — SODIUM POLYSTYRENE SULFONATE 30 G: 15 SUSPENSION ORAL; RECTAL at 10:03

## 2020-01-01 RX ADMIN — AMIODARONE HYDROCHLORIDE 400 MG: 200 TABLET ORAL at 08:03

## 2020-01-01 RX ADMIN — METOPROLOL TARTRATE 5 MG: 5 INJECTION INTRAVENOUS at 10:03

## 2020-01-01 RX ADMIN — MORPHINE SULFATE 4 MG: 2 INJECTION, SOLUTION INTRAMUSCULAR; INTRAVENOUS at 03:04

## 2020-01-01 RX ADMIN — CEFEPIME 2 G: 2 INJECTION, POWDER, FOR SOLUTION INTRAVENOUS at 05:04

## 2020-01-01 RX ADMIN — Medication 2 MG/HR: at 12:04

## 2020-01-01 RX ADMIN — FAMOTIDINE 20 MG: 10 INJECTION INTRAVENOUS at 10:03

## 2020-01-01 RX ADMIN — HEPARIN SODIUM 5000 UNITS: 5000 INJECTION, SOLUTION INTRAVENOUS; SUBCUTANEOUS at 10:03

## 2020-01-01 RX ADMIN — DEXMEDETOMIDINE HYDROCHLORIDE 1 MCG/KG/HR: 100 INJECTION, SOLUTION, CONCENTRATE INTRAVENOUS at 03:04

## 2020-01-01 RX ADMIN — FENTANYL CITRATE 2500 MCG: 50 INJECTION INTRAMUSCULAR; INTRAVENOUS at 04:04

## 2020-01-01 RX ADMIN — Medication 200 MCG/HR: at 02:03

## 2020-01-01 RX ADMIN — INSULIN ASPART 2 UNITS: 100 INJECTION, SOLUTION INTRAVENOUS; SUBCUTANEOUS at 12:04

## 2020-01-01 RX ADMIN — PIPERACILLIN AND TAZOBACTAM 4.5 G: 4; .5 INJECTION, POWDER, LYOPHILIZED, FOR SOLUTION INTRAVENOUS; PARENTERAL at 11:04

## 2020-01-01 RX ADMIN — Medication 150 MCG/HR: at 10:04

## 2020-01-01 RX ADMIN — OXYCODONE HYDROCHLORIDE 5 MG: 5 TABLET ORAL at 04:04

## 2020-01-01 RX ADMIN — Medication 200 MCG/HR: at 05:03

## 2020-01-01 RX ADMIN — FUROSEMIDE 40 MG: 10 INJECTION, SOLUTION INTRAMUSCULAR; INTRAVENOUS at 03:04

## 2020-01-01 RX ADMIN — DEXMEDETOMIDINE HYDROCHLORIDE 1 MCG/KG/HR: 100 INJECTION, SOLUTION, CONCENTRATE INTRAVENOUS at 02:04

## 2020-01-01 RX ADMIN — CEFTRIAXONE 2 G: 2 INJECTION, SOLUTION INTRAVENOUS at 05:04

## 2020-01-01 RX ADMIN — PROPOFOL 30 MCG/KG/MIN: 10 INJECTION, EMULSION INTRAVENOUS at 09:04

## 2020-01-01 RX ADMIN — PROPOFOL 50 MCG/KG/MIN: 10 INJECTION, EMULSION INTRAVENOUS at 01:03

## 2020-01-01 RX ADMIN — HEPARIN SODIUM 22 UNITS/KG/HR: 10000 INJECTION, SOLUTION INTRAVENOUS at 09:04

## 2020-01-01 RX ADMIN — Medication 25 MCG/HR: at 08:04

## 2020-01-01 RX ADMIN — HYDROMORPHONE HYDROCHLORIDE 1 MG: 1 INJECTION, SOLUTION INTRAMUSCULAR; INTRAVENOUS; SUBCUTANEOUS at 12:04

## 2020-01-01 RX ADMIN — Medication 200 MCG/HR: at 04:03

## 2020-01-01 RX ADMIN — CALCIUM GLUCONATE 1 G: 98 INJECTION, SOLUTION INTRAVENOUS at 06:03

## 2020-01-01 RX ADMIN — Medication 200 MCG/HR: at 08:04

## 2020-01-01 RX ADMIN — REMIFENTANIL HYDROCHLORIDE 0.15 MCG/KG/MIN: 1 INJECTION, POWDER, LYOPHILIZED, FOR SOLUTION INTRAVENOUS at 07:04

## 2020-01-01 RX ADMIN — PROPOFOL 50 MCG/KG/MIN: 10 INJECTION, EMULSION INTRAVENOUS at 12:04

## 2020-01-01 RX ADMIN — SODIUM CHLORIDE 4 UNITS/HR: 9 INJECTION, SOLUTION INTRAVENOUS at 10:04

## 2020-01-01 RX ADMIN — AZITHROMYCIN 500 MG: 250 TABLET, FILM COATED ORAL at 09:03

## 2020-01-01 RX ADMIN — AMIODARONE HYDROCHLORIDE 150 MG: 1.5 INJECTION, SOLUTION INTRAVENOUS at 09:03

## 2020-01-01 RX ADMIN — SODIUM CHLORIDE 2.9 UNITS/HR: 9 INJECTION, SOLUTION INTRAVENOUS at 02:04

## 2020-01-01 RX ADMIN — PROPOFOL 30 MCG/KG/MIN: 10 INJECTION, EMULSION INTRAVENOUS at 12:04

## 2020-01-01 RX ADMIN — MIDAZOLAM HYDROCHLORIDE 2 MG: 1 INJECTION, SOLUTION INTRAMUSCULAR; INTRAVENOUS at 02:04

## 2020-01-01 RX ADMIN — OXYCODONE HYDROCHLORIDE 10 MG: 10 TABLET ORAL at 08:04

## 2020-01-01 RX ADMIN — MINERAL OIL, WHITE PETROLATUM: .03; .94 OINTMENT OPHTHALMIC at 09:04

## 2020-01-01 RX ADMIN — HEPARIN SODIUM 13 UNITS/KG/HR: 10000 INJECTION, SOLUTION INTRAVENOUS at 02:04

## 2020-01-01 RX ADMIN — METOPROLOL TARTRATE 5 MG: 5 INJECTION INTRAVENOUS at 12:03

## 2020-01-01 RX ADMIN — FENTANYL CITRATE 212.5 MCG: 50 INJECTION INTRAMUSCULAR; INTRAVENOUS at 01:04

## 2020-01-01 RX ADMIN — PROPOFOL 50.05 MCG/KG/MIN: 10 INJECTION, EMULSION INTRAVENOUS at 02:04

## 2020-01-01 RX ADMIN — VANCOMYCIN HYDROCHLORIDE 500 MG: 500 INJECTION, POWDER, LYOPHILIZED, FOR SOLUTION INTRAVENOUS at 02:04

## 2020-01-01 RX ADMIN — IPRATROPIUM BROMIDE AND ALBUTEROL SULFATE 3 ML: .5; 3 SOLUTION RESPIRATORY (INHALATION) at 03:04

## 2020-01-01 RX ADMIN — CEFEPIME 2 G: 2 INJECTION, POWDER, FOR SOLUTION INTRAVENOUS at 08:04

## 2020-01-01 RX ADMIN — HEPARIN SODIUM 25 UNITS/KG/HR: 10000 INJECTION, SOLUTION INTRAVENOUS at 01:04

## 2020-01-01 RX ADMIN — Medication 0.02 MCG/KG/MIN: at 04:03

## 2020-01-01 RX ADMIN — Medication 0.16 MCG/KG/MIN: at 04:03

## 2020-01-01 RX ADMIN — HEPARIN SODIUM 21 UNITS/KG/HR: 10000 INJECTION, SOLUTION INTRAVENOUS at 08:04

## 2020-01-01 RX ADMIN — LORAZEPAM 4 MG: 2 INJECTION INTRAMUSCULAR; INTRAVENOUS at 11:04

## 2020-01-01 RX ADMIN — SCOPALAMINE 1 PATCH: 1 PATCH, EXTENDED RELEASE TRANSDERMAL at 06:04

## 2020-01-01 RX ADMIN — MIDAZOLAM HYDROCHLORIDE 5 MG: 5 INJECTION, SOLUTION INTRAMUSCULAR; INTRAVENOUS at 06:04

## 2020-01-01 RX ADMIN — HYDROXYCHLOROQUINE SULFATE 400 MG: 200 TABLET, FILM COATED ORAL at 08:03

## 2020-01-01 RX ADMIN — PROPOFOL 30 MCG/KG/MIN: 10 INJECTION, EMULSION INTRAVENOUS at 10:03

## 2020-01-01 RX ADMIN — VANCOMYCIN HYDROCHLORIDE 750 MG: 750 INJECTION, POWDER, LYOPHILIZED, FOR SOLUTION INTRAVENOUS at 08:04

## 2020-01-01 RX ADMIN — PANTOPRAZOLE SODIUM 40 MG: 40 GRANULE, DELAYED RELEASE ORAL at 09:04

## 2020-01-01 RX ADMIN — ACETAMINOPHEN 650 MG: 160 SOLUTION ORAL at 02:03

## 2020-01-01 RX ADMIN — INSULIN DETEMIR 10 UNITS: 100 INJECTION, SOLUTION SUBCUTANEOUS at 12:04

## 2020-01-01 RX ADMIN — LORAZEPAM 4 MG: 2 INJECTION INTRAMUSCULAR; INTRAVENOUS at 03:04

## 2020-01-01 RX ADMIN — ACETAMINOPHEN 325 MG: 325 TABLET ORAL at 02:04

## 2020-01-01 RX ADMIN — INSULIN HUMAN 10 UNITS: 100 INJECTION, SOLUTION PARENTERAL at 07:03

## 2020-01-01 RX ADMIN — SODIUM BICARBONATE: 84 INJECTION, SOLUTION INTRAVENOUS at 10:03

## 2020-01-01 RX ADMIN — Medication 50 MCG/HR: at 05:04

## 2020-01-01 RX ADMIN — Medication 15 MG/HR: at 02:04

## 2020-01-01 RX ADMIN — DEXMEDETOMIDINE HYDROCHLORIDE 1 MCG/KG/HR: 100 INJECTION, SOLUTION, CONCENTRATE INTRAVENOUS at 08:04

## 2020-01-01 RX ADMIN — FENTANYL CITRATE 200 MCG/HR: 50 INJECTION INTRAVENOUS at 11:04

## 2020-01-01 RX ADMIN — LORAZEPAM 2 MG: 2 INJECTION INTRAMUSCULAR; INTRAVENOUS at 08:04

## 2020-01-01 RX ADMIN — SODIUM CHLORIDE 4.5 UNITS/HR: 9 INJECTION, SOLUTION INTRAVENOUS at 01:04

## 2020-01-01 RX ADMIN — Medication 100 MCG/HR: at 11:04

## 2020-01-01 RX ADMIN — MINERAL OIL, WHITE PETROLATUM: .03; .94 OINTMENT OPHTHALMIC at 04:04

## 2020-01-01 RX ADMIN — MORPHINE SULFATE 4 MG: 2 INJECTION, SOLUTION INTRAMUSCULAR; INTRAVENOUS at 11:04

## 2020-01-01 RX ADMIN — HEPARIN SODIUM 25 UNITS/KG/HR: 10000 INJECTION, SOLUTION INTRAVENOUS at 05:04

## 2020-01-01 RX ADMIN — OXYCODONE HYDROCHLORIDE 5 MG: 5 TABLET ORAL at 10:04

## 2020-01-01 RX ADMIN — SODIUM CHLORIDE 1.75 UNITS/HR: 9 INJECTION, SOLUTION INTRAVENOUS at 01:04

## 2020-01-01 RX ADMIN — SODIUM CHLORIDE 10.8 UNITS/HR: 9 INJECTION, SOLUTION INTRAVENOUS at 06:03

## 2020-01-01 RX ADMIN — VASOPRESSIN 0.04 UNITS/MIN: 20 INJECTION INTRAVENOUS at 11:04

## 2020-01-01 RX ADMIN — PROPOFOL 40 MCG/KG/MIN: 10 INJECTION, EMULSION INTRAVENOUS at 08:04

## 2020-01-01 RX ADMIN — REMIFENTANIL HYDROCHLORIDE 0.15 MCG/KG/MIN: 1 INJECTION, POWDER, LYOPHILIZED, FOR SOLUTION INTRAVENOUS at 02:04

## 2020-01-01 RX ADMIN — DEXMEDETOMIDINE HYDROCHLORIDE 1.4 MCG/KG/HR: 100 INJECTION, SOLUTION, CONCENTRATE INTRAVENOUS at 04:03

## 2020-01-01 RX ADMIN — SODIUM CHLORIDE 9.9 UNITS/HR: 9 INJECTION, SOLUTION INTRAVENOUS at 04:04

## 2020-01-01 RX ADMIN — HEPARIN SODIUM 60 UNITS/KG/HR: 10000 INJECTION, SOLUTION INTRAVENOUS at 01:04

## 2020-01-01 RX ADMIN — MIDAZOLAM HYDROCHLORIDE 2 MG: 1 INJECTION, SOLUTION INTRAMUSCULAR; INTRAVENOUS at 12:04

## 2020-01-01 RX ADMIN — MORPHINE SULFATE 1 MG: 2 INJECTION, SOLUTION INTRAMUSCULAR; INTRAVENOUS at 12:04

## 2020-01-01 RX ADMIN — Medication 300 MCG/HR: at 10:03

## 2020-01-01 RX ADMIN — MINERAL OIL AND PETROLATUM: 150; 830 OINTMENT OPHTHALMIC at 11:04

## 2020-01-01 RX ADMIN — HEPARIN SODIUM 17 UNITS/KG/HR: 10000 INJECTION, SOLUTION INTRAVENOUS at 06:04

## 2020-01-01 RX ADMIN — PROPOFOL 35 MCG/KG/MIN: 10 INJECTION, EMULSION INTRAVENOUS at 08:04

## 2020-01-01 RX ADMIN — PROPOFOL 50 MCG/KG/MIN: 10 INJECTION, EMULSION INTRAVENOUS at 09:04

## 2020-01-01 RX ADMIN — VANCOMYCIN HYDROCHLORIDE 750 MG: 750 INJECTION, POWDER, LYOPHILIZED, FOR SOLUTION INTRAVENOUS at 11:04

## 2020-01-01 RX ADMIN — VANCOMYCIN HYDROCHLORIDE 750 MG: 750 INJECTION, POWDER, LYOPHILIZED, FOR SOLUTION INTRAVENOUS at 10:04

## 2020-01-01 RX ADMIN — HEPARIN SODIUM 25 UNITS/KG/HR: 10000 INJECTION, SOLUTION INTRAVENOUS at 12:04

## 2020-01-01 RX ADMIN — FENTANYL CITRATE 2500 MCG: 50 INJECTION INTRAMUSCULAR; INTRAVENOUS at 02:04

## 2020-01-01 RX ADMIN — DEXTROSE 1 MCG/KG/MIN: 50 INJECTION, SOLUTION INTRAVENOUS at 01:04

## 2020-01-01 RX ADMIN — DEXMEDETOMIDINE HYDROCHLORIDE 0.2 MCG/KG/HR: 100 INJECTION, SOLUTION, CONCENTRATE INTRAVENOUS at 02:04

## 2020-01-01 RX ADMIN — PROPOFOL 50.05 MCG/KG/MIN: 10 INJECTION, EMULSION INTRAVENOUS at 12:04

## 2020-01-01 RX ADMIN — CHLORHEXIDINE GLUCONATE 0.12% ORAL RINSE 15 ML: 1.2 LIQUID ORAL at 10:03

## 2020-01-01 RX ADMIN — Medication 300 MCG/HR: at 01:03

## 2020-01-01 RX ADMIN — DEXMEDETOMIDINE HYDROCHLORIDE 0.8 MCG/KG/HR: 100 INJECTION, SOLUTION, CONCENTRATE INTRAVENOUS at 04:03

## 2020-01-01 RX ADMIN — LORAZEPAM 1 MG: 2 INJECTION, SOLUTION INTRAMUSCULAR; INTRAVENOUS at 12:04

## 2020-01-01 RX ADMIN — DEXMEDETOMIDINE HYDROCHLORIDE 0.17 MCG/KG/HR: 100 INJECTION, SOLUTION, CONCENTRATE INTRAVENOUS at 03:04

## 2020-01-01 RX ADMIN — MIDAZOLAM HYDROCHLORIDE 2 MG/HR: 5 INJECTION, SOLUTION INTRAMUSCULAR; INTRAVENOUS at 05:03

## 2020-01-01 RX ADMIN — FUROSEMIDE 60 MG: 10 INJECTION, SOLUTION INTRAMUSCULAR; INTRAVENOUS at 05:04

## 2020-01-01 RX ADMIN — IPRATROPIUM BROMIDE AND ALBUTEROL SULFATE 3 ML: .5; 3 SOLUTION RESPIRATORY (INHALATION) at 02:04

## 2020-01-01 RX ADMIN — SODIUM CHLORIDE 3 UNITS/HR: 9 INJECTION, SOLUTION INTRAVENOUS at 11:04

## 2020-01-02 NOTE — TELEPHONE ENCOUNTER
----- Message from Sue Go sent at 1/2/2020  9:17 AM CST -----  Contact: [phone]  Type: Rx    Name of medication(s): valsartan (DIOVAN) 320 MG tablet    Is this a refill? New rx? refill    Who prescribed medication? Dr. Kinney    Pharmacy Name, Phone, & Location:      Comments:  Patient wants to  today.  Patient is aware the doctor is not in.  Please call and advise, thank you.

## 2020-01-02 NOTE — TELEPHONE ENCOUNTER
He is using new mail order.    He wants this rx printed and he will get it filled locally because only has 7 pills.  It is marked to print.    See other encounter to approve other meds to [facility].    Thanks jesús    ( told him won't be be ready till Monday)

## 2020-01-02 NOTE — TELEPHONE ENCOUNTER
This was faxed request from [facility].    Earlier I sent you rx for valsartan to print so he can fill locally because he is low on that.    Thanks haven

## 2020-01-06 NOTE — TELEPHONE ENCOUNTER
Now he says he realizes he should have just let me call rx in.    Calling to [facility] [phone] recorder as dr villafana approved valsartan 320mg  #90

## 2020-01-07 NOTE — TELEPHONE ENCOUNTER
Faxed request from [facility].    Looks like ok'd 1/2 but marked no print in error.    Please approve    Thanks haven

## 2020-01-13 PROBLEM — R93.1 AGATSTON CORONARY ARTERY CALCIUM SCORE GREATER THAN 400: Status: ACTIVE | Noted: 2020-01-01

## 2020-01-13 PROBLEM — K76.0 FATTY LIVER: Status: ACTIVE | Noted: 2020-01-01

## 2020-01-13 NOTE — PROGRESS NOTES
Subjective:       Patient ID: Erick Valdovinos is a 69 y.o. male.    Chief Complaint: Annual Exam  Dictation #1  MRN:[id number]  CSN:[id number]  Dict 69-year-old white male patient of mine coming in for annual review. He had lab done prior to this which was normal.  He feels well.  He has had a number of problems over the year but they seem to resolve.  The most significant was an admission in May for what sounds like heat exhaustion.  He had chest pain and weakness which was recurrent when cutting lawn.  He had previously done the lawn without difficulty.  He was admitted for workup which initially looked as though he had cardiac damage, but he saw Dr. nath who apparently told him his heart looks fine.  I in the meantime a done a calcium score on him which was over 400 resulting in that referral.  Patient is diabetic with type 4 hyperlipidemia.    The current problem he has is neck pain on the left side which is a long-standing problem.  He had an evaluation done when living in [place] including an MRI showing nerve impingement in the lower cervical spine to account for this.  Pain management saw him at [facility] and he was given 2 injections but the steroids caused a combination of hyperglycemia and gastric irritation so he has done no further treatment of that type.  He is now taking Vicodin as needed for the pain. He is concerned about the addiction capability.    Physical exam:  Vital signs-normal  Skin-age-related changes  HEENT-clear  Neck-no adenopathy, thyroid enlargement, bilateral carotid bruits  Chest-clear to percussion auscultation  Heart-grade 3 aortic outflow murmur, regular rhythm, no gallop  Abdomen-obese, nontender, no organomegaly, no mass, no bruit, bowel sounds active  Rectal-refused  Extremities-normal muscles, pulses, joints.  Full foot exam below  Neurologic-appears normal    Impression:  1.  Diabetes-adequate control  2.  Diabetic polyneuropathy-appears this is improved since he has no numbness  in his feet  3.  Problems with heat intolerance resulting in admission last summer-we had a discussion about that and from not overdoing his outdoor activities in the summer time  4.  Cervical radiculopathy-currently treating this with pain medication  5.  Type for hyperlipidemia on medication with good control  6.  Abnormal calcium score greater than 400  7.  Aortic stenosis with some outflow obstruction  8.  Carotid bruits-likely this is referred murmur from his aortic stenosis.    Plan:  1.  Next visit I am going to order a follow-up echocardiogram and carotid Doppler because of his aortic stenosis murmur and the problems tolerating heat last summer.  He is some apparently going to be seen by Dr. nath again is well who may do that in the meantime 2.  I will see him again in 6 months 3.  He needed no refills.  HPI  Review of Systems   Constitutional: Negative for activity change, appetite change, fatigue and unexpected weight change.   HENT: Negative for dental problem, hearing loss, mouth sores, postnasal drip and sinus pressure.    Eyes: Negative for discharge and visual disturbance.   Respiratory: Negative for cough and shortness of breath.    Cardiovascular: Negative for chest pain and palpitations.   Gastrointestinal: Positive for constipation. Negative for abdominal pain, blood in stool, diarrhea and nausea.   Genitourinary: Negative for dysuria, hematuria and testicular pain.   Musculoskeletal: Positive for back pain and neck pain. Negative for arthralgias and joint swelling.   Skin: Negative for rash.   Neurological: Negative for weakness and headaches.   Psychiatric/Behavioral: Negative for agitation and sleep disturbance.       Objective:      Physical Exam   Constitutional: He is oriented to person, place, and time. He appears well-developed and well-nourished.   HENT:   Head: Normocephalic.   Right Ear: External ear normal.   Left Ear: External ear normal.   Mouth/Throat: Oropharynx is clear and moist.    Eyes: Pupils are equal, round, and reactive to light. EOM are normal. Right eye exhibits no discharge.   Neck: Normal range of motion. No JVD present. No tracheal deviation present. No thyromegaly present.   Cardiovascular: Normal rate, regular rhythm and intact distal pulses. Exam reveals no gallop.   Murmur heard.  Pulses:       Dorsalis pedis pulses are 3+ on the right side, and 3+ on the left side.        Posterior tibial pulses are 3+ on the right side, and 3+ on the left side.   Pulmonary/Chest: Effort normal and breath sounds normal. He has no wheezes. He has no rales.   Abdominal: Soft. Bowel sounds are normal. He exhibits no mass. There is no tenderness.   Genitourinary: Prostate normal and penis normal. Rectal exam shows guaiac negative stool.   Musculoskeletal: Normal range of motion. He exhibits no edema.        Right foot: There is normal range of motion and no deformity.        Left foot: There is normal range of motion and no deformity.   Feet:   Right Foot:   Protective Sensation: 10 sites tested. 10 sites sensed.   Skin Integrity: Positive for dry skin. Negative for ulcer, blister, skin breakdown, erythema, warmth or callus.   Left Foot:   Protective Sensation: 10 sites tested. 10 sites sensed.   Skin Integrity: Positive for dry skin. Negative for ulcer, blister, skin breakdown, erythema, warmth or callus.   Lymphadenopathy:     He has no cervical adenopathy.   Neurological: He is oriented to person, place, and time. He displays normal reflexes. No cranial nerve deficit. Coordination normal.   Skin: Skin is warm. No rash noted. No pallor.   Psychiatric: He has a normal mood and affect.       Assessment:       No diagnosis found.    Plan:

## 2020-01-15 NOTE — TELEPHONE ENCOUNTER
----- Message from Christine Salazar sent at 1/15/2020  8:37 AM CST -----  Contact: Self [phone]  Patient is calling to follow up on medication, E-Prescribing Status: Transmission to pharmacy failed (1/13/2020  4:26 PM CST), please advise.

## 2020-03-04 PROBLEM — E78.2 MIXED HYPERLIPIDEMIA: Status: ACTIVE | Noted: 2020-01-01

## 2020-03-04 NOTE — LETTER
March 4, 2020      Doug Mccarty MD  [address]  [facility] [address]           [facility]  [address]  [facility] [address]  Phone: [phone]          Patient: Erick Valdovinos   MR Number: [id number]   YOB: 1950   Date of Visit: 3/4/2020       Dear Dr. Doug Mccarty:    Thank you for referring Erick Valdovinos to me for evaluation. Attached you will find relevant portions of my assessment and plan of care.    If you have questions, please do not hesitate to call me. I look forward to following Erick Valdovinos along with you.    Sincerely,    Lucila Aguayo, RANDAL    Enclosure  CC:  No Recipients    If you would like to receive this communication electronically, please contact [email or web address] or [phone] to request more information on [employer] Link access.    For providers and/or their staff who would like to refer a patient to [facility], please contact us through our one-stop-shop provider referral line, [facility] , at [phone].    If you feel you have received this communication in error or would no longer like to receive these types of communications, please e-mail [email or web address]

## 2020-03-04 NOTE — PATIENT INSTRUCTIONS
Thank you for coming to your cardiology appointment today.     · Schedule exercise stress echo     Medication changes:   · Stop atorvastatin (lipitor)  · Start rosuvastatin 40 mg (crestor) and zetia 10 mg nightly. These are for your cholesterol   · Repeat FASTING blood work in roughly 6 weeks. Nothing to eat for 12 hours prior to test. Okay to drink water.       Diet: Recommend the mediterranean diet    Exercise: Increase cardiovascular exercise to 30 minutes of brisk walking a day for 4-5 days a week.  You can use a stationary bike or swim for 30 minutes a day instead of walking.  Whatever exercise you choose, make sure you are working hard enough to increase your heart rate.       Please follow up in 3 months      If you have any questions or concerns related to your cardiology care, please call [phone].      Thank you,  Lucila Aguayo DNP, APRN, FNP-C  03/04/2020

## 2020-03-04 NOTE — PROGRESS NOTES
Cardiology Consults Clinic Note    Subjective:   Chief Complaint: Hyperlipidemia, exertional chest pain   Last Clinic Visit: 09/04/2019 with Dr. Jiménez    Problems:  EPR=987  HTN  HLD  CKD III  DM    History of Present Illness: Erick Valdovinos is a 69 y.o. male who presents for follow up.  At time of his previous visit he had been experiencing exertional chest pain. PET stress was completed and was negative for ischemia or any significant resting or stress induced defects, CFR was only mildly reduced (1.67).  Plavix was started and medication compliance was stressed.     Today, Mr. Valdovinos reports doing well, but does continue to have exertional chest pain with activities such as mowing the lawn or lifting heavy items.  Describes chest pain as a tightness that comes on with exertion and subsides with discontinuation of activity, associated with LOPEZ. Otherwise denies SOB, palpitations, lightheadedness, syncope. Denies claudication, does have mild swelling in legs.  Most recent labs are revealing of stable kidney dysfunction, however, lipid panel not at goal with increased , , and TGs 298. Prescribed fenofibrate 145 mg and atorvastatin 80.   A1c 7.2%.     Review of Systems   Constitution: Negative for decreased appetite, fever, malaise/fatigue and weight gain.   HENT: Negative for congestion, hearing loss and nosebleeds.    Eyes: Negative for visual disturbance.   Cardiovascular: Positive for chest pain and dyspnea on exertion. Negative for irregular heartbeat, leg swelling, palpitations and syncope.        With exertion    Respiratory: Negative for cough, shortness of breath and sleep disturbances due to breathing.    Endocrine: Negative for cold intolerance and heat intolerance.   Hematologic/Lymphatic: Negative for bleeding problem. Does not bruise/bleed easily.   Gastrointestinal: Negative for bloating, abdominal pain, change in bowel habit and heartburn.   Neurological: Negative for dizziness,  "loss of balance and numbness.   Psychiatric/Behavioral: Negative for altered mental status. The patient is not nervous/anxious.        Medications:  Patient's Medications   New Prescriptions    EZETIMIBE (ZETIA) 10 MG TABLET    Take 1 tablet (10 mg total) by mouth every evening.    ROSUVASTATIN (CRESTOR) 20 MG TABLET    Take 2 tablets (40 mg total) by mouth every evening.   Previous Medications    AMLODIPINE (NORVASC) 10 MG TABLET    TAKE 1 TABLET(10 MG) BY MOUTH EVERY EVENING    BLOOD SUGAR DIAGNOSTIC STRP    1 strip by Misc.(Non-Drug; Combo Route) route once daily. Test strips for meter covered by insurance.    BLOOD-GLUCOSE METER MISC    Meter covered by insurance, Use as directed    BUTABARBITAL (BUTISOL) 30 MG TAB    Take 30 mg by mouth daily as needed.    CLOPIDOGREL (PLAVIX) 75 MG TABLET    Take 1 tablet (75 mg total) by mouth once daily.    COENZYME Q10 200 MG CAPSULE    Take by mouth once daily.     FENOFIBRATE (TRICOR) 145 MG TABLET    Take 1 tablet (145 mg total) by mouth every evening.    GLIMEPIRIDE (AMARYL) 4 MG TABLET    Take 1 tablet (4 mg total) by mouth before breakfast.    GLUC HERNANDEZ/CHONDRO HERNANDEZ A/VIT C/MN (GLUCOSAMINE 1500 COMPLEX ORAL)    Take by mouth once daily.    HYDROCODONE-ACETAMINOPHEN (NORCO) 5-325 MG PER TABLET    Take 1 tablet by mouth every 6 (six) hours as needed for Pain.    LANCETS MISC    To check BG once daily, to use with insurance preferred meter    LANCETS MISC    1 Device by Misc.(Non-Drug; Combo Route) route once daily.    MULTIVITAMIN (MULTIVITAMIN) PER TABLET    Take 1 tablet by mouth once daily.    OMEPRAZOLE (PRILOSEC) 20 MG CAPSULE    Take 1 capsule (20 mg total) by mouth once daily.    PEN NEEDLE, DIABETIC (RELION PEN NEEDLES) 32 GAUGE X 5/32" NDLE    Needs levemir flexpen needles. To use once daily    SILDENAFIL (REVATIO) 20 MG TAB    Take 1 tablet (20 mg total) by mouth daily as needed.    SITAGLIPTAN-METFORMIN (JANUMET)  MG PER TABLET    Take 1 tablet by mouth 2 " "(two) times daily with meals.    TRIAMCINOLONE ACETONIDE 0.1% (KENALOG) 0.1 % CREAM    Apply topically daily as needed.    VALSARTAN (DIOVAN) 320 MG TABLET    Take 1 tablet (320 mg total) by mouth once daily.   Modified Medications    No medications on file   Discontinued Medications    ATORVASTATIN (LIPITOR) 80 MG TABLET    Take 1 tablet (80 mg total) by mouth once daily.    DOXYCYCLINE (VIBRA-TABS) 100 MG TABLET    TAKE 1 TABLET(100 MG) BY MOUTH TWICE DAILY FOR 7 DAYS    FLUAD [id number], 65 YR UP,,PF, 45 MCG (15 MCG X 3)/0.5 ML SYRG           Family History:  Erick's family history includes Coronary artery disease in his brother and father; Heart attack in his father; Heart disease in his father; Hyperlipidemia in his brother, brother, and father; Hypertension in his mother; Kidney disease in his mother; Kidney failure in his mother.    Social History:  Erick reports that he has never smoked. He has never used smokeless tobacco. He reports that he drank alcohol. He reports that he does not use drugs.    Objective:   BP (!) 146/75 (BP Location: Left arm, Patient Position: Sitting, BP Method: X-Large (Automatic))   Pulse 86   Ht 5' 7.5" (1.715 m)   Wt 112.7 kg (248 lb 7.3 oz)   BMI 38.34 kg/m²     Physical Exam   Constitutional: He is oriented to person, place, and time. Vital signs are normal. He appears well-developed and well-nourished.   HENT:   Head: Normocephalic.   Eyes: Pupils are equal, round, and reactive to light.   Neck: Normal range of motion. Neck supple. No JVD present. Carotid bruit is not present.   Cardiovascular: Normal rate, regular rhythm, S1 normal, S2 normal and intact distal pulses. PMI is not displaced. Exam reveals no gallop and no friction rub.   Murmur heard.   Early systolic murmur is present with a grade of 2/6.  Pulses:       Carotid pulses are 2+ on the right side, and 2+ on the left side.       Radial pulses are 2+ on the right side, and 2+ on the left side.        Dorsalis " pedis pulses are 2+ on the right side, and 2+ on the left side.        Posterior tibial pulses are 1+ on the right side, and 1+ on the left side.   Pulmonary/Chest: Effort normal and breath sounds normal. No accessory muscle usage. He has no decreased breath sounds. He has no wheezes.   Abdominal: Soft. Normal appearance and bowel sounds are normal. He exhibits no distension, no fluid wave and no ascites. There is no hepatosplenomegaly. There is no tenderness.   Musculoskeletal: Normal range of motion.        Right lower leg: He exhibits edema.        Left lower leg: He exhibits edema.   Mild peripheral edema    Neurological: He is alert and oriented to person, place, and time. He has normal strength.   Skin: Skin is warm, dry and intact. No ecchymosis and no rash noted. No erythema.   Psychiatric: He has a normal mood and affect. His speech is normal and behavior is normal. Judgment and thought content normal. Cognition and memory are normal.   Vitals reviewed.      Lipids:  Recent Labs   Lab 01/09/20  0702   LDL Cholesterol 122.4   HDL 47   Cholesterol 229 H      Renal:  Recent Labs   Lab 01/09/20  0702   Creatinine 1.4   Potassium 4.2   CO2 25   BUN, Bld 22     Liver:  Recent Labs   Lab 01/09/20  0702   AST 23   ALT 24       Assessment:     1. Atherosclerosis of native coronary artery of native heart with stable angina pectoris    2. Agatston coronary artery calcium score greater than 400    3. Mixed hyperlipidemia    4. Essential hypertension    5. Chronic kidney disease, stage III (moderate)    6. Uncontrolled type 2 diabetes mellitus with hyperglycemia    7. Severe obesity (BMI 35.0-39.9) with comorbidity      Plan:     Erick Valdovinos was seen in clinic today for Hyperlipidemia, exertional chest pain     Diagnoses and associated orders include:    Atherosclerosis of native coronary artery of native heart with stable angina pectoris  Agatston coronary artery calcium score greater than  400  Comments:  Episodic stable angina with exertion   Compliant on current regimen  Modify statin therapy for target LDL goal < 70    Orders:  -     Stress Echo Which stress agent will be used? Treadmill Exercise; Color Flow Doppler? No; Future; Expected date: 03/05/2020      Mixed hyperlipidemia  Comments:  Not at LDL goal < 70  Start rosuvastatin 40 and zetia 10   Continue fibrate  Repeat lipids in 6 weeks  Disucssed possibility of needing PSK9-I in future  Discussed mediterranean diet and provided with copy     Orders:  -     rosuvastatin (CRESTOR) 20 MG tablet; Take 2 tablets (40 mg total) by mouth every evening.  Dispense: 180 tablet; Refill: 3  -     ezetimibe (ZETIA) 10 mg tablet; Take 1 tablet (10 mg total) by mouth every evening.  Dispense: 90 tablet; Refill: 3  -     Lipid panel; Future; Expected date: 04/29/2020  -     Comprehensive metabolic panel; Future; Expected date: 04/29/2020    Essential hypertension  Comments:  BP borderline control  Continue amlodipine 10 and valsartan 320. Would consider thiazide in future  Stressed importance of low salt diet and exercise regimen     Chronic kidney disease, stage III (moderate)  Comments:  Stable, Cr 1.2-1.4  Routine monitoring     Uncontrolled type 2 diabetes mellitus with hyperglycemia  Comments:  A1c near goal < 7%  Continue current regimen  Discussed mediterranean diet with low CHO  Routine follow up with PCP    Severe obesity (BMI 35.0-39.9) with comorbidity  Comments:  Stressed importance of diet and exercise.  Gained 7 lbs since previous visit.         Follow up in 3 months    This patient was discussed with Dr. Tino CASTLE, YIMI  03/04/2020  1:10 PM      Follow-up:     Future Appointments   Date Time Provider Department Center   3/16/2020  9:45 AM BECKY LANDRY [facility] CARDIO [place]   5/29/2020  7:00 AM LAB, [facility] LAB [place]

## 2020-03-05 NOTE — TELEPHONE ENCOUNTER
Mr. Valdovinos called he had question about his schedule he stated Malaika returned his call and will call him back after she talk to Dr. Jiménez.    Madelaine

## 2020-03-06 NOTE — TELEPHONE ENCOUNTER
----- Message from Aileen Calderon MA sent at 3/6/2020  2:36 PM CST -----  Contact:  patient call  Malaika the patient would like tot alk to you about his test please call [phone]. Thank you.

## 2020-03-16 NOTE — PROGRESS NOTES
"This is a virtual telephone visit because of the coronavirus epidemic.    Ref:  Cash    Hx:  70 yo man (retired [employer] ) with "multiple" stress tests over the past 2 years, all of which are negative.  CCS: 401. Has been drinking excessive amounts.    He complains of a two year period where he is feeling lightheaded while cutting grass and went inside where he had syncope and was admitted for evaluation to [facility].  He stress tests have always been normal.  He has rest and exertional chest discomfort (tightness with pain across the chest) with dyspnea which subsides after a couple of minutes and nocturnal symptoms which wake him from sleep about once a week.     IMP:  Rest and exertional chest discomfort, possibly related to excessive water intake for "kidney problems".  R/O CAD.     Rec:      Cut back on salt and water consumption.  SL NTG PRN.  Coronary angiogram when coronavirus epidemic is over.      "

## 2020-03-16 NOTE — TELEPHONE ENCOUNTER
----- Message from Hannah Genao sent at 3/16/2020  8:07 AM CDT -----  Contact: Erick [phone]  Needs Advice    Reason for call:        Pt has diarrhea for about 20 hours 97.5 to 101.7 and then last night 95.6. Pt has not had any other symptoms and now feels fine.    Communication Preference: Erick [phone]    Additional Information:    Mr. Suarez is requesting a call back to advise.

## 2020-03-16 NOTE — TELEPHONE ENCOUNTER
Started Sunday am with diarrhea.  No other symptoms.  Temp was 97.5  As day went on- diarrhea continued.  Checked temp 10-12 times .  101.7 9pm last nite.  Took 1 aleve to drop temp-   130am  Temp 98.6 and loose bowels gone.  Didn't eat much yesterday after breakfast.   Had 2 bowls of ice cream.    No diarrhea now.  Feels fine now. No temp this am.    I gave him viral instructions- push fluids. Eat bland today. Avoid dairy another 24 hours.  Avoid contact with wife till tommorrow.        Dr villafana all this ok with you?    Thanks haven

## 2020-03-18 NOTE — TELEPHONE ENCOUNTER
We discussed symptoms Monday.    Still has fever off and on.  Denied any symptoms.    Last nite 100.4  9pm  100.2  Took aleve pm.  345am  98.9  630a  98.2  930a  98.6  2pm  100.6  3pm 100.8    I scheduled him to see dr ku in am for eval.

## 2020-03-18 NOTE — TELEPHONE ENCOUNTER
----- Message from Diana Mckinney sent at 3/18/2020  3:42 PM CDT -----  Contact: patient [phone]  Patient spoke with Sushila on Monday and since then has been checking his temp. Pt had a temp last night and took Aleve and temp went down . It is up again this afternoon and has a temp of 100.8 at 3:12 today,   .   Patient c/o chills and temp rising but he feels like Aleve is helping.Pt would like to speak with you again.

## 2020-03-19 NOTE — PROGRESS NOTES
Subjective:       Patient ID: Erick Valdovinos is a 69 y.o. male.    Chief Complaint: Fever    HPI 69-year-old white male with chronic kidney disease, diabetes with peripheral neuropathy, and cardiovascular disease presents to clinic today secondary to a complaint of off and on fever since Monday evening to as high as 101.  He reports previous travel to [place] he reports no known contact but was in crowds as big as 15 people.  On Monday he began feeling fatigued and reports body aches at which time he began taking his fever and began noting fever.  He has been tracking his temperature ever since and has had temperature as stated before as high as 101.  He began taking Advil with improvement of his temperature but yesterday switched to Tylenol.  He did take 1 dose of Tylenol last night but has taken no medication since.  He reports continued cough and shortness of breath but states that this has been ongoing since an upper respiratory infection in December.  Since this morning he has been noticing worsening postnasal drip and runny nose.  Review of Systems   Constitutional: Positive for chills, fatigue and fever. Negative for appetite change.   HENT: Positive for postnasal drip and rhinorrhea. Negative for congestion, ear pain, hearing loss, sinus pressure, sore throat and tinnitus.    Eyes: Negative for redness, itching and visual disturbance.   Respiratory: Positive for cough and shortness of breath. Negative for chest tightness.    Cardiovascular: Negative for chest pain and palpitations.   Gastrointestinal: Positive for diarrhea. Negative for abdominal pain, constipation, nausea and vomiting.   Genitourinary: Negative for decreased urine volume, difficulty urinating, dysuria, frequency, hematuria and urgency.   Musculoskeletal: Positive for myalgias. Negative for back pain, neck pain and neck stiffness.   Skin: Negative for rash.   Neurological: Positive for headaches. Negative for dizziness and  light-headedness.   Psychiatric/Behavioral: Negative.        Objective:      Physical Exam   Constitutional: He is oriented to person, place, and time. He appears well-developed and well-nourished. No distress.   HENT:   Head: Normocephalic and atraumatic.   Right Ear: External ear normal.   Left Ear: External ear normal.   Nose: Nose normal.   Mouth/Throat: Oropharynx is clear and moist. No oropharyngeal exudate.   Eyes: Pupils are equal, round, and reactive to light. Conjunctivae and EOM are normal. Right eye exhibits no discharge. Left eye exhibits no discharge. No scleral icterus.   Neck: Normal range of motion. Neck supple. No JVD present. No tracheal deviation present. No thyromegaly present.   Cardiovascular: Normal rate, regular rhythm, normal heart sounds and intact distal pulses. Exam reveals no gallop and no friction rub.   No murmur heard.  Pulmonary/Chest: Effort normal and breath sounds normal. No stridor. No respiratory distress. He has no wheezes. He has no rales.   Abdominal: Soft. Bowel sounds are normal. He exhibits no distension and no mass. There is no tenderness. There is no rebound and no guarding.   Musculoskeletal: Normal range of motion. He exhibits no edema or tenderness.   Lymphadenopathy:     He has no cervical adenopathy.   Neurological: He is alert and oriented to person, place, and time.   Skin: Skin is warm and dry. No rash noted. He is not diaphoretic. No erythema. No pallor.   Psychiatric: He has a normal mood and affect. His behavior is normal. Judgment and thought content normal.   Nursing note and vitals reviewed.      Results for orders placed or performed in visit on 03/19/20   Influenza A & B by Molecular   Result Value Ref Range    Influenza A, Molecular Negative Negative    Influenza B, Molecular Negative Negative    Flu A & B Source NP        Assessment:       1. Febrile illness        Plan:       Febrile illness  -     Influenza A & B by Molecular  -     SARS- CoV-2  (COVID-19) QUALITATIVE PCR      The patient is currently stable.  He has been given instructions to return home to self isolate until results to return.  Written discharge instructions given.

## 2020-03-23 NOTE — TELEPHONE ENCOUNTER
Spoke to patient and informed him of positive test results.  Recommended Tylenol as needed for fever; however, the patient reports fatty liver disease and states that he report for not to take Tylenol.  Denies shortness of breath but reports mild cough.  Otherwise, the patient reports no further symptoms.  I have encouraged the use of fever reducers but as stated earlier the patient wishes to not take Tylenol.  Recommended mechanical fever reducers such as cool rags or cool soaks in the tub.  Patient expresses understanding. Covid home monitoring system has been ordered.  Patient expresses understanding.  I have encouraged the patient to contact if he has any further questions or concerns.

## 2020-03-23 NOTE — TELEPHONE ENCOUNTER
Please approve rx. Marked to print so we can fax to [facility].    [facility]     (doing well , dx w/ covid.  Quarantined in his bedroom from wife)

## 2020-03-23 NOTE — TELEPHONE ENCOUNTER
----- Message from Becky Washington LPN sent at 3/20/2020  5:04 PM CDT -----  Contact: [phone]-self      ----- Message -----  From: Jaqueline Chin  Sent: 3/20/2020   4:32 PM CDT  To: Nirmal COSTELLO Staff    Patient is requesting a call back concerning the pt needs his Diabetic supplies for a New Pen and blood sugar diagnostic Strp. Please call

## 2020-03-24 NOTE — TELEPHONE ENCOUNTER
Patient tested positive on 3-.  Since that time he has been isolated in his room  He is eating, but still with diarrhea.  He is not taking tylenol as he is worried about his liver, even though his LFT are normal.   He is not able to get in bath or shower because the worse symptom today is weakness.    He will take wet towels to help decrease fever.  He has HTN and DM, instructed to watch his Blood sugars as amaryl can cause low blood sugar and he also takes diovan for BP, Hypotension and Hypoglycemia can contribute to these symptoms  He is also interested in Plaquenil - He has a call into his doctor.     Reason for Disposition   Cough occurs and onset > 14 days after COVID-19 EXPOSURE    Additional Information   Negative: Severe difficulty breathing (e.g., struggling for each breath, speak in single words, bluish lips)   Negative: Sounds like a life-threatening emergency to the triager   Negative: Difficulty breathing (shortness of breath) occurs and onset > 14 days after COVID-19 EXPOSURE (Close Contact)   Negative: Common cold symptoms and onset > 14 days after COVID-19 EXPOSURE   Negative: Difficulty breathing occurs within 14 days of COVID-19 EXPOSURE   Negative: Patient sounds very sick or weak to the triager   Negative: Fever or feeling feverish within 14 Days of COVID-19 EXPOSURE   Negative: Cough occurs within 14 days of COVID-19 EXPOSURE    Protocols used: CORONAVIRUS (COVID-19) EXPOSURE-A-OH

## 2020-03-24 NOTE — TELEPHONE ENCOUNTER
Wife says he is afraid to take tylenol because of fatty liver.  (he has positive covid19)    Dr ku already addressed this at visit.  I told wife must use tylenol ev 4-6 hrs as need for tylenol and let us know if doesn't lower temp.    I told her to call if fever not improving with tylenol.  Reminded of need to quarantine and avoid er unless worsening.

## 2020-03-24 NOTE — TELEPHONE ENCOUNTER
----- Message from Disha Baker sent at 3/24/2020  9:36 AM CDT -----  Contact: Yenny (spouse)  [phone]  Patient test came back Positive for Covid-19. He now fever 101.00, clammy, weak, problems with his eye sight.

## 2020-03-26 NOTE — TELEPHONE ENCOUNTER
----- Message from Dion Shannon sent at 3/26/2020 10:28 AM CDT -----  Contact: Spouse [phone]  RED DOT...    Patient would like to get medical advice.    Comments: Spouse of patient calling stating, patient has covid19, would like to discuss next steps, stating was informed,    Please call an advise  Thank you

## 2020-03-26 NOTE — TELEPHONE ENCOUNTER
----- Message from Gretel Calvin sent at 3/26/2020  1:23 PM CDT -----  Contact: Spouse/Yenny [phone]  Stated that she believes that the patient should be hospitalized due to COVID-19.    Please call and advise.    Thank You

## 2020-03-26 NOTE — PROGRESS NOTES
I spoke to her about getting him some food. Vegies/ fruits/ soup and call me with update in am.    She got logged in for appt but wasn't till 3:24 and  had to leave.

## 2020-03-26 NOTE — TELEPHONE ENCOUNTER
She called 911 because he seemed out of it . I spoke to kyrie adan a little earlier.  Fever not as high as was.    He refused to go to hospital. He is weak .   She is more worried about her getting sick.    I booked her to do video visit about patient. He is present.

## 2020-03-26 NOTE — TELEPHONE ENCOUNTER
He tested positive with covid under dr ku visit.  Done 3/19.    Today Legs are weak.  Gets up to go to bathroom can barely make it.  Is quarantined in bedroom.  Says has No energy.     Not eating. Says has had a lot of fluids but not much else. .  Says no problem with breathing.   Can't even read his thermometer.  Eye sight is blurry and hard to read text on phone.    Doesn't think temp is high. today is 101.  (better than was).  Is using tylenol as need.    Quit the plavix because of back pain. Hip area.   Off 4 days- I told him to restart.     I told him to do more fluids and soups and fruits and vegies to help energy.      Dr broderick OLIVA.

## 2020-03-28 PROBLEM — M54.12 CERVICAL RADICULOPATHY: Status: RESOLVED | Noted: 2018-09-28 | Resolved: 2020-01-01

## 2020-03-28 PROBLEM — I35.0 AORTIC STENOSIS: Status: ACTIVE | Noted: 2020-01-01

## 2020-03-28 PROBLEM — E11.42 DIABETIC POLYNEUROPATHY ASSOCIATED WITH TYPE 2 DIABETES MELLITUS: Status: RESOLVED | Noted: 2019-01-10 | Resolved: 2020-01-01

## 2020-03-28 PROBLEM — R80.9 TYPE 2 DIABETES MELLITUS WITH MICROALBUMINURIA, WITHOUT LONG-TERM CURRENT USE OF INSULIN: Status: RESOLVED | Noted: 2017-12-13 | Resolved: 2020-01-01

## 2020-03-28 PROBLEM — Z87.19 HISTORY OF PANCREATITIS: Status: RESOLVED | Noted: 2017-12-27 | Resolved: 2020-01-01

## 2020-03-28 PROBLEM — R79.89 ELEVATED TROPONIN: Status: ACTIVE | Noted: 2020-01-01

## 2020-03-28 PROBLEM — U07.1 ACUTE RESPIRATORY DISTRESS SYNDROME (ARDS) DUE TO COVID-19 VIRUS: Status: ACTIVE | Noted: 2020-01-01

## 2020-03-28 PROBLEM — R74.01 TRANSAMINITIS: Status: ACTIVE | Noted: 2020-01-01

## 2020-03-28 PROBLEM — U07.1 ACUTE RESPIRATORY DISTRESS SYNDROME (ARDS) DUE TO 2019 NOVEL CORONAVIRUS: Status: ACTIVE | Noted: 2020-01-01

## 2020-03-28 PROBLEM — R29.898 DECONDITIONED LOW BACK: Status: RESOLVED | Noted: 2018-11-12 | Resolved: 2020-01-01

## 2020-03-28 PROBLEM — K80.20 CALCULUS OF GALLBLADDER WITHOUT CHOLECYSTITIS WITHOUT OBSTRUCTION: Status: RESOLVED | Noted: 2018-09-10 | Resolved: 2020-01-01

## 2020-03-28 PROBLEM — Z12.11 SCREENING FOR MALIGNANT NEOPLASM OF COLON: Status: RESOLVED | Noted: 2019-03-19 | Resolved: 2020-01-01

## 2020-03-28 PROBLEM — J80 ACUTE RESPIRATORY DISTRESS SYNDROME (ARDS) DUE TO 2019 NOVEL CORONAVIRUS: Status: ACTIVE | Noted: 2020-01-01

## 2020-03-28 PROBLEM — J11.1 FLU SYNDROME: Status: RESOLVED | Noted: 2019-01-01 | Resolved: 2020-01-01

## 2020-03-28 PROBLEM — R93.1 AGATSTON CORONARY ARTERY CALCIUM SCORE GREATER THAN 400: Status: RESOLVED | Noted: 2020-01-01 | Resolved: 2020-01-01

## 2020-03-28 PROBLEM — E11.29 TYPE 2 DIABETES MELLITUS WITH MICROALBUMINURIA, WITHOUT LONG-TERM CURRENT USE OF INSULIN: Status: RESOLVED | Noted: 2017-12-13 | Resolved: 2020-01-01

## 2020-03-28 PROBLEM — J80 ACUTE RESPIRATORY DISTRESS SYNDROME (ARDS) DUE TO COVID-19 VIRUS: Status: ACTIVE | Noted: 2020-01-01

## 2020-03-28 PROBLEM — R07.9 CHEST PAIN: Status: RESOLVED | Noted: 2019-01-01 | Resolved: 2020-01-01

## 2020-03-28 PROBLEM — J20.8 ACUTE BACTERIAL BRONCHITIS: Status: RESOLVED | Noted: 2019-01-01 | Resolved: 2020-01-01

## 2020-03-28 PROBLEM — E08.40: Status: RESOLVED | Noted: 2017-12-27 | Resolved: 2020-01-01

## 2020-03-28 PROBLEM — J96.01 ACUTE RESPIRATORY FAILURE WITH HYPOXIA: Status: ACTIVE | Noted: 2020-01-01

## 2020-03-28 PROBLEM — B96.89 ACUTE BACTERIAL BRONCHITIS: Status: RESOLVED | Noted: 2019-01-01 | Resolved: 2020-01-01

## 2020-03-28 PROBLEM — R10.11 RUQ PAIN: Status: RESOLVED | Noted: 2019-01-01 | Resolved: 2020-01-01

## 2020-03-28 PROBLEM — N28.9 RENAL DYSFUNCTION: Status: RESOLVED | Noted: 2019-01-10 | Resolved: 2020-01-01

## 2020-03-28 PROBLEM — U07.1 COVID-19 VIRUS DETECTED: Status: ACTIVE | Noted: 2020-01-01

## 2020-03-28 PROBLEM — G89.29 CHRONIC PAIN: Status: RESOLVED | Noted: 2018-10-30 | Resolved: 2020-01-01

## 2020-03-28 PROBLEM — E11.22 TYPE 2 DIABETES MELLITUS WITH STAGE 3 CHRONIC KIDNEY DISEASE, WITHOUT LONG-TERM CURRENT USE OF INSULIN: Status: ACTIVE | Noted: 2018-09-27

## 2020-03-28 PROBLEM — Z97.8 ENDOTRACHEALLY INTUBATED: Status: ACTIVE | Noted: 2020-01-01

## 2020-03-28 PROBLEM — N18.30 ACUTE RENAL FAILURE SUPERIMPOSED ON STAGE 3 CHRONIC KIDNEY DISEASE: Status: ACTIVE | Noted: 2019-01-01

## 2020-03-28 PROBLEM — E11.65 UNCONTROLLED TYPE 2 DIABETES MELLITUS WITH HYPERGLYCEMIA: Status: RESOLVED | Noted: 2019-01-01 | Resolved: 2020-01-01

## 2020-03-28 PROBLEM — R09.02 HYPOXIA: Status: ACTIVE | Noted: 2020-01-01

## 2020-03-28 NOTE — HOSPITAL COURSE
Admitted to MICU on 3/28, intubated and mechanically ventilated. On Ceftriaxone, azithro and plaquenil. Started insulin gtt given glucose >400s and checking serum ketones to evaluate for DKA. Trending troponins 0.087-->0.04, likely demand ischemia 2/2 ARDS. Patient noted to be in DKA with elevated beta hydroxybutyrate to 4.7. Patient fluid resuscitated about 3L from 3/28-3/29 and started on insulin gtt with improvement in glucose control. JAZMIN improving after fluid resuscitation. Patient did go into A fib with RVR on 3/29 and was started on amio gtt with conversion to PO given IV amio shortage. Patient was also started on low intensity heparin gtt given suspected component of microangiopathic coagulopathy related to JAZMIN. Of note, patient was persistently spiking fevers since admit up until 3/30 with a temp of 103 on 3/30 (still on ceftriaxone azithro). Tube feeds started on 3/30 (watching glucose very closely). Patient was placed in prone position on 3/30 at 1815 given P/F ratio of 122 (due to be supinated at 1015 on 3/31). 1/2 blood cultures from admission 3/28 resulted positive for coag negative staph (contaminant), repeat blood cultures ordered on 3/31-->NGTD. Patient was placed in prone position again on 3/31 at 1800 given P/F of 111 (due to supinate at 4/1 1000). Attempting to wean sedation as of 4/2, given improving oxygenation, and starting daily SAT/SBT. Unfortunately, throughout the day 4/3, patient had noted increasing oxygen requirements and pressor requirements, with concern for aspiration event as there was tube feeds contents suctioned out from ETT. Patient was started on vanc and cefepime and tube feeds held. Family was notified regarding acute decompensation and concern for poor prognosis and was made DNR.  04/05/2020 Pt required midazolam bolus alongside increases in fentanyl and precedex infusions to treat desaturation overnight. He was placed on FiO2 of 100% and intermittently paralyzed with  rocuronium.   Patient remains intubated and sedated. Overnight on 4/12, the patient showed signs of twitching/possible seizure activity. Patient's sedation was increased, neuro consulted. Neuro recommends CT head and EEG afterwards, no seizure meds necessary at the present time. ENT was also consulted for increased dried blood vs possible mucor within nasal cavity. ENT indicates there is low to no suspicion for an invasive fungal infection and if there were, it would be unlikely for ENT to proceed to the OR for debridement given his guarded condition and the fact that an invasive fungal infection in someone who is so critically ill would likely be fatal. Head CT negative. EEG to be applied on 4/14, also negative read. Family spoken to daily regarding GOC; aware of poor prognosis. Plan to wean sedation fully and extubate patient on 4/15.

## 2020-03-28 NOTE — ASSESSMENT & PLAN NOTE
- monitor BP  - pressors for hypotension  - consider home antihypertensives vs IV prn's for systolic >180

## 2020-03-28 NOTE — PLAN OF CARE
Internal Medicine Plan of Care    Pt presented to the [facility] ED w/ fever, worsening SOB, cough, and body aches. COVID +. Pt hypoxic, tachypneic, and hypotensive on arrival. He acutely decompensated in the ED and was intubated 2/2 worsening hypoxia. [facility] has no available ICU beds, pt will be transferred to [facility] for continued care.    Tameka Hawkins MD  [facility] IM/Peds, HO-2

## 2020-03-28 NOTE — ED NOTES
Pt ngt placed to low intermittent suction, skin warm and dry, restraints to amador upper ext.  In place skin integrity in tact, + periph pulses to amador upper  and lower ext.cardiac monitor shows st without ectopic, peralta draining dark urine to gravity.

## 2020-03-28 NOTE — TELEPHONE ENCOUNTER
I spoke to his wife Sat and she is coughing and want o have virtual visit with her again Mon and likel with her sone, Clayton, who also has the virus.

## 2020-03-28 NOTE — PROGRESS NOTES
Patient arrived via EMS to [id number] from [facility].       Pulmonary: Mechanically ventilated. 100% 10+ R20  . SpO2 99%     Cardiovascular:  SR on tele. MAP > 65.     Neurological: Patient opens eyes to voice, on propofol and fentanyl gtts     Gastrointestinal: OGT in place.     Genitourinary: Motta in place.     Endocrine: POCT . Hx ANASTASIIA. MD notified.     Skin/Bath: All skin intact. Foam dressings in place.        Notified: Dr. Blanco with [facility]

## 2020-03-28 NOTE — ASSESSMENT & PLAN NOTE
Suspected Covid-19 Virus Infection  Person under investigation (PUI) for COVID-19  Presents with SOB, myalgias, suspicious for COVID. Flu negative.   - COVID-19 testing resulted POSITIVE  - Infection Control notified     - Isolation:   - Airborne and Droplet Precautions  - N95 masks must be fit tested, wear eye protection  - 20 second hand hygiene              - Limit visitors per hospital policy              - Consolidating lab draws, nursing care, and interventions          PLAN:  - continue mechanical ventilation (Intubated on 3/28 at [facility])  - consider high PEEP protocol per ARDSnet  - monitor BP and consider pressors if needed  - sedation for comfort  - strict isolation protocol 2/2 positive COVID  - empiric plaquenil for COVID  - consider steroids if worsening hypotension  - CAP coverage with ceftriaxone azithro, will likely deescalate given low suspicion for CAP and positive COVID  - f/u blood cultures from 3/28  - f/u sputum cultures from 3/28

## 2020-03-28 NOTE — PLAN OF CARE
Called patient's wife regarding need for central line and arterial line. Consent obtained.    Also discussed code status. Patient is full code.    Jessica Blanco MD  PGY-1  Critical Care Medicine

## 2020-03-28 NOTE — SUBJECTIVE & OBJECTIVE
Past Medical History:   Diagnosis Date    Agatston coronary artery calcium score greater than 400 1/13/2020    Anticoagulant long-term use     Calculus of gallbladder without cholecystitis without obstruction 9/10/2018    Cervical radiculopathy 9/28/2018    Chronic neck pain 9/28/2018    DDD (degenerative disc disease), cervical 9/28/2018    Diabetes mellitus     Diabetes mellitus, type 2     Esophageal reflux     Essential hypertension 2/11/2013    Fatty liver 1/13/2020    Gastroesophageal reflux disease without esophagitis 2/11/2013    Hyperlipidemia     Hypertension     Mixed hyperlipidemia 3/4/2020    Nephrolithiasis     Osteoarthritis     Type 2 diabetes mellitus with diabetic polyneuropathy, without long-term current use of insulin 2/11/2013    Type 2 diabetes mellitus with stage 3 chronic kidney disease, without long-term current use of insulin 9/27/2018       Past Surgical History:   Procedure Laterality Date    COLONOSCOPY      COLONOSCOPY N/A 3/19/2019    Procedure: COLONOSCOPY Suprep;  Surgeon: Italo Bañuelos MD;  [facility];  Service: Endoscopy;  Laterality: N/A;    EPIDURAL STEROID INJECTION INTO CERVICAL SPINE N/A 10/30/2018    Procedure: Injection-steroid-epidural-cervical - C7 - T1;  Surgeon: Hernan Murguia Jr., MD;  Location: [facility];  Service: Pain Management;  Laterality: N/A;  PT IS DIABETIC    EPIDURAL STEROID INJECTION INTO CERVICAL SPINE N/A 4/11/2019    Procedure: Cervical Epidural Steroid Injection, C7-T1, With Dexamethasone,  and IV Sedation;  Surgeon: Hernan Murguia Jr., MD;  Location: [facility];  Service: Pain Management;  Laterality: N/A;  PT IS A DIABETIC.  Pt states he stopped taking ASA 81mg 13 days ago.      EPIDURAL STEROID INJECTION INTO CERVICAL SPINE N/A 4/30/2019    Procedure: Injection-steroid-epidural-cervical--C7-T1 With Dexamethasone, and IV Sedation;  Surgeon: Hernan Murguia Jr., MD;  Location: [facility];  Service:  Pain Management;  Laterality: N/A;    ESOPHAGOGASTRODUODENOSCOPY N/A 3/19/2019    Procedure: ESOPHAGOGASTRODUODENOSCOPY (EGD);  Surgeon: Italo Bañuelos MD;  Location: [facility];  Service: Endoscopy;  Laterality: N/A;    EYE SURGERY      hemrrhoidectomy      TONSILLECTOMY      WRIST FRACTURE SURGERY      WRIST SURGERY         Review of patient's allergies indicates:   Allergen Reactions    Codeine Nausea Only    Nsaids (non-steroidal anti-inflammatory drug) Other (See Comments)     Kidney issues       Family History     Problem Relation (Age of Onset)    Coronary artery disease Father, Brother    Heart attack Father    Heart disease Father    Hyperlipidemia Father, Brother, Brother    Hypertension Mother    Kidney disease Mother    Kidney failure Mother        Tobacco Use    Smoking status: Never Smoker    Smokeless tobacco: Never Used   Substance and Sexual Activity    Alcohol use: Not Currently    Drug use: No    Sexual activity: Yes     Partners: Female      Review of Systems   Unable to perform ROS: Intubated     Objective:     Vital Signs (Most Recent):  Temp: (!) 101.8 °F (38.8 °C) (03/28/20 1530)  Pulse: 90 (03/28/20 1600)  Resp: 20 (03/28/20 1600)  BP: (!) 71/43 (03/28/20 1600)  SpO2: 98 % (03/28/20 1600) Vital Signs (24h Range):  Temp:  [99.3 °F (37.4 °C)-101.8 °F (38.8 °C)] 101.8 °F (38.8 °C)  Pulse:  [] 90  Resp:  [17-48] 20  SpO2:  [78 %-100 %] 98 %  BP: ()/(43-86) 71/43   Weight: 108.9 kg (240 lb 1.3 oz)  Body mass index is 37.6 kg/m².      Intake/Output Summary (Last 24 hours) at 3/28/2020 1636  Last data filed at 3/28/2020 1600  Gross per 24 hour   Intake 3.59 ml   Output --   Net 3.59 ml     Limited physical exam 2/2 COVID19 isolation precautions  Physical Exam   Constitutional: He appears distressed.   HENT:   Intubated   Cardiovascular:   Systolic in the 70s   Pulmonary/Chest:   Mechanically ventilated. No belly breathing noted   Abdominal: He exhibits no distension.    Neurological:   Sedated        Vents:  Vent Mode: A/C (03/28/20 1521)  Set Rate: 20 BPM (03/28/20 1521)  Vt Set: 460 mL (03/28/20 1521)  Pressure Support: 0 cmH20 (03/28/20 1521)  PEEP/CPAP: 10 cmH20 (03/28/20 1521)  Oxygen Concentration (%): 100 (03/28/20 1600)  Peak Airway Pressure: 17 cmH2O (03/28/20 1521)  Plateau Pressure: 0 cmH20 (03/28/20 1521)  Total Ve: 15.5 mL (03/28/20 1521)  F/VT Ratio<105 (RSBI): (!) 90.74 (03/28/20 1521)  Lines/Drains/Airways     Drain                 NG/OG Tube 03/28/20 0557 Washington sump 16 Fr. Center mouth less than 1 day         Urethral Catheter 03/28/20 0606 Non-latex 16 Fr. less than 1 day          Airway                 Airway - Non-Surgical 03/28/20 0605 Endotracheal Tube less than 1 day          Peripheral Intravenous Line                 Peripheral IV - Single Lumen 05/01/19 1859 20 G Left Forearm 331 days         Peripheral IV - Single Lumen 03/28/20 0530 18 G Left Forearm less than 1 day         Peripheral IV - Single Lumen 03/28/20 0540 18 G Right Forearm less than 1 day              Significant Labs:    CBC/Anemia Profile:  Recent Labs   Lab 03/28/20  0532 03/28/20  0533   WBC 6.21  --    HGB 11.1*  --    HCT 34.0*  --      --    MCV 93  --    RDW 12.9  --    FERRITIN  --  2,202*        Chemistries:  Recent Labs   Lab 03/28/20  0533      K 5.0      CO2 14*   BUN 43*   CREATININE 2.6*   CALCIUM 9.2   ALBUMIN 2.6*   PROT 7.1   BILITOT 0.5   ALKPHOS 55   ALT 70*   *   MG 2.1       ABGs:   Recent Labs   Lab 03/28/20  1315   PH 7.196*   PCO2 38.8   HCO3 15.0*   POCSATURATED 96   BE -13     Cardiac Markers: No results for input(s): CKMB, TROPONINT, MYOGLOBIN in the last 48 hours.    Significant Imaging: I have reviewed all pertinent imaging results/findings within the past 24 hours.

## 2020-03-28 NOTE — ASSESSMENT & PLAN NOTE
- patient with troponin bump to 0.087 on admission  - trend troponin  - if peak elevation is mild, likely demand ischemia 2/2 ARDS  - other differentials include ACS vs viral cardiomyopathy vs viral myocarditis  - if significant elevation in troponins, will need to obtain echo to evaluate heart function, especially if there is concern for cardiogenic shock

## 2020-03-28 NOTE — PROGRESS NOTES
Subjective:       Patient ID: Erick Valdovinos is a 69 y.o. male.    Chief Complaint: No chief complaint on file.  Dictation #1  MRN:[id number]  [facility]:[id number]  Dict  The patient location is: home  The chief complaint leading to consultation is: SOB,weakness, and confusion  Visit type: Virtual visit with synchronous audio and video  Total time spent with patient: 45min  Each patient to whom he or she provides medical services by telemedicine is:  (1) informed of the relationship between the physician and patient and the respective role of any other health care provider with respect to management of the patient; and (2) notified that he or she may decline to receive medical services by telemedicine and may withdraw from such care at any time.    Notes:  69-year-old white male patient who was diagnosed with COVID and is at home on quarantine.  The consult was done with his wife, the patient was asleep.  He was seen by another MD on 3/ 19 and had COVID testing done that was reported positive on 03/23.  Patient is a diabetic, hypertensive, coronary disease, type for hyperlipidemia, prior pancreatitis, complicating polyneuropathy and renal disease.  He is on multiple medications.  His wife said over the last 2 days he has gotten progressively weaker, somewhat confused.  He ate breakfast earlier in the day that I spoke to her some of it remained in his bed and he was unaware of not finishing his breakfast.  He continues to around temperature though not as high in the 100-101 range.  His appetite is poor but he is drinking fluids.  She has him isolated in a bedroom where he has stayed since he heard about the diagnosis.    Prior to the diagnosis the 2 of them were commuting as usual.  They had done some travel to [place] to gather just before the onset of the illness.  All that travel was by car.    It is unclear what the patient is taking in the way of medication.  She believes he is taking all of his medication.  Patient is  on clopidogrel which she stopped for the last 2 days.  We have no information on his blood pressure, pulse, blood sugars.    I did not speak to the patient at the time of this interview since he was sleeping.    Physical exam:  None was able to be done since this was a virtual visit    Impression:  1.  Diabetes complicated by renal failure, polyneuropathy, microalbumin-I have advised her to stop the Amaryl and Um tried to get a glucose to determine whether he needs to have his Janumet reduced her stopped.  2.  Hypertension-likely he is not requiring the amount of medicine he has been on so I have advised her to stop the amlodipine and try to get a blood pressure and techs that to me.  3.  COVID-patient is near the point of needing hospitalization and I have advised her if he declines at all or she has finding him to get weaker or not eating well to take him to the hospital.        In addition had a lengthy discussion about the risks of her getting COVID and I went through that in detail as much as I am aware of the character of this illness.  I explained to her she should be isolated not only from him but others.  They have a son who is currently being tested for COVID.  HPI  Review of Systems   Constitutional: Positive for activity change, appetite change, fatigue and fever. Negative for unexpected weight change.   HENT: Negative for dental problem, hearing loss, mouth sores, postnasal drip and sinus pressure.    Eyes: Negative for discharge and visual disturbance.   Respiratory: Positive for cough and shortness of breath.    Cardiovascular: Negative for chest pain and palpitations.   Gastrointestinal: Negative for abdominal pain, blood in stool, constipation, diarrhea and nausea.   Genitourinary: Negative for dysuria, hematuria and testicular pain.   Musculoskeletal: Negative for arthralgias, back pain, joint swelling and neck pain.   Skin: Negative for rash.   Neurological: Positive for dizziness, weakness and  light-headedness. Negative for headaches.   Psychiatric/Behavioral: Positive for decreased concentration. Negative for agitation and sleep disturbance.       Objective:      Physical Exam   Constitutional: He is oriented to person, place, and time. He appears well-developed and well-nourished.   HENT:   Head: Normocephalic.   Right Ear: External ear normal.   Left Ear: External ear normal.   Mouth/Throat: Oropharynx is clear and moist.   Eyes: Pupils are equal, round, and reactive to light. EOM are normal. Right eye exhibits no discharge.   Neck: Normal range of motion. No JVD present. No tracheal deviation present. No thyromegaly present.   Cardiovascular: Normal rate, regular rhythm, normal heart sounds and intact distal pulses. Exam reveals no gallop.   No murmur heard.  Pulmonary/Chest: Effort normal and breath sounds normal. He has no wheezes. He has no rales.   Abdominal: Soft. Bowel sounds are normal. He exhibits no mass. There is no tenderness.   Genitourinary: Prostate normal and penis normal. Rectal exam shows guaiac negative stool.   Musculoskeletal: Normal range of motion. He exhibits no edema.   Lymphadenopathy:     He has no cervical adenopathy.   Neurological: He is oriented to person, place, and time. He displays normal reflexes. No cranial nerve deficit. Coordination normal.   Skin: Skin is warm. No rash noted. No pallor.   Psychiatric: He has a normal mood and affect.       Assessment:       1. Covid-19 Virus Detected    2. Febrile illness    3. Weakness    4. Confusion    5. Chronic kidney disease, stage III (moderate)    6. Essential hypertension    7. Agatston coronary artery calcium score greater than 400    8. Type 2 diabetes mellitus with microalbuminuria, without long-term current use of insulin    9. Diabetes mellitus due to underlying condition, controlled, with diabetic neuropathy, without long-term current use of insulin        Plan:

## 2020-03-28 NOTE — ASSESSMENT & PLAN NOTE
- insulin gtt started 3/28 given glucose >400s, monitor K closely given insulin gtt  - lactic normal, checking beta hydroxybutyrate to evaluate for DKA  - consider fluid resuscitation  - serial poct accuchecks  - hold tube feeds for now

## 2020-03-28 NOTE — ASSESSMENT & PLAN NOTE
- history of nonalcoholic fatty liver disease  - trend LFTs  - if evidence of shock liver (transaminitis to the thousands), concerning for poor prognosis

## 2020-03-28 NOTE — TELEPHONE ENCOUNTER
Spoke to pt's wife who stated that that she had to call an ambulance for pt this morning. Temp 102.7ºF and O2 sats: 78%. She stated that pt is on a ventilator in ICU at [facility]. She wanted to know if Dr. Kinney would consider plaquenil for pt. Advised her that the physicians in ICU will make that decision regarding medication for pt.

## 2020-03-28 NOTE — PROVIDER TRANSFER
Confirmed COVID 19 Patient     (Physician in Lead of Transfers)/Regional Referral Center Note    Patients name/MRN: Erick Valdovinos/MRN [id number]    Referring Physician or Mid-Level provider giving report: Dr. Fernando Shell (Emergency Medicine)  Contact number: [phone]    Referral Facility: [facility]    Date/Time of Acceptance: 3/28/2020 11:38 AM    :  Dr. Adriane Arrieta; Accepting physician is Dr. Jak Tee (Pulmonary/Critical care)    Accepting facility [facility] ICU    Reason for transfer request: Needs ICU admission from ED and [facility] ICU at capacity     Report from Physician/Mid-Level Provider/HPI: 68 y/o male with HLP, essential hypertension, Type 2 diabetes with CKD 3 and polyneuropathy not on home insulin, and fatty liver who is known COVID 19 positive presents the ER for evaluation of weakness fatigue and worsening SOB and arrived to ED hypoxic at 78% on room air and in moderate distress. Patient seen by his primary care physician on 3/19 with fever and body aches and cough and tested for COVID 19 and FLU and Flu returned negative but was not hypoxic at that time so sent home and told to self-quarantine. Subsequently his test has returned positive and presented to ED at [place] with SOB and hypoxia. Patient also noted to have initial BP of 85/57 and bolused with 1 liter of NS and now normotensive but afebrile with temp of 99.3 F. CXR was done that showed moderate bilateral patchy haziness. Patient placed on NRB mask but continued to have issues with poor effort so electively intubated due to worsening condition. Patient had COVID labs drawn and noted to have elevated Ferritin 2202, , , . Patient with normal Lactic acid 0.9 and D-Dimer 0.4 and BNP 41. Patient with known CKD but baseline CR usually 1.3-14 and Cr 2.6 this am consistent with JAZMIN. Patient currently sedated on Fentanyl and Propofol and IV Rocephin and Azithromycin given in ED  "and started on Plaquenil as per protocol. Not on pressor support and MAP > 65. Post-extubation film as per ED with ET tube bess good position and no PTX.     VS: /56   Pulse 108   Temp 99.3 °F (37.4 °C) (Oral)   Resp (!) 24   Ht 5' 7" (1.702 m)   Wt 108.9 kg (240 lb)   SpO2 99% on FIO2 100%  BMI 37.59 kg/m²     Current Vent Settings:  Vent Mode: A/C  Oxygen Concentration (%):  [100] 100%  Resp Rate Total:  [24 br/min] 24 br/min  Vt Set:  [40 mL-400 mL] 400 mL  PEEP/CPAP:  [14 cmH20] 14 cmH20  Mean Airway Pressure:  [17 dwE49-21 cmH20] 17 cmH20    Past Medical/Surgical History: See EPIC     Meds: See EPIC    Labs:      Lactate (Lactic Acid)   Date Value Ref Range Status   03/28/2020 0.9 0.5 - 2.2 mmol/L Final     Ferritin   Date Value Ref Range Status   03/28/2020 2,202 (H) 20.0 - 300.0 ng/mL Final     Sed Rate   Date Value Ref Range Status   12/27/2016 32 (H) 0 - 10 mm/Hr Final     CRP   Date Value Ref Range Status   03/28/2020 263.4 (H) 0.0 - 8.2 mg/L Final     BNP   Date Value Ref Range Status   03/28/2020 41 0 - 99 pg/mL Final     D-Dimer   Date Value Ref Range Status   12/27/2016 0.40 <0.50 mg/L FEU Final     Creatinine   Date Value Ref Range Status   03/28/2020 2.6 (H) 0.5 - 1.4 mg/dL Final     AST   Date Value Ref Range Status   03/28/2020 116 (H) 10 - 40 U/L Final     WBC   Date Value Ref Range Status   03/28/2020 6.21 3.90 - 12.70 K/uL Final     Hemoglobin   Date Value Ref Range Status   03/28/2020 11.1 (L) 14.0 - 18.0 g/dL Final     Platelets   Date Value Ref Range Status   03/28/2020 333 150 - 350 K/uL Final     SARS-CoV2 (COVID-19) Qualitative PCR   Date Value Ref Range Status   03/19/2020 Detected (A) Undetected Final       Imaging: See EPIC    To Do List upon arrival:     Admit to ICU to CMICU- assigned room 6069   Special COVID isolation- airborne, droplet and contact   Continue treatment protocol for COVID 19 infection   Patient with JAZMIN on CKD and needs close monitoring of renal " function     Adriane Arrieta MD  Senior Staff Physician  Department of Hospital Medicine  Patient Flow Center/   [phone]

## 2020-03-28 NOTE — ASSESSMENT & PLAN NOTE
Covid-19 Virus Infection  Presents with SOB, myalgias. Flu negative.   - COVID-19 testing resulted POSITIVE     - Isolation:   - Airborne and Droplet Precautions  - N95 masks must be fit tested, wear eye protection  - 20 second hand hygiene              - Limit visitors per hospital policy              - Consolidating lab draws, nursing care, and interventions      PLAN:  - continue mechanical ventilation (Intubated on 3/28 at [facility])  -  high PEEP protocol per [facility]  -Will reduce to 6ccs/kg today. Started on 7 for acid clearance.   - strict isolation protocol 2/2 positive COVID  - plaquenil for COVID  - f/u ID recs regarding antivirals for COVID  - CAP coverage with ceftriaxone azithro, will continue for now.   - f/u blood cultures from 3/28  - f/u sputum cultures from 3/28

## 2020-03-28 NOTE — ED NOTES
Patient on cardiac monitor, automatic blood pressure cuff and pulse oximeter. Pulse ox reading 77%, pt placed on nonrebreather mask at 15 L.

## 2020-03-28 NOTE — ASSESSMENT & PLAN NOTE
- creatinine up to 2.6 on 3/28, basline about 1.3  - trend creatinine  - monitor urine output, goal to keep patient net negative  - insert trialysis line in the case of need for HD given renal failure incidence with COVID  - nephro consult if worsening urine output/need for HD

## 2020-03-28 NOTE — ASSESSMENT & PLAN NOTE
- on statin at home, can restart when appropriate  - continue home plavix (follows with Dr Jiménez in cardiology and has a pending angiogram to evaluate for CAD)

## 2020-03-28 NOTE — HPI
Patient is a 70 yo male with HTN, HLD, DM2, CKD3, NAFLD, aortic stenosis, diabetic neuropathy, cervical radiculopathy who presented to [facility] on 3/28 for worsening SOB in the setting of positive COVID testing done on 3/19-->resulted positive on 3/23. Patient had had fevers and diarrhea for the week prior, fevers as high as 103. He was also having difficulty seeing. Symptoms non-remitting until 3/28 and then had worsening fatigue and weakness, which led him to present to [facility] ED. In the ED, patient was found to be hypoxemic at 78% on room air and in moderate distress. Patient was placed on NRB mask but continued to have issues with poor effort so electively intubated due to worsening condition. Patient was also given 1L fluid bolus for hypotension (systolic in the 80s). Patient sedated on propofol and fentanyl.    ED evaluation at [place] showed: WBC 6.21 with lymphopenia, bicarb 14, creatinine 2.6 (baseline 1.3), Ferritin 2202, , , , Lactic acid 0.9 and D-Dimer 0.4, BNP 41 and troponin bump to 0.087. Patient was started on ceftriaxone, Azithromycin and Plaquenil.     [place] [facility] [place] was out of ICU beds. Patient admitted to MICU on 3/28 for management of acute hypoxemic respiratory failure complicated by JAZMIN and troponinemia in the setting of COVID19.

## 2020-03-28 NOTE — ED NOTES
Pt presents to the ED via ems secondary to SOB, non productive cough, fever and generalized body aches. Pt is covid +. Upon arrival pt noted to be hypotensive and tachypneic but alert. o2 sat 78% on RA. Pt placed on NRB, o2 increased to 89%.

## 2020-03-28 NOTE — H&P
[facility]  Critical Care Medicine  History & Physical    Patient Name: Erick Valdovinos  MRN: [id number]  Admission Date: 3/28/2020  Hospital Length of Stay: 0 days  Code Status: Full Code  Attending Physician: Tessie Figueroa MD   Primary Care Provider: Brandon Kineny MD   Principal Problem: Acute respiratory distress syndrome (ARDS) due to 2019 novel coronavirus    Subjective:     HPI:  Patient is a 70 yo male with HTN, HLD, DM2, CKD3, NAFLD, aortic stenosis, diabetic neuropathy, cervical radiculopathy who presented to [facility] on 3/28 for worsening SOB in the setting of positive COVID testing done on 3/19-->resulted positive on 3/23. Patient had had fevers and diarrhea for the week prior, fevers as high as 103. He was also having difficulty seeing. Symptoms non-remitting until 3/28 and then had worsening fatigue and weakness, which led him to present to [facility] ED. In the ED, patient was found to be hypoxemic at 78% on room air and in moderate distress. Patient was placed on NRB mask but continued to have issues with poor effort so electively intubated due to worsening condition. Patient was also given 1L fluid bolus for hypotension (systolic in the 80s). Patient sedated on propofol and fentanyl.    ED evaluation at [place] showed: WBC 6.21 with lymphopenia, bicarb 14, creatinine 2.6 (baseline 1.3), Ferritin 2202, , , , Lactic acid 0.9 and D-Dimer 0.4, BNP 41 and troponin bump to 0.087. Patient was started on ceftriaxone, Azithromycin and Plaquenil.     Patient was transferred to [facility] as [place] was out of ICU beds. Patient admitted to MICU on 3/28 for management of acute hypoxemic respiratory failure complicated by JAZMIN and troponinemia in the setting of COVID19.    Hospital/ICU Course:  Admitted to MICU on 3/28, intubated and mechanically ventilated. On Ceftriaxone, azithro and plaquenil. Started insulin gtt given glucose >400s and checking serum ketones to  evaluate for DKA. Trending troponins given elevation to 0.087 on admission.     Past Medical History:   Diagnosis Date    Agatston coronary artery calcium score greater than 400 1/13/2020    Anticoagulant long-term use     Calculus of gallbladder without cholecystitis without obstruction 9/10/2018    Cervical radiculopathy 9/28/2018    Chronic neck pain 9/28/2018    DDD (degenerative disc disease), cervical 9/28/2018    Diabetes mellitus     Diabetes mellitus, type 2     Esophageal reflux     Essential hypertension 2/11/2013    Fatty liver 1/13/2020    Gastroesophageal reflux disease without esophagitis 2/11/2013    Hyperlipidemia     Hypertension     Mixed hyperlipidemia 3/4/2020    Nephrolithiasis     Osteoarthritis     Type 2 diabetes mellitus with diabetic polyneuropathy, without long-term current use of insulin 2/11/2013    Type 2 diabetes mellitus with stage 3 chronic kidney disease, without long-term current use of insulin 9/27/2018       Past Surgical History:   Procedure Laterality Date    COLONOSCOPY      COLONOSCOPY N/A 3/19/2019    Procedure: COLONOSCOPY Suprep;  Surgeon: Italo Bañuelos MD;  Location: [facility];  Service: Endoscopy;  Laterality: N/A;    EPIDURAL STEROID INJECTION INTO CERVICAL SPINE N/A 10/30/2018    Procedure: Injection-steroid-epidural-cervical - C7 - T1;  Surgeon: Hernan Murguia Jr., MD;  Location: [facility];  Service: Pain Management;  Laterality: N/A;  PT IS DIABETIC    EPIDURAL STEROID INJECTION INTO CERVICAL SPINE N/A 4/11/2019    Procedure: Cervical Epidural Steroid Injection, C7-T1, With Dexamethasone,  and IV Sedation;  Surgeon: Hernan Murguia Jr., MD;  Location: [facility] PAIN [facility];  Service: Pain Management;  Laterality: N/A;  PT IS A DIABETIC.  Pt states he stopped taking ASA 81mg 13 days ago.      EPIDURAL STEROID INJECTION INTO CERVICAL SPINE N/A 4/30/2019    Procedure: Injection-steroid-epidural-cervical--C7-T1 With Dexamethasone, and IV  Sedation;  Surgeon: Hernan Murguia Jr., MD;  Location: [facility];  Service: Pain Management;  Laterality: N/A;    ESOPHAGOGASTRODUODENOSCOPY N/A 3/19/2019    Procedure: ESOPHAGOGASTRODUODENOSCOPY (EGD);  Surgeon: Italo Bañuelos MD;  Location: [facility] ENDO;  Service: Endoscopy;  Laterality: N/A;    EYE SURGERY      hemrrhoidectomy      TONSILLECTOMY      WRIST FRACTURE SURGERY      WRIST SURGERY         Review of patient's allergies indicates:   Allergen Reactions    Codeine Nausea Only    Nsaids (non-steroidal anti-inflammatory drug) Other (See Comments)     Kidney issues       Family History     Problem Relation (Age of Onset)    Coronary artery disease Father, Brother    Heart attack Father    Heart disease Father    Hyperlipidemia Father, Brother, Brother    Hypertension Mother    Kidney disease Mother    Kidney failure Mother        Tobacco Use    Smoking status: Never Smoker    Smokeless tobacco: Never Used   Substance and Sexual Activity    Alcohol use: Not Currently    Drug use: No    Sexual activity: Yes     Partners: Female      Review of Systems   Unable to perform ROS: Intubated     Objective:     Vital Signs (Most Recent):  Temp: (!) 101.8 °F (38.8 °C) (03/28/20 1530)  Pulse: 90 (03/28/20 1600)  Resp: 20 (03/28/20 1600)  BP: (!) 71/43 (03/28/20 1600)  SpO2: 98 % (03/28/20 1600) Vital Signs (24h Range):  Temp:  [99.3 °F (37.4 °C)-101.8 °F (38.8 °C)] 101.8 °F (38.8 °C)  Pulse:  [] 90  Resp:  [17-48] 20  SpO2:  [78 %-100 %] 98 %  BP: ()/(43-86) 71/43   Weight: 108.9 kg (240 lb 1.3 oz)  Body mass index is 37.6 kg/m².      Intake/Output Summary (Last 24 hours) at 3/28/2020 1636  Last data filed at 3/28/2020 1600  Gross per 24 hour   Intake 3.59 ml   Output --   Net 3.59 ml     Limited physical exam 2/2 COVID19 isolation precautions  Physical Exam   Constitutional: He appears distressed.   HENT:   Intubated   Cardiovascular:   Systolic in the 70s   Pulmonary/Chest:   Mechanically  ventilated. No belly breathing noted   Abdominal: He exhibits no distension.   Neurological:   Sedated        Vents:  Vent Mode: A/C (03/28/20 1521)  Set Rate: 20 BPM (03/28/20 1521)  Vt Set: 460 mL (03/28/20 1521)  Pressure Support: 0 cmH20 (03/28/20 1521)  PEEP/CPAP: 10 cmH20 (03/28/20 1521)  Oxygen Concentration (%): 100 (03/28/20 1600)  Peak Airway Pressure: 17 cmH2O (03/28/20 1521)  Plateau Pressure: 0 cmH20 (03/28/20 1521)  Total Ve: 15.5 mL (03/28/20 1521)  F/VT Ratio<105 (RSBI): (!) 90.74 (03/28/20 1521)  Lines/Drains/Airways     Drain                 NG/OG Tube 03/28/20 0557 [place] sump 16 Fr. Center mouth less than 1 day         Urethral Catheter 03/28/20 0606 Non-latex 16 Fr. less than 1 day          Airway                 Airway - Non-Surgical 03/28/20 0605 Endotracheal Tube less than 1 day          Peripheral Intravenous Line                 Peripheral IV - Single Lumen 05/01/19 1859 20 G Left Forearm 331 days         Peripheral IV - Single Lumen 03/28/20 0530 18 G Left Forearm less than 1 day         Peripheral IV - Single Lumen 03/28/20 0540 18 G Right Forearm less than 1 day              Significant Labs:    CBC/Anemia Profile:  Recent Labs   Lab 03/28/20  0532 03/28/20  0533   WBC 6.21  --    HGB 11.1*  --    HCT 34.0*  --      --    MCV 93  --    RDW 12.9  --    FERRITIN  --  2,202*        Chemistries:  Recent Labs   Lab 03/28/20  0533      K 5.0      CO2 14*   BUN 43*   CREATININE 2.6*   CALCIUM 9.2   ALBUMIN 2.6*   PROT 7.1   BILITOT 0.5   ALKPHOS 55   ALT 70*   *   MG 2.1       ABGs:   Recent Labs   Lab 03/28/20  1315   PH 7.196*   PCO2 38.8   HCO3 15.0*   POCSATURATED 96   BE -13     Cardiac Markers: No results for input(s): CKMB, TROPONINT, MYOGLOBIN in the last 48 hours.    Significant Imaging: I have reviewed all pertinent imaging results/findings within the past 24 hours.    Assessment/Plan:     Pulmonary  Acute respiratory failure with hypoxia  See acute resp  distress syndrome due to 2019 novel coronavirus      Cardiac/Vascular  Elevated troponin  - patient with troponin bump to 0.087 on admission  - trend troponin  - if peak elevation is mild, likely demand ischemia 2/2 ARDS  - other differentials include ACS vs septic cardiomyopathy vs viral myocarditis  - if significant elevation in troponins, will need to obtain echo to evaluate heart function, especially if there is concern for cardiogenic shock    Aortic stenosis  - follows with cardiology outpatient    Mixed hyperlipidemia  - on statin at home, can restart when appropriate  - continue home plavix (follows with Dr Jiménez in cardiology and has a pending angiogram to evaluate for CAD)    Essential hypertension  - monitor BP  - pressors for hypotension  - consider home antihypertensives vs IV prn's for systolic >180    Renal/  Acute renal failure superimposed on stage 3 chronic kidney disease  - creatinine up to 2.6 on 3/28, basline about 1.3  - trend creatinine  - monitor urine output, goal to keep patient net negative  - insert trialysis line in the case of need for HD given renal failure incidence with COVID  - nephro consult if worsening urine output/need for HD    Endocrine  Severe obesity (BMI 35.0-39.9) with comorbidity  - nutrition consult for tube feeds  - consider starting tube feeds 3/29    Type 2 diabetes mellitus with stage 3 chronic kidney disease, without long-term current use of insulin  - insulin gtt started 3/28 given glucose >400s, monitor K closely given insulin gtt  - lactic normal, checking beta hydroxybutyrate to evaluate for DKA  - consider fluid resuscitation  - serial poct accuchecks  - hold tube feeds for now    GI  Transaminitis  See NAFLD    Nonalcoholic fatty liver disease  - history of nonalcoholic fatty liver disease  - trend LFTs  - if evidence of shock liver (transaminitis to the thousands), concerning for poor prognosis    Other  * Acute respiratory distress syndrome (ARDS) due to  2019 novel coronavirus  Covid-19 Virus Infection  Presents with SOB, myalgias. Flu negative.   - COVID-19 testing resulted POSITIVE     - Isolation:   - Airborne and Droplet Precautions  - N95 masks must be fit tested, wear eye protection  - 20 second hand hygiene              - Limit visitors per hospital policy              - Consolidating lab draws, nursing care, and interventions      PLAN:  - continue mechanical ventilation (Intubated on 3/28 at [facility])  - consider high PEEP protocol per ARDSnet  - monitor BP and consider pressors if needed  - sedation for comfort  - strict isolation protocol 2/2 positive COVID  - empiric plaquenil for COVID  - consider steroids if worsening hypotension  - f/u ID recs regarding antivirals for COVID  - CAP coverage with ceftriaxone azithro, will likely deescalate given low suspicion for CAP and positive COVID  - f/u blood cultures from 3/28  - f/u sputum cultures from 3/28      Acute respiratory distress syndrome (ARDS) due to COVID-19 virus  See acute resp distress syndrome due to 2019 novel coronavirus      Endotracheally intubated  See acute resp distress syndrome due to 2019 novel coronavirus      COVID-19 virus detected  See acute resp distress syndrome due to 2019 novel coronavirus      Critical Care Daily Checklist:    A: Awake: RASS Goal/Actual Goal:    Actual:     B: Spontaneous Breathing Trial Performed?     C: SAT & SBT Coordinated?  No, intubated 3/28                      D: Delirium: CAM-ICU     E: Early Mobility Performed? No   F: Feeding Goal:    Status:     Current Diet Order   Procedures    Diet NPO      AS: Analgesia/Sedation Fentanyl and propofol, watch TG   T: Thromboembolic Prophylaxis Heparin 5K q8h (not on lovenox given JAZMIN)   H: HOB > 300 Yes   U: Stress Ulcer Prophylaxis (if needed) Famotidine    G: Glucose Control Uncontrolled, >400, started insulin gtt, consider fluid resuscitation if evidence of DKA given anion gap of 17 (checking serum  ketones)   B: Bowel Function     I: Indwelling Catheter (Lines & Motta) Necessity Yes    D: De-escalation of Antimicrobials/Pharmacotherapies Continue ceftriaxone azithro and plaquenil for now    Plan for the day/ETD Admit to MICU, evaluate for DKA, start insulin gtt, continue ceftriaxone azithro and plaquenil, continue mechanical ventilation for COVID-induced ARDS, place central line and arterial line    Code Status:  Family/Goals of Care: Full Code         Critical secondary to Patient has a condition that poses threat to life and bodily function: Severe Respiratory Distress     Critical care was time spent personally by me on the following activities: development of treatment plan with patient or surrogate and bedside caregivers, discussions with consultants, evaluation of patient's response to treatment, examination of patient, ordering and performing treatments and interventions, ordering and review of laboratory studies, ordering and review of radiographic studies, pulse oximetry, re-evaluation of patient's condition. This critical care time did not overlap with that of any other provider or involve time for any procedures.     Jessica Blanco MD  Critical Care Medicine  [facility]

## 2020-03-28 NOTE — PROCEDURES
Erick Valdovinos is a 69 y.o. male patient.    Temp: (!) 101.8 °F (38.8 °C) (03/28/20 1530)  Pulse: 82 (03/28/20 1700)  Resp: (!) 23 (03/28/20 1700)  BP: (!) 95/53 (03/28/20 1700)  SpO2: 100 % (03/28/20 1700)  Weight: 108.9 kg (240 lb 1.3 oz) (03/28/20 1529)       Arterial    Diagnosis: respiratory failure    Patient location during procedure: ICU  Procedure start time: 3/28/2020 6:20 PM  Procedure end time: 3/28/2020 6:25 PM    Staffing  Authorizing Provider: Margarito Shore MD  Performing Provider: Mark Anthony Soria MD    Anesthesiologist was present at the time of the procedure.    Preanesthetic Checklist  Completed: patient identified, surgical consent, pre-op evaluation, timeout performed, IV checked, risks and benefits discussed, monitors and equipment checked and anesthesia consent givenArterial  Skin Prep: chlorhexidine gluconate and isopropyl alcohol  Local Infiltration: none  Orientation: right  Location: radial  Catheter Size: 20 G  Catheter placement by Anatomical landmarks. Heme positive aspiration all ports.Insertion Attempts: 1  Assessment  Dressing: secured with tape and tegaderm  Patient: Tolerated well            Margarito Shore  3/28/2020

## 2020-03-28 NOTE — TELEPHONE ENCOUNTER
----- Message from Ana Moore sent at 3/28/2020  8:36 AM CDT -----  Contact: Yenny(wife)[phone]  Pt wife states the pt has tested positive for COVID 19. Pt wife states she had to call 911 this morning to take the pt to [facility]. Pt wife is requesting a call back from Dr Kinney. Please call and advise.

## 2020-03-28 NOTE — ED PROVIDER NOTES
Encounter Date: 3/28/2020       History     Chief Complaint   Patient presents with    Shortness of Breath     Patient presents to ED secondary to cough, congestion, fever and generalized weakness. 78% on room air. also hypotensive upon arrival . +COVID-19     This is a 69-year-old male history of hypertension, diabetes, fatty liver, who is Coronavirus positive presents the ER for evaluation of weakness fatigue.  Per EMS, complaining of worse S:  A virus symptoms today, he arrived in the ER, hypoxic at 78% on room air, moderate respiratory distress as well as hypotensive.  Patient was still mentating well, I discussed with patient expected prognosis of Coronavirus, and need for intubation to help with his respiratory failure.  Patient understood and agreed agreed intubation.  Patient successfully intubated in 1 attempt without any complication.        Review of patient's allergies indicates:   Allergen Reactions    Codeine Nausea Only    Nsaids (non-steroidal anti-inflammatory drug) Other (See Comments)     Kidney issues     Past Medical History:   Diagnosis Date    Agatston coronary artery calcium score greater than 400 1/13/2020    Anticoagulant long-term use     Calculus of gallbladder without cholecystitis without obstruction 9/10/2018    Cervical radiculopathy 9/28/2018    Chronic neck pain 9/28/2018    DDD (degenerative disc disease), cervical 9/28/2018    Diabetes mellitus     Diabetes mellitus, type 2     Esophageal reflux     Essential hypertension 2/11/2013    Fatty liver 1/13/2020    Gastroesophageal reflux disease without esophagitis 2/11/2013    Hyperlipidemia     Hypertension     Mixed hyperlipidemia 3/4/2020    Nephrolithiasis     Osteoarthritis     Type 2 diabetes mellitus with diabetic polyneuropathy, without long-term current use of insulin 2/11/2013    Type 2 diabetes mellitus with stage 3 chronic kidney disease, without long-term current use of insulin 9/27/2018     Past  Surgical History:   Procedure Laterality Date    COLONOSCOPY      COLONOSCOPY N/A 3/19/2019    Procedure: COLONOSCOPY Suprep;  Surgeon: Italo Bañuelos MD;  Location: [facility] ENDO;  Service: Endoscopy;  Laterality: N/A;    EPIDURAL STEROID INJECTION INTO CERVICAL SPINE N/A 10/30/2018    Procedure: Injection-steroid-epidural-cervical - C7 - T1;  Surgeon: Hernan Murguia Jr., MD;  Location: [facility] PAIN T;  Service: Pain Management;  Laterality: N/A;  PT IS DIABETIC    EPIDURAL STEROID INJECTION INTO CERVICAL SPINE N/A 4/11/2019    Procedure: Cervical Epidural Steroid Injection, C7-T1, With Dexamethasone,  and IV Sedation;  Surgeon: Hernan Murguia Jr., MD;  Location: [facility] PAIN [facility] [place];  Service: Pain Management;  Laterality: N/A;  PT IS A DIABETIC.  Pt states he stopped taking ASA 81mg 13 days ago.      EPIDURAL STEROID INJECTION INTO CERVICAL SPINE N/A 4/30/2019    Procedure: Injection-steroid-epidural-cervical--C7-T1 With Dexamethasone, and IV Sedation;  Surgeon: Hernan Murguia Jr., MD;  Location: [facility] PAIN [facility] [place];  Service: Pain Management;  Laterality: N/A;    ESOPHAGOGASTRODUODENOSCOPY N/A 3/19/2019    Procedure: ESOPHAGOGASTRODUODENOSCOPY (EGD);  Surgeon: Italo Bañuelos MD;  Location: [facility];  Service: Endoscopy;  Laterality: N/A;    EYE SURGERY      hemrrhoidectomy      TONSILLECTOMY      WRIST FRACTURE SURGERY      WRIST SURGERY       Family History   Problem Relation Age of Onset    Kidney disease Mother     Kidney failure Mother     Hypertension Mother     Heart attack Father     Heart disease Father     Coronary artery disease Father     Hyperlipidemia Father     Coronary artery disease Brother     Hyperlipidemia Brother     Hyperlipidemia Brother      Social History     Tobacco Use    Smoking status: Never Smoker    Smokeless tobacco: Never Used   Substance Use Topics    Alcohol use: Not Currently    Drug use: No     Review of Systems   Constitutional: Positive for diaphoresis,  fatigue and fever.   Respiratory: Positive for cough and shortness of breath.    Gastrointestinal: Negative for abdominal pain, nausea and vomiting.   Genitourinary: Negative for dysuria.       Physical Exam     Initial Vitals [03/28/20 0524]   BP Pulse Resp Temp SpO2   (!) 85/57 105 (!) 24 99.3 °F (37.4 °C) (!) 78 %      MAP       --         Physical Exam    Constitutional:   Chronically ill-appearing, increased respiratory effort   HENT:   Head: Normocephalic and atraumatic.   Eyes: Pupils are equal, round, and reactive to light.   Neck: Normal range of motion.   Cardiovascular:   Tachycardic rate and rhythm   Pulmonary/Chest:   Moderate respiratory distress increase respiratory effort diminished breath sounds crackles   Abdominal: Soft. He exhibits distension. There is no tenderness. There is no rebound and no guarding.   Musculoskeletal: Normal range of motion.   Neurological: He is alert and oriented to person, place, and time.   Skin: Skin is warm and dry.         ED Course   Intubation  Date/Time: 3/28/2020 6:20 AM  Performed by: Renetta Johnson MD  Authorized by: Renetta Johnson MD   Indications: respiratory distress,  airway protection and  hypoxemia  Intubation method: video-assisted  Patient status: paralyzed (RSI)  Preoxygenation: nonrebreather mask  Sedatives: ketmine  Paralytic: rocuronium  Laryngoscope size: Mac 3  Tube size: 8.0 mm  Tube type: cuffed  Number of attempts: 1  Cords visualized: yes  Post-procedure assessment: chest rise,  ETCO2 monitor and CO2 detector  Breath sounds: rales/crackles,  diminished and equal  Cuff inflated: yes  ETT to lip: 24 cm  Tube secured with: adhesive tape    Critical Care  Date/Time: 3/28/2020 6:21 AM  Performed by: Renetta Johnson MD  Authorized by: Renetta Johnson MD   Total critical care time (exclusive of procedural time) : 40 minutes  Critical care was time spent personally by me on the following activities: ventilator management, review of old  charts, re-evaluation of patient's condition, pulse oximetry, ordering and review of radiographic studies, ordering and review of laboratory studies, ordering and performing treatments and interventions, examination of patient, interpretation of cardiac output measurements, evaluation of patient's response to treatment and discussions with primary provider.  Comments: 40 min of critical care time for managing acute hypoxemic respiratory failure secondary to Coronavirus        Labs Reviewed   CBC W/ AUTO DIFFERENTIAL - Abnormal; Notable for the following components:       Result Value    RBC 3.67 (*)     Hemoglobin 11.1 (*)     Hematocrit 34.0 (*)     Immature Granulocytes 0.8 (*)     Immature Grans (Abs) 0.05 (*)     Lymph # 0.7 (*)     Gran% 80.8 (*)     Lymph% 11.6 (*)     All other components within normal limits   COMPREHENSIVE METABOLIC PANEL - Abnormal; Notable for the following components:    CO2 14 (*)     Glucose 240 (*)     BUN, Bld 43 (*)     Creatinine 2.6 (*)     Albumin 2.6 (*)      (*)     ALT 70 (*)     Anion Gap 17 (*)     eGFR if  28 (*)     eGFR if non  24 (*)     All other components within normal limits   TROPONIN I - Abnormal; Notable for the following components:    Troponin I 0.087 (*)     All other components within normal limits   URINALYSIS, REFLEX TO URINE CULTURE - Abnormal; Notable for the following components:    Protein, UA 2+ (*)     Occult Blood UA 1+ (*)     All other components within normal limits    Narrative:     Preferred Collection Type->Urine, Clean Catch   C-REACTIVE PROTEIN - Abnormal; Notable for the following components:    .4 (*)     All other components within normal limits   FERRITIN - Abnormal; Notable for the following components:    Ferritin 2,202 (*)     All other components within normal limits   C-REACTIVE PROTEIN - Abnormal; Notable for the following components:    .4 (*)     All other components within  normal limits   LACTATE DEHYDROGENASE - Abnormal; Notable for the following components:     (*)     All other components within normal limits   ISTAT PROCEDURE - Abnormal; Notable for the following components:    POC PH 7.196 (*)     POC HCO3 15.0 (*)     POC TCO2 16 (*)     All other components within normal limits   B-TYPE NATRIURETIC PEPTIDE   MAGNESIUM   TSH   LACTIC ACID, PLASMA   URINALYSIS MICROSCOPIC    Narrative:     Preferred Collection Type->Urine, Clean Catch     EKG Readings: (Independently Interpreted)   Sinus tachycardia 104 beats per minute, normal intervals, no acute ST elevations     ECG Results          EKG 12-lead (In process)  Result time 03/29/20 17:22:26    In process by Interface, Lab In [facility] (03/29/20 17:22:26)                 Narrative:    Test Reason : R09.02,    Vent. Rate : 186 BPM     Atrial Rate : 186 BPM     P-R Int : 000 ms          QRS Dur : 144 ms      QT Int : 214 ms       P-R-T Axes : 000 052 268 degrees     QTc Int : 376 ms    Wide QRS tachycardia with Premature supraventricular complexes in a pattern  of bigeminy and Premature ventricular complexes or Fusion complexes  Nonspecific intraventricular block  Nonspecific T wave abnormality  Abnormal ECG  When compared with ECG of 01-OCT-2019 08:55,  Previous ECG has undetermined rhythm, needs review    Referred By: [email or web address]   SELF           Confirmed By:                   In process by Interface, Lab In [facility] (03/28/20 21:33:41)                 Narrative:    Test Reason : R09.02,    Vent. Rate : 186 BPM     Atrial Rate : 186 BPM     P-R Int : 000 ms          QRS Dur : 144 ms      QT Int : 214 ms       P-R-T Axes : 000 052 268 degrees     QTc Int : 376 ms    Wide QRS tachycardia with Premature supraventricular complexes in a pattern  of bigeminy and Premature ventricular complexes or Fusion complexes  Nonspecific intraventricular block  Nonspecific T wave abnormality  Abnormal ECG  When compared with ECG of 01-OCT-2019  08:55,  Previous ECG has undetermined rhythm, needs review    Referred By: [email or web address]   SELF           Confirmed By:                             Imaging Results          X-Ray Chest 1 View (Final result)  Result time 03/28/20 06:28:32    Final result by Vanessa Pastrana MD (03/28/20 06:28:32)                 Impression:      1. Medical support devices as above.  2. Increased interstitial attenuation/prominence and diffuse parenchymal haziness possibly relating to underlying infectious process, edema or non infectious inflammatory process.      Electronically signed by: Vanessa Pastrana MD  Date:    03/28/2020  Time:    06:28             Narrative:    EXAMINATION:  XR CHEST 1 VIEW    CLINICAL HISTORY:  Infection and inflammatory reaction due to other internal prosthetic devices, implants and grafts, initial encounter    TECHNIQUE:  Single frontal view of the chest was performed.    COMPARISON:  11/26/2019    FINDINGS:  Endotracheal tube in place with tip projecting approximately 3.5 cm above the rosa. Esophagogastric tube tip courses below the diaphragm, outside the field of view.  The cardiomediastinal silhouette is within normal limits.  The lungs are symmetrically expanded with slight increased interstitial prominence slight basilar ground-glass opacity which can be seen with underlying infectious process or pulmonary edema.  No significant volume of pleural fluid or pneumothorax.  Visualized osseous structures are intact.                                 Medical Decision Making:   Initial Assessment:   69-year-old male, multiple medical problems, presents the ER for evaluation of acute hypoxemic respiratory failure likely secondary to Coronavirus.  Has tested positive for Coronavirus.  Arrived in the ER hypoxic 78% on room air, tachypneic, hypotensive, patient was still maintaining well, discussed with patient need for intubation or expected airway and respiratory failure.  Patient agreed to intubation.  Successfully  intubated with 1 attempt without any complications.  Will given.  Antibiotics to cover for possible coinfection, obtain blood work I will plan admission to ICU.  Informed patient's wife understands.                                 Clinical Impression:       ICD-10-CM ICD-9-CM   1. Acute hypoxemic respiratory failure J96.01 518.81   2. Hypoxia R09.02 799.02   3. COVID-19 virus detected J22     B97.29    4. COVID-19 virus infection J22     B97.29    5. Encounter for intubation Z01.818 V72.83         Disposition:   Disposition: Admitted  Condition: Serious     ED Disposition Condition    Transfer to Another Facility Stable                          Renetta Johnson MD  03/28/20 2016       Renetta Johnson MD  03/29/20 2024

## 2020-03-28 NOTE — PROGRESS NOTES
Patient, Erick Valdovinos (MRN #[id number]), presented with a recent Estimated Glumerular Filtration Rate (EGFR) between 15 and 29 consistent with the definition of chronic kidney disease stage 4 (ICD10 - N18.4).    eGFR if non    Date Value Ref Range Status   03/28/2020 24 (A) >60 mL/min/1.73 m^2 Final     Comment:     Calculation used to obtain the estimated glomerular filtration  rate (eGFR) is the CKD-EPI equation.          The patient's chronic kidney disease stage 4 was monitored, evaluated, addressed and/or treated. This addendum to the medical record is made on 03/28/2020.

## 2020-03-28 NOTE — PROCEDURES
Erick Valdovinos is a 69 y.o. male patient.    Temp: (!) 101.8 °F (38.8 °C) (03/28/20 1530)  Pulse: 82 (03/28/20 1700)  Resp: (!) 23 (03/28/20 1700)  BP: (!) 95/53 (03/28/20 1700)  SpO2: 100 % (03/28/20 1700)  Weight: 108.9 kg (240 lb 1.3 oz) (03/28/20 1529)       Central Line    Diagnosis: respiratory failure  Patient location during procedure: ICU  Procedure start time: 3/28/2020 6:25 PM  Timeout: 3/28/2020 6:24 PM  Procedure end time: 3/28/2020 6:36 PM    Staffing  Authorizing Provider: Margarito Shore MD  Performing Provider: Margarito Shore MD    Staffing  Anesthesiologist: Margarito Shore MD  Performed: anesthesiologist   Anesthesiologist was present at the time of the procedure.  Preanesthetic Checklist  Completed: patient identified, surgical consent, pre-op evaluation, timeout performed, IV checked, risks and benefits discussed, monitors and equipment checked and anesthesia consent given  Indication   Indication: hemodynamic monitoring, vascular access, med administration, hemodialysis     Anesthesia   local infiltration    Central Line   Skin Prep: skin prepped with ChloraPrep, skin prep agent completely dried prior to procedure  maximum sterile barriers used during central venous catheter insertion  hand hygiene performed prior to central venous catheter insertion  Location: left, internal jugular.   Catheter type: dialysis  Catheter Size: 12 Fr  Inserted Catheter Length: 19 cm  Ultrasound: vascular probe with ultrasound  Vessel Caliber: small, patent, compressibility normal  Needle advanced into vessel with real time Ultrasound guidance.   Manometry: Venous cannualation confirmed by visual estimation of blood vessel pressure using manometry.  Insertion Attempts: 1   Securement:line sutured, chlorhexidine patch, sterile dressing applied and blood return through all ports    Post-Procedure   Adverse Events:none    Guidewire Guidewire removed intact.             Margarito Shore  3/28/2020

## 2020-03-29 PROBLEM — I21.4 NSTEMI (NON-ST ELEVATED MYOCARDIAL INFARCTION): Status: ACTIVE | Noted: 2020-01-01

## 2020-03-29 PROBLEM — R57.9 SHOCK, UNSPECIFIED: Status: ACTIVE | Noted: 2020-01-01

## 2020-03-29 PROBLEM — I48.91 A-FIB: Status: ACTIVE | Noted: 2020-01-01

## 2020-03-29 PROBLEM — E11.10 DKA (DIABETIC KETOACIDOSES): Status: ACTIVE | Noted: 2020-01-01

## 2020-03-29 NOTE — HPI
68 yo male with HTN, HLD, DM2, CKD3, NAFLD, aortic stenosis, diabetic neuropathy, cervical radiculopathy who presented to [facility] on 3/28 for worsening SOB in the setting of positive COVID testing done on 3/19-->resulted positive on 3/23. Patient had had fevers and diarrhea for the week prior, fevers as high as 103. He was also having difficulty seeing. Symptoms non-remitting until 3/28 and then had worsening fatigue and weakness, which led him to present to [facility] ED. In the ED, patient was found to be hypoxemic at 78% on room air and in moderate distress. Patient was placed on NRB mask but continued to have issues with poor effort so electively intubated due to worsening condition. Patient was also given 1L fluid bolus for hypotension (systolic in the 80s). Patient sedated on propofol and fentanyl.     ED evaluation at [facility] showed: WBC 6.21 with lymphopenia, bicarb 14, creatinine 2.6 (baseline 1.3), Ferritin 2202, , , , Lactic acid 0.9 and D-Dimer 0.4, BNP 41 and troponin bump to 0.087. Patient was started on Ceftriaxone, Azithromycin and Plaquenil.      Patient was transferred to [facility] as [facility] was out of ICU beds. Patient admitted to MICU on 3/28 for management of acute hypoxemic respiratory failure complicated by JAZMIN and troponinemia in the setting of COVID19. Remarkable labs: hyperkalemia at 7.7, Bicarbonate 12, pH of 7.1, lasix 80mg  administered with one liter bolus of ringer's lactate, insulin and calcium gluconate. Started on insulin infusion due to possible DKA. Nephrology consulted for severe JAZMIN accompanied by hyperkalemia and severe acidosis.

## 2020-03-29 NOTE — PROGRESS NOTES
[facility]  Critical Care Medicine  Progress Note    Patient Name: Erick Valdovinos  MRN: [id number]  Admission Date: 3/28/2020  Hospital Length of Stay: 1 days  Code Status: Full Code  Attending Provider: Tessie Figueroa MD  Primary Care Provider: Brandon Kinney MD   Principal Problem: Acute respiratory distress syndrome (ARDS) due to 2019 novel coronavirus    Subjective:     HPI:  Patient is a 70 yo male with HTN, HLD, DM2, CKD3, NAFLD, aortic stenosis, diabetic neuropathy, cervical radiculopathy who presented to [facility] on 3/28 for worsening SOB in the setting of positive COVID testing done on 3/19-->resulted positive on 3/23. Patient had had fevers and diarrhea for the week prior, fevers as high as 103. He was also having difficulty seeing. Symptoms non-remitting until 3/28 and then had worsening fatigue and weakness, which led him to present to [facility] ED. In the ED, patient was found to be hypoxemic at 78% on room air and in moderate distress. Patient was placed on NRB mask but continued to have issues with poor effort so electively intubated due to worsening condition. Patient was also given 1L fluid bolus for hypotension (systolic in the 80s). Patient sedated on propofol and fentanyl.    ED evaluation at [facility] showed: WBC 6.21 with lymphopenia, bicarb 14, creatinine 2.6 (baseline 1.3), Ferritin 2202, , , , Lactic acid 0.9 and D-Dimer 0.4, BNP 41 and troponin bump to 0.087. Patient was started on ceftriaxone, Azithromycin and Plaquenil.     Patient was transferred to [facility] as [facility] was out of ICU beds. Patient admitted to MICU on 3/28 for management of acute hypoxemic respiratory failure complicated by JAZMIN and troponinemia in the setting of COVID19.    Hospital/ICU Course:  Admitted to MICU on 3/28, intubated and mechanically ventilated. On Ceftriaxone, azithro and plaquenil. Started insulin gtt given glucose >400s and checking serum ketones to evaluate for  DKA. Trending troponins given elevation to 0.087 on admission.    Interval History/Significant Events:   Patient developed narrow complex tachyarrhythmia overnight. Given 2 doses of IV metoprolol.     Persistently hyperglycemic despite large amounts of IV insulin. On 15 u/hr this AM.     Persistently hyperkalemic until early morning.     Febrile overnight.     Review of Systems   Unable to perform ROS: Intubated     Objective:     Vital Signs (Most Recent):  Temp: 99.1 °F (37.3 °C) (03/29/20 1000)  Pulse: (!) 125 (03/29/20 1100)  Resp: 18 (03/29/20 1000)  BP: 128/66 (03/29/20 1100)  SpO2: (!) 92 % (03/29/20 1100) Vital Signs (24h Range):  Temp:  [99.1 °F (37.3 °C)-102.5 °F (39.2 °C)] 99.1 °F (37.3 °C)  Pulse:  [] 125  Resp:  [18-48] 18  SpO2:  [89 %-100 %] 92 %  BP: ()/(39-91) 128/66  Arterial Line BP: ()/(41-60) 156/59   Weight: 108.9 kg (240 lb 1.3 oz)  Body mass index is 37.6 kg/m².      Intake/Output Summary (Last 24 hours) at 3/29/2020 1128  Last data filed at 3/29/2020 0900  Gross per 24 hour   Intake 4214.24 ml   Output 1925 ml   Net 2289.24 ml       Physical Exam   Constitutional:   Examined from window to reduce COVID-19 contact   HENT:   Intubated   Cardiovascular:   Irregular tachycardia   Pulmonary/Chest:   Mechanically Ventilated   Genitourinary:   Genitourinary Comments: Motta in place   Musculoskeletal:   Trialysis catheter left IJ and arterial line in place   Nursing note and vitals reviewed.      Vents:  Vent Mode: A/C (03/29/20 0829)  Set Rate: 20 BPM (03/29/20 0829)  Vt Set: 460 mL (03/29/20 0829)  Pressure Support: 0 cmH20 (03/29/20 0829)  PEEP/CPAP: 10 cmH20 (03/29/20 0829)  Oxygen Concentration (%): 40 (03/29/20 1100)  Peak Airway Pressure: 27 cmH2O (03/29/20 0829)  Plateau Pressure: 22 cmH20 (03/29/20 0829)  Total Ve: 11.5 mL (03/29/20 0829)  F/VT Ratio<105 (RSBI): 760 (03/29/20 0829)  Lines/Drains/Airways     Central Venous Catheter Line            Trialysis (Dialysis)  Catheter 03/28/20 1820 external jugular;left internal jugular less than 1 day          Drain                 NG/OG Tube 03/28/20 0557 Renetta sump 16 Fr. Center mouth 1 day         Urethral Catheter 03/28/20 0606 Non-latex 16 Fr. 1 day          Airway                 Airway - Non-Surgical 03/28/20 0605 Endotracheal Tube 1 day          Arterial Line                 Arterial Line 03/28/20 1819 Right Radial less than 1 day          Peripheral Intravenous Line                 Peripheral IV - Single Lumen 05/01/19 1859 20 G Left Forearm 332 days         Peripheral IV - Single Lumen 03/28/20 0530 18 G Left Forearm 1 day         Peripheral IV - Single Lumen 03/28/20 0540 18 G Right Forearm 1 day              Significant Labs:    CBC/Anemia Profile:  Recent Labs   Lab 03/28/20  0532 03/28/20  0533 03/28/20  1540 03/29/20  0504   WBC 6.21  --   --  7.37   HGB 11.1*  --   --  10.2*   HCT 34.0*  --   --  31.8*     --   --  329   MCV 93  --   --  97   RDW 12.9  --   --  13.1   FERRITIN  --  2,202* 2,520* 3,385*        Chemistries:  Recent Labs   Lab 03/28/20  0533  03/29/20  0146 03/29/20  0504 03/29/20  0950      < > 130* 133*  133* 135*   K 5.0   < > 5.4* 4.7  4.7 4.4      < > 98 99  99 100   CO2 14*   < > 20* 23  23 23   BUN 43*   < > 62* 57*  57* 52*   CREATININE 2.6*   < > 3.7* 3.6*  3.6* 3.0*   CALCIUM 9.2   < > 8.0* 8.3*  8.3* 8.9   ALBUMIN 2.6*  --   --  2.1*  --    PROT 7.1  --   --  6.5  --    BILITOT 0.5  --   --  0.4  --    ALKPHOS 55  --   --  54*  --    ALT 70*  --   --  53*  --    *  --   --  68*  --    MG 2.1  --   --  2.2  --    PHOS  --   --   --  2.8  --     < > = values in this interval not displayed.       ABG  Recent Labs   Lab 03/28/20 2034   PH 7.296*   PO2 77*   PCO2 30.0*   HCO3 14.6*   BE -12     Assessment/Plan:     Pulmonary  Acute respiratory failure with hypoxia  See acute resp distress syndrome due to 2019 novel coronavirus      Cardiac/Vascular  A-fib  New onset  narrow complex tachyarrhythmia overnight 3/28-2/39.   Max rate 150. No obvious p waves.   Given mult pushes IV metoprolol with modest improvement in rate.   Given 2g Mg today and started on amiodarone 3/29.   Pressor requirements increased overnight, unclear if this is caused by tachyarrhythmia, suspect 2/2 septic and distributive shock.     NSTEMI (non-ST elevated myocardial infarction)  - mild troponin elevation, suspect Type 2 NSTEMI from severe metabolic derangements.     Aortic stenosis  - follows with cardiology outpatient    Mixed hyperlipidemia  - on statin at home, can restart when appropriate  - continue home plavix (follows with Dr Jiménez in cardiology and has a pending angiogram to evaluate for CAD)    Essential hypertension  - monitor BP  - pressors for hypotension  Holding home BP medications    Renal/  Acute renal failure superimposed on stage 3 chronic kidney disease  - creatinine up to 4 on 3/28, basline about 1.3. Improved after volume resuscitation.   - trend creatinine  - started on empiric heparin drip today for renal dysfunction.   - trialysis line in the case of need for HD given renal failure incidence with COVID  - nephro consulted in setting of severe hyperkalemia which has improved.     Endocrine  DKA (diabetic ketoacidoses)  Presented with low bicarb, increased AG, + B OHB.   Improving on insulin infusion. Required bicarbonate infusion overnight, stopped this AM.   Continue on insulin infusion, very high insulin requirements overnight.     Severe obesity (BMI 35.0-39.9) with comorbidity  - nutrition consult for tube feeds  - consider starting tube feeds 3/29    Type 2 diabetes mellitus with stage 3 chronic kidney disease, without long-term current use of insulin  - insulin gtt started 3/28 given glucose >400s, monitor K closely given insulin gtt  - lactic normal, checking beta hydroxybutyrate to evaluate for DKA  - consider fluid resuscitation  - serial poct accuchecks  - hold tube feeds  for now    GI  Transaminitis  See NAFLD    Nonalcoholic fatty liver disease  - history of nonalcoholic fatty liver disease  - trend LFTs  - if evidence of shock liver (transaminitis to the thousands), concerning for poor prognosis    Other  * Acute respiratory distress syndrome (ARDS) due to 2019 novel coronavirus  Covid-19 Virus Infection  Presents with SOB, myalgias. Flu negative.   - COVID-19 testing resulted POSITIVE     - Isolation:   - Airborne and Droplet Precautions  - N95 masks must be fit tested, wear eye protection  - 20 second hand hygiene              - Limit visitors per hospital policy              - Consolidating lab draws, nursing care, and interventions      PLAN:  - continue mechanical ventilation (Intubated on 3/28 at [facility])  -  high PEEP protocol per ARDSnet  -Will reduce to 6ccs/kg today. Started on 7 for acid clearance.   - strict isolation protocol 2/2 positive COVID  - plaquenil for COVID  - f/u ID recs regarding antivirals for COVID  - CAP coverage with ceftriaxone azithro, will continue for now.   - f/u blood cultures from 3/28  - f/u sputum cultures from 3/28      Shock, unspecified  Suspect distributive 2/2 sedation with contribution from hypovolemia, high PEEP. Possible component of septic shock from COVID-19 infection.   Pressor requirements stable on about 0.3 levo and vasopressin.   Holding steroids in setting of uncontrolled hyperglycemia.     Acute respiratory distress syndrome (ARDS) due to COVID-19 virus  See acute resp distress syndrome due to 2019 novel coronavirus      Endotracheally intubated  See acute resp distress syndrome due to 2019 novel coronavirus      COVID-19 virus detected  See acute resp distress syndrome due to 2019 novel coronavirus       Critical Care Daily Checklist:    A: Awake: RASS Goal/Actual Goal: RASS Goal: -2-->light sedation  Actual: Mensah Agitation Sedation Scale (RASS): Moderate sedation   B: Spontaneous Breathing Trial Performed?     C:  SAT & SBT Coordinated?  No                      D: Delirium: CAM-ICU Overall CAM-ICU: Positive   E: Early Mobility Performed? No   F: Feeding Goal: Goals: meet % EEN/EPN by RD following  Status: Nutrition Goal Status: new   Current Diet Order   Procedures    Diet NPO      AS: Analgesia/Sedation Prop, Fentanyl   T: Thromboembolic Prophylaxis Therapeutic heparin   H: HOB > 300 Yes   U: Stress Ulcer Prophylaxis (if needed) Yes   G: Glucose Control Insulin drip   B: Bowel Function     I: Indwelling Catheter (Lines & Motta) Necessity Trialysis, art line.    D: De-escalation of Antimicrobials/Pharmacotherapies Continue rocephin, azithnikolay    Plan for the day/ETD Continue supportive care    Code Status:  Family/Goals of Care: Full Code  Discussed with wife and daughter this morning and updated on critical status.        Critical secondary to Patient has a condition that poses threat to life and bodily function: Severe Respiratory Distress and Acute Renal Failure      Critical care was time spent personally by me on the following activities: development of treatment plan with patient or surrogate and bedside caregivers, discussions with consultants, evaluation of patient's response to treatment, examination of patient, ordering and performing treatments and interventions, ordering and review of laboratory studies, ordering and review of radiographic studies, pulse oximetry, re-evaluation of patient's condition. This critical care time did not overlap with that of any other provider or involve time for any procedures.     Wade Garcia MD  Critical Care Medicine  [facility]

## 2020-03-29 NOTE — PLAN OF CARE
Recommendations    1. Recommend TF of Peptamen Intense VHP advancing as tolerated to goal rate of 60 mL/hr to provide 1440 kcal, 132 gm protein, and 1210 mL free fluid. Additional water per MD.     2. If able to extubate, recommend diabetic diet with texture per SLP.    - If po intake poor, recommend adding Boost Glucose Control.     3. RD following.     Goals: meet % EEN/EPN by RD following  Nutrition Goal Status: new

## 2020-03-29 NOTE — ASSESSMENT & PLAN NOTE
New onset narrow complex tachyarrhythmia overnight 3/28-2/39.   Max rate 150. No obvious p waves.   Given mult pushes IV metoprolol with modest improvement in rate.   Given 2g Mg today and started on amiodarone 3/29.   Pressor requirements increased overnight, unclear if this is caused by tachyarrhythmia, suspect 2/2 septic and distributive shock.

## 2020-03-29 NOTE — SUBJECTIVE & OBJECTIVE
Past Medical History:   Diagnosis Date    Agatston coronary artery calcium score greater than 400 1/13/2020    Anticoagulant long-term use     Calculus of gallbladder without cholecystitis without obstruction 9/10/2018    Cervical radiculopathy 9/28/2018    Chronic neck pain 9/28/2018    DDD (degenerative disc disease), cervical 9/28/2018    Diabetes mellitus     Diabetes mellitus, type 2     Esophageal reflux     Essential hypertension 2/11/2013    Fatty liver 1/13/2020    Gastroesophageal reflux disease without esophagitis 2/11/2013    Hyperlipidemia     Hypertension     Mixed hyperlipidemia 3/4/2020    Nephrolithiasis     Osteoarthritis     Type 2 diabetes mellitus with diabetic polyneuropathy, without long-term current use of insulin 2/11/2013    Type 2 diabetes mellitus with stage 3 chronic kidney disease, without long-term current use of insulin 9/27/2018       Past Surgical History:   Procedure Laterality Date    COLONOSCOPY      COLONOSCOPY N/A 3/19/2019    Procedure: COLONOSCOPY Suprep;  Surgeon: Italo Bañuelos MD;  Location: [facility];  Service: Endoscopy;  Laterality: N/A;    EPIDURAL STEROID INJECTION INTO CERVICAL SPINE N/A 10/30/2018    Procedure: Injection-steroid-epidural-cervical - C7 - T1;  Surgeon: Hernan Murguia Jr., MD;  Location: [facility];  Service: Pain Management;  Laterality: N/A;  PT IS DIABETIC    EPIDURAL STEROID INJECTION INTO CERVICAL SPINE N/A 4/11/2019    Procedure: Cervical Epidural Steroid Injection, C7-T1, With Dexamethasone,  and IV Sedation;  Surgeon: Hernan Murguia Jr., MD;  Location: [facility];  Service: Pain Management;  Laterality: N/A;  PT IS A DIABETIC.  Pt states he stopped taking ASA 81mg 13 days ago.      EPIDURAL STEROID INJECTION INTO CERVICAL SPINE N/A 4/30/2019    Procedure: Injection-steroid-epidural-cervical--C7-T1 With Dexamethasone, and IV Sedation;  Surgeon: Hernan Murguia Jr., MD;  Location: [facility];  Service:  Pain Management;  Laterality: N/A;    ESOPHAGOGASTRODUODENOSCOPY N/A 3/19/2019    Procedure: ESOPHAGOGASTRODUODENOSCOPY (EGD);  Surgeon: Italo Bañuelos MD;  Location: [facility];  Service: Endoscopy;  Laterality: N/A;    EYE SURGERY      hemrrhoidectomy      TONSILLECTOMY      WRIST FRACTURE SURGERY      WRIST SURGERY         Review of patient's allergies indicates:   Allergen Reactions    Codeine Nausea Only    Nsaids (non-steroidal anti-inflammatory drug) Other (See Comments)     Kidney issues       Medications:  Medications Prior to Admission   Medication Sig    blood sugar diagnostic Strp 1 strip by Misc.(Non-Drug; Combo Route) route once daily. Test strips for meter covered by insurance.    blood-glucose meter Misc Meter covered by insurance, Use as directed    butabarbital (BUTISOL) 30 mg Tab Take 30 mg by mouth daily as needed.    clopidogrel (PLAVIX) 75 mg tablet Take 1 tablet (75 mg total) by mouth once daily.    coenzyme Q10 200 mg capsule Take by mouth once daily.     ezetimibe (ZETIA) 10 mg tablet Take 1 tablet (10 mg total) by mouth every evening.    fenofibrate (TRICOR) 145 MG tablet Take 1 tablet (145 mg total) by mouth every evening.    gluc jacobo/chondro jacobo A/vit C/Mn (GLUCOSAMINE 1500 COMPLEX ORAL) Take by mouth once daily.    HYDROcodone-acetaminophen (NORCO) 5-325 mg per tablet Take 1 tablet by mouth every 6 (six) hours as needed for Pain.    lancets Misc To check BG once daily, to use with insurance preferred meter    lancets Misc 1 Device by Misc.(Non-Drug; Combo Route) route once daily.    multivitamin (MULTIVITAMIN) per tablet Take 1 tablet by mouth once daily.    nitroGLYCERIN (NITROSTAT) 0.4 MG SL tablet Place 1 tablet (0.4 mg total) under the tongue every 5 (five) minutes as needed for Chest pain (Go to ER following 3rd tab if needed).    omeprazole (PRILOSEC) 20 MG capsule Take 1 capsule (20 mg total) by mouth once daily.    pen needle, diabetic (RELION PEN NEEDLES)  "32 gauge x 5/32" Ndle Needs levemir flexpen needles. To use once daily    rosuvastatin (CRESTOR) 40 MG Tab Take 1 tablet (40 mg total) by mouth every evening.    sildenafil (REVATIO) 20 mg Tab Take 1 tablet (20 mg total) by mouth daily as needed.    SITagliptan-metformin (JANUMET)  mg per tablet Take 1 tablet by mouth 2 (two) times daily with meals.    triamcinolone acetonide 0.1% (KENALOG) 0.1 % cream Apply topically daily as needed.    valsartan (DIOVAN) 320 MG tablet Take 1 tablet (320 mg total) by mouth once daily.     Antibiotics (From admission, onward)    Start     Stop Route Frequency Ordered    03/29/20 0900  azithromycin tablet 250 mg      04/02 0859 Oral Daily 03/28/20 1555    03/29/20 0600  cefTRIAXone (ROCEPHIN) 2 g in dextrose 5 % 50 mL IVPB      -- IV Every 24 hours (non-standard times) 03/28/20 1555        Antifungals (From admission, onward)    None        Antivirals (From admission, onward)    None           Immunization History   Administered Date(s) Administered    Influenza - High Dose - PF (65 years and older) 09/08/2016, 10/26/2018    Influenza A (H1N1) 2009 Monovalent - IM 12/28/2009    Influenza Split 10/14/2015    Pneumococcal Conjugate - 13 Valent 05/03/2019    Pneumococcal Polysaccharide - 23 Valent 11/02/2015    Td - PF (ADULT) 07/29/2019    Zoster 10/14/2015       Family History     Problem Relation (Age of Onset)    Coronary artery disease Father, Brother    Heart attack Father    Heart disease Father    Hyperlipidemia Father, Brother, Brother    Hypertension Mother    Kidney disease Mother    Kidney failure Mother        Social History     Socioeconomic History    Marital status:      Spouse name: Not on file    Number of children: Not on file    Years of education: Not on file    Highest education level: Not on file   Occupational History    Not on file   Social Needs    Financial resource strain: Not on file    Food insecurity:     Worry: Not on file "     Inability: Not on file    Transportation needs:     Medical: Not on file     Non-medical: Not on file   Tobacco Use    Smoking status: Never Smoker    Smokeless tobacco: Never Used   Substance and Sexual Activity    Alcohol use: Not Currently    Drug use: No    Sexual activity: Yes     Partners: Female   Lifestyle    Physical activity:     Days per week: Not on file     Minutes per session: Not on file    Stress: Not on file   Relationships    Social connections:     Talks on phone: Not on file     Gets together: Not on file     Attends [employer]ism service: Not on file     Active member of club or organization: Not on file     Attends meetings of clubs or organizations: Not on file     Relationship status: Not on file   Other Topics Concern    Not on file   Social History Narrative    Not on file     Review of Systems   Unable to perform ROS: Acuity of condition     Objective:     Vital Signs (Most Recent):  Temp: (!) 101.3 °F (38.5 °C) (03/29/20 1411)  Pulse: 105 (03/29/20 1422)  Resp: 18 (03/29/20 1000)  BP: (!) 153/75 (03/29/20 1400)  SpO2: (!) 92 % (03/29/20 1422) Vital Signs (24h Range):  Temp:  [99.1 °F (37.3 °C)-102.5 °F (39.2 °C)] 101.3 °F (38.5 °C)  Pulse:  [] 105  Resp:  [18-48] 18  SpO2:  [89 %-100 %] 92 %  BP: ()/(39-91) 153/75  Arterial Line BP: ()/(41-60) 147/58     Weight: 108.9 kg (240 lb 1.3 oz)  Body mass index is 37.6 kg/m².    Estimated Creatinine Clearance: 27.7 mL/min (A) (based on SCr of 3 mg/dL (H)).    Physical Exam   Constitutional: He appears well-developed and well-nourished.   HENT:   Intubated.  OG tube in place.   Eyes: Right eye exhibits no discharge. Left eye exhibits no discharge.   Cardiovascular:   Tachycardia    Pulmonary/Chest:   Mechanically ventilated.  PEEP=10.  FiO2=40.   Genitourinary:   Genitourinary Comments: Motta catheter in place.   Neurological:   Sedated   Nursing note and vitals reviewed.      Significant Labs:   Microbiology Results  (last 7 days)     Procedure Component Value Units Date/Time    Culture, Respiratory with Gram Stain [[id number]] Collected:  03/28/20 2101    Order Status:  Completed Specimen:  Respiratory from Sputum Updated:  03/29/20 0305     Gram Stain (Respiratory) <10 epithelial cells per low power field.     Gram Stain (Respiratory) Rare WBC's     Gram Stain (Respiratory) Few Gram positive cocci    Culture, Respiratory with Gram Stain [[id number]] Collected:  03/28/20 2043    Order Status:  Sent Specimen:  Respiratory from Tracheal Aspirate Updated:  03/28/20 2043    Blood culture [[id number]] Collected:  03/28/20 1817    Order Status:  Sent Specimen:  Blood from Line, Jugular, Internal Left Updated:  03/28/20 1840    Blood culture [[id number]] Collected:  03/28/20 1817    Order Status:  Sent Specimen:  Blood from Radial Arterial Stick, Right Updated:  03/28/20 1833          Significant Imaging: I have reviewed all pertinent imaging results/findings within the past 24 hours.

## 2020-03-29 NOTE — ASSESSMENT & PLAN NOTE
70 yo male with HTN, HLD, DM2, CKD3, NAFLD, aortic stenosis, diabetic neuropathy, cervical radiculopathy who presented to [facility] on 3/28 for worsening SOB in the setting of positive COVID, hypoxemic respiratory failure and JAZMIN most likely due to dehydration from hyperglycemia and sepsis    Plan/recommendations:   - Strict I/O and chart   - daily weights   - Creatinine trend: 2.6-->3.9-->4.2  - continue bicarbonate infusion  - follow potassium closely with bmp q 4hrs  - hyperkalemia improving with conservative management  - consider IVF if allowed by fluid status   - UA:2 + protein, 1+ glucose, negative ketones  - renally dose all medications   - Avoid nephrotoxic medications, NSAIDs, IV contrast, ACE/ARB.  - Maintain MAP > 65  - Hb > 7 gm/dL   - Will follow closely

## 2020-03-29 NOTE — CARE UPDATE
Called family and discussed clinical status. Critical condition. DKA, Afib with RVR, hypoxemia. Renal failure improved, not requiring dialysis at this time. All questions answered.     Wade Garcia MD   Internal Medicine PGY-3  [phone]

## 2020-03-29 NOTE — CONSULTS
[facility]  Adult Nutrition  Consult Note    SUMMARY     Recommendations    1. Recommend TF of Peptamen Intense VHP advancing as tolerated to goal rate of 60 mL/hr to provide 1440 kcal, 132 gm protein, and 1210 mL free fluid. Additional water per MD.     2. If able to extubate, recommend diabetic diet with texture per SLP.    - If po intake poor, recommend adding Boost Glucose Control.     3. RD following.     Goals: meet % EEN/EPN by RD following  Nutrition Goal Status: new  Communication of RD Recs: (POC)    Reason for Assessment    Reason For Assessment: consult  Diagnosis: (ARDS 2/2 coronavirus )  Relevant Medical History: HTN, HLD, DM2, CKDIII  Interdisciplinary Rounds: did not attend  General Information Comments: Remote access. Pt intubated, sedated. No TF ordered at this time. Unable to assess for appetite PTA. Noted wt fairly stable per chart review. Due to recent hospital wide restrictions to limit the transfer of COVID-19, we are not performing any physical exams at this time. All S/S will be observational; NFPE to be performed at a future date.  Nutrition Discharge Planning: unable to determine at this time    Nutrition Risk Screen    Nutrition Risk Screen: dysphagia or difficulty swallowing    Nutrition/Diet History    Factors Affecting Nutritional Intake: NPO, on mechanical ventilation    Anthropometrics    Temp: 99.1 °F (37.3 °C)  Weight: 108.9 kg (240 lb 1.3 oz)  Weight (lb): 240.08 lb  BMI (Calculated): 37.6  BMI Grade: 35 - 39.9 - obesity - grade II    Lab/Procedures/Meds    Pertinent Labs Reviewed: reviewed  Pertinent Labs Comments: Na 135, BUN 52, GFR 20.4, gluvose 342  Pertinent Medications Reviewed: reviewed    Estimated/Assessed Needs    Weight Used For Calorie Calculations: 108.9 kg (240 lb 1.3 oz)  Energy Calorie Requirements (kcal): 2515-5906 kcal/day  Energy Need Method: Kcal/kg(11-14 kcal/kg)  Protein Requirements: 101-135 gm/day(1.5-2.0 g/kg)  Weight Used For  Protein Calculations: 67.3 kg (148 lb 4.9 oz)(IBW)  Fluid Requirements (mL): 1 mL/kcal or per MD     RDA Method (mL): 1198    Nutrition Prescription Ordered    Current Diet Order: NPO    Evaluation of Received Nutrient/Fluid Intake    Other Calories (kcal): 87(propofol)  I/O: +2.042L since admit  % Intake of Estimated Energy Needs: 0 - 25 %  % Meal Intake: NPO    Nutrition Risk    Level of Risk/Frequency of Follow-up: high(f/u 2 x wk)     Assessment and Plan    Nutrition Problem  Inadequate Energy Intake    Related to (etiology):   Inability to consume sufficient energy     Signs and Symptoms (as evidenced by):   Intubated, NPO with no alternative means of nutrition      Interventions (treatment strategy):  Collaboration of nutrition care with other providers    Nutrition Diagnosis Status:   New    Monitor and Evaluation    Food and Nutrient Intake: energy intake  Food and Nutrient Adminstration: enteral and parenteral nutrition administration  Anthropometric Measurements: weight, weight change, body mass index  Biochemical Data, Medical Tests and Procedures: electrolyte and renal panel, gastrointestinal profile, glucose/endocrine profile, inflammatory profile, lipid profile  Nutrition-Focused Physical Findings: overall appearance     Nutrition Follow-Up    RD Follow-up?: Yes

## 2020-03-29 NOTE — ASSESSMENT & PLAN NOTE
Presented with low bicarb, increased AG, + B OHB.   Improving on insulin infusion. Required bicarbonate infusion overnight, stopped this AM.   Continue on insulin infusion, very high insulin requirements overnight.

## 2020-03-29 NOTE — ASSESSMENT & PLAN NOTE
Suspect distributive 2/2 sedation with contribution from hypovolemia, high PEEP. Possible component of septic shock from COVID-19 infection.   Pressor requirements stable on about 0.3 levo and vasopressin.   Holding steroids in setting of uncontrolled hyperglycemia.

## 2020-03-29 NOTE — ASSESSMENT & PLAN NOTE
- creatinine up to 4 on 3/28, basline about 1.3. Improved after volume resuscitation.   - trend creatinine  - started on empiric heparin drip today for renal dysfunction.   - trialysis line in the case of need for HD given renal failure incidence with COVID  - nephro consulted in setting of severe hyperkalemia which has improved.

## 2020-03-29 NOTE — SUBJECTIVE & OBJECTIVE
Interval History/Significant Events:   Patient developed narrow complex tachyarrhythmia overnight. Given 2 doses of IV metoprolol.     Persistently hyperglycemic despite large amounts of IV insulin. On 15 u/hr this AM.     Persistently hyperkalemic until early morning.     Febrile overnight.     Review of Systems   Unable to perform ROS: Intubated     Objective:     Vital Signs (Most Recent):  Temp: 99.1 °F (37.3 °C) (03/29/20 1000)  Pulse: (!) 125 (03/29/20 1100)  Resp: 18 (03/29/20 1000)  BP: 128/66 (03/29/20 1100)  SpO2: (!) 92 % (03/29/20 1100) Vital Signs (24h Range):  Temp:  [99.1 °F (37.3 °C)-102.5 °F (39.2 °C)] 99.1 °F (37.3 °C)  Pulse:  [] 125  Resp:  [18-48] 18  SpO2:  [89 %-100 %] 92 %  BP: ()/(39-91) 128/66  Arterial Line BP: ()/(41-60) 156/59   Weight: 108.9 kg (240 lb 1.3 oz)  Body mass index is 37.6 kg/m².      Intake/Output Summary (Last 24 hours) at 3/29/2020 1128  Last data filed at 3/29/2020 0900  Gross per 24 hour   Intake 4214.24 ml   Output 1925 ml   Net 2289.24 ml       Physical Exam   Constitutional:   Examined from window to reduce COVID-19 contact   HENT:   Intubated   Cardiovascular:   Irregular tachycardia   Pulmonary/Chest:   Mechanically Ventilated   Genitourinary:   Genitourinary Comments: Motta in place   Musculoskeletal:   Trialysis catheter left IJ and arterial line in place   Nursing note and vitals reviewed.      Vents:  Vent Mode: A/C (03/29/20 0829)  Set Rate: 20 BPM (03/29/20 0829)  Vt Set: 460 mL (03/29/20 0829)  Pressure Support: 0 cmH20 (03/29/20 0829)  PEEP/CPAP: 10 cmH20 (03/29/20 0829)  Oxygen Concentration (%): 40 (03/29/20 1100)  Peak Airway Pressure: 27 cmH2O (03/29/20 0829)  Plateau Pressure: 22 cmH20 (03/29/20 0829)  Total Ve: 11.5 mL (03/29/20 0829)  F/VT Ratio<105 (RSBI): 760 (03/29/20 0829)  Lines/Drains/Airways     Central Venous Catheter Line            Trialysis (Dialysis) Catheter 03/28/20 1820 external jugular;left internal jugular less than  1 day          Drain                 NG/OG Tube 03/28/20 0557 Allen sump 16 Fr. Center mouth 1 day         Urethral Catheter 03/28/20 0606 Non-latex 16 Fr. 1 day          Airway                 Airway - Non-Surgical 03/28/20 0605 Endotracheal Tube 1 day          Arterial Line                 Arterial Line 03/28/20 1819 Right Radial less than 1 day          Peripheral Intravenous Line                 Peripheral IV - Single Lumen 05/01/19 1859 20 G Left Forearm 332 days         Peripheral IV - Single Lumen 03/28/20 0530 18 G Left Forearm 1 day         Peripheral IV - Single Lumen 03/28/20 0540 18 G Right Forearm 1 day              Significant Labs:    CBC/Anemia Profile:  Recent Labs   Lab 03/28/20  0532 03/28/20  0533 03/28/20  1540 03/29/20  0504   WBC 6.21  --   --  7.37   HGB 11.1*  --   --  10.2*   HCT 34.0*  --   --  31.8*     --   --  329   MCV 93  --   --  97   RDW 12.9  --   --  13.1   FERRITIN  --  2,202* 2,520* 3,385*        Chemistries:  Recent Labs   Lab 03/28/20  0533  03/29/20  0146 03/29/20  0504 03/29/20  0950      < > 130* 133*  133* 135*   K 5.0   < > 5.4* 4.7  4.7 4.4      < > 98 99  99 100   CO2 14*   < > 20* 23  23 23   BUN 43*   < > 62* 57*  57* 52*   CREATININE 2.6*   < > 3.7* 3.6*  3.6* 3.0*   CALCIUM 9.2   < > 8.0* 8.3*  8.3* 8.9   ALBUMIN 2.6*  --   --  2.1*  --    PROT 7.1  --   --  6.5  --    BILITOT 0.5  --   --  0.4  --    ALKPHOS 55  --   --  54*  --    ALT 70*  --   --  53*  --    *  --   --  68*  --    MG 2.1  --   --  2.2  --    PHOS  --   --   --  2.8  --     < > = values in this interval not displayed.

## 2020-03-29 NOTE — CONSULTS
[facility]  Infectious Disease  Consult Note    Patient Name: Erick Valdovinos  MRN: [id number]  Admission Date: 3/28/2020  Hospital Length of Stay: 1 days  Attending Physician: Tessie Figueroa MD  Primary Care Provider: Brandon Kinney MD     Isolation Status: Airborne and Contact and Droplet    Patient information was obtained from past medical records and ER records.      Inpatient consult to Infectious Diseases  Consult performed by: Sergei Campo MD  Consult ordered by: Jessica Blanco MD        Assessment/Plan:     * Acute respiratory distress syndrome (ARDS) due to 2019 novel coronavirus  70 y/o male with a history of HTN, HLD, DM2, CKD3, NAFLD, aortic stenosis diagnosed with SARSCoV2 on march 19, 2020.  Initially managed at home with frequent monitoring.  He however continued to decline and ultimately required admission.  At this time, his poor renal function and need for pressors means that he doesn't meet criteria set by [place] for use of remdesivir.  We are still pending approval as a study site for tocilizumab.  I agree with hydroxychloroquine for a 5 day course.  There is some risk of super-imposed bacterial infection so I agree with IV ceftriaxone and azithromycin.          Thank you for your consult. I will follow-up with patient. Please contact us if you have any additional questions.    Sergei Campo MD  Infectious Disease  [facility]    Subjective:     Principal Problem: Acute respiratory distress syndrome (ARDS) due to 2019 novel coronavirus    HPI: 70 y/o male with a history of HTN, HLD, DM2, CKD3, NAFLD, aortic stenosis, diabetic neuropathy, cervical radiculopathy.  He was initially seen in his PCPs office on march 18 with complaints of fevers, fatigue and body aches.  Testing for SARSCoV2 was positive.  The patient was not having any shortness of breath.  As such a plan to monitor the patient at home was developed with is PCPs office.  His  symptoms continued to slowly get worse.  He became weaker.  He was seen via telemedicine virtual visit on march 26 and there were concerns that he may need inpatient evaluation.  The patient was advised to go to the ER if anything got worse.  He ultimately presented to [facility] with respiratory distress.  He was transferred to [facility] for higher level of care.  I am being consulted to see if he would benefit from any of the experimental therapies that are available.    Past Medical History:   Diagnosis Date    Agatston coronary artery calcium score greater than 400 1/13/2020    Anticoagulant long-term use     Calculus of gallbladder without cholecystitis without obstruction 9/10/2018    Cervical radiculopathy 9/28/2018    Chronic neck pain 9/28/2018    DDD (degenerative disc disease), cervical 9/28/2018    Diabetes mellitus     Diabetes mellitus, type 2     Esophageal reflux     Essential hypertension 2/11/2013    Fatty liver 1/13/2020    Gastroesophageal reflux disease without esophagitis 2/11/2013    Hyperlipidemia     Hypertension     Mixed hyperlipidemia 3/4/2020    Nephrolithiasis     Osteoarthritis     Type 2 diabetes mellitus with diabetic polyneuropathy, without long-term current use of insulin 2/11/2013    Type 2 diabetes mellitus with stage 3 chronic kidney disease, without long-term current use of insulin 9/27/2018       Past Surgical History:   Procedure Laterality Date    COLONOSCOPY      COLONOSCOPY N/A 3/19/2019    Procedure: COLONOSCOPY Suprep;  Surgeon: Italo Bañuelos MD;  Location: [facility] ENDO;  Service: Endoscopy;  Laterality: N/A;    EPIDURAL STEROID INJECTION INTO CERVICAL SPINE N/A 10/30/2018    Procedure: Injection-steroid-epidural-cervical - C7 - T1;  Surgeon: Hernan Murguia Jr., MD;  Location: [facility] PAIN MGT;  Service: Pain Management;  Laterality: N/A;  PT IS DIABETIC    EPIDURAL STEROID INJECTION INTO CERVICAL SPINE N/A 4/11/2019    Procedure:  Cervical Epidural Steroid Injection, C7-T1, With Dexamethasone,  and IV Sedation;  Surgeon: Hernan Murguia Jr., MD;  Location: [facility] PAIN MGT;  Service: Pain Management;  Laterality: N/A;  PT IS A DIABETIC.  Pt states he stopped taking ASA 81mg 13 days ago.      EPIDURAL STEROID INJECTION INTO CERVICAL SPINE N/A 4/30/2019    Procedure: Injection-steroid-epidural-cervical--C7-T1 With Dexamethasone, and IV Sedation;  Surgeon: Hernan Murguia Jr., MD;  Location: [facility] PAIN MGT;  Service: Pain Management;  Laterality: N/A;    ESOPHAGOGASTRODUODENOSCOPY N/A 3/19/2019    Procedure: ESOPHAGOGASTRODUODENOSCOPY (EGD);  Surgeon: Italo Bañuelos MD;  Location: [facility] ENDO;  Service: Endoscopy;  Laterality: N/A;    EYE SURGERY      hemrrhoidectomy      TONSILLECTOMY      WRIST FRACTURE SURGERY      WRIST SURGERY         Review of patient's allergies indicates:   Allergen Reactions    Codeine Nausea Only    Nsaids (non-steroidal anti-inflammatory drug) Other (See Comments)     Kidney issues       Medications:  Medications Prior to Admission   Medication Sig    blood sugar diagnostic Strp 1 strip by Misc.(Non-Drug; Combo Route) route once daily. Test strips for meter covered by insurance.    blood-glucose meter Misc Meter covered by insurance, Use as directed    butabarbital (BUTISOL) 30 mg Tab Take 30 mg by mouth daily as needed.    clopidogrel (PLAVIX) 75 mg tablet Take 1 tablet (75 mg total) by mouth once daily.    coenzyme Q10 200 mg capsule Take by mouth once daily.     ezetimibe (ZETIA) 10 mg tablet Take 1 tablet (10 mg total) by mouth every evening.    fenofibrate (TRICOR) 145 MG tablet Take 1 tablet (145 mg total) by mouth every evening.    gluc jacobo/chondro jacobo A/vit C/Mn (GLUCOSAMINE 1500 COMPLEX ORAL) Take by mouth once daily.    HYDROcodone-acetaminophen (NORCO) 5-325 mg per tablet Take 1 tablet by mouth every 6 (six) hours as needed for Pain.    lancets Misc To check BG once daily, to use with  "insurance preferred meter    lancets Misc 1 Device by Misc.(Non-Drug; Combo Route) route once daily.    multivitamin (MULTIVITAMIN) per tablet Take 1 tablet by mouth once daily.    nitroGLYCERIN (NITROSTAT) 0.4 MG SL tablet Place 1 tablet (0.4 mg total) under the tongue every 5 (five) minutes as needed for Chest pain (Go to ER following 3rd tab if needed).    omeprazole (PRILOSEC) 20 MG capsule Take 1 capsule (20 mg total) by mouth once daily.    pen needle, diabetic (RELION PEN NEEDLES) 32 gauge x 5/32" Ndle Needs levemir flexpen needles. To use once daily    rosuvastatin (CRESTOR) 40 MG Tab Take 1 tablet (40 mg total) by mouth every evening.    sildenafil (REVATIO) 20 mg Tab Take 1 tablet (20 mg total) by mouth daily as needed.    SITagliptan-metformin (JANUMET)  mg per tablet Take 1 tablet by mouth 2 (two) times daily with meals.    triamcinolone acetonide 0.1% (KENALOG) 0.1 % cream Apply topically daily as needed.    valsartan (DIOVAN) 320 MG tablet Take 1 tablet (320 mg total) by mouth once daily.     Antibiotics (From admission, onward)    Start     Stop Route Frequency Ordered    03/29/20 0900  azithromycin tablet 250 mg      04/02 0859 Oral Daily 03/28/20 1555    03/29/20 0600  cefTRIAXone (ROCEPHIN) 2 g in dextrose 5 % 50 mL IVPB      -- IV Every 24 hours (non-standard times) 03/28/20 1555        Antifungals (From admission, onward)    None        Antivirals (From admission, onward)    None           Immunization History   Administered Date(s) Administered    Influenza - High Dose - PF (65 years and older) 09/08/2016, 10/26/2018    Influenza A (H1N1) 2009 Monovalent - IM 12/28/2009    Influenza Split 10/14/2015    Pneumococcal Conjugate - 13 Valent 05/03/2019    Pneumococcal Polysaccharide - 23 Valent 11/02/2015    Td - PF (ADULT) 07/29/2019    Zoster 10/14/2015       Family History     Problem Relation (Age of Onset)    Coronary artery disease Father, Brother    Heart attack Father "    Heart disease Father    Hyperlipidemia Father, Brother, Brother    Hypertension Mother    Kidney disease Mother    Kidney failure Mother        Social History     Socioeconomic History    Marital status:      Spouse name: Not on file    Number of children: Not on file    Years of education: Not on file    Highest education level: Not on file   Occupational History    Not on file   Social Needs    Financial resource strain: Not on file    Food insecurity:     Worry: Not on file     Inability: Not on file    Transportation needs:     Medical: Not on file     Non-medical: Not on file   Tobacco Use    Smoking status: Never Smoker    Smokeless tobacco: Never Used   Substance and Sexual Activity    Alcohol use: Not Currently    Drug use: No    Sexual activity: Yes     Partners: Female   Lifestyle    Physical activity:     Days per week: Not on file     Minutes per session: Not on file    Stress: Not on file   Relationships    Social connections:     Talks on phone: Not on file     Gets together: Not on file     Attends Sabianist service: Not on file     Active member of club or organization: Not on file     Attends meetings of clubs or organizations: Not on file     Relationship status: Not on file   Other Topics Concern    Not on file   Social History Narrative    Not on file     Review of Systems   Unable to perform ROS: Acuity of condition     Objective:     Vital Signs (Most Recent):  Temp: (!) 101.3 °F (38.5 °C) (03/29/20 1411)  Pulse: 105 (03/29/20 1422)  Resp: 18 (03/29/20 1000)  BP: (!) 153/75 (03/29/20 1400)  SpO2: (!) 92 % (03/29/20 1422) Vital Signs (24h Range):  Temp:  [99.1 °F (37.3 °C)-102.5 °F (39.2 °C)] 101.3 °F (38.5 °C)  Pulse:  [] 105  Resp:  [18-48] 18  SpO2:  [89 %-100 %] 92 %  BP: ()/(39-91) 153/75  Arterial Line BP: ()/(41-60) 147/58     Weight: 108.9 kg (240 lb 1.3 oz)  Body mass index is 37.6 kg/m².    Estimated Creatinine Clearance: 27.7 mL/min (A)  (based on SCr of 3 mg/dL (H)).    Physical Exam   Constitutional: He appears well-developed and well-nourished.   HENT:   Intubated.  OG tube in place.   Eyes: Right eye exhibits no discharge. Left eye exhibits no discharge.   Cardiovascular:   Tachycardia    Pulmonary/Chest:   Mechanically ventilated.  PEEP=10.  FiO2=40.   Genitourinary:   Genitourinary Comments: Motta catheter in place.   Neurological:   Sedated   Nursing note and vitals reviewed.      Significant Labs:   Microbiology Results (last 7 days)     Procedure Component Value Units Date/Time    Culture, Respiratory with Gram Stain [[id number]] Collected:  03/28/20 2101    Order Status:  Completed Specimen:  Respiratory from Sputum Updated:  03/29/20 0305     Gram Stain (Respiratory) <10 epithelial cells per low power field.     Gram Stain (Respiratory) Rare WBC's     Gram Stain (Respiratory) Few Gram positive cocci    Culture, Respiratory with Gram Stain [[id number]] Collected:  03/28/20 2043    Order Status:  Sent Specimen:  Respiratory from Tracheal Aspirate Updated:  03/28/20 2043    Blood culture [[id number]] Collected:  03/28/20 1817    Order Status:  Sent Specimen:  Blood from Line, Jugular, Internal Left Updated:  03/28/20 1840    Blood culture [[id number]] Collected:  03/28/20 1817    Order Status:  Sent Specimen:  Blood from Radial Arterial Stick, Right Updated:  03/28/20 1833          Significant Imaging: I have reviewed all pertinent imaging results/findings within the past 24 hours.

## 2020-03-29 NOTE — ASSESSMENT & PLAN NOTE
68 y/o male with a history of HTN, HLD, DM2, CKD3, NAFLD, aortic stenosis diagnosed with SARSCoV2 on march 19, 2020.  Initially managed at home with frequent monitoring.  He however continued to decline and ultimately required admission.  At this time, his poor renal function and need for pressors means that he doesn't meet criteria set by [place] for use of remdesivir.  We are still pending approval as a study site for tocilizumab.  I agree with hydroxychloroquine for a 5 day course.  There is some risk of super-imposed bacterial infection so I agree with IV ceftriaxone and azithromycin.

## 2020-03-29 NOTE — SUBJECTIVE & OBJECTIVE
Past Medical History:   Diagnosis Date    Agatston coronary artery calcium score greater than 400 1/13/2020    Anticoagulant long-term use     Calculus of gallbladder without cholecystitis without obstruction 9/10/2018    Cervical radiculopathy 9/28/2018    Chronic neck pain 9/28/2018    DDD (degenerative disc disease), cervical 9/28/2018    Diabetes mellitus     Diabetes mellitus, type 2     Esophageal reflux     Essential hypertension 2/11/2013    Fatty liver 1/13/2020    Gastroesophageal reflux disease without esophagitis 2/11/2013    Hyperlipidemia     Hypertension     Mixed hyperlipidemia 3/4/2020    Nephrolithiasis     Osteoarthritis     Type 2 diabetes mellitus with diabetic polyneuropathy, without long-term current use of insulin 2/11/2013    Type 2 diabetes mellitus with stage 3 chronic kidney disease, without long-term current use of insulin 9/27/2018       Past Surgical History:   Procedure Laterality Date    COLONOSCOPY      COLONOSCOPY N/A 3/19/2019    Procedure: COLONOSCOPY Suprep;  Surgeon: Italo Bañuelos MD;  Location: [facility];  Service: Endoscopy;  Laterality: N/A;    EPIDURAL STEROID INJECTION INTO CERVICAL SPINE N/A 10/30/2018    Procedure: Injection-steroid-epidural-cervical - C7 - T1;  Surgeon: Hernan Murguia Jr., MD;  Location: [facility];  Service: Pain Management;  Laterality: N/A;  PT IS DIABETIC    EPIDURAL STEROID INJECTION INTO CERVICAL SPINE N/A 4/11/2019    Procedure: Cervical Epidural Steroid Injection, C7-T1, With Dexamethasone,  and IV Sedation;  Surgeon: Hernan Murguia Jr., MD;  Location: [facility];  Service: Pain Management;  Laterality: N/A;  PT IS A DIABETIC.  Pt states he stopped taking ASA 81mg 13 days ago.      EPIDURAL STEROID INJECTION INTO CERVICAL SPINE N/A 4/30/2019    Procedure: Injection-steroid-epidural-cervical--C7-T1 With Dexamethasone, and IV Sedation;  Surgeon: Hernan Murguia Jr., MD;  Location: [facility];  Service:  Pain Management;  Laterality: N/A;    ESOPHAGOGASTRODUODENOSCOPY N/A 3/19/2019    Procedure: ESOPHAGOGASTRODUODENOSCOPY (EGD);  Surgeon: Italo Bañuelos MD;  Location: [facility];  Service: Endoscopy;  Laterality: N/A;    EYE SURGERY      hemrrhoidectomy      TONSILLECTOMY      WRIST FRACTURE SURGERY      WRIST SURGERY         Review of patient's allergies indicates:   Allergen Reactions    Codeine Nausea Only    Nsaids (non-steroidal anti-inflammatory drug) Other (See Comments)     Kidney issues     Current Facility-Administered Medications   Medication Frequency    [START ON 3/29/2020] azithromycin tablet 250 mg Daily    calcium gluconate 1g in dextrose 5% 100mL (ready to mix system) Q10 Min PRN    calcium gluconate 1g in dextrose 5% 100mL (ready to mix system) Once    [START ON 3/29/2020] cefTRIAXone (ROCEPHIN) 2 g in dextrose 5 % 50 mL IVPB Q24H    chlorhexidine 0.12 % solution 15 mL BID    [START ON 3/29/2020] clopidogreL tablet 75 mg Daily    dextrose 10% (D10W) Bolus PRN    dextrose 10% (D10W) Bolus PRN    famotidine (PF) injection 20 mg QHS    fentaNYL 2500 mcg in 0.9% sodium chloride 250 mL infusion premix (titrating) Continuous    heparin (porcine) injection 5,000 Units Q8H    hydroxychloroquine tablet 400 mg BID    Followed by    [START ON 3/30/2020] hydroxychloroquine tablet 200 mg Daily    insulin regular 100 Units in sodium chloride 0.9% 100 mL infusion Continuous    lactated ringers bolus 500 mL Once    norepinephrine 4 mg in dextrose 5% 250 mL infusion (premix) (titrating) Continuous    ondansetron injection 4 mg Q8H PRN    propofol (DIPRIVAN) 10 mg/mL infusion Continuous    sodium bicarbonate 1 mEq/mL (8.4 %) 150 mEq in dextrose 5 % 1,000 mL infusion Continuous    sodium chloride 0.9% flush 10 mL PRN    sodium polystyrene 15 gram/60 mL suspension 30 g Once    vasopressin (PITRESSIN) 0.2 Units/mL in dextrose 5 % 100 mL infusion Continuous     Family History     Problem  Relation (Age of Onset)    Coronary artery disease Father, Brother    Heart attack Father    Heart disease Father    Hyperlipidemia Father, Brother, Brother    Hypertension Mother    Kidney disease Mother    Kidney failure Mother        Tobacco Use    Smoking status: Never Smoker    Smokeless tobacco: Never Used   Substance and Sexual Activity    Alcohol use: Not Currently    Drug use: No    Sexual activity: Yes     Partners: Female     Review of Systems   Unable to perform ROS: Intubated     Objective:     Vital Signs (Most Recent):  Temp: (!) 101.7 °F (38.7 °C) (03/28/20 2000)  Pulse: (!) 119 (03/28/20 2034)  Resp: (!) 22 (03/28/20 2034)  BP: 106/61 (03/28/20 2000)  SpO2: 95 % (03/28/20 2034)  O2 Device (Oxygen Therapy): ventilator (03/28/20 2034) Vital Signs (24h Range):  Temp:  [99.3 °F (37.4 °C)-101.8 °F (38.8 °C)] 101.7 °F (38.7 °C)  Pulse:  [] 119  Resp:  [17-48] 22  SpO2:  [78 %-100 %] 95 %  BP: ()/(39-86) 106/61     Weight: 108.9 kg (240 lb 1.3 oz) (03/28/20 1529)  Body mass index is 37.6 kg/m².  Body surface area is 2.27 meters squared.    I/O last 3 completed shifts:  In: 167.5 [I.V.:167.5]  Out: 100 [Urine:100]    Physical Exam   Constitutional: He appears distressed.   HENT:   Intubated   Cardiovascular: Normal rate and regular rhythm.   Systolic in the 70s   Pulmonary/Chest:   Mechanically ventilated. No belly breathing noted   Abdominal: He exhibits no distension.   Neurological:   Sedated        Significant Labs:  CBC:   Recent Labs   Lab 03/28/20  0532   WBC 6.21   RBC 3.67*   HGB 11.1*   HCT 34.0*      MCV 93   MCH 30.2   MCHC 32.6     CMP:   Recent Labs   Lab 03/28/20  0533  03/28/20  1820   *   < > 497*   CALCIUM 9.2   < > 7.7*   ALBUMIN 2.6*  --   --    PROT 7.1  --   --       < > 131*   K 5.0   < > 6.9*   CO2 14*   < > 12*      < > 104   BUN 43*   < > 62*   CREATININE 2.6*   < > 4.2*   ALKPHOS 55  --   --    ALT 70*  --   --    *  --   --     BILITOT 0.5  --   --     < > = values in this interval not displayed.     Coagulation:   Recent Labs   Lab 03/28/20  1540   INR 1.1     All labs within the past 24 hours have been reviewed.    Significant Imaging:  Labs: Reviewed

## 2020-03-29 NOTE — HPI
68 y/o male with a history of HTN, HLD, DM2, CKD3, NAFLD, aortic stenosis, diabetic neuropathy, cervical radiculopathy.  He was initially seen in his PCPs office on march 18 with complaints of fevers, fatigue and body aches.  Testing for SARSCoV2 was positive.  The patient was not having any shortness of breath.  As such a plan to monitor the patient at home was developed with is PCPs office.  His symptoms continued to slowly get worse.  He became weaker.  He was seen via telemedicine virtual visit on march 26 and there were concerns that he may need inpatient evaluation.  The patient was advised to go to the ER if anything got worse.  [facility] ultimately [facility] with [facility].  He was transferred to [facility] for higher level of care.  He has had a prolonged ICU course.  I am re-consulted for worsening leukocytosis and initiation of micafungin.

## 2020-03-29 NOTE — CONSULTS
[facility]  Nephrology  Consult Note    Patient Name: Erick Valdovinos  MRN: [id number]  Admission Date: 3/28/2020  Hospital Length of Stay: 0 days  Attending Provider: Tessie Figueroa MD   Primary Care Physician: Brandon Kinney MD  Principal Problem:Acute respiratory distress syndrome (ARDS) due to 2019 novel coronavirus    Inpatient consult to Nephrology  Consult performed by: Vincenzo Costa MD  Consult ordered by: Jessica Blanco MD  Reason for consult: worsening JAZMIN and hyperkalemia         Subjective:     HPI: 70 yo male with HTN, HLD, DM2, CKD3, NAFLD, aortic stenosis, diabetic neuropathy, cervical radiculopathy who presented to [facility] on 3/28 for worsening SOB in the setting of positive COVID testing done on 3/19-->resulted positive on 3/23. Patient had had fevers and diarrhea for the week prior, fevers as high as 103. He was also having difficulty seeing. Symptoms non-remitting until 3/28 and then had worsening fatigue and weakness, which led him to present to [facility] ED. In the ED, patient was found to be hypoxemic at 78% on room air and in moderate distress. Patient was placed on NRB mask but continued to have issues with poor effort so electively intubated due to worsening condition. Patient was also given 1L fluid bolus for hypotension (systolic in the 80s). Patient sedated on propofol and fentanyl.     ED evaluation at [facility] showed: WBC 6.21 with lymphopenia, bicarb 14, creatinine 2.6 (baseline 1.3), Ferritin 2202, , , , Lactic acid 0.9 and D-Dimer 0.4, BNP 41 and troponin bump to 0.087. Patient was started on Ceftriaxone, Azithromycin and Plaquenil.      Patient was transferred to [facility] as [facility] was out of ICU beds. Patient admitted to MICU on 3/28 for management of acute hypoxemic respiratory failure complicated by JAZMIN and troponinemia in the setting of COVID19. Remarkable labs: hyperkalemia at 7.7, Bicarbonate 12, pH of 7.1, lasix 80mg   administered with one liter bolus of ringer's lactate, insulin and calcium gluconate. Started on insulin infusion due to possible DKA. Nephrology consulted for severe JAZMIN accompanied by hyperkalemia and severe acidosis.     Past Medical History:   Diagnosis Date    Agatston coronary artery calcium score greater than 400 1/13/2020    Anticoagulant long-term use     Calculus of gallbladder without cholecystitis without obstruction 9/10/2018    Cervical radiculopathy 9/28/2018    Chronic neck pain 9/28/2018    DDD (degenerative disc disease), cervical 9/28/2018    Diabetes mellitus     Diabetes mellitus, type 2     Esophageal reflux     Essential hypertension 2/11/2013    Fatty liver 1/13/2020    Gastroesophageal reflux disease without esophagitis 2/11/2013    Hyperlipidemia     Hypertension     Mixed hyperlipidemia 3/4/2020    Nephrolithiasis     Osteoarthritis     Type 2 diabetes mellitus with diabetic polyneuropathy, without long-term current use of insulin 2/11/2013    Type 2 diabetes mellitus with stage 3 chronic kidney disease, without long-term current use of insulin 9/27/2018       Past Surgical History:   Procedure Laterality Date    COLONOSCOPY      COLONOSCOPY N/A 3/19/2019    Procedure: COLONOSCOPY Suprep;  Surgeon: Italo Bañuelos MD;  Location: [facility];  Service: Endoscopy;  Laterality: N/A;    EPIDURAL STEROID INJECTION INTO CERVICAL SPINE N/A 10/30/2018    Procedure: Injection-steroid-epidural-cervical - C7 - T1;  Surgeon: Hernan Murguia Jr., MD;  Location: [facility];  Service: Pain Management;  Laterality: N/A;  PT IS DIABETIC    EPIDURAL STEROID INJECTION INTO CERVICAL SPINE N/A 4/11/2019    Procedure: Cervical Epidural Steroid Injection, C7-T1, With Dexamethasone,  and IV Sedation;  Surgeon: Hernan Murguia Jr., MD;  [facility];  Service: Pain Management;  Laterality: N/A;  PT IS A DIABETIC.  Pt states he stopped taking ASA 81mg 13 days ago.       EPIDURAL STEROID INJECTION INTO CERVICAL SPINE N/A 4/30/2019    Procedure: Injection-steroid-epidural-cervical--C7-T1 With Dexamethasone, and IV Sedation;  Surgeon: Hernan Murguia Jr., MD;  Location: [facility];  Service: Pain Management;  Laterality: N/A;    ESOPHAGOGASTRODUODENOSCOPY N/A 3/19/2019    Procedure: ESOPHAGOGASTRODUODENOSCOPY (EGD);  Surgeon: Italo Bañuelos MD;  Location: [facility];  Service: Endoscopy;  Laterality: N/A;    EYE SURGERY      hemrrhoidectomy      TONSILLECTOMY      WRIST FRACTURE SURGERY      WRIST SURGERY         Review of patient's allergies indicates:   Allergen Reactions    Codeine Nausea Only    Nsaids (non-steroidal anti-inflammatory drug) Other (See Comments)     Kidney issues     Current Facility-Administered Medications   Medication Frequency    [START ON 3/29/2020] azithromycin tablet 250 mg Daily    calcium gluconate 1g in dextrose 5% 100mL (ready to mix system) Q10 Min PRN    calcium gluconate 1g in dextrose 5% 100mL (ready to mix system) Once    [START ON 3/29/2020] cefTRIAXone (ROCEPHIN) 2 g in dextrose 5 % 50 mL IVPB Q24H    chlorhexidine 0.12 % solution 15 mL BID    [START ON 3/29/2020] clopidogreL tablet 75 mg Daily    dextrose 10% (D10W) Bolus PRN    dextrose 10% (D10W) Bolus PRN    famotidine (PF) injection 20 mg QHS    fentaNYL 2500 mcg in 0.9% sodium chloride 250 mL infusion premix (titrating) Continuous    heparin (porcine) injection 5,000 Units Q8H    hydroxychloroquine tablet 400 mg BID    Followed by    [START ON 3/30/2020] hydroxychloroquine tablet 200 mg Daily    insulin regular 100 Units in sodium chloride 0.9% 100 mL infusion Continuous    lactated ringers bolus 500 mL Once    norepinephrine 4 mg in dextrose 5% 250 mL infusion (premix) (titrating) Continuous    ondansetron injection 4 mg Q8H PRN    propofol (DIPRIVAN) 10 mg/mL infusion Continuous    sodium bicarbonate 1 mEq/mL (8.4 %) 150 mEq in dextrose 5 % 1,000 mL  infusion Continuous    sodium chloride 0.9% flush 10 mL PRN    sodium polystyrene 15 gram/60 mL suspension 30 g Once    vasopressin (PITRESSIN) 0.2 Units/mL in dextrose 5 % 100 mL infusion Continuous     Family History     Problem Relation (Age of Onset)    Coronary artery disease Father, Brother    Heart attack Father    Heart disease Father    Hyperlipidemia Father, Brother, Brother    Hypertension Mother    Kidney disease Mother    Kidney failure Mother        Tobacco Use    Smoking status: Never Smoker    Smokeless tobacco: Never Used   Substance and Sexual Activity    Alcohol use: Not Currently    Drug use: No    Sexual activity: Yes     Partners: Female     Review of Systems   Unable to perform ROS: Intubated     Objective:     Vital Signs (Most Recent):  Temp: (!) 101.7 °F (38.7 °C) (03/28/20 2000)  Pulse: (!) 119 (03/28/20 2034)  Resp: (!) 22 (03/28/20 2034)  BP: 106/61 (03/28/20 2000)  SpO2: 95 % (03/28/20 2034)  O2 Device (Oxygen Therapy): ventilator (03/28/20 2034) Vital Signs (24h Range):  Temp:  [99.3 °F (37.4 °C)-101.8 °F (38.8 °C)] 101.7 °F (38.7 °C)  Pulse:  [] 119  Resp:  [17-48] 22  SpO2:  [78 %-100 %] 95 %  BP: ()/(39-86) 106/61     Weight: 108.9 kg (240 lb 1.3 oz) (03/28/20 1529)  Body mass index is 37.6 kg/m².  Body surface area is 2.27 meters squared.    I/O last 3 completed shifts:  In: 167.5 [I.V.:167.5]  Out: 100 [Urine:100]    Physical Exam   Constitutional: He appears distressed.   HENT:   Intubated   Cardiovascular: Normal rate and regular rhythm.   Systolic in the 70s   Pulmonary/Chest:   Mechanically ventilated. No belly breathing noted   Abdominal: He exhibits no distension.   Neurological:   Sedated        Significant Labs:  CBC:   Recent Labs   Lab 03/28/20  0532   WBC 6.21   RBC 3.67*   HGB 11.1*   HCT 34.0*      MCV 93   MCH 30.2   MCHC 32.6     CMP:   Recent Labs   Lab 03/28/20  0533  03/28/20  1820   *   < > 497*   CALCIUM 9.2   < > 7.7*    ALBUMIN 2.6*  --   --    PROT 7.1  --   --       < > 131*   K 5.0   < > 6.9*   CO2 14*   < > 12*      < > 104   BUN 43*   < > 62*   CREATININE 2.6*   < > 4.2*   ALKPHOS 55  --   --    ALT 70*  --   --    *  --   --    BILITOT 0.5  --   --     < > = values in this interval not displayed.     Coagulation:   Recent Labs   Lab 03/28/20  1540   INR 1.1     All labs within the past 24 hours have been reviewed.    Significant Imaging:  Labs: Reviewed    Assessment/Plan:     Acute respiratory distress syndrome (ARDS) due to COVID-19 virus  management as per ICU    Acute renal failure superimposed on stage 3 chronic kidney disease  68 yo male with HTN, HLD, DM2, CKD3, NAFLD, aortic stenosis, diabetic neuropathy, cervical radiculopathy who presented to [facility] on 3/28 for worsening SOB in the setting of positive COVID, hypoxemic respiratory failure and JAZMIN most likely due to dehydration from hyperglycemia and sepsis    Plan/recommendations:   - Strict I/O and chart   - daily weights   - Creatinine trend: 2.6-->3.9-->4.2  - continue bicarbonate infusion  - follow potassium closely with bmp q 4hrs  - hyperkalemia improving with conservative management  - consider IVF if allowed by fluid status   - UA:2 + protein, 1+ glucose, negative ketones  - renally dose all medications   - Avoid nephrotoxic medications, NSAIDs, IV contrast, ACE/ARB.  - Maintain MAP > 65  - Hb > 7 gm/dL   - Will follow closely         Thank you for your consult. I will follow-up with patient. Please contact us if you have any additional questions.    Vincenzo Costa MD  Nephrology  [facility]

## 2020-03-30 PROBLEM — E78.1 HYPERTRIGLYCERIDEMIA: Status: ACTIVE | Noted: 2020-01-01

## 2020-03-30 NOTE — PROGRESS NOTES
[facility]  Nephrology  Progress Note    Patient Name: Erick Valdovinos  MRN: [id number]  Admission Date: 3/28/2020  Hospital Length of Stay: 2 days  Attending Provider: Jak Tee MD   Primary Care Physician: Brandon Kinney MD  Principal Problem:Acute respiratory distress syndrome (ARDS) due to 2019 novel coronavirus    Subjective:     HPI: 70 yo male with HTN, HLD, DM2, CKD3, NAFLD, aortic stenosis, diabetic neuropathy, cervical radiculopathy who presented to [facility] on 3/28 for worsening SOB in the setting of positive COVID testing done on 3/19-->resulted positive on 3/23. Patient had had fevers and diarrhea for the week prior, fevers as high as 103. He was also having difficulty seeing. Symptoms non-remitting until 3/28 and then had worsening fatigue and weakness, which led him to present to [facility] ED. In the ED, patient was found to be hypoxemic at 78% on room air and in moderate distress. Patient was placed on NRB mask but continued to have issues with poor effort so electively intubated due to worsening condition. Patient was also given 1L fluid bolus for hypotension (systolic in the 80s). Patient sedated on propofol and fentanyl.     ED evaluation at [facility] showed: WBC 6.21 with lymphopenia, bicarb 14, creatinine 2.6 (baseline 1.3), Ferritin 2202, , , , Lactic acid 0.9 and D-Dimer 0.4, BNP 41 and troponin bump to 0.087. Patient was started on Ceftriaxone, Azithromycin and Plaquenil.      Patient was transferred to [facility] as [facility] was out of ICU beds. Patient admitted to MICU on 3/28 for management of acute hypoxemic respiratory failure complicated by JAZMIN and troponinemia in the setting of COVID19. Remarkable labs: hyperkalemia at 7.7, Bicarbonate 12, pH of 7.1, lasix 80mg  administered with one liter bolus of ringer's lactate, insulin and calcium gluconate. Started on insulin infusion due to possible DKA. Nephrology consulted for severe JAZMIN accompanied  by hyperkalemia and severe acidosis.     Interval History: Still intubated with 50% FiO2, UOP Increased to 1.9 overnight. Creatinine decreased to 1.7    Review of patient's allergies indicates:   Allergen Reactions    Codeine Nausea Only    Nsaids (non-steroidal anti-inflammatory drug) Other (See Comments)     Kidney issues     Current Facility-Administered Medications   Medication Frequency    acetaminophen oral solution 650 mg Q6H PRN    amiodarone tablet 400 mg Q8H    Followed by    [START ON 4/2/2020] amiodarone tablet 200 mg Daily    azithromycin tablet 250 mg Daily    calcium gluconate 1g in dextrose 5% 100mL (ready to mix system) Q10 Min PRN    cefTRIAXone (ROCEPHIN) 2 g in dextrose 5 % 50 mL IVPB Q24H    chlorhexidine 0.12 % solution 15 mL BID    clopidogreL tablet 75 mg Daily    dexmedetomidine (PRECEDEX) 400mcg/100mL 0.9% NaCL infusion Continuous    dextrose 10% (D10W) Bolus PRN    dextrose 10% (D10W) Bolus PRN    fentaNYL 2500 mcg in 0.9% sodium chloride 250 mL infusion premix (titrating) Continuous    heparin 25,000 units in dextrose 5% (100 units/ml) IV bolus from bag - ADDITIONAL PRN BOLUS - 30 units/kg PRN    heparin 25,000 units in dextrose 5% (100 units/ml) IV bolus from bag - ADDITIONAL PRN BOLUS - 60 units/kg PRN    heparin 25,000 units in dextrose 5% 250 mL (100 units/mL) infusion LOW INTENSITY nomogram - [facility] Continuous    hydroxychloroquine tablet 400 mg Daily    insulin regular 100 Units in sodium chloride 0.9% 100 mL infusion Continuous    norepinephrine 16 mg in dextrose 5 % 250 mL infusion Continuous    ondansetron injection 4 mg Q8H PRN    pantoprazole injection 40 mg BID    [START ON 3/31/2020] polyethylene glycol packet 17 g Daily    propofol (DIPRIVAN) 10 mg/mL infusion Continuous    sodium chloride 0.9% flush 10 mL PRN    vasopressin (PITRESSIN) 0.2 Units/mL in dextrose 5 % 100 mL infusion Continuous       Objective:     Vital Signs (Most Recent):  Temp: 100.3  °F (37.9 °C) (03/30/20 0701)  Pulse: 91 (03/30/20 1400)  Resp: 20 (03/30/20 1400)  BP: 125/69 (03/30/20 1400)  SpO2: (!) 91 % (03/30/20 1400)  O2 Device (Oxygen Therapy): ventilator (03/30/20 1400) Vital Signs (24h Range):  Temp:  [98.2 °F (36.8 °C)-103.3 °F (39.6 °C)] 100.3 °F (37.9 °C)  Pulse:  [] 91  Resp:  [16-29] 20  SpO2:  [88 %-97 %] 91 %  BP: ()/(50-75) 125/69  Arterial Line BP: (103-150)/(40-62) 143/61     Weight: 108.9 kg (240 lb 1.3 oz) (03/28/20 1529)  Body mass index is 37.6 kg/m².  Body surface area is 2.27 meters squared.    I/O last 3 completed shifts:  In: 6485.5 [I.V.:5615.5; NG/GT:220; IV Piggyback:650]  Out: 3895 [Urine:3795; Stool:100]    Physical Exam   Constitutional: He appears distressed.   HENT:   Intubated   Cardiovascular: Normal rate and regular rhythm.   Systolic in the 70s   Pulmonary/Chest:   Mechanically ventilated. No belly breathing noted   Abdominal: He exhibits no distension.   Neurological:   Sedated        Significant Labs:  CBC:   Recent Labs   Lab 03/30/20  0522   WBC 7.49   RBC 3.15*   HGB 10.1*   HCT 30.2*      MCV 96   MCH 32.1*   MCHC 33.4     CMP:   Recent Labs   Lab 03/30/20  0522 03/30/20  0834   * 211*   CALCIUM 8.3* 8.0*   ALBUMIN 1.8*  --    PROT 6.2  --    * 134*   K 4.7 4.2   CO2 24 22*    101   BUN 31* 28*   CREATININE 1.7* 1.7*   ALKPHOS 52*  --    ALT 43  --    AST 58*  --    BILITOT 0.4  --      All labs within the past 24 hours have been reviewed.     Significant Imaging:  Labs: Reviewed    Assessment/Plan:     COVID-19 virus detected  Management per ICU     Acute renal failure superimposed on stage 3 chronic kidney disease  70 yo male with HTN, HLD, DM2, CKD3, NAFLD, aortic stenosis, diabetic neuropathy, cervical radiculopathy who presented to [facility] on 3/28 for worsening SOB in the setting of positive COVID, hypoxemic respiratory failure and JAZMIN most likely due to dehydration from hyperglycemia and  sepsis    Plan/recommendations:   - Strict I/O and chart   - daily weights   - Creatinine trend: 2.6-->3.9-->4.2-->2.1-->1.7  - UOP at 2.7 over past 24 hours  - bicarbonate now normal   - potassium is now in the normal range   - treat underlying DKA/HHS  - UA:2 + protein, 1+ glucose, negative ketones  - renally dose all medications   - Avoid nephrotoxic medications, NSAIDs, IV contrast, ACE/ARB.  - Maintain MAP > 65  - Hb > 7 gm/dL   - Will follow closely         Thank you for your consult. I will follow-up with patient. Please contact us if you have any additional questions.    Vincenzo Costa MD  Nephrology  [facility]

## 2020-03-30 NOTE — ASSESSMENT & PLAN NOTE
68 yo male with HTN, HLD, DM2, CKD3, NAFLD, aortic stenosis, diabetic neuropathy, cervical radiculopathy who presented to [facility] on 3/28 for worsening SOB in the setting of positive COVID, hypoxemic respiratory failure and JAZMIN most likely due to dehydration from hyperglycemia and sepsis    Plan/recommendations:   - Strict I/O and chart   - daily weights   - Creatinine trend: 2.6-->3.9-->4.2-->2.1-->1.7  - UOP at 2.7 over past 24 hours  - bicarbonate now normal   - potassium is now in the normal range   - treat underlying DKA/HHS  - UA:2 + protein, 1+ glucose, negative ketones  - renally dose all medications   - Avoid nephrotoxic medications, NSAIDs, IV contrast, ACE/ARB.  - Maintain MAP > 65  - Hb > 7 gm/dL   - Will follow closely

## 2020-03-30 NOTE — ASSESSMENT & PLAN NOTE
- Presented with low bicarb, increased AG, + B OHB.   - Improving on insulin infusion. Required bicarbonate infusion 3/28.  - Continue on insulin infusion, monitor glucose closely, renee with starting tube feeds   - frequent accuchecks

## 2020-03-30 NOTE — ASSESSMENT & PLAN NOTE
68 yo male with HTN, HLD, DM2, CKD3, NAFLD, aortic stenosis, diabetic neuropathy, cervical radiculopathy who presented to [facility] on 3/28 for worsening SOB in the setting of positive COVID, hypoxemic respiratory failure and JAZMIN most likely due to dehydration from hyperglycemia and sepsis    Plan/recommendations:   - Strict I/O and chart   - daily weights   - Creatinine trend: 2.6-->3.9-->4.2-->2.1  - bicarbonate now normal   - potassium is now in the normal range   - treat underlying DKA/HHS  - UA:2 + protein, 1+ glucose, negative ketones  - renally dose all medications   - Avoid nephrotoxic medications, NSAIDs, IV contrast, ACE/ARB.  - Maintain MAP > 65  - Hb > 7 gm/dL   - Will follow closely

## 2020-03-30 NOTE — PLAN OF CARE
Patient on COVID-19 precautions.  Admission assessment verified and completed via phone with spouse (Yenny).  Wife states that patient does use a glucometer and occasionally uses a bp cuff.    Brandon Kinney MD  [address] [facility] [address] / [place] [address]      [id number] - [address]  [address]  [address]  [place] [address]  Phone: [phone] Fax: [phone]    [facility] - [address]  [address] [facility]  [address]  [facility] [address]  Phone: [phone] Fax: [phone]      Extended Emergency Contact Information  Primary Emergency Contact: Yenny Valdovinos  Address: [address] [place] [address] [place] [address] [place]  Home Phone: [phone]  Mobile Phone: [phone]  Relation: Spouse    Future Appointments   Date Time Provider Department Center   5/29/2020  7:00 AM LAB, [facility]       Payor: [employer] MEDICARE / Plan: Testif 65 / Product Type: Medicare Advantage /        03/30/20 1117   Discharge Assessment   Assessment Type Discharge Planning Assessment   Confirmed/corrected address and phone number on facesheet? Yes   Assessment information obtained from? Other  (wife)   Prior to hospitilization cognitive status: Alert/Oriented   Prior to hospitalization functional status: Independent   Current cognitive status: Coma/Sedated/Intubated   Current Functional Status: Completely Dependent   Lives With spouse   Able to Return to Prior Arrangements other (see comments)  (TBD)   Is patient able to care for self after discharge? Unable to determine at this time (comments)   Patient currently being followed by outpatient case management? No   Patient currently receives any other outside agency services? No   Do you have any problems affording any of your prescribed medications? No   Is the patient taking medications  as prescribed? yes   Does the patient have transportation home? Yes   Transportation Anticipated family or friend will provide   Does the patient receive services at the [facility]? No   Discharge Plan A Home with family   Discharge Plan B Home with family;Home Health   DME Needed Upon Discharge  other (see comments)  (TBD)       Sha Rueda RN MSN  Critical Care-   Ext. [phone]

## 2020-03-30 NOTE — SUBJECTIVE & OBJECTIVE
Interval History: Still intubated with 50% FiO2, UOP Increased to 1.9 overnight. Creatinine decreased to 1.7    Review of patient's allergies indicates:   Allergen Reactions    Codeine Nausea Only    Nsaids (non-steroidal anti-inflammatory drug) Other (See Comments)     Kidney issues     Current Facility-Administered Medications   Medication Frequency    acetaminophen oral solution 650 mg Q6H PRN    amiodarone tablet 400 mg Q8H    Followed by    [START ON 4/2/2020] amiodarone tablet 200 mg Daily    azithromycin tablet 250 mg Daily    calcium gluconate 1g in dextrose 5% 100mL (ready to mix system) Q10 Min PRN    cefTRIAXone (ROCEPHIN) 2 g in dextrose 5 % 50 mL IVPB Q24H    chlorhexidine 0.12 % solution 15 mL BID    clopidogreL tablet 75 mg Daily    dexmedetomidine (PRECEDEX) 400mcg/100mL 0.9% NaCL infusion Continuous    dextrose 10% (D10W) Bolus PRN    dextrose 10% (D10W) Bolus PRN    fentaNYL 2500 mcg in 0.9% sodium chloride 250 mL infusion premix (titrating) Continuous    heparin 25,000 units in dextrose 5% (100 units/ml) IV bolus from bag - ADDITIONAL PRN BOLUS - 30 units/kg PRN    heparin 25,000 units in dextrose 5% (100 units/ml) IV bolus from bag - ADDITIONAL PRN BOLUS - 60 units/kg PRN    heparin 25,000 units in dextrose 5% 250 mL (100 units/mL) infusion LOW INTENSITY nomogram - [facility] Continuous    hydroxychloroquine tablet 400 mg Daily    insulin regular 100 Units in sodium chloride 0.9% 100 mL infusion Continuous    norepinephrine 16 mg in dextrose 5 % 250 mL infusion Continuous    ondansetron injection 4 mg Q8H PRN    pantoprazole injection 40 mg BID    [START ON 3/31/2020] polyethylene glycol packet 17 g Daily    propofol (DIPRIVAN) 10 mg/mL infusion Continuous    sodium chloride 0.9% flush 10 mL PRN    vasopressin (PITRESSIN) 0.2 Units/mL in dextrose 5 % 100 mL infusion Continuous       Objective:     Vital Signs (Most Recent):  Temp: 100.3 °F (37.9 °C) (03/30/20 0701)  Pulse: 91  (03/30/20 1400)  Resp: 20 (03/30/20 1400)  BP: 125/69 (03/30/20 1400)  SpO2: (!) 91 % (03/30/20 1400)  O2 Device (Oxygen Therapy): ventilator (03/30/20 1400) Vital Signs (24h Range):  Temp:  [98.2 °F (36.8 °C)-103.3 °F (39.6 °C)] 100.3 °F (37.9 °C)  Pulse:  [] 91  Resp:  [16-29] 20  SpO2:  [88 %-97 %] 91 %  BP: ()/(50-75) 125/69  Arterial Line BP: (103-150)/(40-62) 143/61     Weight: 108.9 kg (240 lb 1.3 oz) (03/28/20 1529)  Body mass index is 37.6 kg/m².  Body surface area is 2.27 meters squared.    I/O last 3 completed shifts:  In: 6485.5 [I.V.:5615.5; NG/GT:220; IV Piggyback:650]  Out: 3895 [Urine:3795; Stool:100]    Physical Exam   Constitutional: He appears distressed.   HENT:   Intubated   Cardiovascular: Normal rate and regular rhythm.   Systolic in the 70s   Pulmonary/Chest:   Mechanically ventilated. No belly breathing noted   Abdominal: He exhibits no distension.   Neurological:   Sedated        Significant Labs:  CBC:   Recent Labs   Lab 03/30/20  0522   WBC 7.49   RBC 3.15*   HGB 10.1*   HCT 30.2*      MCV 96   MCH 32.1*   MCHC 33.4     CMP:   Recent Labs   Lab 03/30/20  0522 03/30/20  0834   * 211*   CALCIUM 8.3* 8.0*   ALBUMIN 1.8*  --    PROT 6.2  --    * 134*   K 4.7 4.2   CO2 24 22*    101   BUN 31* 28*   CREATININE 1.7* 1.7*   ALKPHOS 52*  --    ALT 43  --    AST 58*  --    BILITOT 0.4  --      All labs within the past 24 hours have been reviewed.     Significant Imaging:  Labs: Reviewed

## 2020-03-30 NOTE — PROGRESS NOTES
[facility]  Critical Care Medicine  Progress Note    Patient Name: Erick Valdovinos  MRN: [id number]  Admission Date: 3/28/2020  Hospital Length of Stay: 2 days  Code Status: Full Code  Attending Provider: Jak Tee MD  Primary Care Provider: Brandon Kinney MD   Principal Problem: Acute respiratory distress syndrome (ARDS) due to 2019 novel coronavirus    Subjective:     HPI:  Patient is a 68 yo male with HTN, HLD, DM2, CKD3, NAFLD, aortic stenosis, diabetic neuropathy, cervical radiculopathy who presented to [facility] on 3/28 for worsening SOB in the setting of positive COVID testing done on 3/19-->resulted positive on 3/23. Patient had had fevers and diarrhea for the week prior, fevers as high as 103. He was also having difficulty seeing. Symptoms non-remitting until 3/28 and then had worsening fatigue and weakness, which led him to present to [facility] ED. In the ED, patient was found to be hypoxemic at 78% on room air and in moderate distress. Patient was placed on NRB mask but continued to have issues with poor effort so electively intubated due to worsening condition. Patient was also given 1L fluid bolus for hypotension (systolic in the 80s). Patient sedated on propofol and fentanyl.    ED evaluation at [facility] showed: WBC 6.21 with lymphopenia, bicarb 14, creatinine 2.6 (baseline 1.3), Ferritin 2202, , , , Lactic acid 0.9 and D-Dimer 0.4, BNP 41 and troponin bump to 0.087. Patient was started on ceftriaxone, Azithromycin and Plaquenil.     Patient was transferred to [facility] as [facility] was out of ICU beds. Patient admitted to MICU on 3/28 for management of acute hypoxemic respiratory failure complicated by JAZMIN and troponinemia in the setting of COVID19.    Hospital/ICU Course:  Admitted to MICU on 3/28, intubated and mechanically ventilated. On Ceftriaxone, azithro and plaquenil. Started insulin gtt given glucose >400s and checking serum ketones to evaluate for  DKA. Trending troponins 0.087-->0.04, likely demand ischemia 2/2 ARDS. Patient noted to be in DKA with elevated beta hydroxybutyrate to 4.7. Patient fluid resuscitated about 3L from 3/28-3/29 and started on insulin gtt with improvement in glucose control. JAZMIN improving after fluid resuscitation. Patient did go into A fib with RVR on 3/29 and was started on amio gtt with conversion to PO given IV amio shortage. Patient was also started on low intensity heparin gtt given suspected component of microangiopathic coagulopathy related to JAZMIN. Of note, patient was persistently spiking fevers since admit up until 3/30 with a temp of 103 on 3/30 (still on ceftriaxone azithro). Tube feeds started on 3/30 (watching glucose very closely).    Interval History/Significant Events:   A fib with RVR controlled on amio gtt. Hyperglycemia better controlled, on insulin gtt 12 u/hr this AM. Electrolytes and creatinine improved after fluid resuscitation. Continues to spike fevers, up to 103 this am, continue ABX for now with low threshold to broaden if worsening pressor requirements. Intubated and mechanically ventilated on 50% FiO2 and 10 PEEP. Plan to start tube feeds today. Plan to switch famotidine to protonix given mild mucosal bleeding in the mouth reported by RN (watching closely given heparin gtt for microangiopathic coagulopathy concern). Plan to consolidate sedation given hypertriglyceridemia (wean propofol and add precedex).    Review of Systems   Unable to perform ROS: Intubated     Objective:     Vital Signs (Most Recent):  Temp: 100.3 °F (37.9 °C) (03/30/20 0701)  Pulse: 93 (03/30/20 0900)  Resp: 20 (03/30/20 0900)  BP: (!) 106/58 (03/30/20 0900)  SpO2: (!) 89 % (03/30/20 0900) Vital Signs (24h Range):  Temp:  [98.2 °F (36.8 °C)-103.3 °F (39.6 °C)] 100.3 °F (37.9 °C)  Pulse:  [] 93  Resp:  [16-29] 20  SpO2:  [88 %-96 %] 89 %  BP: ()/(50-78) 106/58  Arterial Line BP: (103-156)/(40-60) 117/47   Weight: 108.9 kg  (240 lb 1.3 oz)  Body mass index is 37.6 kg/m².      Intake/Output Summary (Last 24 hours) at 3/30/2020 0938  Last data filed at 3/30/2020 0900  Gross per 24 hour   Intake 2860.13 ml   Output 2145 ml   Net 715.13 ml       Physical Exam   Constitutional:   Examined from window to reduce COVID-19 contact   HENT:   Intubated   Cardiovascular:   Irregular tachycardia   Pulmonary/Chest:   Mechanically Ventilated   Genitourinary:   Genitourinary Comments: Motta in place   Musculoskeletal:   Trialysis catheter left IJ and arterial line in place   Nursing note and vitals reviewed.      Vents:  Vent Mode: A/C (03/30/20 0840)  Ventilator Initiated: Yes (03/28/20 1515)  Set Rate: 20 BPM (03/30/20 0840)  Vt Set: 460 mL (03/30/20 0840)  Pressure Support: 0 cmH20 (03/30/20 0840)  PEEP/CPAP: 10 cmH20 (03/30/20 0840)  Oxygen Concentration (%): 50 (03/30/20 0900)  Peak Airway Pressure: 29 cmH2O (03/30/20 0840)  Plateau Pressure: 18 cmH20 (03/30/20 0840)  Total Ve: 10.6 mL (03/30/20 0840)  F/VT Ratio<105 (RSBI): (!) 74.74 (03/30/20 0029)  Lines/Drains/Airways     Central Venous Catheter Line            Trialysis (Dialysis) Catheter 03/28/20 1820 external jugular;left internal jugular 1 day          Drain                 NG/OG Tube 03/28/20 0557 Atkinson sump 16 Fr. Center mouth 2 days         Urethral Catheter 03/28/20 0606 Non-latex 16 Fr. 2 days         Rectal Tube 03/29/20 2300 rectal tube w/ balloon (indicate number of mLs) less than 1 day          Airway                 Airway - Non-Surgical 03/28/20 0605 Endotracheal Tube 2 days          Arterial Line                 Arterial Line 03/28/20 1819 Right Radial 1 day          Peripheral Intravenous Line                 Peripheral IV - Single Lumen 03/28/20 0530 18 G Left Forearm 2 days         Peripheral IV - Single Lumen 03/28/20 0540 18 G Right Forearm 2 days              Significant Labs:    CBC/Anemia Profile:  Recent Labs   Lab 03/28/20  1540 03/29/20  0504 03/30/20  0522   WBC   --  7.37 7.49   HGB  --  10.2* 10.1*   HCT  --  31.8* 30.2*   PLT  --  329 280   MCV  --  97 96   RDW  --  13.1 13.0   FERRITIN 2,520* 3,385* 2,874*        Chemistries:  Recent Labs   Lab 03/29/20  0504  03/30/20  0115 03/30/20  0522 03/30/20  0834   *  133*   < > 133* 135* 134*   K 4.7  4.7   < > 4.8 4.7 4.2   CL 99  99   < > 99 101 101   CO2 23  23   < > 22* 24 22*   BUN 57*  57*   < > 31* 31* 28*   CREATININE 3.6*  3.6*   < > 1.7* 1.7* 1.7*   CALCIUM 8.3*  8.3*   < > 8.2* 8.3* 8.0*   ALBUMIN 2.1*  --   --   --   --    PROT 6.5  --   --  6.2  --    BILITOT 0.4  --   --  0.4  --    ALKPHOS 54*  --   --  52*  --    ALT 53*  --   --  43  --    AST 68*  --   --  58*  --    MG 2.2  --   --  2.0  --    PHOS 2.8  --   --  1.6*  --     < > = values in this interval not displayed.       ABG  Recent Labs   Lab 03/29/20  1422   PH 7.416   PO2 77*   PCO2 36.3   HCO3 23.3*   BE -1     Assessment/Plan:     Pulmonary  Acute respiratory failure with hypoxia  See acute resp distress syndrome due to 2019 novel coronavirus      Cardiac/Vascular  Hypertriglyceridemia  - likely 2/2 propofol  - try to wean off propofol and use precedex instead    A-fib  - New onset narrow complex tachyarrhythmia overnight 3/28-2/39. Max rate 150. No obvious p waves. Given mult pushes IV metoprolol with modest improvement in rate.   - Given 2g Mg 3/29  - started on amiodarone 3/29. Converted to amio po given IV amio shortage  - Pressor requirements increased since 3/29 night, unclear if this is caused by tachyarrhythmia, suspect 2/2 septic and distributive shock.     NSTEMI (non-ST elevated myocardial infarction)  - mild troponin elevation, suspect Type 2 NSTEMI from severe metabolic derangements.     Aortic stenosis  - follows with cardiology outpatient    Mixed hyperlipidemia  - on statin at home, can restart when appropriate  - continue home plavix (follows with Dr Jiménez in cardiology and has a pending angiogram to evaluate for  CAD)    Essential hypertension  - monitor BP  - pressors for hypotension, monitor trend closely (low threshold to broaden ABX if worsening pressor requirements; on CTX azithro as of 3/30)  Holding home BP medications    Renal/  Acute renal failure superimposed on stage 3 chronic kidney disease  - creatinine up to 4 on 3/28, basline about 1.3. Improved after volume resuscitation.   - trend creatinine  - started on empiric heparin 3/29 for renal dysfunction and suspicion of microangiopathic coagulopathy.   - trialysis line in the case of need for HD given renal failure incidence with COVID  - nephro consulted in setting of severe hyperkalemia which has improved     Endocrine  DKA (diabetic ketoacidoses)  - Presented with low bicarb, increased AG, + B OHB.   - Improving on insulin infusion. Required bicarbonate infusion 3/28.  - Continue on insulin infusion, monitor glucose closely, renee with starting tube feeds   - frequent accuchecks    Severe obesity (BMI 35.0-39.9) with comorbidity  - nutrition consult for tube feeds    Type 2 diabetes mellitus with stage 3 chronic kidney disease, without long-term current use of insulin  See DKA    GI  Transaminitis  See NAFLD    Nonalcoholic fatty liver disease  - history of nonalcoholic fatty liver disease  - trend LFTs  - if evidence of shock liver (transaminitis to the thousands), concerning for poor prognosis    Other  * Acute respiratory distress syndrome (ARDS) due to 2019 novel coronavirus  Covid-19 Virus Infection  Presents with SOB, myalgias. Flu negative.   - COVID-19 testing resulted POSITIVE     - Isolation:   - Airborne and Droplet Precautions  - N95 masks must be fit tested, wear eye protection  - 20 second hand hygiene              - Limit visitors per hospital policy              - Consolidating lab draws, nursing care, and interventions      PLAN:  - continue mechanical ventilation (Intubated on 3/28 at [facility])  -  high PEEP protocol per ARDSnet  -  reduced TV to 6ccs/kg 3/29. Started on 7 on admit for acid clearance given DKA.   - strict isolation protocol 2/2 positive COVID  - plaquenil for COVID  - f/u ID recs regarding antivirals for COVID  - CAP coverage with ceftriaxone azithro (low threshold to escalate ABX if worsening hypotension or persistently febrile not remitting with tylenol)   - f/u blood cultures from 3/28  - f/u sputum cultures from 3/28      Shock, unspecified  - Suspect distributive 2/2 sedation with contribution from hypovolemia, high PEEP. Possible component of septic shock from COVID-19 infection.   - Pressor requirements stable on about 0.3 levo and vasopressin on 3/29. Off vaso on 3/30. Watch closely   - Holding steroids in setting of uncontrolled hyperglycemia.     Acute respiratory distress syndrome (ARDS) due to COVID-19 virus  See acute resp distress syndrome due to 2019 novel coronavirus      Endotracheally intubated  See acute resp distress syndrome due to 2019 novel coronavirus      COVID-19 virus detected  See acute resp distress syndrome due to 2019 novel coronavirus     Critical Care Daily Checklist:    A: Awake: RASS Goal/Actual Goal: RASS Goal: -3-->moderate sedation  Actual: Mensah Agitation Sedation Scale (RASS): Moderate sedation   B: Spontaneous Breathing Trial Performed? Spon. Breathing Trial Initiated?: Not initiated (03/30/20 0840)   C: SAT & SBT Coordinated?  No                       D: Delirium: CAM-ICU Overall CAM-ICU: Positive   E: Early Mobility Performed? No   F: Feeding Goal: Goals: meet % EEN/EPN by RD following  Status: Nutrition Goal Status: new   Current Diet Order   Procedures    Diet NPO      AS: Analgesia/Sedation Fentanyl. Switching propofol to precedex 2/2 hyperTG   T: Thromboembolic Prophylaxis Heparin gtt (JAZMIN)   H: HOB > 300 Yes   U: Stress Ulcer Prophylaxis (if needed) protonix    G: Glucose Control Now controlled on insulin gtt, watch closely as we are starting tube feeds   B: Bowel  Function Stool Occurrence: 1   I: Indwelling Catheter (Lines & Motta) Necessity Yes, trialysis, arterial and ETT/OG tube   D: De-escalation of Antimicrobials/Pharmacotherapies No. Keep CTX azithro for now given 103 temp today. Low threshold to escalate to vanc/cefepime    Plan for the day/ETD Supportive care, wean O2, start tube feeds, watch glucose closely, watch pressor requirements and consider escalating ABX if decompensating, switch to protonix given bleeding at the mouth, watch bleeding very closely given heparin gtt, monitor HR (continue amio)    Code Status:  Family/Goals of Care: Full Code         Critical secondary to Patient has a condition that poses threat to life and bodily function: Severe Respiratory Distress      Critical care was time spent personally by me on the following activities: development of treatment plan with patient or surrogate and bedside caregivers, discussions with consultants, evaluation of patient's response to treatment, examination of patient, ordering and performing treatments and interventions, ordering and review of laboratory studies, ordering and review of radiographic studies, pulse oximetry, re-evaluation of patient's condition. This critical care time did not overlap with that of any other provider or involve time for any procedures.     Jessica Blanco MD  Critical Care Medicine  [facility]

## 2020-03-30 NOTE — PROGRESS NOTES
[facility]  Nephrology  Progress Note    Patient Name: Erick Valdovinos  MRN: [id number]  Admission Date: 3/28/2020  Hospital Length of Stay: 1 days  Attending Provider: Tessie Figueroa MD   Primary Care Physician: Brandon Kinney MD  Principal Problem:Acute respiratory distress syndrome (ARDS) due to 2019 novel coronavirus    Subjective:     HPI: 70 yo male with HTN, HLD, DM2, CKD3, NAFLD, aortic stenosis, diabetic neuropathy, cervical radiculopathy who presented to [facility] on 3/28 for worsening SOB in the setting of positive COVID testing done on 3/19-->resulted positive on 3/23. Patient had had fevers and diarrhea for the week prior, fevers as high as 103. He was also having difficulty seeing. Symptoms non-remitting until 3/28 and then had worsening fatigue and weakness, which led him to present to [facility] ED. In the ED, patient was found to be hypoxemic at 78% on room air and in moderate distress. Patient was placed on NRB mask but continued to have issues with poor effort so electively intubated due to worsening condition. Patient was also given 1L fluid bolus for hypotension (systolic in the 80s). Patient sedated on propofol and fentanyl.     ED evaluation at [place] showed: WBC 6.21 with lymphopenia, bicarb 14, creatinine 2.6 (baseline 1.3), Ferritin 2202, , , , Lactic acid 0.9 and D-Dimer 0.4, BNP 41 and troponin bump to 0.087. Patient was started on Ceftriaxone, Azithromycin and Plaquenil.      [place] [facility] [place] was out of ICU beds. Patient admitted to MICU on 3/28 for management of acute hypoxemic respiratory failure complicated by JAZMIN and troponinemia in the setting of COVID19. Remarkable labs: hyperkalemia at 7.7, Bicarbonate 12, pH of 7.1, lasix 80mg  administered with one liter bolus of ringer's lactate, insulin and calcium gluconate. Started on insulin infusion due to possible DKA. Nephrology consulted for severe JAZMIN accompanied  by hyperkalemia and severe acidosis.     Interval History: Patient still intubated at 40% FiO2. Voided over 2 liters of urine today. Creatinine downtrending.     Review of patient's allergies indicates:   Allergen Reactions    Codeine Nausea Only    Nsaids (non-steroidal anti-inflammatory drug) Other (See Comments)     Kidney issues     Current Facility-Administered Medications   Medication Frequency    acetaminophen oral solution 650 mg Q6H PRN    amiodarone 360 mg/200 mL (1.8 mg/mL) infusion Continuous    amiodarone tablet 400 mg Q8H    Followed by    [START ON 4/2/2020] amiodarone tablet 200 mg Daily    azithromycin tablet 250 mg Daily    calcium gluconate 1g in dextrose 5% 100mL (ready to mix system) Q10 Min PRN    cefTRIAXone (ROCEPHIN) 2 g in dextrose 5 % 50 mL IVPB Q24H    chlorhexidine 0.12 % solution 15 mL BID    clopidogreL tablet 75 mg Daily    dextrose 10% (D10W) Bolus PRN    dextrose 10% (D10W) Bolus PRN    famotidine (PF) injection 20 mg QHS    fentaNYL 2500 mcg in 0.9% sodium chloride 250 mL infusion premix (titrating) Continuous    heparin 25,000 units in dextrose 5% (100 units/ml) IV bolus from bag - ADDITIONAL PRN BOLUS - 30 units/kg PRN    heparin 25,000 units in dextrose 5% (100 units/ml) IV bolus from bag - ADDITIONAL PRN BOLUS - 60 units/kg PRN    heparin 25,000 units in dextrose 5% 250 mL (100 units/mL) infusion LOW INTENSITY nomogram - [facility] Continuous    [START ON 3/30/2020] hydroxychloroquine tablet 400 mg Daily    insulin regular 100 Units in sodium chloride 0.9% 100 mL infusion Continuous    norepinephrine 16 mg in dextrose 5 % 250 mL infusion Continuous    ondansetron injection 4 mg Q8H PRN    propofol (DIPRIVAN) 10 mg/mL infusion Continuous    sodium chloride 0.9% flush 10 mL PRN    vasopressin (PITRESSIN) 0.2 Units/mL in dextrose 5 % 100 mL infusion Continuous       Objective:     Vital Signs (Most Recent):  Temp: 98.8 °F (37.1 °C) (03/29/20 1511)  Pulse: 97  (03/29/20 1954)  Resp: 18 (03/29/20 1000)  BP: 116/64 (03/29/20 1901)  SpO2: (!) 90 % (03/29/20 1954)  O2 Device (Oxygen Therapy): ventilator (03/29/20 1954) Vital Signs (24h Range):  Temp:  [98.8 °F (37.1 °C)-102.5 °F (39.2 °C)] 98.8 °F (37.1 °C)  Pulse:  [] 97  Resp:  [18-29] 18  SpO2:  [88 %-96 %] 90 %  BP: ()/(57-91) 116/64  Arterial Line BP: ()/(41-60) 103/52     Weight: 108.9 kg (240 lb 1.3 oz) (03/28/20 1529)  Body mass index is 37.6 kg/m².  Body surface area is 2.27 meters squared.    I/O last 3 completed shifts:  In: 5468.2 [I.V.:4698.2; NG/GT:120; IV Piggyback:650]  Out: 3135 [Urine:3135]    Physical Exam   Constitutional: He appears distressed.   HENT:   Intubated   Cardiovascular: Normal rate and regular rhythm.   Systolic in the 70s   Pulmonary/Chest:   Mechanically ventilated. No belly breathing noted   Abdominal: He exhibits no distension.   Neurological:   Sedated        Significant Labs:  CBC:   Recent Labs   Lab 03/29/20  0504   WBC 7.37   RBC 3.28*   HGB 10.2*   HCT 31.8*      MCV 97   MCH 31.1*   MCHC 32.1     CMP:   Recent Labs   Lab 03/29/20  0504  03/29/20  1709   *  491*   < > 237*   CALCIUM 8.3*  8.3*   < > 8.3*   ALBUMIN 2.1*  --   --    PROT 6.5  --   --    *  133*   < > 135*   K 4.7  4.7   < > 4.4   CO2 23  23   < > 24   CL 99  99   < > 100   BUN 57*  57*   < > 37*   CREATININE 3.6*  3.6*   < > 2.1*   ALKPHOS 54*  --   --    ALT 53*  --   --    AST 68*  --   --    BILITOT 0.4  --   --     < > = values in this interval not displayed.     All labs within the past 24 hours have been reviewed.     Significant Imaging:  Labs: Reviewed    Assessment/Plan:     Acute respiratory distress syndrome (ARDS) due to COVID-19 virus  management as per ICU    Acute renal failure superimposed on stage 3 chronic kidney disease  68 yo male with HTN, HLD, DM2, CKD3, NAFLD, aortic stenosis, diabetic neuropathy, cervical radiculopathy who presented to [facility]  on 3/28 for worsening SOB in the setting of positive COVID, hypoxemic respiratory failure and JAZMIN most likely due to dehydration from hyperglycemia and sepsis    Plan/recommendations:   - Strict I/O and chart   - daily weights   - Creatinine trend: 2.6-->3.9-->4.2-->2.1  - bicarbonate now normal   - potassium is now in the normal range   - treat underlying DKA/HHS  - UA:2 + protein, 1+ glucose, negative ketones  - renally dose all medications   - Avoid nephrotoxic medications, NSAIDs, IV contrast, ACE/ARB.  - Maintain MAP > 65  - Hb > 7 gm/dL   - Will follow closely         Thank you for your consult. I will follow-up with patient. Please contact us if you have any additional questions.    Vincenzo Costa MD  Nephrology  [facility]

## 2020-03-30 NOTE — ASSESSMENT & PLAN NOTE
Covid-19 Virus Infection  Presents with SOB, myalgias. Flu negative.   - COVID-19 testing resulted POSITIVE     - Isolation:   - Airborne and Droplet Precautions  - N95 masks must be fit tested, wear eye protection  - 20 second hand hygiene              - Limit visitors per hospital policy              - Consolidating lab draws, nursing care, and interventions      PLAN:  - continue mechanical ventilation (Intubated on 3/28 at [facility])  -  high PEEP protocol per ARDSnet  - reduced TV to 6ccs/kg 3/29. Started on 7 on admit for acid clearance given DKA.   - strict isolation protocol 2/2 positive COVID  - plaquenil for COVID  - f/u ID recs regarding antivirals for COVID  - CAP coverage with ceftriaxone azithro (low threshold to escalate ABX if worsening hypotension or persistently febrile not remitting with tylenol)   - f/u blood cultures from 3/28  - f/u sputum cultures from 3/28

## 2020-03-30 NOTE — SUBJECTIVE & OBJECTIVE
Interval History/Significant Events:   A fib with RVR controlled on amio gtt. Hyperglycemia better controlled, on insulin gtt 12 u/hr this AM. Electrolytes and creatinine improved after fluid resuscitation. Continues to spike fevers, up to 103 this am, continue ABX for now with low threshold to broaden if worsening pressor requirements. Intubated and mechanically ventilated on 50% FiO2 and 10 PEEP. Plan to start tube feeds today. Plan to switch famotidine to protonix given mild mucosal bleeding in the mouth reported by RN (watching closely given heparin gtt for microangiopathic coagulopathy concern). Plan to consolidate sedation given hypertriglyceridemia (wean propofol and add precedex).    Review of Systems   Unable to perform ROS: Intubated     Objective:     Vital Signs (Most Recent):  Temp: 100.3 °F (37.9 °C) (03/30/20 0701)  Pulse: 93 (03/30/20 0900)  Resp: 20 (03/30/20 0900)  BP: (!) 106/58 (03/30/20 0900)  SpO2: (!) 89 % (03/30/20 0900) Vital Signs (24h Range):  Temp:  [98.2 °F (36.8 °C)-103.3 °F (39.6 °C)] 100.3 °F (37.9 °C)  Pulse:  [] 93  Resp:  [16-29] 20  SpO2:  [88 %-96 %] 89 %  BP: ()/(50-78) 106/58  Arterial Line BP: (103-156)/(40-60) 117/47   Weight: 108.9 kg (240 lb 1.3 oz)  Body mass index is 37.6 kg/m².      Intake/Output Summary (Last 24 hours) at 3/30/2020 0938  Last data filed at 3/30/2020 0900  Gross per 24 hour   Intake 2860.13 ml   Output 2145 ml   Net 715.13 ml       Physical Exam   Constitutional:   Examined from window to reduce COVID-19 contact   HENT:   Intubated   Cardiovascular:   Irregular tachycardia   Pulmonary/Chest:   Mechanically Ventilated   Genitourinary:   Genitourinary Comments: Motta in place   Musculoskeletal:   Trialysis catheter left IJ and arterial line in place   Nursing note and vitals reviewed.      Vents:  Vent Mode: A/C (03/30/20 0840)  Ventilator Initiated: Yes (03/28/20 1515)  Set Rate: 20 BPM (03/30/20 0840)  Vt Set: 460 mL (03/30/20 0840)  Pressure  Support: 0 cmH20 (03/30/20 0840)  PEEP/CPAP: 10 cmH20 (03/30/20 0840)  Oxygen Concentration (%): 50 (03/30/20 0900)  Peak Airway Pressure: 29 cmH2O (03/30/20 0840)  Plateau Pressure: 18 cmH20 (03/30/20 0840)  Total Ve: 10.6 mL (03/30/20 0840)  F/VT Ratio<105 (RSBI): (!) 74.74 (03/30/20 0029)  Lines/Drains/Airways     Central Venous Catheter Line            Trialysis (Dialysis) Catheter 03/28/20 1820 external jugular;left internal jugular 1 day          Drain                 NG/OG Tube 03/28/20 0557 Zaleski sump 16 Fr. Center mouth 2 days         Urethral Catheter 03/28/20 0606 Non-latex 16 Fr. 2 days         Rectal Tube 03/29/20 2300 rectal tube w/ balloon (indicate number of mLs) less than 1 day          Airway                 Airway - Non-Surgical 03/28/20 0605 Endotracheal Tube 2 days          Arterial Line                 Arterial Line 03/28/20 1819 Right Radial 1 day          Peripheral Intravenous Line                 Peripheral IV - Single Lumen 03/28/20 0530 18 G Left Forearm 2 days         Peripheral IV - Single Lumen 03/28/20 0540 18 G Right Forearm 2 days              Significant Labs:    CBC/Anemia Profile:  Recent Labs   Lab 03/28/20  1540 03/29/20  0504 03/30/20  0522   WBC  --  7.37 7.49   HGB  --  10.2* 10.1*   HCT  --  31.8* 30.2*   PLT  --  329 280   MCV  --  97 96   RDW  --  13.1 13.0   FERRITIN 2,520* 3,385* 2,874*        Chemistries:  Recent Labs   Lab 03/29/20  0504  03/30/20  0115 03/30/20  0522 03/30/20  0834   *  133*   < > 133* 135* 134*   K 4.7  4.7   < > 4.8 4.7 4.2   CL 99  99   < > 99 101 101   CO2 23  23   < > 22* 24 22*   BUN 57*  57*   < > 31* 31* 28*   CREATININE 3.6*  3.6*   < > 1.7* 1.7* 1.7*   CALCIUM 8.3*  8.3*   < > 8.2* 8.3* 8.0*   ALBUMIN 2.1*  --   --   --   --    PROT 6.5  --   --  6.2  --    BILITOT 0.4  --   --  0.4  --    ALKPHOS 54*  --   --  52*  --    ALT 53*  --   --  43  --    AST 68*  --   --  58*  --    MG 2.2  --   --  2.0  --    PHOS 2.8  --   --   1.6*  --     < > = values in this interval not displayed.

## 2020-03-30 NOTE — ASSESSMENT & PLAN NOTE
- Suspect distributive 2/2 sedation with contribution from hypovolemia, high PEEP. Possible component of septic shock from COVID-19 infection.   - Pressor requirements stable on about 0.3 levo and vasopressin on 3/29. Off vaso on 3/30. Watch closely   - Holding steroids in setting of uncontrolled hyperglycemia.

## 2020-03-30 NOTE — ASSESSMENT & PLAN NOTE
- New onset narrow complex tachyarrhythmia overnight 3/28-2/39. Max rate 150. No obvious p waves. Given mult pushes IV metoprolol with modest improvement in rate.   - Given 2g Mg 3/29  - started on amiodarone 3/29. Converted to amio po given IV amio shortage  - Pressor requirements increased since 3/29 night, unclear if this is caused by tachyarrhythmia, suspect 2/2 septic and distributive shock.

## 2020-03-30 NOTE — SUBJECTIVE & OBJECTIVE
Interval History: Patient still intubated at 40% FiO2. Voided over 2 liters of urine today. Creatinine downtrending.     Review of patient's allergies indicates:   Allergen Reactions    Codeine Nausea Only    Nsaids (non-steroidal anti-inflammatory drug) Other (See Comments)     Kidney issues     Current Facility-Administered Medications   Medication Frequency    acetaminophen oral solution 650 mg Q6H PRN    amiodarone 360 mg/200 mL (1.8 mg/mL) infusion Continuous    amiodarone tablet 400 mg Q8H    Followed by    [START ON 4/2/2020] amiodarone tablet 200 mg Daily    azithromycin tablet 250 mg Daily    calcium gluconate 1g in dextrose 5% 100mL (ready to mix system) Q10 Min PRN    cefTRIAXone (ROCEPHIN) 2 g in dextrose 5 % 50 mL IVPB Q24H    chlorhexidine 0.12 % solution 15 mL BID    clopidogreL tablet 75 mg Daily    dextrose 10% (D10W) Bolus PRN    dextrose 10% (D10W) Bolus PRN    famotidine (PF) injection 20 mg QHS    fentaNYL 2500 mcg in 0.9% sodium chloride 250 mL infusion premix (titrating) Continuous    heparin 25,000 units in dextrose 5% (100 units/ml) IV bolus from bag - ADDITIONAL PRN BOLUS - 30 units/kg PRN    heparin 25,000 units in dextrose 5% (100 units/ml) IV bolus from bag - ADDITIONAL PRN BOLUS - 60 units/kg PRN    heparin 25,000 units in dextrose 5% 250 mL (100 units/mL) infusion LOW INTENSITY nomogram - [facility] Continuous    [START ON 3/30/2020] hydroxychloroquine tablet 400 mg Daily    insulin regular 100 Units in sodium chloride 0.9% 100 mL infusion Continuous    norepinephrine 16 mg in dextrose 5 % 250 mL infusion Continuous    ondansetron injection 4 mg Q8H PRN    propofol (DIPRIVAN) 10 mg/mL infusion Continuous    sodium chloride 0.9% flush 10 mL PRN    vasopressin (PITRESSIN) 0.2 Units/mL in dextrose 5 % 100 mL infusion Continuous       Objective:     Vital Signs (Most Recent):  Temp: 98.8 °F (37.1 °C) (03/29/20 1511)  Pulse: 97 (03/29/20 1954)  Resp: 18 (03/29/20  1000)  BP: 116/64 (03/29/20 1901)  SpO2: (!) 90 % (03/29/20 1954)  O2 Device (Oxygen Therapy): ventilator (03/29/20 1954) Vital Signs (24h Range):  Temp:  [98.8 °F (37.1 °C)-102.5 °F (39.2 °C)] 98.8 °F (37.1 °C)  Pulse:  [] 97  Resp:  [18-29] 18  SpO2:  [88 %-96 %] 90 %  BP: ()/(57-91) 116/64  Arterial Line BP: ()/(41-60) 103/52     Weight: 108.9 kg (240 lb 1.3 oz) (03/28/20 1529)  Body mass index is 37.6 kg/m².  Body surface area is 2.27 meters squared.    I/O last 3 completed shifts:  In: 5468.2 [I.V.:4698.2; NG/GT:120; IV Piggyback:650]  Out: 3135 [Urine:3135]    Physical Exam   Constitutional: He appears distressed.   HENT:   Intubated   Cardiovascular: Normal rate and regular rhythm.   Systolic in the 70s   Pulmonary/Chest:   Mechanically ventilated. No belly breathing noted   Abdominal: He exhibits no distension.   Neurological:   Sedated        Significant Labs:  CBC:   Recent Labs   Lab 03/29/20  0504   WBC 7.37   RBC 3.28*   HGB 10.2*   HCT 31.8*      MCV 97   MCH 31.1*   MCHC 32.1     CMP:   Recent Labs   Lab 03/29/20  0504  03/29/20  1709   *  491*   < > 237*   CALCIUM 8.3*  8.3*   < > 8.3*   ALBUMIN 2.1*  --   --    PROT 6.5  --   --    *  133*   < > 135*   K 4.7  4.7   < > 4.4   CO2 23  23   < > 24   CL 99  99   < > 100   BUN 57*  57*   < > 37*   CREATININE 3.6*  3.6*   < > 2.1*   ALKPHOS 54*  --   --    ALT 53*  --   --    AST 68*  --   --    BILITOT 0.4  --   --     < > = values in this interval not displayed.     All labs within the past 24 hours have been reviewed.     Significant Imaging:  Labs: Reviewed

## 2020-03-30 NOTE — ASSESSMENT & PLAN NOTE
- monitor BP  - pressors for hypotension, monitor trend closely (low threshold to broaden ABX if worsening pressor requirements; on CTX azithro as of 3/30)  Holding home BP medications

## 2020-03-31 NOTE — ASSESSMENT & PLAN NOTE
Covid-19 Virus Infection  Presents with SOB, myalgias. Flu negative.   - COVID-19 testing resulted POSITIVE     - Isolation:   - Airborne and Droplet Precautions  - N95 masks must be fit tested, wear eye protection  - 20 second hand hygiene              - Limit visitors per hospital policy              - Consolidating lab draws, nursing care, and interventions      PLAN:  - continue mechanical ventilation (Intubated on 3/28 at [facility])  - high PEEP protocol per ARDSnet  - reduced TV to 6ccs/kg 3/29. Started on 7 on admit for acid clearance given DKA.   - patient placed in prone position on 3/30 1815 given P/F ratio of 122. Supinate after 16h  - strict isolation protocol 2/2 positive COVID  - plaquenil for COVID  - f/u ID recs regarding antivirals for COVID  - CAP coverage with 5 day course ceftriaxone kaiden completed 4/1  - f/u blood cultures from 3/28-->1/2 positive for coag staph, likely contamininant  - f/u sputum cultures from 3/28-->NGTD as of 3/31  - f/u repeat blood cultures from 3/31-->so far NGTD as of 4/1

## 2020-03-31 NOTE — SUBJECTIVE & OBJECTIVE
Interval History:     No adverse events.    Review of Systems   Unable to perform ROS: Acuity of condition     Objective:     Vital Signs (Most Recent):  Temp: (!) 100.9 °F (38.3 °C) (03/30/20 2100)  Pulse: 76 (03/30/20 2100)  Resp: 20 (03/30/20 2100)  BP: 106/60 (03/30/20 2100)  SpO2: (!) 91 % (03/30/20 2100) Vital Signs (24h Range):  Temp:  [71.4 °F (21.9 °C)-103.3 °F (39.6 °C)] 100.9 °F (38.3 °C)  Pulse:  [] 76  Resp:  [16-33] 20  SpO2:  [87 %-99 %] 91 %  BP: ()/(50-69) 106/60  Arterial Line BP: (113-154)/(40-63) 115/52     Weight: 108.9 kg (240 lb 1.3 oz)  Body mass index is 37.6 kg/m².    Estimated Creatinine Clearance: 59.4 mL/min (based on SCr of 1.4 mg/dL).    Physical Exam   Constitutional: He appears well-developed and well-nourished.   HENT:   Intubated.  OG tube in place.   Eyes: Right eye exhibits no discharge. Left eye exhibits no discharge.   Cardiovascular:   Tachycardia    Pulmonary/Chest:   Mechanically ventilated.  PEEP=10.  FiO2=40.   Genitourinary:   Genitourinary Comments: Motta catheter in place.   Neurological:   Sedated   Nursing note and vitals reviewed.      Significant Labs:   Microbiology Results (last 7 days)     Procedure Component Value Units Date/Time    Blood culture [[id number]] Collected:  03/28/20 1817    Order Status:  Completed Specimen:  Blood from Radial Arterial Stick, Right Updated:  03/30/20 2012     Blood Culture, Routine No Growth to date      No Growth to date    Blood culture [[id number]] Collected:  03/28/20 1817    Order Status:  Completed Specimen:  Blood from Line, Jugular, Internal Left Updated:  03/30/20 2012     Blood Culture, Routine No Growth to date      No Growth to date    Culture, Respiratory with Gram Stain [[id number]] Collected:  03/28/20 2101    Order Status:  Completed Specimen:  Respiratory from Sputum Updated:  03/30/20 0945     Respiratory Culture Normal respiratory ran     Gram Stain (Respiratory) <10 epithelial cells per low power  field.     Gram Stain (Respiratory) Rare WBC's     Gram Stain (Respiratory) Few Gram positive cocci    Culture, Respiratory with Gram Stain [[id number]] Collected:  03/28/20 2043    Order Status:  Sent Specimen:  Respiratory from Tracheal Aspirate Updated:  03/28/20 2043          Significant Imaging: I have reviewed all pertinent imaging results/findings within the past 24 hours.

## 2020-03-31 NOTE — PROGRESS NOTES
[facility]  Nephrology  Progress Note    Patient Name: Erick Valdovinos  MRN: [id number]  Admission Date: 3/28/2020  Hospital Length of Stay: 3 days  Attending Provider: Jak Tee MD   Primary Care Physician: Brandon Kinney MD  Principal Problem:Acute respiratory distress syndrome (ARDS) due to 2019 novel coronavirus    Subjective:     HPI: 70 yo male with HTN, HLD, DM2, CKD3, NAFLD, aortic stenosis, diabetic neuropathy, cervical radiculopathy who presented to [facility] on 3/28 for worsening SOB in the setting of positive COVID testing done on 3/19-->resulted positive on 3/23. Patient had had fevers and diarrhea for the week prior, fevers as high as 103. He was also having difficulty seeing. Symptoms non-remitting until 3/28 and then had worsening fatigue and weakness, which led him to present to [facility] ED. In the ED, patient was found to be hypoxemic at 78% on room air and in moderate distress. Patient was placed on NRB mask but continued to have issues with poor effort so electively intubated due to worsening condition. Patient was also given 1L fluid bolus for hypotension (systolic in the 80s). Patient sedated on propofol and fentanyl.     ED evaluation at [place] showed: WBC 6.21 with lymphopenia, bicarb 14, creatinine 2.6 (baseline 1.3), Ferritin 2202, , , , Lactic acid 0.9 and D-Dimer 0.4, BNP 41 and troponin bump to 0.087. Patient was started on Ceftriaxone, Azithromycin and Plaquenil.      Patient was transferred to [facility] as [place] was out of ICU beds. Patient admitted to MICU on 3/28 for management of acute hypoxemic respiratory failure complicated by JAZMIN and troponinemia in the setting of COVID19. Remarkable labs: hyperkalemia at 7.7, Bicarbonate 12, pH of 7.1, lasix 80mg  administered with one liter bolus of ringer's lactate, insulin and calcium gluconate. Started on insulin infusion due to possible DKA. Nephrology consulted for severe JAZMIN accompanied  by hyperkalemia and severe acidosis.     Interval History: Still intubated with 60% FiO2, UOP at to 1.7 overnight. Creatinine decreased to 1.6    Review of patient's allergies indicates:   Allergen Reactions    Codeine Nausea Only    Nsaids (non-steroidal anti-inflammatory drug) Other (See Comments)     Kidney issues     Current Facility-Administered Medications   Medication Frequency    acetaminophen oral solution 650 mg Q6H PRN    amiodarone tablet 400 mg Q8H    Followed by    [START ON 4/2/2020] amiodarone tablet 200 mg Daily    azithromycin tablet 250 mg Daily    calcium gluconate 1g in dextrose 5% 100mL (ready to mix system) Q10 Min PRN    cefTRIAXone (ROCEPHIN) 2 g in dextrose 5 % 50 mL IVPB Q24H    chlorhexidine 0.12 % solution 15 mL BID    cisatracurium (NIMBEX) 200 mg in dextrose 5 % 100 mL infusion Continuous    clopidogreL tablet 75 mg Daily    dexmedetomidine (PRECEDEX) 400mcg/100mL 0.9% NaCL infusion Continuous    dextrose 10% (D10W) Bolus PRN    dextrose 10% (D10W) Bolus PRN    fentaNYL 2500 mcg in 0.9% sodium chloride 250 mL infusion premix (titrating) Continuous    heparin 25,000 units in dextrose 5% (100 units/ml) IV bolus from bag - ADDITIONAL PRN BOLUS - 30 units/kg PRN    heparin 25,000 units in dextrose 5% (100 units/ml) IV bolus from bag - ADDITIONAL PRN BOLUS - 60 units/kg PRN    heparin 25,000 units in dextrose 5% 250 mL (100 units/mL) infusion LOW INTENSITY nomogram - [facility] Continuous    hydroxychloroquine tablet 400 mg Daily    insulin regular 100 Units in sodium chloride 0.9% 100 mL infusion Continuous    midazolam (VERSED) 1 mg/mL in dextrose 5 % 100 mL infusion (titrating) Continuous    norepinephrine 16 mg in dextrose 5 % 250 mL infusion Continuous    ondansetron injection 4 mg Q8H PRN    oxyCODONE immediate release tablet 5 mg Q6H    pantoprazole injection 40 mg BID    polyethylene glycol packet 17 g Daily    sodium chloride 0.9% flush 10 mL PRN    vasopressin  (PITRESSIN) 0.2 Units/mL in dextrose 5 % 100 mL infusion Continuous    white petrolatum-mineral oil (LUBIFRESH P.M.) ophthalmic ointment Q8H       Objective:     Vital Signs (Most Recent):  Temp: 98.4 °F (36.9 °C) (03/31/20 1300)  Pulse: 106 (03/31/20 1300)  Resp: 20 (03/31/20 1300)  BP: (!) 103/57 (03/31/20 0800)  SpO2: 95 % (03/31/20 1300)  O2 Device (Oxygen Therapy): ventilator (03/31/20 1300) Vital Signs (24h Range):  Temp:  [71.4 °F (21.9 °C)-101.3 °F (38.5 °C)] 98.4 °F (36.9 °C)  Pulse:  [] 106  Resp:  [18-33] 20  SpO2:  [87 %-99 %] 95 %  BP: ()/(56-69) 103/57  Arterial Line BP: ()/(47-74) 162/74     Weight: 108.9 kg (240 lb 1.3 oz) (03/28/20 1529)  Body mass index is 37.6 kg/m².  Body surface area is 2.27 meters squared.    I/O last 3 completed shifts:  In: 3992 [I.V.:3497; NG/GT:395; IV Piggyback:100]  Out: 2695 [Urine:2520; Stool:175]    Physical Exam   Constitutional: He appears distressed.   HENT:   Intubated   Cardiovascular: Normal rate and regular rhythm.   Systolic in the 70s   Pulmonary/Chest:   Mechanically ventilated. No belly breathing noted   Abdominal: He exhibits no distension.   Neurological:   Sedated        Significant Labs:  CBC:   Recent Labs   Lab 03/31/20  0357   WBC 5.92   RBC 2.97*   HGB 9.2*   HCT 28.9*      MCV 97   MCH 31.0   MCHC 31.8*     CMP:   Recent Labs   Lab 03/31/20  0357 03/31/20  1144   * 209*   CALCIUM 8.1* 8.1*   ALBUMIN 1.4*  --    PROT 6.1  --     138   K 5.3* 4.7   CO2 25 27    105   BUN 26* 30*   CREATININE 1.6* 1.6*   ALKPHOS 51*  --    ALT 33  --    AST 57*  --    BILITOT 0.4  --      All labs within the past 24 hours have been reviewed.     Significant Imaging:  Labs: Reviewed    Assessment/Plan:     Acute respiratory distress syndrome (ARDS) due to COVID-19 virus  management as per ICU    COVID-19 virus detected  Management per ICU     Acute renal failure superimposed on stage 3 chronic kidney disease  70 yo male with  HTN, HLD, DM2, CKD3 (Baseline Cr of 1.3-1.7), NAFLD, aortic stenosis, diabetic neuropathy, cervical radiculopathy who presented to [facility] on 3/28 for worsening SOB in the setting of positive COVID, hypoxemic respiratory failure and JAZMIN most likely due to dehydration from hyperglycemia and sepsis    Plan/recommendations:   - Strict I/O and chart   - daily weights   - Creatinine trend: 2.6-->3.9-->4.2-->2.1-->1.7-->1.6  - UOP at 1.7 over past 24 hours  - bicarbonate now normal   - potassium is now in the normal range   - treat underlying DKA/HHS  - UA:2 + protein, 1+ glucose, negative ketones  - renally dose all medications   - Avoid nephrotoxic medications, NSAIDs, IV contrast, ACE/ARB.  - Maintain MAP > 65  - Hb > 7 gm/dL   - Will follow closely         Thank you for your consult. I will follow-up with patient. Please contact us if you have any additional questions.    Vincenzo Costa MD  Nephrology  [facility]

## 2020-03-31 NOTE — SUBJECTIVE & OBJECTIVE
Interval History: Still intubated with 60% FiO2, UOP at to 1.7 overnight. Creatinine decreased to 1.6    Review of patient's allergies indicates:   Allergen Reactions    Codeine Nausea Only    Nsaids (non-steroidal anti-inflammatory drug) Other (See Comments)     Kidney issues     Current Facility-Administered Medications   Medication Frequency    acetaminophen oral solution 650 mg Q6H PRN    amiodarone tablet 400 mg Q8H    Followed by    [START ON 4/2/2020] amiodarone tablet 200 mg Daily    azithromycin tablet 250 mg Daily    calcium gluconate 1g in dextrose 5% 100mL (ready to mix system) Q10 Min PRN    cefTRIAXone (ROCEPHIN) 2 g in dextrose 5 % 50 mL IVPB Q24H    chlorhexidine 0.12 % solution 15 mL BID    cisatracurium (NIMBEX) 200 mg in dextrose 5 % 100 mL infusion Continuous    clopidogreL tablet 75 mg Daily    dexmedetomidine (PRECEDEX) 400mcg/100mL 0.9% NaCL infusion Continuous    dextrose 10% (D10W) Bolus PRN    dextrose 10% (D10W) Bolus PRN    fentaNYL 2500 mcg in 0.9% sodium chloride 250 mL infusion premix (titrating) Continuous    heparin 25,000 units in dextrose 5% (100 units/ml) IV bolus from bag - ADDITIONAL PRN BOLUS - 30 units/kg PRN    heparin 25,000 units in dextrose 5% (100 units/ml) IV bolus from bag - ADDITIONAL PRN BOLUS - 60 units/kg PRN    heparin 25,000 units in dextrose 5% 250 mL (100 units/mL) infusion LOW INTENSITY nomogram - [facility] Continuous    hydroxychloroquine tablet 400 mg Daily    insulin regular 100 Units in sodium chloride 0.9% 100 mL infusion Continuous    midazolam (VERSED) 1 mg/mL in dextrose 5 % 100 mL infusion (titrating) Continuous    norepinephrine 16 mg in dextrose 5 % 250 mL infusion Continuous    ondansetron injection 4 mg Q8H PRN    oxyCODONE immediate release tablet 5 mg Q6H    pantoprazole injection 40 mg BID    polyethylene glycol packet 17 g Daily    sodium chloride 0.9% flush 10 mL PRN    vasopressin (PITRESSIN) 0.2 Units/mL in dextrose 5 %  100 mL infusion Continuous    white petrolatum-mineral oil (LUBIFRESH P.M.) ophthalmic ointment Q8H       Objective:     Vital Signs (Most Recent):  Temp: 98.4 °F (36.9 °C) (03/31/20 1300)  Pulse: 106 (03/31/20 1300)  Resp: 20 (03/31/20 1300)  BP: (!) 103/57 (03/31/20 0800)  SpO2: 95 % (03/31/20 1300)  O2 Device (Oxygen Therapy): ventilator (03/31/20 1300) Vital Signs (24h Range):  Temp:  [71.4 °F (21.9 °C)-101.3 °F (38.5 °C)] 98.4 °F (36.9 °C)  Pulse:  [] 106  Resp:  [18-33] 20  SpO2:  [87 %-99 %] 95 %  BP: ()/(56-69) 103/57  Arterial Line BP: ()/(47-74) 162/74     Weight: 108.9 kg (240 lb 1.3 oz) (03/28/20 1529)  Body mass index is 37.6 kg/m².  Body surface area is 2.27 meters squared.    I/O last 3 completed shifts:  In: 3992 [I.V.:3497; NG/GT:395; IV Piggyback:100]  Out: 2695 [Urine:2520; Stool:175]    Physical Exam   Constitutional: He appears distressed.   HENT:   Intubated   Cardiovascular: Normal rate and regular rhythm.   Systolic in the 70s   Pulmonary/Chest:   Mechanically ventilated. No belly breathing noted   Abdominal: He exhibits no distension.   Neurological:   Sedated        Significant Labs:  CBC:   Recent Labs   Lab 03/31/20 0357   WBC 5.92   RBC 2.97*   HGB 9.2*   HCT 28.9*      MCV 97   MCH 31.0   MCHC 31.8*     CMP:   Recent Labs   Lab 03/31/20 0357 03/31/20  1144   * 209*   CALCIUM 8.1* 8.1*   ALBUMIN 1.4*  --    PROT 6.1  --     138   K 5.3* 4.7   CO2 25 27    105   BUN 26* 30*   CREATININE 1.6* 1.6*   ALKPHOS 51*  --    ALT 33  --    AST 57*  --    BILITOT 0.4  --      All labs within the past 24 hours have been reviewed.     Significant Imaging:  Labs: Reviewed

## 2020-03-31 NOTE — ASSESSMENT & PLAN NOTE
Covid-19 Virus Infection  Presents with SOB, myalgias. Flu negative.   - COVID-19 testing resulted POSITIVE     - Isolation:   - Airborne and Droplet Precautions  - N95 masks must be fit tested, wear eye protection  - 20 second hand hygiene              - Limit visitors per hospital policy              - Consolidating lab draws, nursing care, and interventions      PLAN:  - continue mechanical ventilation (Intubated on 3/28 at [facility])  -  high PEEP protocol per [facility]DSnet  - reduced TV to 6ccs/kg 3/29. Started on 7 on admit for acid clearance given DKA.   - strict isolation protocol 2/2 positive COVID  - plaquenil for COVID  - f/u ID recs regarding antivirals for COVID  - CAP coverage with ceftriaxone azithro (low threshold to escalate ABX if worsening hypotension or persistently febrile not remitting with tylenol)   - f/u blood cultures from 3/28-->1/2 positive for staph, likely contamininant  - f/u sputum cultures from 3/28-->NGTD as of 3/31  - f/u repeat blood cultures from 3/31 given staph on admission blood cultures

## 2020-03-31 NOTE — ASSESSMENT & PLAN NOTE
- creatinine up to 4 on 3/28, basline about 1.3. Improved after volume resuscitation.   - trend creatinine  - started on empiric low intensity heparin gtt 3/29 for renal dysfunction and suspicion of microangiopathic coagulopathy.   - trialysis line in the case of need for HD given renal failure incidence with COVID  - nephro consulted in setting of severe hyperkalemia which has improved

## 2020-03-31 NOTE — SUBJECTIVE & OBJECTIVE
Interval History/Significant Events:   Patient proned last night at 1815, due to be supinated at 1015 on 3/31. Patient paralyzed with nimbex. Sedated with fentanyl, precedex and versed (no propofol 2/2 hyperTG). HR controlled on po amio. Hyperglycemia controlled, on insulin gtt 2.1 u/hr this AM with tube feeds running. Continues to spike fevers, up to 101 in the past 24h, but pressor requirements coming down and blood cultures only positive for staph in 1/2 which is likely contamininat. Intubated and mechanically ventilated on 60% FiO2 and 10 PEEP.     Review of Systems   Unable to perform ROS: Intubated     Objective:     Vital Signs (Most Recent):  Temp: 99.5 °F (37.5 °C) (03/31/20 0814)  Pulse: 69 (03/31/20 0814)  Resp: 20 (03/31/20 0814)  BP: (!) 100/58 (03/31/20 0700)  SpO2: (!) 93 % (03/31/20 0814) Vital Signs (24h Range):  Temp:  [71.4 °F (21.9 °C)-101.3 °F (38.5 °C)] 99.5 °F (37.5 °C)  Pulse:  [] 69  Resp:  [18-33] 20  SpO2:  [87 %-99 %] 93 %  BP: ()/(56-69) 100/58  Arterial Line BP: ()/(47-63) 101/51   Weight: 108.9 kg (240 lb 1.3 oz)  Body mass index is 37.6 kg/m².      Intake/Output Summary (Last 24 hours) at 3/31/2020 0856  Last data filed at 3/31/2020 0700  Gross per 24 hour   Intake 2406.33 ml   Output 1760 ml   Net 646.33 ml       Physical Exam   Constitutional:   Examined from window to reduce COVID-19 contact   HENT:   Intubated   Cardiovascular:   Irregular tachycardia   Pulmonary/Chest:   Mechanically Ventilated   Genitourinary:   Genitourinary Comments: Motta in place   Musculoskeletal:   Trialysis catheter left IJ and arterial line in place   Nursing note and vitals reviewed.      Vents:  Vent Mode: A/C (03/31/20 0814)  Ventilator Initiated: Yes (03/28/20 1515)  Set Rate: 20 BPM (03/31/20 0814)  Vt Set: 460 mL (03/31/20 0814)  Pressure Support: 0 cmH20 (03/31/20 0814)  PEEP/CPAP: 10 cmH20 (03/31/20 0814)  Oxygen Concentration (%): 60 (03/31/20 0814)  Peak Airway Pressure: 33  cmH2O (03/31/20 0814)  Plateau Pressure: 18 cmH20 (03/31/20 0814)  Total Ve: 9.6 mL (03/31/20 0814)  F/VT Ratio<105 (RSBI): (!) 41.58 (03/31/20 0814)  Lines/Drains/Airways     Central Venous Catheter Line            Trialysis (Dialysis) Catheter 03/28/20 1820 external jugular;left internal jugular 2 days          Drain                 NG/OG Tube 03/28/20 0557 [place] sump 16 Fr. Center mouth 3 days         Urethral Catheter 03/28/20 0606 Non-latex 16 Fr. 3 days         Rectal Tube 03/29/20 2300 rectal tube w/ balloon (indicate number of mLs) 1 day          Airway                 Airway - Non-Surgical 03/28/20 0605 Endotracheal Tube 3 days          Arterial Line                 Arterial Line 03/28/20 1819 Right Radial 2 days          Peripheral Intravenous Line                 Peripheral IV - Single Lumen 03/28/20 0530 18 G Left Forearm 3 days         Peripheral IV - Single Lumen 03/28/20 0540 18 G Right Forearm 3 days              Significant Labs:    CBC/Anemia Profile:  Recent Labs   Lab 03/30/20  0522 03/31/20  0357   WBC 7.49 5.92   HGB 10.1* 9.2*   HCT 30.2* 28.9*    264   MCV 96 97   RDW 13.0 13.2   FERRITIN 2,874* 2,726*        Chemistries:  Recent Labs   Lab 03/30/20  0522  03/30/20  1645 03/30/20  2358 03/31/20  0357   *   < > 136 136 138   K 4.7   < > 5.1 4.8 5.3*      < > 102 102 105   CO2 24   < > 22* 28 25   BUN 31*   < > 26* 27* 26*   CREATININE 1.7*   < > 1.4 1.5* 1.6*   CALCIUM 8.3*   < > 7.9* 7.9* 8.1*   ALBUMIN 1.8*  --   --   --  1.4*   PROT 6.2  --   --   --  6.1   BILITOT 0.4  --   --   --  0.4   ALKPHOS 52*  --   --   --  51*   ALT 43  --   --   --  33   AST 58*  --   --   --  57*   MG 2.0  --   --   --  2.3   PHOS 1.6*  --   --   --  2.6*    < > = values in this interval not displayed.

## 2020-03-31 NOTE — ASSESSMENT & PLAN NOTE
- New onset narrow complex tachyarrhythmia overnight 3/28-2/39. Max rate 150. No obvious p waves. Given mult pushes IV metoprolol with modest improvement in rate.   - Given 2g Mg 3/29  - started on amiodarone 3/29. Converted to amio po given IV amio shortage  - Pressor requirements increased since 3/29 night, now decreasing. Continue to monitor

## 2020-03-31 NOTE — PROGRESS NOTES
Patient supinated @ noon today. Proned again at 1800.     Pulmonary: Vent @ 70% 12+     Cardiovascular: SR on tele. Low dose levo weaning.     Neurological: Sedated & paralyzed. BIS 20s. TOF 2/4     Gastrointestinal: Flexi in place, minimal output.     Genitourinary: Motta, continues to make urine, total for shift 525cc     Endocrine: Insulin gtt     Skin/Bath: Skin intact. Blister noted to left buttock. Blisters noted to bilateral great toes. Foam dressings placed. Barrier cream applied.     Date of last CHG bath given: 3/31/20    Spoke with patient's wife Araceli and Mady & Clayton, patient's son. Video call also done to update on plan of care. Verbalized understanding.       Jennifer Billingsley RN

## 2020-03-31 NOTE — ASSESSMENT & PLAN NOTE
70 yo male with HTN, HLD, DM2, CKD3 (Baseline Cr of 1.3-1.7), NAFLD, aortic stenosis, diabetic neuropathy, cervical radiculopathy who presented to [facility] on 3/28 for worsening SOB in the setting of positive COVID, hypoxemic respiratory failure and JAZMIN most likely due to dehydration from hyperglycemia and sepsis    Plan/recommendations:   - Strict I/O and chart   - daily weights   - Creatinine trend: 2.6-->3.9-->4.2-->2.1-->1.7-->1.6  - UOP at 1.7 over past 24 hours  - bicarbonate now normal   - potassium is now in the normal range   - treat underlying DKA/HHS  - UA:2 + protein, 1+ glucose, negative ketones  - renally dose all medications   - Avoid nephrotoxic medications, NSAIDs, IV contrast, ACE/ARB.  - Maintain MAP > 65  - Hb > 7 gm/dL   - Will follow closely

## 2020-03-31 NOTE — PROGRESS NOTES
[facility]  Critical Care Medicine  Progress Note    Patient Name: Erick Valdovinos  MRN: [id number]  Admission Date: 3/28/2020  Hospital Length of Stay: 3 days  Code Status: Full Code  Attending Provider: Jak Tee MD  Primary Care Provider: Brandon Kinney MD   Principal Problem: Acute respiratory distress syndrome (ARDS) due to 2019 novel coronavirus    Subjective:     HPI:  Patient is a 70 yo male with HTN, HLD, DM2, CKD3, NAFLD, aortic stenosis, diabetic neuropathy, cervical radiculopathy who presented to [facility] on 3/28 for worsening SOB in the setting of positive COVID testing done on 3/19-->resulted positive on 3/23. Patient had had fevers and diarrhea for the week prior, fevers as high as 103. He was also having difficulty seeing. Symptoms non-remitting until 3/28 and then had worsening fatigue and weakness, which led him to present to [facility] ED. In the ED, patient was found to be hypoxemic at 78% on room air and in moderate distress. Patient was placed on NRB mask but continued to have issues with poor effort so electively intubated due to worsening condition. Patient was also given 1L fluid bolus for hypotension (systolic in the 80s). Patient sedated on propofol and fentanyl.    ED evaluation at [facility] showed: WBC 6.21 with lymphopenia, bicarb 14, creatinine 2.6 (baseline 1.3), Ferritin 2202, , , , Lactic acid 0.9 and D-Dimer 0.4, BNP 41 and troponin bump to 0.087. Patient was started on ceftriaxone, Azithromycin and Plaquenil.     Patient was transferred to [facility] as [facility] was out of ICU beds. Patient admitted to MICU on 3/28 for management of acute hypoxemic respiratory failure complicated by JAZMIN and troponinemia in the setting of COVID19.    Hospital/ICU Course:  Admitted to MICU on 3/28, intubated and mechanically ventilated. On Ceftriaxone, azithro and plaquenil. Started insulin gtt given glucose >400s and checking serum ketones to evaluate for  DKA. Trending troponins 0.087-->0.04, likely demand ischemia 2/2 ARDS. Patient noted to be in DKA with elevated beta hydroxybutyrate to 4.7. Patient fluid resuscitated about 3L from 3/28-3/29 and started on insulin gtt with improvement in glucose control. JAZMIN improving after fluid resuscitation. Patient did go into A fib with RVR on 3/29 and was started on amio gtt with conversion to PO given IV amio shortage. Patient was also started on low intensity heparin gtt given suspected component of microangiopathic coagulopathy related to JAZMIN. Of note, patient was persistently spiking fevers since admit up until 3/30 with a temp of 103 on 3/30 (still on ceftriaxone azithro). Tube feeds started on 3/30 (watching glucose very closely). Patient was placed in prone position on 3/30 at 1815 given P/F ratio of 122 (due to be supinated at 1015 on 3/31). 1/2 blood cultures from admission 3/28 resulted positive for GPCs in clusters resembling staph (possible contaminant), repeat blood cultures ordered on 3/31.     Interval History/Significant Events:   Patient proned last night at 1815, due to be supinated at 1015 on 3/31. Patient paralyzed with nimbex. Sedated with fentanyl, precedex and versed (no propofol 2/2 hyperTG). HR controlled on po amio. Hyperglycemia controlled, on insulin gtt 2.1 u/hr this AM with tube feeds running. Continues to spike fevers, up to 101 in the past 24h, but pressor requirements coming down and blood cultures only positive for staph in 1/2 which is likely contamininat. Intubated and mechanically ventilated on 60% FiO2 and 10 PEEP.     Review of Systems   Unable to perform ROS: Intubated     Objective:     Vital Signs (Most Recent):  Temp: 99.5 °F (37.5 °C) (03/31/20 0814)  Pulse: 69 (03/31/20 0814)  Resp: 20 (03/31/20 0814)  BP: (!) 100/58 (03/31/20 0700)  SpO2: (!) 93 % (03/31/20 0814) Vital Signs (24h Range):  Temp:  [71.4 °F (21.9 °C)-101.3 °F (38.5 °C)] 99.5 °F (37.5 °C)  Pulse:  [] 69  Resp:   [18-33] 20  SpO2:  [87 %-99 %] 93 %  BP: ()/(56-69) 100/58  Arterial Line BP: ()/(47-63) 101/51   Weight: 108.9 kg (240 lb 1.3 oz)  Body mass index is 37.6 kg/m².      Intake/Output Summary (Last 24 hours) at 3/31/2020 0856  Last data filed at 3/31/2020 0700  Gross per 24 hour   Intake 2406.33 ml   Output 1760 ml   Net 646.33 ml       Physical Exam   Constitutional:   Examined from window to reduce COVID-19 contact   HENT:   Intubated   Cardiovascular:   Irregular tachycardia   Pulmonary/Chest:   Mechanically Ventilated   Genitourinary:   Genitourinary Comments: Motta in place   Musculoskeletal:   Trialysis catheter left IJ and arterial line in place   Nursing note and vitals reviewed.      Vents:  Vent Mode: A/C (03/31/20 0814)  Ventilator Initiated: Yes (03/28/20 1515)  Set Rate: 20 BPM (03/31/20 0814)  Vt Set: 460 mL (03/31/20 0814)  Pressure Support: 0 cmH20 (03/31/20 0814)  PEEP/CPAP: 10 cmH20 (03/31/20 0814)  Oxygen Concentration (%): 60 (03/31/20 0814)  Peak Airway Pressure: 33 cmH2O (03/31/20 0814)  Plateau Pressure: 18 cmH20 (03/31/20 0814)  Total Ve: 9.6 mL (03/31/20 0814)  F/VT Ratio<105 (RSBI): (!) 41.58 (03/31/20 0814)  Lines/Drains/Airways     Central Venous Catheter Line            Trialysis (Dialysis) Catheter 03/28/20 1820 external jugular;left internal jugular 2 days          Drain                 NG/OG Tube 03/28/20 0557 [place] sump 16 Fr. Center mouth 3 days         Urethral Catheter 03/28/20 0606 Non-latex 16 Fr. 3 days         Rectal Tube 03/29/20 2300 rectal tube w/ balloon (indicate number of mLs) 1 day          Airway                 Airway - Non-Surgical 03/28/20 0605 Endotracheal Tube 3 days          Arterial Line                 Arterial Line 03/28/20 1819 Right Radial 2 days          Peripheral Intravenous Line                 Peripheral IV - Single Lumen 03/28/20 0530 18 G Left Forearm 3 days         Peripheral IV - Single Lumen 03/28/20 0540 18 G Right Forearm 3 days               Significant Labs:    CBC/Anemia Profile:  Recent Labs   Lab 03/30/20  0522 03/31/20  0357   WBC 7.49 5.92   HGB 10.1* 9.2*   HCT 30.2* 28.9*    264   MCV 96 97   RDW 13.0 13.2   FERRITIN 2,874* 2,726*        Chemistries:  Recent Labs   Lab 03/30/20  0522  03/30/20  1645 03/30/20  2358 03/31/20  0357   *   < > 136 136 138   K 4.7   < > 5.1 4.8 5.3*      < > 102 102 105   CO2 24   < > 22* 28 25   BUN 31*   < > 26* 27* 26*   CREATININE 1.7*   < > 1.4 1.5* 1.6*   CALCIUM 8.3*   < > 7.9* 7.9* 8.1*   ALBUMIN 1.8*  --   --   --  1.4*   PROT 6.2  --   --   --  6.1   BILITOT 0.4  --   --   --  0.4   ALKPHOS 52*  --   --   --  51*   ALT 43  --   --   --  33   AST 58*  --   --   --  57*   MG 2.0  --   --   --  2.3   PHOS 1.6*  --   --   --  2.6*    < > = values in this interval not displayed.       ABG  Recent Labs   Lab 03/30/20  1717   PH 7.463*   PO2 61*   PCO2 37.1   HCO3 26.6   BE 3     Assessment/Plan:     Pulmonary  Acute respiratory failure with hypoxia  See acute resp distress syndrome due to 2019 novel coronavirus      Cardiac/Vascular  Hypertriglyceridemia  - likely 2/2 propofol  - try to wean off propofol and use precedex instead    A-fib  - New onset narrow complex tachyarrhythmia overnight 3/28-2/39. Max rate 150. No obvious p waves. Given mult pushes IV metoprolol with modest improvement in rate.   - Given 2g Mg 3/29  - started on amiodarone 3/29. Converted to amio po given IV amio shortage  - Pressor requirements increased since 3/29 night, now decreasing. Continue to monitor    NSTEMI (non-ST elevated myocardial infarction)  - mild troponin elevation, suspect Type 2 NSTEMI from severe metabolic derangements.     Aortic stenosis  - follows with cardiology outpatient    Mixed hyperlipidemia  - on statin at home, can restart when appropriate  - continue home plavix (follows with Dr Jiménez in cardiology and has a pending angiogram to evaluate for CAD)    Essential hypertension  - monitor  BP  - pressors for hypotension, monitor trend closely (low threshold to broaden ABX if worsening pressor requirements; on CTX azithro as of 3/30)  Holding home BP medications    Renal/  Acute renal failure superimposed on stage 3 chronic kidney disease  - creatinine up to 4 on 3/28, basline about 1.3. Improved after volume resuscitation.   - trend creatinine  - started on empiric low intensity heparin gtt 3/29 for renal dysfunction and suspicion of microangiopathic coagulopathy.   - trialysis line in the case of need for HD given renal failure incidence with COVID  - nephro consulted in setting of severe hyperkalemia which has improved     Endocrine  DKA (diabetic ketoacidoses)  - Presented with low bicarb, increased AG, + B OHB.   - Improving on insulin infusion. Required bicarbonate infusion 3/28.  - Continue on insulin infusion, monitor glucose closely, renee with starting tube feeds   - frequent accuchecks    Severe obesity (BMI 35.0-39.9) with comorbidity  - nutrition consult for tube feeds    Type 2 diabetes mellitus with stage 3 chronic kidney disease, without long-term current use of insulin  See DKA    GI  Transaminitis  See NAFLD    Nonalcoholic fatty liver disease  - history of nonalcoholic fatty liver disease  - trend LFTs  - if evidence of shock liver (transaminitis to the thousands), concerning for poor prognosis    Other  * Acute respiratory distress syndrome (ARDS) due to 2019 novel coronavirus  Covid-19 Virus Infection  Presents with SOB, myalgias. Flu negative.   - COVID-19 testing resulted POSITIVE     - Isolation:   - Airborne and Droplet Precautions  - N95 masks must be fit tested, wear eye protection  - 20 second hand hygiene              - Limit visitors per hospital policy              - Consolidating lab draws, nursing care, and interventions      PLAN:  - continue mechanical ventilation (Intubated on 3/28 at [facility])  - high PEEP protocol per ARDSnet  - reduced TV to 6ccs/kg 3/29.  Started on 7 on admit for acid clearance given DKA.   - patient placed in prone position on 3/30 1815 given P/F ratio of 122. Supinate after 16h  - strict isolation protocol 2/2 positive COVID  - plaquenil for COVID  - f/u ID recs regarding antivirals for COVID  - CAP coverage with ceftriaxone azithro (low threshold to escalate ABX if worsening hypotension or persistently febrile not remitting with tylenol)   - f/u blood cultures from 3/28-->1/2 positive for staph, likely contamininant  - f/u sputum cultures from 3/28-->NGTD as of 3/31  - f/u repeat blood cultures from 3/31 given staph on admission blood cultures      Shock, unspecified  - Suspect distributive 2/2 sedation with contribution from hypovolemia, high PEEP. Possible component of septic shock from COVID-19 infection.   - Pressor requirements stable on about 0.3 levo and vasopressin on 3/29. Off vaso on 3/30. Watch closely   - Holding steroids in setting of uncontrolled hyperglycemia.     Acute respiratory distress syndrome (ARDS) due to COVID-19 virus  See acute resp distress syndrome due to 2019 novel coronavirus      Endotracheally intubated  See acute resp distress syndrome due to 2019 novel coronavirus      COVID-19 virus detected  See acute resp distress syndrome due to 2019 novel coronavirus     Critical Care Daily Checklist:    A: Awake: RASS Goal/Actual Goal: RASS Goal: -5-->unarousable  Actual: Mensah Agitation Sedation Scale (RASS): Unarousable   B: Spontaneous Breathing Trial Performed? Spon. Breathing Trial Initiated?: Not initiated (03/30/20 0840)   C: SAT & SBT Coordinated?  No                       D: Delirium: CAM-ICU Overall CAM-ICU: Positive   E: Early Mobility Performed? No   F: Feeding Goal: Goals: meet % EEN/EPN by RD following  Status: Nutrition Goal Status: new   Current Diet Order   Procedures    Diet NPO      AS: Analgesia/Sedation Fentanyl, precedex and versed. On nimbex for paralysis.   T: Thromboembolic Prophylaxis Low  intensity heparin gtt (JAZMIN)   H: HOB > 300 No, patient is in prone position   U: Stress Ulcer Prophylaxis (if needed) protonix    G: Glucose Control Controlled, continue insulin gtt with tube feeds and convert to basal insulin when rate steady and able to calculate appropriate dose   B: Bowel Function Stool Occurrence: 1(flexi in place)   I: Indwelling Catheter (Lines & Motta) Necessity Yes, trialysis, arterial, ETT, OG tube   D: De-escalation of Antimicrobials/Pharmacotherapies On CTX azithro. Will discuss escalation to include vanc but suspect that 1/2 Bcx with staph is contaminant, renee given pressor requirements are coming down    Plan for the day/ETD Supportive care, supinate patient after 16h proned, wean O2, monitor glucose closely renee now that he is on tube feeds, consider switch to basal insulin if requirements on gtt are stable, watch pressor requirements and consider escalation of ABX if decompensation, obtain repeat blood cultures, watch for bleeding    Code Status:  Family/Goals of Care: Full Code         Critical secondary to Patient has a condition that poses threat to life and bodily function: Severe Respiratory Distress      Critical care was time spent personally by me on the following activities: development of treatment plan with patient or surrogate and bedside caregivers, discussions with consultants, evaluation of patient's response to treatment, examination of patient, ordering and performing treatments and interventions, ordering and review of laboratory studies, ordering and review of radiographic studies, pulse oximetry, re-evaluation of patient's condition. This critical care time did not overlap with that of any other provider or involve time for any procedures.     Jessica Blanco MD  Critical Care Medicine  [facility]

## 2020-03-31 NOTE — PROGRESS NOTES
[facility]  Infectious Disease  Progress Note    Patient Name: Erick Valdovinos  MRN: [id number]  Admission Date: 3/28/2020  Length of Stay: 2 days  Attending Physician: Jak Tee MD  Primary Care Provider: Brandon Kinney MD    Isolation Status: Airborne and Contact and Droplet  Assessment/Plan:      * Acute respiratory distress syndrome (ARDS) due to 2019 novel coronavirus  68 y/o male with a history of HTN, HLD, DM2, CKD3, NAFLD, aortic stenosis diagnosed with SARSCoV2 on march 19, 2020.  Initially managed at home with frequent monitoring.  He however continued to decline and ultimately required admission.  At this time, his poor renal function and need for pressors means that he doesn't meet criteria set by [place] for use of remdesivir.   There is some risk of super-imposed bacterial infection so I agree with IV ceftriaxone and azithromycin.  ID will contact team if an appropriate clinical trial is approved for [facility].    ID will sign off at this time.        Anticipated Disposition: TBD    Thank you for your consult. I will sign off. Please contact us if you have any additional questions.    Sergei Campo MD  Infectious Disease  [facility]    Subjective:     Principal Problem:Acute respiratory distress syndrome (ARDS) due to 2019 novel coronavirus    HPI: 68 y/o male with a history of HTN, HLD, DM2, CKD3, NAFLD, aortic stenosis, diabetic neuropathy, cervical radiculopathy.  He was initially seen in his PCPs office on march 18 with complaints of fevers, fatigue and body aches.  Testing for SARSCoV2 was positive.  The patient was not having any shortness of breath.  As such a plan to monitor the patient at home was developed with is PCPs office.  His symptoms continued to slowly get worse.  He became weaker.  He was seen via telemedicine virtual visit on march 26 and there were concerns that he may need inpatient evaluation.  The patient was advised to go to the ER if  anything got worse.  He ultimately presented to [facility] with respiratory distress.  He was transferred to [facility] for higher level of care.  I am being consulted to see if he would benefit from any of the experimental therapies that are available.  Interval History:     No adverse events.    Review of Systems   Unable to perform ROS: Acuity of condition     Objective:     Vital Signs (Most Recent):  Temp: (!) 100.9 °F (38.3 °C) (03/30/20 2100)  Pulse: 76 (03/30/20 2100)  Resp: 20 (03/30/20 2100)  BP: 106/60 (03/30/20 2100)  SpO2: (!) 91 % (03/30/20 2100) Vital Signs (24h Range):  Temp:  [71.4 °F (21.9 °C)-103.3 °F (39.6 °C)] 100.9 °F (38.3 °C)  Pulse:  [] 76  Resp:  [16-33] 20  SpO2:  [87 %-99 %] 91 %  BP: ()/(50-69) 106/60  Arterial Line BP: (113-154)/(40-63) 115/52     Weight: 108.9 kg (240 lb 1.3 oz)  Body mass index is 37.6 kg/m².    Estimated Creatinine Clearance: 59.4 mL/min (based on SCr of 1.4 mg/dL).    Physical Exam   Constitutional: He appears well-developed and well-nourished.   HENT:   Intubated.  OG tube in place.   Eyes: Right eye exhibits no discharge. Left eye exhibits no discharge.   Cardiovascular:   Tachycardia    Pulmonary/Chest:   Mechanically ventilated.  PEEP=10.  FiO2=40.   Genitourinary:   Genitourinary Comments: Motta catheter in place.   Neurological:   Sedated   Nursing note and vitals reviewed.      Significant Labs:   Microbiology Results (last 7 days)     Procedure Component Value Units Date/Time    Blood culture [[id number]] Collected:  03/28/20 1817    Order Status:  Completed Specimen:  Blood from Radial Arterial Stick, Right Updated:  03/30/20 2012     Blood Culture, Routine No Growth to date      No Growth to date    Blood culture [[id number]] Collected:  03/28/20 1817    Order Status:  Completed Specimen:  Blood from Line, Jugular, Internal Left Updated:  03/30/20 2012     Blood Culture, Routine No Growth to date      No Growth to date    Culture,  Respiratory with Gram Stain [[id number]] Collected:  03/28/20 2101    Order Status:  Completed Specimen:  Respiratory from Sputum Updated:  03/30/20 0945     Respiratory Culture Normal respiratory ran     Gram Stain (Respiratory) <10 epithelial cells per low power field.     Gram Stain (Respiratory) Rare WBC's     Gram Stain (Respiratory) Few Gram positive cocci    Culture, Respiratory with Gram Stain [[id number]] Collected:  03/28/20 2043    Order Status:  Sent Specimen:  Respiratory from Tracheal Aspirate Updated:  03/28/20 2043          Significant Imaging: I have reviewed all pertinent imaging results/findings within the past 24 hours.

## 2020-04-01 NOTE — PROGRESS NOTES
Procedure Note  Supine Positioning  Critical Care Medicine       Chief Complaint: Acute respiratory distress syndrome (ARDS) due to 2019 novel coronavirus  MRN: [id number]  LOS: 4  Sex: male  Age: 69 y.o.      Last ABG:   Recent Labs   Lab 03/31/20  1645   PH 7.365   PO2 108*   PCO2 51.5*   HCO3 29.4*   BE 4       Vital Signs (Most Recent):   Temp: 99.3 °F (37.4 °C) (04/01/20 1100)  Pulse: 77 (04/01/20 1100)  Resp: (!) 21 (04/01/20 1100)  BP: (!) 103/57 (03/31/20 0800)  SpO2: (!) 93 % (04/01/20 1100)    Ventilator Settings:  Vent Mode: A/C  Oxygen Concentration (%):  [] 60  Resp Rate Total:  [20 br/min-22 br/min] 22 br/min  Vt Set:  [460 mL] 460 mL  PEEP/CPAP:  [10 [id number] cmH20] 12 cmH20  Pressure Support:  [0 cmH20] 0 cmH20  Mean Airway Pressure:  [17 [id number] cmH20] 17 cmH20        Indication: Severe, refractory ARDS. High risk for deterioration during supination procedure in need of direct monitoring by Critical Care personnel.     Prior to beginning the procedure, all appropriate equipment and personnel were gathered in the room. Two individuals were placed on each side of the patient and two respiratory therapists stood at the head of the bed and was dedicated to the management of the head of the patient, the endotracheal tube, and the ventilator lines. The person at the head of the bed coordinated the steps of the supination procedure at the direction of Critical Care team members at the bedside. The patient was first log-rolled 90 degrees onto their side away from the direction of their central venous catheter. Cardiac electrodes were then moved from the patient's posterior back to the anterior. Once properly positioned in the bed, another 90 degree log roll was performed placing the patient into the supine position. The patient's arms were placed alongside the body. Continuous pulse oximetry and blood pressure monitoring was performed without any desaturation or hemodynamic instability. The patient  tolerated the procedure well.     Total Critical Care time (uninterrupted not including procedures): 30 minutes    Margarito Shore M.D.  Department of Anesthesiology  [facility]

## 2020-04-01 NOTE — PROGRESS NOTES
[facility]  Critical Care Medicine  Progress Note    Patient Name: Erick Valdovinos  MRN: [id number]  Admission Date: 3/28/2020  Hospital Length of Stay: 4 days  Code Status: Full Code  Attending Provider: Jak Tee MD  Primary Care Provider: Brandon Kinney MD   Principal Problem: Acute respiratory distress syndrome (ARDS) due to 2019 novel coronavirus    Subjective:     HPI:  Patient is a 70 yo male with HTN, HLD, DM2, CKD3, NAFLD, aortic stenosis, diabetic neuropathy, cervical radiculopathy who presented to [facility] on 3/28 for worsening SOB in the setting of positive COVID testing done on 3/19-->resulted positive on 3/23. Patient had had fevers and diarrhea for the week prior, fevers as high as 103. He was also having difficulty seeing. Symptoms non-remitting until 3/28 and then had worsening fatigue and weakness, which led him to present to [facility] ED. In the ED, patient was found to be hypoxemic at 78% on room air and in moderate distress. Patient was placed on NRB mask but continued to have issues with poor effort so electively intubated due to worsening condition. Patient was also given 1L fluid bolus for hypotension (systolic in the 80s). Patient sedated on propofol and fentanyl.    ED evaluation at [facility] showed: WBC 6.21 with lymphopenia, bicarb 14, creatinine 2.6 (baseline 1.3), Ferritin 2202, , , , Lactic acid 0.9 and D-Dimer 0.4, BNP 41 and troponin bump to 0.087. Patient was started on ceftriaxone, Azithromycin and Plaquenil.     Patient [place] [facility] [place] [facility] was out of ICU beds. Patient admitted to MICU on 3/28 for management of acute hypoxemic respiratory failure complicated by JAZMIN and troponinemia in the setting of COVID19.    Hospital/ICU Course:  Admitted to MICU on 3/28, intubated and mechanically ventilated. On Ceftriaxone, azithro and plaquenil. Started insulin gtt given glucose >400s and checking serum ketones to evaluate for  DKA. Trending troponins 0.087-->0.04, likely demand ischemia 2/2 ARDS. Patient noted to be in DKA with elevated beta hydroxybutyrate to 4.7. Patient fluid resuscitated about 3L from 3/28-3/29 and started on insulin gtt with improvement in glucose control. JAZMIN improving after fluid resuscitation. Patient did go into A fib with RVR on 3/29 and was started on amio gtt with conversion to PO given IV amio shortage. Patient was also started on low intensity heparin gtt given suspected component of microangiopathic coagulopathy related to JAZMIN. Of note, patient was persistently spiking fevers since admit up until 3/30 with a temp of 103 on 3/30 (still on ceftriaxone azithro). Tube feeds started on 3/30 (watching glucose very closely). Patient was placed in prone position on 3/30 at 1815 given P/F ratio of 122 (due to be supinated at 1015 on 3/31). 1/2 blood cultures from admission 3/28 resulted positive for coag negative staph (contaminant), repeat blood cultures ordered on 3/31. Patient was placed in prone position again on 3/31 at 1800 given P/F of 111 (due to supinate at 4/1 1000).    Interval History/Significant Events:   Patient proned again last night at 1800, supinated this am at 1000. Patient paralyzed with nimbex, weaning when supine. Sedated with fentanyl and precedex. Stopped versed. HR controlled on po amio. Hyperglycemia controlled, on insulin gtt 2.8 u/hr this AM. Tube feeds with 450 residual, holding. Afebrile past 24h. Intubated and mechanically ventilated on 50% FiO2 and 12 PEEP.     Review of Systems   Unable to perform ROS: Intubated     Objective:     Vital Signs (Most Recent):  Temp: 99.3 °F (37.4 °C) (04/01/20 1100)  Pulse: 77 (04/01/20 1100)  Resp: (!) 21 (04/01/20 1100)  BP: (!) 103/57 (03/31/20 0800)  SpO2: (!) 93 % (04/01/20 1100) Vital Signs (24h Range):  Temp:  [98.2 °F (36.8 °C)-100 °F (37.8 °C)] 99.3 °F (37.4 °C)  Pulse:  [] 77  Resp:  [20-21] 21  SpO2:  [88 %-100 %] 93 %  Arterial Line  BP: ()/(45-74) 116/53   Weight: 108.9 kg (240 lb 1.3 oz)  Body mass index is 37.6 kg/m².      Intake/Output Summary (Last 24 hours) at 4/1/2020 1140  Last data filed at 4/1/2020 1100  Gross per 24 hour   Intake 2341.56 ml   Output 935 ml   Net 1406.56 ml       Physical Exam   Constitutional:   Examined from window to reduce COVID-19 contact   HENT:   Intubated   Cardiovascular:   Irregular tachycardia   Pulmonary/Chest:   Mechanically Ventilated   Genitourinary:   Genitourinary Comments: Motta in place   Musculoskeletal:   Trialysis catheter left IJ and arterial line in place   Nursing note and vitals reviewed.      Vents:  Vent Mode: A/C (04/01/20 0845)  Ventilator Initiated: Yes (03/28/20 1515)  Set Rate: 20 BPM (04/01/20 0845)  Vt Set: 460 mL (04/01/20 0845)  Pressure Support: 0 cmH20 (04/01/20 0845)  PEEP/CPAP: 12 cmH20 (04/01/20 0845)  Oxygen Concentration (%): 60 (04/01/20 1100)  Peak Airway Pressure: 26 cmH2O (04/01/20 0845)  Plateau Pressure: 25 cmH20 (04/01/20 0845)  Total Ve: 9.5 mL (04/01/20 0845)  F/VT Ratio<105 (RSBI): (!) 42.19 (04/01/20 0845)  Lines/Drains/Airways     Central Venous Catheter Line            Trialysis (Dialysis) Catheter 03/28/20 1820 external jugular;left internal jugular 3 days          Drain                 NG/OG Tube 03/28/20 0557 [place] sump 16 Fr. Center mouth 4 days         Urethral Catheter 03/28/20 0606 Non-latex 16 Fr. 4 days         Rectal Tube 03/29/20 2300 rectal tube w/ balloon (indicate number of mLs) 2 days          Airway                 Airway - Non-Surgical 03/28/20 0605 Endotracheal Tube 4 days          Arterial Line                 Arterial Line 03/28/20 1819 Right Radial 3 days          Peripheral Intravenous Line                 Peripheral IV - Single Lumen 03/28/20 0530 18 G Left Forearm 4 days         Peripheral IV - Single Lumen 03/28/20 0540 18 G Right Forearm 4 days              Significant Labs:    CBC/Anemia Profile:  Recent Labs   Lab 03/31/20  9688  04/01/20  0401   WBC 5.92 5.97   HGB 9.2* 8.8*   HCT 28.9* 29.5*    253   MCV 97 101*   RDW 13.2 13.5   FERRITIN 2,726* 2,613*        Chemistries:  Recent Labs   Lab 03/31/20  0357  03/31/20  2355 04/01/20  0401 04/01/20  0858      < > 139 139 138   K 5.3*   < > 4.8 5.0 5.0      < > 104 105 104   CO2 25   < > 25 24 26   BUN 26*   < > 32* 33* 34*   CREATININE 1.6*   < > 1.5* 1.5* 1.5*   CALCIUM 8.1*   < > 8.2* 8.1* 8.3*   ALBUMIN 1.4*  --   --  1.3*  --    PROT 6.1  --   --  6.0  --    BILITOT 0.4  --   --  0.3  --    ALKPHOS 51*  --   --  60  --    ALT 33  --   --  28  --    AST 57*  --   --  44*  --    MG 2.3  --   --  2.6  --    PHOS 2.6*  --   --  2.4*  --     < > = values in this interval not displayed.       ABG  Recent Labs   Lab 03/31/20  1645   PH 7.365   PO2 108*   PCO2 51.5*   HCO3 29.4*   BE 4     Assessment/Plan:     Pulmonary  Acute respiratory failure with hypoxia  See acute resp distress syndrome due to 2019 novel coronavirus      Cardiac/Vascular  Hypertriglyceridemia  - likely 2/2 propofol  - try to avoid propofol  - monitor TG    A-fib  - New onset narrow complex tachyarrhythmia overnight 3/28-2/39. Max rate 150. No obvious p waves. Given mult pushes IV metoprolol with modest improvement in rate.   - Given 2g Mg 3/29  - started on amiodarone 3/29. Converted to amio po given IV amio shortage  - Pressor requirements increased since 3/29 night, now decreasing. Continue to monitor    NSTEMI (non-ST elevated myocardial infarction)  - mild troponin elevation, suspect Type 2 NSTEMI from severe metabolic derangements.     Aortic stenosis  - follows with cardiology outpatient    Mixed hyperlipidemia  - on statin at home, can restart when appropriate  - continue home plavix (follows with Dr Jiménez in cardiology and has a pending angiogram to evaluate for CAD)    Essential hypertension  - monitor BP  - pressors for hypotension, at very mild dose as of 3/31, likely sedation  related    Renal/  Acute renal failure superimposed on stage 3 chronic kidney disease  - creatinine up to 4 on 3/28, basline about 1.3. Improved after volume resuscitation.   - trend creatinine  - started on empiric low intensity heparin gtt 3/29 for renal dysfunction and suspicion of microangiopathic coagulopathy.   - trialysis line in the case of need for HD given renal failure incidence with COVID  - nephro consulted in setting of severe hyperkalemia which has improved     Endocrine  DKA (diabetic ketoacidoses)  - Presented with low bicarb, increased AG, + B OHB.   - resolved with insulin infusion. Required bicarbonate infusion 3/28.  - Continue on insulin infusion, monitor glucose closely, renee with starting tube feeds   - adding basal insulin 4/1, attempt to wean drip so we can reduce the amount of fluid we are giving him  - frequent accuchecks    Severe obesity (BMI 35.0-39.9) with comorbidity  - nutrition consult for tube feeds    Type 2 diabetes mellitus with stage 3 chronic kidney disease, without long-term current use of insulin  See DKA    GI  Transaminitis  See NAFLD    Nonalcoholic fatty liver disease  - history of nonalcoholic fatty liver disease  - trend LFTs  - if evidence of shock liver (transaminitis to the thousands), concerning for poor prognosis    Other  * Acute respiratory distress syndrome (ARDS) due to 2019 novel coronavirus  Covid-19 Virus Infection  Presents with SOB, myalgias. Flu negative.   - COVID-19 testing resulted POSITIVE     - Isolation:   - Airborne and Droplet Precautions  - N95 masks must be fit tested, wear eye protection  - 20 second hand hygiene              - Limit visitors per hospital policy              - Consolidating lab draws, nursing care, and interventions      PLAN:  - continue mechanical ventilation (Intubated on 3/28 at [facility])  - high PEEP protocol per ARDSnet  - reduced TV to 6ccs/kg 3/29. Started on 7 on admit for acid clearance given DKA.   - patient  placed in prone position on 3/30 and again on 3/31  - strict isolation protocol 2/2 positive COVID  - plaquenil for COVID  - f/u ID recs regarding antivirals for COVID  - CAP coverage with 5 day course ceftriaxone azithro completed 4/1  - f/u blood cultures from 3/28-->1/2 positive for coag staph, likely contamininant  - f/u sputum cultures from 3/28-->NGTD as of 3/31  - f/u repeat blood cultures from 3/31-->so far NGTD as of 4/1      Shock, unspecified  - Suspect distributive 2/2 sedation with contribution from hypovolemia, high PEEP. Possible component of septic shock from COVID-19 infection.   - Pressor requirements stable on about 0.3 levo and vasopressin on 3/29. Off vaso on 3/30. Watch closely   - Holding steroids in setting of uncontrolled hyperglycemia.     Acute respiratory distress syndrome (ARDS) due to COVID-19 virus  See acute resp distress syndrome due to 2019 novel coronavirus      Endotracheally intubated  See acute resp distress syndrome due to 2019 novel coronavirus      COVID-19 virus detected  See acute resp distress syndrome due to 2019 novel coronavirus     Critical Care Daily Checklist:    A: Awake: RASS Goal/Actual Goal: RASS Goal: -5-->unarousable  Actual: Mensah Agitation Sedation Scale (RASS): Unarousable   B: Spontaneous Breathing Trial Performed? Spon. Breathing Trial Initiated?: Not initiated (03/30/20 0840)   C: SAT & SBT Coordinated?  No                       D: Delirium: CAM-ICU Overall CAM-ICU: Positive   E: Early Mobility Performed? No   F: Feeding Goal: Goals: meet % EEN/EPN by RD following  Status: Nutrition Goal Status: goal not met   Current Diet Order   Procedures    Diet NPO      AS: Analgesia/Sedation Fentanyl, precedex. Stopping nimbex while supine   T: Thromboembolic Prophylaxis Low intensity heparin gtt (JAZMIN)   H: HOB > 300 Yes, while supine   U: Stress Ulcer Prophylaxis (if needed) protonix   G: Glucose Control Controlled on insulin gtt, starting detemir today  with plans to wean insulin gtt and titrate up detemir and aspart   B: Bowel Function Stool Occurrence: 1(flexi in place)   I: Indwelling Catheter (Lines & Motta) Necessity Yes, trialysis, arterial, ETT, OG tube   D: De-escalation of Antimicrobials/Pharmacotherapies Stop CTX azithro today given completion of 5 days for CAP. Staph blood culture on admission is contaminant    Plan for the day/ETD Supportive care, f/u ABG after patient is supine for 4h to see if he needs to be proned again, hold nimbex while supine, wean O2, monitor glucose closely, start basal insulin and wean off gtt are stable, watch for bleeding given heparin gtt    Code Status:  Family/Goals of Care: Full Code         Critical secondary to Patient has a condition that poses threat to life and bodily function: Severe Respiratory Distress      Critical care was time spent personally by me on the following activities: development of treatment plan with patient or surrogate and bedside caregivers, discussions with consultants, evaluation of patient's response to treatment, examination of patient, ordering and performing treatments and interventions, ordering and review of laboratory studies, ordering and review of radiographic studies, pulse oximetry, re-evaluation of patient's condition. This critical care time did not overlap with that of any other provider or involve time for any procedures.     Jessica Blanco MD  Critical Care Medicine  [facility]

## 2020-04-01 NOTE — PLAN OF CARE
Patient intubated, sedated and on pressors.  Not medically ready to step down.       04/01/20 7538   Discharge Reassessment   Assessment Type Discharge Planning Reassessment   Do you have any problems affording any of your prescribed medications? No   Discharge Plan A Home with family   Discharge Plan B Home Health   Anticipated Discharge Disposition Home

## 2020-04-01 NOTE — PROGRESS NOTES
"  [facility]  Adult Nutrition  Consult Note    SUMMARY     Recommendations    1. As tolerated, increase TF rate (of Peptamen Intense VHP) to 50 mL/hr to provide 1200 kcals, 110 g of protein, 1008 mL fluid.   2. RD to monitor & follow-up.    Goals: meet % EEN/EPN by RD following  Nutrition Goal Status: goal not met  Communication of RD Recs: reviewed with RN    Reason for Assessment    Reason For Assessment: RD follow-up  Diagnosis: (ARDS 2/2 coronavirus )  Relevant Medical History: HTN, HLD, DM2, CKDIII  Interdisciplinary Rounds: did not attend    General Information Comments: Pt remains intubated/sedated, tolerating TFs at trickle (per RN documentation). Unable to assess for appetite PTA. Noted wt fairly stable per chart review. Due to recent hospital wide restrictions to limit the transfer of COVID-19, we are not performing any physical exams at this time. NFPE to be performed at a future date.  Nutrition Discharge Planning: Unable to determine    Nutrition/Diet History    Factors Affecting Nutritional Intake: NPO, on mechanical ventilation    Anthropometrics    Temp: 99.7 °F (37.6 °C)  Height: 5' 7" (170.2 cm)  Height (inches): 67 in  Weight: 108.9 kg (240 lb 1.3 oz)  Weight (lb): 240.08 lb  Ideal Body Weight (IBW), Male: 148 lb  % Ideal Body Weight, Male (lb): 162.22 %  BMI (Calculated): 37.6  BMI Grade: 35 - 39.9 - obesity - grade II    Lab/Procedures/Meds    Pertinent Labs Reviewed: reviewed  Pertinent Labs Comments: BUN 34, Creat 1.5, GFR 47.2  Pertinent Medications Reviewed: reviewed  Pertinent Medications Comments: Nimbex, Precedex, Fentanyl, Heparin, Insulin, Versed, Levophed    Estimated/Assessed Needs    Weight Used For Calorie Calculations: 109 kg (240 lb 4.8 oz)     Energy Calorie Requirements (kcal): 7791-9815 kcal/d   Energy Need Method: Kcal/kg(11-14 kcal/kg)     Protein Requirements: 101-135 g/d (1.5-2 g/kg IBW)  Weight Used For Protein Calculations: 67.3 kg (148 lb 5.9 " oz)     Fluid Requirements (mL): 1 mL/kcal or per MD    Nutrition Prescription Ordered    Current Diet Order: NPO  Current Nutrition Support Formula Ordered: Peptamen Intense VHP  Current Nutrition Support Rate Ordered: 10 mL/hr    Evaluation of Received Nutrient/Fluid Intake    Enteral Calories (kcal): 240  Enteral Protein (gm): 22  Enteral (Free Water) Fluid (mL): 202    % Kcal Needs: 20%  % Protein Needs: 22%    Energy Calories Required: not meeting needs  Protein Required: not meeting needs  Fluid Required: other (see comments)(Per MD or 1 mL/kcal)    Comments: LBM: 3/31    Tolerance: tolerating    Nutrition Risk    Level of Risk/Frequency of Follow-up: (2x/week)     Assessment and Plan    Nutrition Problem  Inadequate Energy Intake     Related to (etiology):   Inability to consume sufficient energy      Signs and Symptoms (as evidenced by):   Intubated, NPO with no alternative means of nutrition       Interventions (treatment strategy):  Collaboration of nutrition care with other providers     Nutrition Diagnosis Status:   Improving     Monitor and Evaluation    Food and Nutrient Intake: food and beverage intake, enteral nutrition intake, energy intake  Food and Nutrient Adminstration: diet order, enteral and parenteral nutrition administration  Physical Activity and Function: nutrition-related ADLs and IADLs  Anthropometric Measurements: weight, weight change  Biochemical Data, Medical Tests and Procedures: lipid profile, gastrointestinal profile, glucose/endocrine profile, inflammatory profile, electrolyte and renal panel  Nutrition-Focused Physical Findings: overall appearance     Nutrition Follow-Up    RD Follow-up?: Yes

## 2020-04-01 NOTE — PROGRESS NOTES
"Procedure Note  Prone Positioning  Critical Care Medicine       Chief Complaint: Acute respiratory distress syndrome (ARDS) due to 2019 novel coronavirus  MRN: [id number]  LOS: 4  Sex: male  Age: 69 y.o.      Last ABG:   Recent Labs   Lab 03/31/20  1645   PH 7.365   PO2 108*   PCO2 51.5*   HCO3 29.4*   BE 4     BP (!) 103/57 (BP Location: Left arm, Patient Position: Lying)   Pulse 83   Temp 99.1 °F (37.3 °C)   Resp 20   Ht 5' 7" (1.702 m)   Wt 108.9 kg (240 lb 1.3 oz)   SpO2 97%   BMI 37.60 kg/m²       Ventilator Settings:  Vent Mode: A/C  Oxygen Concentration (%):  [] 60  Resp Rate Total:  [20 br/min-22 br/min] 22 br/min  Vt Set:  [460 mL] 460 mL  PEEP/CPAP:  [10 mlE29-17 cmH20] 12 cmH20  Pressure Support:  [0 cmH20] 0 cmH20  Mean Airway Pressure:  [17 ppX28-13 cmH20] 17 cmH20    Indication: Severe, refractory ARDS. High risk for deterioration during proning procedure in need of direct monitoring by Critical Care personnel.     Prior to beginning the procedure, all appropriate equipment and personnel were gathered in the room. Two individuals were placed on each side of the patient and one person stood at the head of the bed and was dedicated to the management of the head of the patient, the endotracheal tube, and the ventilator lines. The person at the head of the bed  coordinated the steps of the proning procedure with team team at the direction of Critical Care team members at the bedside. The patient was first log-rolled 90 degrees onto their side towards the direction of their central venous catheter. Cardiac electrodes were then moved from the patient's anterior to the posterior. The patient?s knees, forehead, chest, and iliac crests were protected using adhesive pads and/or foam pads to reduce the risk of skin breakdown. Once properly positioned in the bed, another 90 degree log roll was performed placing the patient into the prone position. The patient's arms were placed alongside the body. The " patient's head should be turned alternately to the right or left every 2 hours. The patient tolerated the procedure well.     Total Critical Care time (uninterrupted not including procedures): 30 minutes      Margarito Shore M.D.  Department of Anesthesiology  [facility]

## 2020-04-01 NOTE — ASSESSMENT & PLAN NOTE
Covid-19 Virus Infection  Presents with SOB, myalgias. Flu negative.   - COVID-19 testing resulted POSITIVE     - Isolation:   - Airborne and Droplet Precautions  - N95 masks must be fit tested, wear eye protection  - 20 second hand hygiene              - Limit visitors per hospital policy              - Consolidating lab draws, nursing care, and interventions      PLAN:  - continue mechanical ventilation (Intubated on 3/28 at [facility])  - high PEEP protocol per ARDSnet  - reduced TV to 6ccs/kg 3/29. Started on 7 on admit for acid clearance given DKA.   - patient placed in prone position on 3/30 and again on 3/31  - strict isolation protocol 2/2 positive COVID  - plaquenil for COVID  - f/u ID recs regarding antivirals for COVID  - CAP coverage with 5 day course ceftriaxone azithro completed 4/1  - f/u blood cultures from 3/28-->1/2 positive for coag staph, likely contamininant  - f/u sputum cultures from 3/28-->NGTD as of 3/31  - f/u repeat blood cultures from 3/31-->so far NGTD as of 4/2

## 2020-04-01 NOTE — PROGRESS NOTES
No acute events throughout day, VS and assessment per flow sheet, see below for updates:    Pulmonary: Supinated at 1000 today. Weaned to 50% 10+ on vent. Tolerating well, SpO2 93-94%. ETcO2 35-40 throughout day.     Cardiovascular: SR on tele. Low dose levo for MAP>65.     Neurological: Weaned off sedation and paralytic. BIS increased to 50s. Still not waking up, but grimaces to pain. +reflexes.     Gastrointestinal: TF held this am for residuals 450cc, now down to 100cc. TF resumed, increase to goal 60 as tolerated. Flexi in place, minimal output.     Genitourinary: Motta in place, 475cc tima urine for shift.     Endocrine: CBG stable. Continue on insulin gtt. Added detemir today.     Skin/Bath: Skin intact, blister noted to left buttock and bilateral great toes. Small skin abrasion below nose 2/2 ETT byers.   Date of last CHG bath given: 4/1/20    Patient progressing towards goals as tolerated, plan of care communicated and reviewed with Erick Valdovinos and family, all concerns addressed, will continue to monitor. Spoke with Mrs. Charles several times throughout day to update on plan of care. Video call done with Clayton Charles (son), and Justin (brother).     Jennifer Billingsley RN

## 2020-04-01 NOTE — ASSESSMENT & PLAN NOTE
- Presented with low bicarb, increased AG, + B OHB.   - resolved with insulin infusion. Required bicarbonate infusion 3/28.  - Continue on insulin infusion, monitor glucose closely, renee with starting tube feeds   - adding basal insulin 4/1, attempt to wean drip so we can reduce the amount of fluid we are giving him  - frequent accuchecks

## 2020-04-01 NOTE — SUBJECTIVE & OBJECTIVE
Interval History/Significant Events:   Patient proned again last night at 1800, supinated this am at 1000. Patient paralyzed with nimbex, weaning when supine. Sedated with fentanyl and precedex. Stopped versed. HR controlled on po amio. Hyperglycemia controlled, on insulin gtt 2.8 u/hr this AM. Tube feeds with 450 residual, holding. Afebrile past 24h. Intubated and mechanically ventilated on 50% FiO2 and 12 PEEP.     Review of Systems   Unable to perform ROS: Intubated     Objective:     Vital Signs (Most Recent):  Temp: 99.3 °F (37.4 °C) (04/01/20 1100)  Pulse: 77 (04/01/20 1100)  Resp: (!) 21 (04/01/20 1100)  BP: (!) 103/57 (03/31/20 0800)  SpO2: (!) 93 % (04/01/20 1100) Vital Signs (24h Range):  Temp:  [98.2 °F (36.8 °C)-100 °F (37.8 °C)] 99.3 °F (37.4 °C)  Pulse:  [] 77  Resp:  [20-21] 21  SpO2:  [88 %-100 %] 93 %  Arterial Line BP: ()/(45-74) 116/53   Weight: 108.9 kg (240 lb 1.3 oz)  Body mass index is 37.6 kg/m².      Intake/Output Summary (Last 24 hours) at 4/1/2020 1140  Last data filed at 4/1/2020 1100  Gross per 24 hour   Intake 2341.56 ml   Output 935 ml   Net 1406.56 ml       Physical Exam   Constitutional:   Examined from window to reduce COVID-19 contact   HENT:   Intubated   Cardiovascular:   Irregular tachycardia   Pulmonary/Chest:   Mechanically Ventilated   Genitourinary:   Genitourinary Comments: Motta in place   Musculoskeletal:   Trialysis catheter left IJ and arterial line in place   Nursing note and vitals reviewed.      Vents:  Vent Mode: A/C (04/01/20 0845)  Ventilator Initiated: Yes (03/28/20 1515)  Set Rate: 20 BPM (04/01/20 0845)  Vt Set: 460 mL (04/01/20 0845)  Pressure Support: 0 cmH20 (04/01/20 0845)  PEEP/CPAP: 12 cmH20 (04/01/20 0845)  Oxygen Concentration (%): 60 (04/01/20 1100)  Peak Airway Pressure: 26 cmH2O (04/01/20 0845)  Plateau Pressure: 25 cmH20 (04/01/20 0845)  Total Ve: 9.5 mL (04/01/20 0845)  F/VT Ratio<105 (RSBI): (!) 42.19 (04/01/20  0845)  Lines/Drains/Airways     Central Venous Catheter Line            Trialysis (Dialysis) Catheter 03/28/20 1820 external jugular;left internal jugular 3 days          Drain                 NG/OG Tube 03/28/20 0557 [place] sump 16 Fr. Center mouth 4 days         Urethral Catheter 03/28/20 0606 Non-latex 16 Fr. 4 days         Rectal Tube 03/29/20 2300 rectal tube w/ balloon (indicate number of mLs) 2 days          Airway                 Airway - Non-Surgical 03/28/20 0605 Endotracheal Tube 4 days          Arterial Line                 Arterial Line 03/28/20 1819 Right Radial 3 days          Peripheral Intravenous Line                 Peripheral IV - Single Lumen 03/28/20 0530 18 G Left Forearm 4 days         Peripheral IV - Single Lumen 03/28/20 0540 18 G Right Forearm 4 days              Significant Labs:    CBC/Anemia Profile:  Recent Labs   Lab 03/31/20  0357 04/01/20  0401   WBC 5.92 5.97   HGB 9.2* 8.8*   HCT 28.9* 29.5*    253   MCV 97 101*   RDW 13.2 13.5   FERRITIN 2,726* 2,613*        Chemistries:  Recent Labs   Lab 03/31/20  0357  03/31/20  2355 04/01/20  0401 04/01/20  0858      < > 139 139 138   K 5.3*   < > 4.8 5.0 5.0      < > 104 105 104   CO2 25   < > 25 24 26   BUN 26*   < > 32* 33* 34*   CREATININE 1.6*   < > 1.5* 1.5* 1.5*   CALCIUM 8.1*   < > 8.2* 8.1* 8.3*   ALBUMIN 1.4*  --   --  1.3*  --    PROT 6.1  --   --  6.0  --    BILITOT 0.4  --   --  0.3  --    ALKPHOS 51*  --   --  60  --    ALT 33  --   --  28  --    AST 57*  --   --  44*  --    MG 2.3  --   --  2.6  --    PHOS 2.6*  --   --  2.4*  --     < > = values in this interval not displayed.

## 2020-04-02 PROBLEM — Z79.01 ANTICOAGULATED: Status: ACTIVE | Noted: 2020-01-01

## 2020-04-02 NOTE — ASSESSMENT & PLAN NOTE
- patient is on heparin gtt. We have received reports that patients with COVID may be more accurately monitored for therapeutic levels with factor Xa levels rather than aPTT  - check factor Xa

## 2020-04-02 NOTE — SUBJECTIVE & OBJECTIVE
Interval History/Significant Events:   No acute events overnight. Sedated with fentanyl, weaning. Plan for SAT/SBT today. HR mildly elevated today likely 2/2 waking up and stress response. Hyperglycemia controlled. Afebrile past 24h. Intubated and mechanically ventilated on 50% FiO2 and 8 PEEP.     Review of Systems   Unable to perform ROS: Intubated     Objective:     Vital Signs (Most Recent):  Temp: 97.7 °F (36.5 °C) (04/02/20 1000)  Pulse: 105 (04/02/20 1000)  Resp: (!) 24 (04/02/20 1000)  BP: (!) 144/69 (04/02/20 0930)  SpO2: (!) 93 % (04/02/20 1000) Vital Signs (24h Range):  Temp:  [97 °F (36.1 °C)-99.9 °F (37.7 °C)] 97.7 °F (36.5 °C)  Pulse:  [] 105  Resp:  [19-45] 24  SpO2:  [93 %-98 %] 93 %  BP: (144)/(69) 144/69  Arterial Line BP: ()/(42-65) 125/54   Weight: 108.9 kg (240 lb 1.3 oz)  Body mass index is 37.6 kg/m².      Intake/Output Summary (Last 24 hours) at 4/2/2020 1132  Last data filed at 4/2/2020 1000  Gross per 24 hour   Intake 1578.86 ml   Output 990 ml   Net 588.86 ml       Physical Exam   Constitutional:   Examined from window to reduce COVID-19 contact   HENT:   Intubated   Cardiovascular:   Irregular tachycardia   Pulmonary/Chest:   Mechanically Ventilated   Genitourinary:   Genitourinary Comments: Motta in place   Musculoskeletal:   Trialysis catheter left IJ and arterial line in place   Nursing note and vitals reviewed.      Vents:  Vent Mode: A/C (04/02/20 0817)  Ventilator Initiated: Yes (03/28/20 1515)  Set Rate: 20 BPM (04/02/20 0817)  Vt Set: 460 mL (04/02/20 0817)  Pressure Support: 0 cmH20 (04/02/20 0817)  PEEP/CPAP: 10 cmH20 (04/02/20 0817)  Oxygen Concentration (%): 50 (04/02/20 1000)  Peak Airway Pressure: 17 cmH2O (04/02/20 0817)  Plateau Pressure: 25 cmH20 (04/02/20 0817)  Total Ve: 10.7 mL (04/02/20 0817)  F/VT Ratio<105 (RSBI): 245.61 (04/02/20 0817)  Lines/Drains/Airways     Central Venous Catheter Line            Trialysis (Dialysis) Catheter 03/28/20 1820 external  jugular;left internal jugular 4 days          Drain                 NG/OG Tube 03/28/20 0557 [place] sump 16 Fr. Center mouth 5 days         Urethral Catheter 03/28/20 0606 Non-latex 16 Fr. 5 days         Rectal Tube 03/29/20 2300 rectal tube w/ balloon (indicate number of mLs) 3 days          Airway                 Airway - Non-Surgical 03/28/20 0605 Endotracheal Tube 5 days          Arterial Line                 Arterial Line 03/28/20 1819 Right Radial 4 days              Significant Labs:    CBC/Anemia Profile:  Recent Labs   Lab 04/01/20 0401 04/02/20  0411   WBC 5.97 7.33   HGB 8.8* 9.0*   HCT 29.5* 29.6*    283   * 99*   RDW 13.5 13.5   FERRITIN 2,613* 2,488*        Chemistries:  Recent Labs   Lab 04/01/20 0401 04/02/20  0025 04/02/20 0411 04/02/20  0918      < > 138 139 141   K 5.0   < > 4.6 4.7 4.5      < > 105 105 105   CO2 24   < > 25 24 25   BUN 33*   < > 37* 38* 39*   CREATININE 1.5*   < > 1.3 1.3 1.3   CALCIUM 8.1*   < > 8.5* 8.7 8.4*   ALBUMIN 1.3*  --   --  1.3*  --    PROT 6.0  --   --  6.1  --    BILITOT 0.3  --   --  0.6  --    ALKPHOS 60  --   --  119  --    ALT 28  --   --  32  --    AST 44*  --   --  57*  --    MG 2.6  --   --  2.5  --    PHOS 2.4*  --   --  2.2*  --     < > = values in this interval not displayed.

## 2020-04-02 NOTE — PROGRESS NOTES
[facility]  Critical Care Medicine  Progress Note    Patient Name: Erick Valdovinos  MRN: [id number]  Admission Date: 3/28/2020  Hospital Length of Stay: 5 days  Code Status: Full Code  Attending Provider: Jak Tee MD  Primary Care Provider: Brandon Kinney MD   Principal Problem: Acute respiratory distress syndrome (ARDS) due to 2019 novel coronavirus    Subjective:     HPI:  Patient is a 70 yo male with HTN, HLD, DM2, CKD3, NAFLD, aortic stenosis, diabetic neuropathy, cervical radiculopathy who presented to [facility] on 3/28 for worsening SOB in the setting of positive COVID testing done on 3/19-->resulted positive on 3/23. Patient had had fevers and diarrhea for the week prior, fevers as high as 103. He was also having difficulty seeing. Symptoms non-remitting until 3/28 and then had worsening fatigue and weakness, which led him to present to [facility] ED. In the ED, patient was found to be hypoxemic at 78% on room air and in moderate distress. Patient was placed on NRB mask but continued to have issues with poor effort so electively intubated due to worsening condition. Patient was also given 1L fluid bolus for hypotension (systolic in the 80s). Patient sedated on propofol and fentanyl.    ED evaluation at [facility] showed: WBC 6.21 with lymphopenia, bicarb 14, creatinine 2.6 (baseline 1.3), Ferritin 2202, , , , Lactic acid 0.9 and D-Dimer 0.4, BNP 41 and troponin bump to 0.087. Patient was started on ceftriaxone, Azithromycin and Plaquenil.     Patient was transferred to [facility] as [facility] was out of ICU beds. Patient admitted to MICU on 3/28 for management of acute hypoxemic respiratory failure complicated by JAZMIN and troponinemia in the setting of COVID19.    Hospital/ICU Course:  Admitted to MICU on 3/28, intubated and mechanically ventilated. On Ceftriaxone, azithro and plaquenil. Started insulin gtt given glucose >400s and checking serum ketones to evaluate for  DKA. Trending troponins 0.087-->0.04, likely demand ischemia 2/2 ARDS. Patient noted to be in DKA with elevated beta hydroxybutyrate to 4.7. Patient fluid resuscitated about 3L from 3/28-3/29 and started on insulin gtt with improvement in glucose control. JAZMIN improving after fluid resuscitation. Patient did go into A fib with RVR on 3/29 and was started on amio gtt with conversion to PO given IV amio shortage. Patient was also started on low intensity heparin gtt given suspected component of microangiopathic coagulopathy related to JAZMIN. Of note, patient was persistently spiking fevers since admit up until 3/30 with a temp of 103 on 3/30 (still on ceftriaxone azithro). Tube feeds started on 3/30 (watching glucose very closely). Patient was placed in prone position on 3/30 at 1815 given P/F ratio of 122 (due to be supinated at 1015 on 3/31). 1/2 blood cultures from admission 3/28 resulted positive for coag negative staph (contaminant), repeat blood cultures ordered on 3/31. Patient was placed in prone position again on 3/31 at 1800 given P/F of 111 (due to supinate at 4/1 1000). Attempting to wean sedation as of 4/2, given improving oxygenation.    Interval History/Significant Events:   No acute events overnight. Sedated with fentanyl, weaning. Plan for SAT/SBT today. HR mildly elevated today likely 2/2 waking up and stress response. Hyperglycemia controlled. Afebrile past 24h. Intubated and mechanically ventilated on 50% FiO2 and 8 PEEP.     Review of Systems   Unable to perform ROS: Intubated     Objective:     Vital Signs (Most Recent):  Temp: 97.7 °F (36.5 °C) (04/02/20 1000)  Pulse: 105 (04/02/20 1000)  Resp: (!) 24 (04/02/20 1000)  BP: (!) 144/69 (04/02/20 0930)  SpO2: (!) 93 % (04/02/20 1000) Vital Signs (24h Range):  Temp:  [97 °F (36.1 °C)-99.9 °F (37.7 °C)] 97.7 °F (36.5 °C)  Pulse:  [] 105  Resp:  [19-45] 24  SpO2:  [93 %-98 %] 93 %  BP: (144)/(69) 144/69  Arterial Line BP: ()/(42-65) 125/54    Weight: 108.9 kg (240 lb 1.3 oz)  Body mass index is 37.6 kg/m².      Intake/Output Summary (Last 24 hours) at 4/2/2020 1132  Last data filed at 4/2/2020 1000  Gross per 24 hour   Intake 1578.86 ml   Output 990 ml   Net 588.86 ml       Physical Exam   Constitutional:   Examined from window to reduce COVID-19 contact   HENT:   Intubated   Cardiovascular:   Irregular tachycardia   Pulmonary/Chest:   Mechanically Ventilated   Genitourinary:   Genitourinary Comments: Motta in place   Musculoskeletal:   Trialysis catheter left IJ and arterial line in place   Nursing note and vitals reviewed.      Vents:  Vent Mode: A/C (04/02/20 0817)  Ventilator Initiated: Yes (03/28/20 1515)  Set Rate: 20 BPM (04/02/20 0817)  Vt Set: 460 mL (04/02/20 0817)  Pressure Support: 0 cmH20 (04/02/20 0817)  PEEP/CPAP: 10 cmH20 (04/02/20 0817)  Oxygen Concentration (%): 50 (04/02/20 1000)  Peak Airway Pressure: 17 cmH2O (04/02/20 0817)  Plateau Pressure: 25 cmH20 (04/02/20 0817)  Total Ve: 10.7 mL (04/02/20 0817)  F/VT Ratio<105 (RSBI): 245.61 (04/02/20 0817)  Lines/Drains/Airways     Central Venous Catheter Line            Trialysis (Dialysis) Catheter 03/28/20 1820 external jugular;left internal jugular 4 days          Drain                 NG/OG Tube 03/28/20 0557 [place] sump 16 Fr. Center mouth 5 days         Urethral Catheter 03/28/20 0606 Non-latex 16 Fr. 5 days         Rectal Tube 03/29/20 2300 rectal tube w/ balloon (indicate number of mLs) 3 days          Airway                 Airway - Non-Surgical 03/28/20 0605 Endotracheal Tube 5 days          Arterial Line                 Arterial Line 03/28/20 1819 Right Radial 4 days              Significant Labs:    CBC/Anemia Profile:  Recent Labs   Lab 04/01/20  0401 04/02/20  0411   WBC 5.97 7.33   HGB 8.8* 9.0*   HCT 29.5* 29.6*    283   * 99*   RDW 13.5 13.5   FERRITIN 2,613* 2,488*        Chemistries:  Recent Labs   Lab 04/01/20  0401  04/02/20  0025 04/02/20  0411  04/02/20  0918      < > 138 139 141   K 5.0   < > 4.6 4.7 4.5      < > 105 105 105   CO2 24   < > 25 24 25   BUN 33*   < > 37* 38* 39*   CREATININE 1.5*   < > 1.3 1.3 1.3   CALCIUM 8.1*   < > 8.5* 8.7 8.4*   ALBUMIN 1.3*  --   --  1.3*  --    PROT 6.0  --   --  6.1  --    BILITOT 0.3  --   --  0.6  --    ALKPHOS 60  --   --  119  --    ALT 28  --   --  32  --    AST 44*  --   --  57*  --    MG 2.6  --   --  2.5  --    PHOS 2.4*  --   --  2.2*  --     < > = values in this interval not displayed.       ABG  Recent Labs   Lab 04/01/20  1424   PH 7.417   PO2 80   PCO2 35.7   HCO3 23.0*   BE -1     Assessment/Plan:     Pulmonary  Acute respiratory failure with hypoxia  See acute resp distress syndrome due to 2019 novel coronavirus      Cardiac/Vascular  Hypertriglyceridemia  - likely 2/2 propofol  - try to avoid propofol  - monitor TG    A-fib  - New onset narrow complex tachyarrhythmia overnight 3/28-2/39. Max rate 150. No obvious p waves. Given mult pushes IV metoprolol with modest improvement in rate.   - Given 2g Mg 3/29  - started on amiodarone 3/29. Converted to amio po given IV amio shortage  - Pressor requirements increased since 3/29 night, now decreasing. Continue to monitor    NSTEMI (non-ST elevated myocardial infarction)  - mild troponin elevation, suspect Type 2 NSTEMI from severe metabolic derangements.     Aortic stenosis  - follows with cardiology outpatient    Mixed hyperlipidemia  - on statin at home, can restart when appropriate  - continue home plavix (follows with Dr Jiménez in cardiology and has a pending angiogram to evaluate for CAD)    Essential hypertension  - monitor BP  - pressors for hypotension, at very mild dose as of 3/31, likely sedation related    Renal/  Acute renal failure superimposed on stage 3 chronic kidney disease  - creatinine up to 4 on 3/28, basline about 1.3. Improved after volume resuscitation.   - trend creatinine  - started on empiric low intensity heparin gtt  3/29 for renal dysfunction and suspicion of microangiopathic coagulopathy.   - trialysis line in the case of need for HD given renal failure incidence with COVID  - nephro consulted in setting of severe hyperkalemia which has improved     Hematology  Anticoagulated  - patient is on heparin gtt. We have received reports that patients with COVID may be more accurately monitored for therapeutic levels with factor Xa levels rather than aPTT  - check factor Xa    Endocrine  DKA (diabetic ketoacidoses)  - Presented with low bicarb, increased AG, + B OHB.   - resolved with insulin infusion. Required bicarbonate infusion 3/28.  - Continue on insulin infusion, monitor glucose closely, renee with starting tube feeds   - adding basal insulin 4/1, attempt to wean drip so we can reduce the amount of fluid we are giving him  - frequent accuchecks    Severe obesity (BMI 35.0-39.9) with comorbidity  - nutrition consult for tube feeds    Type 2 diabetes mellitus with stage 3 chronic kidney disease, without long-term current use of insulin  See DKA    GI  Transaminitis  See NAFLD    Nonalcoholic fatty liver disease  - history of nonalcoholic fatty liver disease  - trend LFTs  - if evidence of shock liver (transaminitis to the thousands), concerning for poor prognosis    Other  * Acute respiratory distress syndrome (ARDS) due to 2019 novel coronavirus  Covid-19 Virus Infection  Presents with SOB, myalgias. Flu negative.   - COVID-19 testing resulted POSITIVE     - Isolation:   - Airborne and Droplet Precautions  - N95 masks must be fit tested, wear eye protection  - 20 second hand hygiene              - Limit visitors per hospital policy              - Consolidating lab draws, nursing care, and interventions      PLAN:  - continue mechanical ventilation (Intubated on 3/28 at [facility])  - high PEEP protocol per ARDSnet  - reduced TV to 6ccs/kg 3/29. Started on 7 on admit for acid clearance given DKA.   - patient placed in prone  position on 3/30 and again on 3/31  - strict isolation protocol 2/2 positive COVID  - plaquenil for COVID  - f/u ID recs regarding antivirals for COVID  - CAP coverage with 5 day course ceftriaxone azithro completed 4/1  - f/u blood cultures from 3/28-->1/2 positive for coag staph, likely contamininant  - f/u sputum cultures from 3/28-->NGTD as of 3/31  - f/u repeat blood cultures from 3/31-->so far NGTD as of 4/2      Shock, unspecified  - Suspect distributive 2/2 sedation with contribution from hypovolemia, high PEEP. Possible component of septic shock from COVID-19 infection.   - Pressor requirements stable on about 0.3 levo and vasopressin on 3/29. Off vaso on 3/30. Watch closely   - Holding steroids in setting of uncontrolled hyperglycemia.     Acute respiratory distress syndrome (ARDS) due to COVID-19 virus  See acute resp distress syndrome due to 2019 novel coronavirus      Endotracheally intubated  See acute resp distress syndrome due to 2019 novel coronavirus      COVID-19 virus detected  See acute resp distress syndrome due to 2019 novel coronavirus     Critical Care Daily Checklist:    A: Awake: RASS Goal/Actual Goal: RASS Goal: -1-->drowsy  Actual: [place] Agitation Sedation Scale (RASS): Moderate sedation   B: Spontaneous Breathing Trial Performed? Spon. Breathing Trial Initiated?: Not initiated (03/30/20 0840)   C: SAT & SBT Coordinated?  SAT today.                       D: Delirium: CAM-ICU Overall CAM-ICU: Positive   E: Early Mobility Performed? No   F: Feeding Goal: Goals: meet % EEN/EPN by RD following  Status: Nutrition Goal Status: goal not met   Current Diet Order   Procedures    Diet NPO      AS: Analgesia/Sedation Weaned off for SAT today   T: Thromboembolic Prophylaxis Low intensity Heparin gtt   H: HOB > 300 Yes   U: Stress Ulcer Prophylaxis (if needed) protonix    G: Glucose Control Controlled, increasing basal insulin and weaning insulin gtt   B: Bowel Function Stool Occurrence:  1(flexi)   I: Indwelling Catheter (Lines & Motta) Necessity Yes, trialysis, arterial, ETT, OG tube   D: De-escalation of Antimicrobials/Pharmacotherapies Off abx.     Plan for the day/ETD Check factor Xa level, SAT today, Supportive care, wean O2, monitor glucose closely, watch for bleeding given heparin gtt    Code Status:  Family/Goals of Care: Full Code         Critical secondary to Patient has a condition that poses threat to life and bodily function: Severe Respiratory Distress      Critical care was time spent personally by me on the following activities: development of treatment plan with patient or surrogate and bedside caregivers, discussions with consultants, evaluation of patient's response to treatment, examination of patient, ordering and performing treatments and interventions, ordering and review of laboratory studies, ordering and review of radiographic studies, pulse oximetry, re-evaluation of patient's condition. This critical care time did not overlap with that of any other provider or involve time for any procedures.     Jessica Blanco MD  Critical Care Medicine  [facility]

## 2020-04-02 NOTE — CONSULTS
Wound care consult received. Pt positive for Covid 19. Spoke with nursing who reports eschar to upper lip and scattered partial skin breakdown to buttocks.  Recommended Sween moisturizer to lip and Triad ointment to buttocks BID.  Nursing to maintain pressure injury prevention interventions.  Please re-consult wound care team if needed. [phone]

## 2020-04-03 NOTE — PROGRESS NOTES
Pharmacokinetic Initial Assessment: IV Vancomycin    Assessment/Plan:    - Initiate intravenous vancomycin with a loading dose of 2000 mg x 1 followed by 1500 mg every 12 hours.   - Desired empiric serum trough concentration is 15 to 20 mcg/mL.   - Draw vancomycin trough level 30 min prior to third dose on 4/4 at approximately 13:00.  - Pharmacy will continue to follow and monitor vancomycin.      Please contact pharmacy at extension [phone] with any questions regarding this assessment.     Thank you for the consult,   Becca Aldridge, PharmD, BCCCP     Patient brief summary:  Erick Valdovinos is a 69 y.o. male initiated on antimicrobial therapy with IV Vancomycin for treatment of suspected pneumonia.     Drug Allergies:   Review of patient's allergies indicates:   Allergen Reactions    Codeine Nausea Only    Nsaids (non-steroidal anti-inflammatory drug) Other (See Comments)     Kidney issues       Actual Body Weight:   108.9 kg    Renal Function:   Estimated Creatinine Clearance: 75.6 mL/min (based on SCr of 1.1 mg/dL).,     Dialysis Method (if applicable):  N/A    CBC (last 72 hours):  Recent Labs   Lab Result Units 04/01/20  0401 04/02/20  0411 04/03/20  0412   WBC K/uL 5.97 7.33 8.32   Hemoglobin g/dL 8.8* 9.0* 8.7*   Hematocrit % 29.5* 29.6* 28.2*   Platelets K/uL 253 283 310   Gran% % 82.4* 85.0* 80.3*   Lymph% % 8.7* 5.7* 7.2*   Mono% % 5.2 6.5 7.7   Eosinophil% % 1.5 0.1 0.1   Basophil% % 0.2 0.1 0.1   Differential Method  Automated Automated Automated       Metabolic Panel (last 72 hours):  Recent Labs   Lab Result Units 03/31/20  1654 03/31/20  2355 04/01/20  0401 04/01/20  0858 04/01/20  1740 04/02/20  0025 04/02/20  0411 04/02/20  0918 04/02/20  1639 04/03/20  0412 04/03/20  1108   Sodium mmol/L 137 139 139 138 138 138 139 141 141 141 142   Potassium mmol/L 4.9 4.8 5.0 5.0 4.8 4.6 4.7 4.5 4.5 4.6 4.8   Chloride mmol/L 103 104 105 104 104 105 105 105 105 108 108   CO2 mmol/L 25 25 24 26 24 25 24 25 24  23 25   Glucose mg/dL 214* 150* 159* 179* 207* 215* 202* 144* 213* 226* 221*   BUN, Bld mg/dL 30* 32* 33* 34* 37* 37* 38* 39* 41* 44* 46*   Creatinine mg/dL 1.6* 1.5* 1.5* 1.5* 1.5* 1.3 1.3 1.3 1.2 1.2 1.1   Albumin g/dL  --   --  1.3*  --   --   --  1.3*  --   --  1.2*  --    Total Bilirubin mg/dL  --   --  0.3  --   --   --  0.6  --   --  0.6  --    Alkaline Phosphatase U/L  --   --  60  --   --   --  119  --   --  117  --    AST U/L  --   --  44*  --   --   --  57*  --   --  46*  --    ALT U/L  --   --  28  --   --   --  32  --   --  30  --    Magnesium mg/dL  --   --  2.6  --   --   --  2.5  --   --  2.3  --    Phosphorus mg/dL  --   --  2.4*  --   --   --  2.2*  --   --  2.3*  --        Drug levels (last 3 results):  No results for input(s): VANCOMYCINRA, VANCOMYCINPE, VANCOMYCINTR in the last 72 hours.    Microbiologic Results:  Microbiology Results (last 7 days)     Procedure Component Value Units Date/Time    Culture, Respiratory with Gram Stain [[id number]]     Order Status:  No result Specimen:  Respiratory     Blood culture [[id number]]     Order Status:  Sent Specimen:  Blood     Blood culture [[id number]]     Order Status:  Sent Specimen:  Blood     Blood culture [[id number]] Collected:  03/31/20 0351    Order Status:  Completed Specimen:  Blood from Peripheral, Forearm, Left Updated:  04/03/20 0812     Blood Culture, Routine No Growth to date      No Growth to date      No Growth to date      No Growth to date    Blood culture [[id number]] Collected:  03/31/20 0353    Order Status:  Completed Specimen:  Blood from Peripheral, Forearm, Right Updated:  04/03/20 0812     Blood Culture, Routine No Growth to date      No Growth to date      No Growth to date      No Growth to date    Blood culture [[id number]]  (Abnormal) Collected:  03/28/20 1811    Order Status:  Completed Specimen:  Blood from Line, Jugular, Internal Left Updated:  04/02/20 1021     Blood Culture, Routine Gram stain aer bottle: Gram positive  cocci in clusters resembling Staph       Results called to and read back by: Celia Buenrostro RN 03/31/2020  02:34      COAGULASE-NEGATIVE STAPHYLOCOCCUS SPECIES  Organism is a probable contaminant      Blood culture [[id number]] Collected:  03/28/20 1817    Order Status:  Completed Specimen:  Blood from Radial Arterial Stick, Right Updated:  04/01/20 2012     Blood Culture, Routine No Growth after 4 days.     Culture, Respiratory with Gram Stain [[id number]] Collected:  03/28/20 2101    Order Status:  Completed Specimen:  Respiratory from Sputum Updated:  03/31/20 1253     Respiratory Culture Normal respiratory ran      No S aureus or Pseudomonas isolated.     Gram Stain (Respiratory) <10 epithelial cells per low power field.     Gram Stain (Respiratory) Rare WBC's     Gram Stain (Respiratory) Few Gram positive cocci    Culture, Respiratory with Gram Stain [[id number]] Collected:  03/28/20 2043    Order Status:  Sent Specimen:  Respiratory from Tracheal Aspirate Updated:  03/28/20 2043

## 2020-04-03 NOTE — PROGRESS NOTES
[facility]  Critical Care Medicine  Progress Note    Patient Name: Erick Valdovinos  MRN: [id number]  Admission Date: 3/28/2020  Hospital Length of Stay: 6 days  Code Status: Full Code  Attending Provider: Jak Tee MD  Primary Care Provider: Brandon Kinney MD   Principal Problem: Acute respiratory distress syndrome (ARDS) due to 2019 novel coronavirus    Subjective:     HPI:  Patient is a 68 yo male with HTN, HLD, DM2, CKD3, NAFLD, aortic stenosis, diabetic neuropathy, cervical radiculopathy who presented to [facility] on 3/28 for worsening SOB in the setting of positive COVID testing done on 3/19-->resulted positive on 3/23. Patient had had fevers and diarrhea for the week prior, fevers as high as 103. He was also having difficulty seeing. Symptoms non-remitting until 3/28 and then had worsening fatigue and weakness, which led him to present to [facility] ED. In the ED, patient was found to be hypoxemic at 78% on room air and in moderate distress. Patient was placed on NRB mask but continued to have issues with poor effort so electively intubated due to worsening condition. Patient was also given 1L fluid bolus for hypotension (systolic in the 80s). Patient sedated on propofol and fentanyl.    ED evaluation at [facility] showed: WBC 6.21 with lymphopenia, bicarb 14, creatinine 2.6 (baseline 1.3), Ferritin 2202, , , , Lactic acid 0.9 and D-Dimer 0.4, BNP 41 and troponin bump to 0.087. Patient was started on ceftriaxone, Azithromycin and Plaquenil.     Patient was transferred to [facility] as [facility] was out of ICU beds. Patient admitted to MICU on 3/28 for management of acute hypoxemic respiratory failure complicated by JAZMIN and troponinemia in the setting of COVID19.    Hospital/ICU Course:  Admitted to MICU on 3/28, intubated and mechanically ventilated. On Ceftriaxone, azithro and plaquenil. Started insulin gtt given glucose >400s and checking serum ketones to evaluate for  DKA. Trending troponins 0.087-->0.04, likely demand ischemia 2/2 ARDS. Patient noted to be in DKA with elevated beta hydroxybutyrate to 4.7. Patient fluid resuscitated about 3L from 3/28-3/29 and started on insulin gtt with improvement in glucose control. JAZMIN improving after fluid resuscitation. Patient did go into A fib with RVR on 3/29 and was started on amio gtt with conversion to PO given IV amio shortage. Patient was also started on low intensity heparin gtt given suspected component of microangiopathic coagulopathy related to JAZMIN. Of note, patient was persistently spiking fevers since admit up until 3/30 with a temp of 103 on 3/30 (still on ceftriaxone azithro). Tube feeds started on 3/30 (watching glucose very closely). Patient was placed in prone position on 3/30 at 1815 given P/F ratio of 122 (due to be supinated at 1015 on 3/31). 1/2 blood cultures from admission 3/28 resulted positive for coag negative staph (contaminant), repeat blood cultures ordered on 3/31-->NGTD. Patient was placed in prone position again on 3/31 at 1800 given P/F of 111 (due to supinate at 4/1 1000). Attempting to wean sedation as of 4/2, given improving oxygenation, and starting daily SAT/SBT.    Interval History/Significant Events:   Desaturation overnight with agitation, improved with sedation. Plan for SAT/SBT today. HR mildly elevated today likely 2/2 waking up and stress response. Increasing insulin SQ today, wean off gtt. Hypothermic to 95.4 yesterday, now improved. Intubated and mechanically ventilated on 55% FiO2 and 8 PEEP.     Review of Systems   Unable to perform ROS: Intubated     Objective:     Vital Signs (Most Recent):  Temp: (!) 100.6 °F (38.1 °C) (04/03/20 0600)  Pulse: 93 (04/03/20 0600)  Resp: (!) 59 (04/03/20 0600)  BP: (!) 144/69 (04/02/20 0930)  SpO2: (!) 90 % (04/03/20 0600) Vital Signs (24h Range):  Temp:  [95.4 °F (35.2 °C)-100.6 °F (38.1 °C)] 100.6 °F (38.1 °C)  Pulse:  [] 93  Resp:  [24-60]  59  SpO2:  [90 %-95 %] 90 %  BP: (144)/(69) 144/69  Arterial Line BP: (116-160)/(50-71) 148/66   Weight: 108.9 kg (240 lb 1.3 oz)  Body mass index is 37.6 kg/m².      Intake/Output Summary (Last 24 hours) at 4/3/2020 0853  Last data filed at 4/3/2020 0600  Gross per 24 hour   Intake 1625.52 ml   Output 1775 ml   Net -149.48 ml       Physical Exam   Constitutional:   Examined from window to reduce COVID-19 contact   HENT:   Intubated   Cardiovascular:   Irregular tachycardia   Pulmonary/Chest:   Mechanically Ventilated   Genitourinary:   Genitourinary Comments: Motta in place   Musculoskeletal:   Trialysis catheter left IJ and arterial line in place   Nursing note and vitals reviewed.      Vents:  Vent Mode: A/C (04/03/20 0200)  Ventilator Initiated: Yes (03/28/20 1515)  Set Rate: 20 BPM (04/03/20 0200)  Vt Set: 460 mL (04/03/20 0200)  Pressure Support: 0 cmH20 (04/03/20 0200)  PEEP/CPAP: 8 cmH20 (04/03/20 0200)  Oxygen Concentration (%): 55 (04/03/20 0600)  Peak Airway Pressure: 22 cmH2O (04/03/20 0200)  Plateau Pressure: 25 cmH20 (04/03/20 0200)  Total Ve: 17.9 mL (04/03/20 0200)  F/VT Ratio<105 (RSBI): (!) 86.47 (04/03/20 0200)  Lines/Drains/Airways     Central Venous Catheter Line            Trialysis (Dialysis) Catheter 03/28/20 1820 external jugular;left internal jugular 5 days          Drain                 NG/OG Tube 03/28/20 0557 [place] sump 16 Fr. Center mouth 6 days         Urethral Catheter 03/28/20 0606 Non-latex 16 Fr. 6 days         Rectal Tube 03/29/20 2300 rectal tube w/ balloon (indicate number of mLs) 4 days          Airway                 Airway - Non-Surgical 03/28/20 0605 Endotracheal Tube 6 days          Arterial Line                 Arterial Line 03/28/20 1819 Right Radial 5 days              Significant Labs:    CBC/Anemia Profile:  Recent Labs   Lab 04/02/20  0411 04/03/20  0412   WBC 7.33 8.32   HGB 9.0* 8.7*   HCT 29.6* 28.2*    310   MCV 99* 98   RDW 13.5 13.6   FERRITIN 2,488*  2,392*        Chemistries:  Recent Labs   Lab 04/02/20  0411 04/02/20  0918 04/02/20  1639 04/03/20  0412    141 141 141   K 4.7 4.5 4.5 4.6    105 105 108   CO2 24 25 24 23   BUN 38* 39* 41* 44*   CREATININE 1.3 1.3 1.2 1.2   CALCIUM 8.7 8.4* 8.5* 8.5*   ALBUMIN 1.3*  --   --  1.2*   PROT 6.1  --   --  6.0   BILITOT 0.6  --   --  0.6   ALKPHOS 119  --   --  117   ALT 32  --   --  30   AST 57*  --   --  46*   MG 2.5  --   --  2.3   PHOS 2.2*  --   --  2.3*       ABG  Recent Labs   Lab 04/01/20  1424   PH 7.417   PO2 80   PCO2 35.7   HCO3 23.0*   BE -1     Assessment/Plan:     Pulmonary  Acute respiratory failure with hypoxia  See acute resp distress syndrome due to 2019 novel coronavirus      Cardiac/Vascular  Hypertriglyceridemia  - likely 2/2 propofol  - try to avoid propofol  - monitor TG    A-fib  - New onset narrow complex tachyarrhythmia overnight 3/28-2/39. Max rate 150. No obvious p waves. Given mult pushes IV metoprolol with modest improvement in rate.   - Given 2g Mg 3/29  - started on amiodarone 3/29. Converted to amio po given IV amio shortage  - Pressor requirements increased since 3/29 night, now decreasing. Continue to monitor    NSTEMI (non-ST elevated myocardial infarction)  - mild troponin elevation, suspect Type 2 NSTEMI from severe metabolic derangements.     Aortic stenosis  - follows with cardiology outpatient    Mixed hyperlipidemia  - on statin at home, can restart when appropriate  - continue home plavix (follows with Dr Jiménez in cardiology and has a pending angiogram to evaluate for CAD)    Essential hypertension  - monitor BP  - pressors for hypotension, at very mild dose as of 3/31, likely sedation related    Renal/  Acute renal failure superimposed on stage 3 chronic kidney disease  - creatinine up to 4 on 3/28, basline about 1.3. Improved after volume resuscitation.   - trend creatinine  - started on empiric low intensity heparin gtt 3/29 for renal dysfunction and suspicion  of microangiopathic coagulopathy.   - trialysis line in the case of need for HD given renal failure incidence with COVID  - nephro consulted in setting of severe hyperkalemia which has improved     Hematology  Anticoagulated  - patient is on heparin gtt. We have received reports that patients with COVID may be more accurately monitored for therapeutic levels with factor Xa levels rather than aPTT  - checked factor Xa 4/2-->appears subtherapeutic    Endocrine  DKA (diabetic ketoacidoses)  - Presented with low bicarb, increased AG, + B OHB.   - resolved with insulin infusion. Required bicarbonate infusion 3/28.  - Continue on insulin infusion, monitor glucose closely, renee with starting tube feeds   - adding basal insulin 4/1, attempt to wean drip so we can reduce the amount of fluid we are giving him  - frequent accuchecks    Severe obesity (BMI 35.0-39.9) with comorbidity  - nutrition consult for tube feeds    Type 2 diabetes mellitus with stage 3 chronic kidney disease, without long-term current use of insulin  See DKA    GI  Transaminitis  See NAFLD    Nonalcoholic fatty liver disease  - history of nonalcoholic fatty liver disease  - trend LFTs  - if evidence of shock liver (transaminitis to the thousands), concerning for poor prognosis    Other  * Acute respiratory distress syndrome (ARDS) due to 2019 novel coronavirus  Covid-19 Virus Infection  Presents with SOB, myalgias. Flu negative.   - COVID-19 testing resulted POSITIVE     - Isolation:   - Airborne and Droplet Precautions  - N95 masks must be fit tested, wear eye protection  - 20 second hand hygiene              - Limit visitors per hospital policy              - Consolidating lab draws, nursing care, and interventions      PLAN:  - daily SAT/SBT  - continue mechanical ventilation (Intubated on 3/28 at [facility])  - low PEEP protocol per ARDSnet  - reduced TV to 6ccs/kg 3/29. Started on 7 on admit for acid clearance given DKA.   - patient placed in  prone position on 3/30 and again on 3/31  - strict isolation protocol 2/2 positive COVID  - plaquenil for COVID  - f/u ID recs regarding antivirals for COVID  - CAP coverage with 5 day course ceftriaxone azithro completed 4/1  - f/u blood cultures from 3/28-->1/2 positive for coag staph, likely contamininant  - f/u sputum cultures from 3/28-->NGTD as of 3/31  - f/u repeat blood cultures from 3/31-->so far NGTD as of 4/2      Shock, unspecified  - Suspect distributive 2/2 sedation with contribution from hypovolemia, high PEEP. Possible component of septic shock from COVID-19 infection.   - Pressor requirements stable on about 0.3 levo and vasopressin on 3/29. Off vaso on 3/30. Watch closely   - Holding steroids in setting of uncontrolled hyperglycemia.     Acute respiratory distress syndrome (ARDS) due to COVID-19 virus  See acute resp distress syndrome due to 2019 novel coronavirus      Endotracheally intubated  See acute resp distress syndrome due to 2019 novel coronavirus      COVID-19 virus detected  See acute resp distress syndrome due to 2019 novel coronavirus     Critical Care Daily Checklist:    A: Awake: RASS Goal/Actual Goal: RASS Goal: -1-->drowsy  Actual: [place]ah Agitation Sedation Scale (RASS): Moderate sedation   B: Spontaneous Breathing Trial Performed? Spon. Breathing Trial Initiated?: Not initiated (03/30/20 0840)   C: SAT & SBT Coordinated?  Plan for today                      D: Delirium: CAM-ICU Overall CAM-ICU: Positive   E: Early Mobility Performed? No   F: Feeding Goal: Goals: meet % EEN/EPN by RD following  Status: Nutrition Goal Status: goal not met   Current Diet Order   Procedures    Diet NPO      AS: Analgesia/Sedation fentanyl and precedex, wean for SAT/SBT   T: Thromboembolic Prophylaxis Low intensity heparin gtt (JAZMIN on admit, [facility] back to baseline)   H: HOB > 300 Yes   U: Stress Ulcer Prophylaxis (if needed) protonix    G: Glucose Control Controlled on insulin gtt, increasing  detemir today to try and wean off gtt   B: Bowel Function Stool Occurrence: 1(flexi)   I: Indwelling Catheter (Lines & Motta) Necessity Yes, trialysis, arterial, ETT, OG tube   D: De-escalation of Antimicrobials/Pharmacotherapies Finished ABX for CAP and completed plaquenil    Plan for the day/ETD Daily SAT/SBT, supportive care, wean O2, monitor glucose, watch for bleeding given heparin gtt    Code Status:  Family/Goals of Care: Full Code         Critical secondary to Patient has a condition that poses threat to life and bodily function: Severe Respiratory Distress      Critical care was time spent personally by me on the following activities: development of treatment plan with patient or surrogate and bedside caregivers, discussions with consultants, evaluation of patient's response to treatment, examination of patient, ordering and performing treatments and interventions, ordering and review of laboratory studies, ordering and review of radiographic studies, pulse oximetry, re-evaluation of patient's condition. This critical care time did not overlap with that of any other provider or involve time for any procedures.     Jessica Blanco MD  Critical Care Medicine  [facility]

## 2020-04-03 NOTE — ASSESSMENT & PLAN NOTE
Covid-19 Virus Infection  Presents with SOB, myalgias. Flu negative.   - COVID-19 testing resulted POSITIVE     - Isolation:   - Airborne and Droplet Precautions  - N95 masks must be fit tested, wear eye protection  - 20 second hand hygiene              - Limit visitors per hospital policy              - Consolidating lab draws, nursing care, and interventions      PLAN:  - daily SAT/SBT  - continue mechanical ventilation (Intubated on 3/28 at [facility])  - low PEEP protocol per ARDSnet  - reduced TV to 6ccs/kg 3/29. Started on 7 on admit for acid clearance given DKA.   - patient placed in prone position on 3/30 and again on 3/31  - strict isolation protocol 2/2 positive COVID  - plaquenil for COVID  - f/u ID recs regarding antivirals for COVID  - CAP coverage with 5 day course ceftriaxone azithro completed 4/1  - f/u blood cultures from 3/28-->1/2 positive for coag staph, likely contamininant  - f/u sputum cultures from 3/28-->NGTD as of 3/31  - f/u repeat blood cultures from 3/31-->so far NGTD as of 4/2

## 2020-04-03 NOTE — ASSESSMENT & PLAN NOTE
- patient is on heparin gtt. We have received reports that patients with COVID may be more accurately monitored for therapeutic levels with factor Xa levels rather than aPTT  - checked factor Xa 4/2-->appears subtherapeutic

## 2020-04-03 NOTE — PLAN OF CARE
Patient not medically stable for stepdown at this time. Patient continues to require CCM service for:       ETT  Sedation  Paralytics  Insulin drip         04/03/20 1059   Discharge Reassessment   Assessment Type Discharge Planning Reassessment   Do you have any problems affording any of your prescribed medications? No   Discharge Plan A Home with family   Discharge Plan B Home with family;Home Health   DME Needed Upon Discharge  other (see comments)  (TBD)   Anticipated Discharge Disposition Home     Sha Rueda RN MSN  Critical Care-   Ext. [phone]

## 2020-04-03 NOTE — SUBJECTIVE & OBJECTIVE
Interval History/Significant Events:   Desaturation overnight with agitation, improved with sedation. Plan for SAT/SBT today. HR mildly elevated today likely 2/2 waking up and stress response. Increasing insulin SQ today, wean off gtt. Hypothermic to 95.4 yesterday, now improved. Intubated and mechanically ventilated on 55% FiO2 and 8 PEEP.     Review of Systems   Unable to perform ROS: Intubated     Objective:     Vital Signs (Most Recent):  Temp: (!) 100.6 °F (38.1 °C) (04/03/20 0600)  Pulse: 93 (04/03/20 0600)  Resp: (!) 59 (04/03/20 0600)  BP: (!) 144/69 (04/02/20 0930)  SpO2: (!) 90 % (04/03/20 0600) Vital Signs (24h Range):  Temp:  [95.4 °F (35.2 °C)-100.6 °F (38.1 °C)] 100.6 °F (38.1 °C)  Pulse:  [] 93  Resp:  [24-60] 59  SpO2:  [90 %-95 %] 90 %  BP: (144)/(69) 144/69  Arterial Line BP: (116-160)/(50-71) 148/66   Weight: 108.9 kg (240 lb 1.3 oz)  Body mass index is 37.6 kg/m².      Intake/Output Summary (Last 24 hours) at 4/3/2020 0853  Last data filed at 4/3/2020 0600  Gross per 24 hour   Intake 1625.52 ml   Output 1775 ml   Net -149.48 ml       Physical Exam   Constitutional:   Examined from window to reduce COVID-19 contact   HENT:   Intubated   Cardiovascular:   Irregular tachycardia   Pulmonary/Chest:   Mechanically Ventilated   Genitourinary:   Genitourinary Comments: Motta in place   Musculoskeletal:   Trialysis catheter left IJ and arterial line in place   Nursing note and vitals reviewed.      Vents:  Vent Mode: A/C (04/03/20 0200)  Ventilator Initiated: Yes (03/28/20 1515)  Set Rate: 20 BPM (04/03/20 0200)  Vt Set: 460 mL (04/03/20 0200)  Pressure Support: 0 cmH20 (04/03/20 0200)  PEEP/CPAP: 8 cmH20 (04/03/20 0200)  Oxygen Concentration (%): 55 (04/03/20 0600)  Peak Airway Pressure: 22 cmH2O (04/03/20 0200)  Plateau Pressure: 25 cmH20 (04/03/20 0200)  Total Ve: 17.9 mL (04/03/20 0200)  F/VT Ratio<105 (RSBI): (!) 86.47 (04/03/20 0200)  Lines/Drains/Airways     Central Venous Catheter Line             Trialysis (Dialysis) Catheter 03/28/20 1820 external jugular;left internal jugular 5 days          Drain                 NG/OG Tube 03/28/20 0557 [place] sump 16 Fr. Center mouth 6 days         Urethral Catheter 03/28/20 0606 Non-latex 16 Fr. 6 days         Rectal Tube 03/29/20 2300 rectal tube w/ balloon (indicate number of mLs) 4 days          Airway                 Airway - Non-Surgical 03/28/20 0605 Endotracheal Tube 6 days          Arterial Line                 Arterial Line 03/28/20 1819 Right Radial 5 days              Significant Labs:    CBC/Anemia Profile:  Recent Labs   Lab 04/02/20  0411 04/03/20  0412   WBC 7.33 8.32   HGB 9.0* 8.7*   HCT 29.6* 28.2*    310   MCV 99* 98   RDW 13.5 13.6   FERRITIN 2,488* 2,392*        Chemistries:  Recent Labs   Lab 04/02/20  0411 04/02/20  0918 04/02/20  1639 04/03/20  0412    141 141 141   K 4.7 4.5 4.5 4.6    105 105 108   CO2 24 25 24 23   BUN 38* 39* 41* 44*   CREATININE 1.3 1.3 1.2 1.2   CALCIUM 8.7 8.4* 8.5* 8.5*   ALBUMIN 1.3*  --   --  1.2*   PROT 6.1  --   --  6.0   BILITOT 0.6  --   --  0.6   ALKPHOS 119  --   --  117   ALT 32  --   --  30   AST 57*  --   --  46*   MG 2.5  --   --  2.3   PHOS 2.2*  --   --  2.3*

## 2020-04-03 NOTE — PLAN OF CARE
Patient rapidly deteriorating in the context of a likely aspiration event superimposed on his ARDS from COVID-19.  Worry this may result in a cardiac arrest 2/2 hypoxia.  Explained the situation to his wife, she has elected to make him DNR as heroic measures in the context of refractory hypoxemia are unlikely to be helpful.    Jose Maria Jessica MD

## 2020-04-04 NOTE — PROGRESS NOTES
[facility]  Critical Care Medicine  Progress Note    Patient Name: Erick Valdovinos  MRN: [id number]  Admission Date: 3/28/2020  Hospital Length of Stay: 7 days  Code Status: DNR  Attending Provider: Kizzy Ingram DO  Primary Care Provider: Brandon Kinney MD   Principal Problem: Acute respiratory distress syndrome (ARDS) due to 2019 novel coronavirus    Subjective:     HPI:  Patient is a 70 yo male with HTN, HLD, DM2, CKD3, NAFLD, aortic stenosis, diabetic neuropathy, cervical radiculopathy who presented to [facility] on 3/28 for worsening SOB in the setting of positive COVID testing done on 3/19-->resulted positive on 3/23. Patient had had fevers and diarrhea for the week prior, fevers as high as 103. He was also having difficulty seeing. Symptoms non-remitting until 3/28 and then had worsening fatigue and weakness, which led him to present to [facility] ED. In the ED, patient was found to be hypoxemic at 78% on room air and in moderate distress. Patient was placed on NRB mask but continued to have issues with poor effort so electively intubated due to worsening condition. Patient was also given 1L fluid bolus for hypotension (systolic in the 80s). Patient sedated on propofol and fentanyl.    ED evaluation at [place] showed: WBC 6.21 with lymphopenia, bicarb 14, creatinine 2.6 (baseline 1.3), Ferritin 2202, , , , Lactic acid 0.9 and D-Dimer 0.4, BNP 41 and troponin bump to 0.087. Patient was started on ceftriaxone, Azithromycin and Plaquenil.     Patient was transferred to [facility] as [place] was out of ICU beds. Patient admitted to MICU on 3/28 for management of acute hypoxemic respiratory failure complicated by JAZMIN and troponinemia in the setting of COVID19.    Hospital/ICU Course:  Admitted to MICU on 3/28, intubated and mechanically ventilated. On Ceftriaxone, azithro and plaquenil. Started insulin gtt given glucose >400s and checking serum ketones to evaluate for  DKA. Trending troponins 0.087-->0.04, likely demand ischemia 2/2 ARDS. Patient noted to be in DKA with elevated beta hydroxybutyrate to 4.7. Patient fluid resuscitated about 3L from 3/28-3/29 and started on insulin gtt with improvement in glucose control. JAZMIN improving after fluid resuscitation. Patient did go into A fib with RVR on 3/29 and was started on amio gtt with conversion to PO given IV amio shortage. Patient was also started on low intensity heparin gtt given suspected component of microangiopathic coagulopathy related to JAZMIN. Of note, patient was persistently spiking fevers since admit up until 3/30 with a temp of 103 on 3/30 (still on ceftriaxone azithro). Tube feeds started on 3/30 (watching glucose very closely). Patient was placed in prone position on 3/30 at 1815 given P/F ratio of 122 (due to be supinated at 1015 on 3/31). 1/2 blood cultures from admission 3/28 resulted positive for coag negative staph (contaminant), repeat blood cultures ordered on 3/31-->NGTD. Patient was placed in prone position again on 3/31 at 1800 given P/F of 111 (due to supinate at 4/1 1000). Attempting to wean sedation as of 4/2, given improving oxygenation, and starting daily SAT/SBT. Unfortunately, throughout the day 4/3, patient had noted increasing oxygen requirements and pressor requirements, with concern for aspiration event as there was tube feeds contents suctioned out from ETT. Patient was started on vanc and cefepime and tube feeds held. Family was notified regarding acute decompensation and concern for poor prognosis and was made DNR.    Interval History/Significant Events:   Aspiration event with significant decompensation yesterday. Febrile to 101.1. On pressors, levophed. Glucose controlled. Intubated and mechanically ventilated on bilevel PEEP.    Review of Systems   Unable to perform ROS: Intubated     Objective:     Vital Signs (Most Recent):  Temp: 98.8 °F (37.1 °C) (04/04/20 0600)  Pulse: 73 (04/04/20  0600)  Resp: (!) 22 (04/04/20 0600)  BP: (!) 144/69 (04/02/20 0930)  SpO2: 95 % (04/04/20 0600) Vital Signs (24h Range):  Temp:  [98.4 °F (36.9 °C)-101.1 °F (38.4 °C)] 98.8 °F (37.1 °C)  Pulse:  [] 73  Resp:  [20-62] 22  SpO2:  [80 %-96 %] 95 %  Arterial Line BP: (108-233)/(48-96) 132/59   Weight: 108.9 kg (240 lb 1.3 oz)  Body mass index is 37.6 kg/m².      Intake/Output Summary (Last 24 hours) at 4/4/2020 0756  Last data filed at 4/4/2020 0620  Gross per 24 hour   Intake 1493.8 ml   Output 2050 ml   Net -556.2 ml       Physical Exam   Constitutional:   Examined from window to reduce COVID-19 contact   HENT:   Intubated   Cardiovascular:   Irregular tachycardia   Pulmonary/Chest:   Mechanically Ventilated   Genitourinary:   Genitourinary Comments: Motta in place   Musculoskeletal:   Trialysis catheter left IJ and arterial line in place   Nursing note and vitals reviewed.      Vents:  Vent Mode: BILEVL (04/04/20 0122)  Ventilator Initiated: Yes (03/28/20 1515)  Set Rate: 22 BPM (04/04/20 0122)  Vt Set: 0 mL (04/04/20 0122)  Pressure Support: 0 cmH20 (04/04/20 0122)  PEEP/CPAP: 0 cmH20 (04/04/20 0122)  Oxygen Concentration (%): 80 (04/04/20 0600)  Peak Airway Pressure: 33 cmH2O (04/04/20 0122)  Plateau Pressure: 27 cmH20 (04/04/20 0122)  Total Ve: 13.6 mL (04/04/20 0122)  F/VT Ratio<105 (RSBI): (!) 35.48 (04/04/20 0122)  Lines/Drains/Airways     Central Venous Catheter Line            Trialysis (Dialysis) Catheter 03/28/20 1820 external jugular;left internal jugular 6 days          Drain                 NG/OG Tube 03/28/20 0557 Ware sump 16 Fr. Center mouth 7 days         Urethral Catheter 03/28/20 0606 Non-latex 16 Fr. 7 days         Rectal Tube 03/29/20 2300 rectal tube w/ balloon (indicate number of mLs) 5 days          Airway                 Airway - Non-Surgical 03/28/20 0605 Endotracheal Tube 7 days          Arterial Line                 Arterial Line 03/28/20 1819 Right Radial 6 days               Significant Labs:    CBC/Anemia Profile:  Recent Labs   Lab 04/03/20  0412 04/04/20  0336   WBC 8.32 11.36   HGB 8.7* 8.3*   HCT 28.2* 27.6*    378*   MCV 98 100*   RDW 13.6 13.9   FERRITIN 2,392* 2,833*        Chemistries:  Recent Labs   Lab 04/03/20  0412 04/03/20  1108 04/03/20  1736 04/04/20  0336    142 142 142   K 4.6 4.8 4.5 4.2    108 110 110   CO2 23 25 23 22*   BUN 44* 46* 47* 41*   CREATININE 1.2 1.1 1.2 1.1   CALCIUM 8.5* 8.7 8.4* 8.4*   ALBUMIN 1.2*  --   --  1.2*   PROT 6.0  --   --  5.8*   BILITOT 0.6  --   --  1.2*   ALKPHOS 117  --   --  135   ALT 30  --   --  34   AST 46*  --   --  63*   MG 2.3  --   --  2.3   PHOS 2.3*  --   --  2.4*       ABG  Recent Labs   Lab 04/03/20  2148   PH 7.518*   PO2 81   PCO2 33.8*   HCO3 27.5   BE 5     Assessment/Plan:     Pulmonary  Acute respiratory failure with hypoxia  See acute resp distress syndrome due to 2019 novel coronavirus      Cardiac/Vascular  Hypertriglyceridemia  - likely 2/2 propofol  - try to avoid propofol  - monitor TG    A-fib  - New onset narrow complex tachyarrhythmia overnight 3/28-2/39. Max rate 150. No obvious p waves. Given mult pushes IV metoprolol with modest improvement in rate.   - Given 2g Mg 3/29  - started on amiodarone 3/29. Converted to amio po given IV amio shortage  - Pressor requirements increased since 3/29 night, now decreasing. Continue to monitor    NSTEMI (non-ST elevated myocardial infarction)  - mild troponin elevation, suspect Type 2 NSTEMI from severe metabolic derangements.     Aortic stenosis  - follows with cardiology outpatient    Mixed hyperlipidemia  - on statin at home, can restart when appropriate  - continue home plavix (follows with Dr Jiménez in cardiology and has a pending angiogram to evaluate for CAD)    Essential hypertension  - monitor BP  - pressors for hypotension, at very mild dose as of 3/31, likely sedation related    Renal/  Acute renal failure superimposed on stage 3 chronic  kidney disease  - creatinine up to 4 on 3/28, basline about 1.3. Improved after volume resuscitation.   - trend creatinine  - started on empiric low intensity heparin gtt 3/29 for renal dysfunction and suspicion of microangiopathic coagulopathy.   - trialysis line in the case of need for HD given renal failure incidence with COVID  - nephro consulted in setting of severe hyperkalemia which has improved     Hematology  Anticoagulated  - patient is on heparin gtt. We have received reports that patients with COVID may be more accurately monitored for therapeutic levels with factor Xa levels rather than aPTT  - checked factor Xa 4/2-->appears subtherapeutic    Endocrine  DKA (diabetic ketoacidoses)  - Presented with low bicarb, increased AG, + B OHB.   - resolved with insulin infusion. Required bicarbonate infusion 3/28.  - Continue on insulin infusion, monitor glucose closely, renee with starting tube feeds   - adding basal insulin 4/1, attempt to wean drip so we can reduce the amount of fluid we are giving him  - frequent accuchecks    Severe obesity (BMI 35.0-39.9) with comorbidity  - nutrition consult for tube feeds    Type 2 diabetes mellitus with stage 3 chronic kidney disease, without long-term current use of insulin  See DKA    GI  Transaminitis  See NAFLD    Nonalcoholic fatty liver disease  - history of nonalcoholic fatty liver disease  - trend LFTs  - if evidence of shock liver (transaminitis to the thousands), concerning for poor prognosis    Other  * Acute respiratory distress syndrome (ARDS) due to 2019 novel coronavirus  Covid-19 Virus Infection  Presents with SOB, myalgias. Flu negative.   - COVID-19 testing resulted POSITIVE     - Isolation:   - Airborne and Droplet Precautions  - N95 masks must be fit tested, wear eye protection  - 20 second hand hygiene              - Limit visitors per hospital policy              - Consolidating lab draws, nursing care, and interventions      PLAN:  - continue  mechanical ventilation (Intubated on 3/28 at [facility])  - low PEEP protocol per ARDSnet  - reduced TV to 6ccs/kg 3/29. Started on 7 on admit for acid clearance given DKA.   - patient placed in prone position on 3/30 and again on 3/31  - strict isolation protocol 2/2 positive COVID  - plaquenil for COVID  - f/u ID recs regarding antivirals for COVID  - CAP coverage with 5 day course ceftriaxone azithro completed 4/1  - f/u blood cultures from 3/28-->1/2 positive for coag staph, likely contamininant  - f/u sputum cultures from 3/28-->NGTD as of 3/31  - f/u repeat blood cultures from 3/31-->so far NGTD as of 4/2  - f/u repeat blood and sputum cultures from 4/3 (concern for aspiration event with significant decompensation)  - vanc and cefepime started on 4/3 for concern for septic shock from aspiration    Shock, unspecified  - Suspect distributive 2/2 sedation with contribution from hypovolemia, high PEEP. Possible component of septic shock from COVID-19 infection.   - Pressor requirements stable on about 0.3 levo and vasopressin on 3/29. Off vaso on 3/30. Watch closely   - Holding steroids in setting of uncontrolled hyperglycemia.     Acute respiratory distress syndrome (ARDS) due to COVID-19 virus  See acute resp distress syndrome due to 2019 novel coronavirus      Endotracheally intubated  See acute resp distress syndrome due to 2019 novel coronavirus      COVID-19 virus detected  See acute resp distress syndrome due to 2019 novel coronavirus     Critical Care Daily Checklist:    A: Awake: RASS Goal/Actual Goal: RASS Goal: -2-->light sedation  Actual: Mensah Agitation Sedation Scale (RASS): Unarousable   B: Spontaneous Breathing Trial Performed? Spon. Breathing Trial Initiated?: Not initiated (03/30/20 0840)   C: SAT & SBT Coordinated?  No                       D: Delirium: CAM-ICU Overall CAM-ICU: Positive   E: Early Mobility Performed? No   F: Feeding Goal: Goals: meet % EEN/EPN by KERWIN  following  Status: Nutrition Goal Status: goal not met   Current Diet Order   Procedures    Diet NPO      AS: Analgesia/Sedation Dilaudid, propofol, precedex   T: Thromboembolic Prophylaxis Heparin gtt   H: HOB > 300 Yes   U: Stress Ulcer Prophylaxis (if needed) protonix   G: Glucose Control Controlled    B: Bowel Function Stool Occurrence: 1(flexi)   I: Indwelling Catheter (Lines & Motta) Necessity Yes, trialysis, arterial, ETT, OG tube   D: De-escalation of Antimicrobials/Pharmacotherapies vanc and cefepime for septic shock, await cultures, concern for aspiration event 4/3    Plan for the day/ETD Continue ABX, follow cultures, supportive care, wean O2, monitor glucose, watch for bleeding given heparin gtt, continue goals of care discussions with family    Code Status:  Family/Goals of Care: DNR         Critical secondary to Patient has a condition that poses threat to life and bodily function: Severe Respiratory Distress      Critical care was time spent personally by me on the following activities: development of treatment plan with patient or surrogate and bedside caregivers, discussions with consultants, evaluation of patient's response to treatment, examination of patient, ordering and performing treatments and interventions, ordering and review of laboratory studies, ordering and review of radiographic studies, pulse oximetry, re-evaluation of patient's condition. This critical care time did not overlap with that of any other provider or involve time for any procedures.     Jessica Blanco MD  Critical Care Medicine  [facility]

## 2020-04-04 NOTE — ASSESSMENT & PLAN NOTE
Covid-19 Virus Infection  Presents with SOB, myalgias. Flu negative.   - COVID-19 testing resulted POSITIVE     - Isolation:   - Airborne and Droplet Precautions  - N95 masks must be fit tested, wear eye protection  - 20 second hand hygiene              - Limit visitors per hospital policy              - Consolidating lab draws, nursing care, and interventions      PLAN:  - continue mechanical ventilation (Intubated on 3/28 at [facility])  - low PEEP protocol per ARDSnet  - reduced TV to 6ccs/kg 3/29. Started on 7 on admit for acid clearance given DKA.   - patient placed in prone position on 3/30 and again on 3/31  - strict isolation protocol 2/2 positive COVID  - plaquenil for COVID  - f/u ID recs regarding antivirals for COVID  - CAP coverage with 5 day course ceftriaxone azithro completed 4/1  - f/u blood cultures from 3/28-->1/2 positive for coag staph, likely contamininant  - f/u sputum cultures from 3/28-->NGTD as of 3/31  - f/u repeat blood cultures from 3/31-->so far NGTD as of 4/2  - f/u repeat blood and sputum cultures from 4/3 (concern for aspiration event with significant decompensation)  - vanc and cefepime started on 4/3 for concern for septic shock from aspiration

## 2020-04-04 NOTE — PLAN OF CARE
Spoke to patient's wife over phone today and updated on plan of care. See below.     Pulmonary: Patient initially on bilevel settings, weaned back to AC mode 70% +10; unable to wean to 60% as patient SpO2 dropped to 80%. Baseline SpO2 89%. Weaned off paralytic today.     Cardiovascular: SR on tele. Art line, levo gtt for SBP >100.     Neurological: still not waking up. Weaned off dilaudid, weaning propofol.     Gastrointestinal: OGT, residual 0-180cc throughout day. TF held 2/2 aspiration event yesterday. Flexi producing moderate amount liquid stool.     Genitourinary: Motta in place. Lasix given today, ~1150cc output during shift.     Endocrine: Sugars stable on insulin gtt.     Skin/Bath: Perineal care done, partial bath and linen changed. Skin tear noted to buttock, triad cream applied.    Date of last CHG bath given: 4/3 PM    Patient progressing towards goals as tolerated, plan of care communicated and reviewed with Erick Valdovinos and family, all concerns addressed, will continue to monitor.     Jennifer Billingsley RN

## 2020-04-04 NOTE — SUBJECTIVE & OBJECTIVE
Interval History/Significant Events:   Aspiration event with significant decompensation yesterday. Febrile to 101.1. On pressors, levophed. Glucose controlled. Intubated and mechanically ventilated on bilevel PEEP.    Review of Systems   Unable to perform ROS: Intubated     Objective:     Vital Signs (Most Recent):  Temp: 98.8 °F (37.1 °C) (04/04/20 0600)  Pulse: 73 (04/04/20 0600)  Resp: (!) 22 (04/04/20 0600)  BP: (!) 144/69 (04/02/20 0930)  SpO2: 95 % (04/04/20 0600) Vital Signs (24h Range):  Temp:  [98.4 °F (36.9 °C)-101.1 °F (38.4 °C)] 98.8 °F (37.1 °C)  Pulse:  [] 73  Resp:  [20-62] 22  SpO2:  [80 %-96 %] 95 %  Arterial Line BP: (108-233)/(48-96) 132/59   Weight: 108.9 kg (240 lb 1.3 oz)  Body mass index is 37.6 kg/m².      Intake/Output Summary (Last 24 hours) at 4/4/2020 0756  Last data filed at 4/4/2020 0620  Gross per 24 hour   Intake 1493.8 ml   Output 2050 ml   Net -556.2 ml       Physical Exam   Constitutional:   Examined from window to reduce COVID-19 contact   HENT:   Intubated   Cardiovascular:   Irregular tachycardia   Pulmonary/Chest:   Mechanically Ventilated   Genitourinary:   Genitourinary Comments: Motta in place   Musculoskeletal:   Trialysis catheter left IJ and arterial line in place   Nursing note and vitals reviewed.      Vents:  Vent Mode: BILEVL (04/04/20 0122)  Ventilator Initiated: Yes (03/28/20 1515)  Set Rate: 22 BPM (04/04/20 0122)  Vt Set: 0 mL (04/04/20 0122)  Pressure Support: 0 cmH20 (04/04/20 0122)  PEEP/CPAP: 0 cmH20 (04/04/20 0122)  Oxygen Concentration (%): 80 (04/04/20 0600)  Peak Airway Pressure: 33 cmH2O (04/04/20 0122)  Plateau Pressure: 27 cmH20 (04/04/20 0122)  Total Ve: 13.6 mL (04/04/20 0122)  F/VT Ratio<105 (RSBI): (!) 35.48 (04/04/20 0122)  Lines/Drains/Airways     Central Venous Catheter Line            Trialysis (Dialysis) Catheter 03/28/20 1820 external jugular;left internal jugular 6 days          Drain                 NG/OG Tube 03/28/20 0557 Renetta grewal  16 Fr. Center mouth 7 days         Urethral Catheter 03/28/20 0606 Non-latex 16 Fr. 7 days         Rectal Tube 03/29/20 2300 rectal tube w/ balloon (indicate number of mLs) 5 days          Airway                 Airway - Non-Surgical 03/28/20 0605 Endotracheal Tube 7 days          Arterial Line                 Arterial Line 03/28/20 1819 Right Radial 6 days              Significant Labs:    CBC/Anemia Profile:  Recent Labs   Lab 04/03/20  0412 04/04/20  0336   WBC 8.32 11.36   HGB 8.7* 8.3*   HCT 28.2* 27.6*    378*   MCV 98 100*   RDW 13.6 13.9   FERRITIN 2,392* 2,833*        Chemistries:  Recent Labs   Lab 04/03/20  0412 04/03/20  1108 04/03/20  1736 04/04/20  0336    142 142 142   K 4.6 4.8 4.5 4.2    108 110 110   CO2 23 25 23 22*   BUN 44* 46* 47* 41*   CREATININE 1.2 1.1 1.2 1.1   CALCIUM 8.5* 8.7 8.4* 8.4*   ALBUMIN 1.2*  --   --  1.2*   PROT 6.0  --   --  5.8*   BILITOT 0.6  --   --  1.2*   ALKPHOS 117  --   --  135   ALT 30  --   --  34   AST 46*  --   --  63*   MG 2.3  --   --  2.3   PHOS 2.3*  --   --  2.4*

## 2020-04-05 PROBLEM — G93.41 ACUTE METABOLIC ENCEPHALOPATHY: Status: ACTIVE | Noted: 2020-01-01

## 2020-04-05 NOTE — CARE UPDATE
Spoke with patient's wife and daughter, who is a neonatologist. Updated them that we have been making progress with decreasing the amount of sedation that he is requiring and weaning his ventilator settings. Updated them that we had checked his endotracheal aspirate for pathogens and found gram positive cocci. We are still awaiting speciation, but have already started him on broad-spectrum antibiotics for the infection empirically.     Explained that he remains critically ill and on the breathing machine.       Behram Khan, MD  Internal Medicine, PGY-3  #[phone]  M: [phone]

## 2020-04-05 NOTE — ASSESSMENT & PLAN NOTE
- Suspect distributive 2/2 sedation with contribution from hypovolemia, high PEEP. Possible component of septic shock from COVID-19 infection.   - Pressor requirements stable on about 0.3 levo and vasopressin on 3/29. Off vaso on 3/30. Watch closely   - Holding steroids in setting of uncontrolled hyperglycemia. a

## 2020-04-05 NOTE — PROGRESS NOTES
[facility]  Critical Care Medicine  Progress Note    Patient Name: Erick Valdovinos  MRN: [id number]  Admission Date: 3/28/2020  Hospital Length of Stay: 8 days  Code Status: DNR  Attending Provider: Kizzy Ingram DO  Primary Care Provider: Brandon Kinney MD   Principal Problem: Acute respiratory distress syndrome (ARDS) due to 2019 novel coronavirus    Subjective:     HPI:  Patient is a 68 yo male with HTN, HLD, DM2, CKD3, NAFLD, aortic stenosis, diabetic neuropathy, cervical radiculopathy who presented to [facility] on 3/28 for worsening SOB in the setting of positive COVID testing done on 3/19-->resulted positive on 3/23. Patient had had fevers and diarrhea for the week prior, fevers as high as 103. He was also having difficulty seeing. Symptoms non-remitting until 3/28 and then had worsening fatigue and weakness, which led him to present to [facility] ED. In the ED, patient was found to be hypoxemic at 78% on room air and in moderate distress. Patient was placed on NRB mask but continued to have issues with poor effort so electively intubated due to worsening condition. Patient was also given 1L fluid bolus for hypotension (systolic in the 80s). Patient sedated on propofol and fentanyl.    ED evaluation at [place] showed: WBC 6.21 with lymphopenia, bicarb 14, creatinine 2.6 (baseline 1.3), Ferritin 2202, , , , Lactic acid 0.9 and D-Dimer 0.4, BNP 41 and troponin bump to 0.087. Patient was started on ceftriaxone, Azithromycin and Plaquenil.     [place] [facility] [place] out of [place] beds. Patient admitted to MICU on 3/28 for management of acute hypoxemic respiratory failure complicated by JAZMIN and troponinemia in the setting of COVID19.    Hospital/ICU Course:  Admitted to MICU on 3/28, intubated and mechanically ventilated. On Ceftriaxone, azithro and plaquenil. Started insulin gtt given glucose >400s and checking serum ketones to evaluate for  DKA. Trending troponins 0.087-->0.04, likely demand ischemia 2/2 ARDS. Patient noted to be in DKA with elevated beta hydroxybutyrate to 4.7. Patient fluid resuscitated about 3L from 3/28-3/29 and started on insulin gtt with improvement in glucose control. JAZMIN improving after fluid resuscitation. Patient did go into A fib with RVR on 3/29 and was started on amio gtt with conversion to PO given IV amio shortage. Patient was also started on low intensity heparin gtt given suspected component of microangiopathic coagulopathy related to JAZMIN. Of note, patient was persistently spiking fevers since admit up until 3/30 with a temp of 103 on 3/30 (still on ceftriaxone azithro). Tube feeds started on 3/30 (watching glucose very closely). Patient was placed in prone position on 3/30 at 1815 given P/F ratio of 122 (due to be supinated at 1015 on 3/31). 1/2 blood cultures from admission 3/28 resulted positive for coag negative staph (contaminant), repeat blood cultures ordered on 3/31-->NGTD. Patient was placed in prone position again on 3/31 at 1800 given P/F of 111 (due to supinate at 4/1 1000). Attempting to wean sedation as of 4/2, given improving oxygenation, and starting daily SAT/SBT. Unfortunately, throughout the day 4/3, patient had noted increasing oxygen requirements and pressor requirements, with concern for aspiration event as there was tube feeds contents suctioned out from ETT. Patient was started on vanc and cefepime and tube feeds held. Family was notified regarding acute decompensation and concern for poor prognosis and was made DNR.    04/05/2020 Pt required midazolam bolus alongside increases in fentanyl and precedex infusions to treat desaturation overnight. He was placed on FiO2 of 100% and intermittently paralyzed with rocuronium.       Interval History/Significant Events: Pt required midazolam bolus alongside increases in fentanyl and precedex infusions to treat desaturation overnight. He was placed on  FiO2 of 100% and intermittently paralyzed with rocuronium. Weaned off propofol in d/t hypertriglyceridemia.    Review of Systems   Unable to perform ROS: Intubated     Objective:     Vital Signs (Most Recent):  Temp: 99.1 °F (37.3 °C) (04/05/20 1002)  Pulse: 74 (04/05/20 1002)  Resp: (!) 21 (04/05/20 1002)  BP: (!) 144/69 (04/02/20 0930)  SpO2: (!) 94 % (04/05/20 1002) Vital Signs (24h Range):  Temp:  [97.3 °F (36.3 °C)-101.3 °F (38.5 °C)] 99.1 °F (37.3 °C)  Pulse:  [] 74  Resp:  [21-40] 21  SpO2:  [88 %-100 %] 94 %  Arterial Line BP: ()/(40-66) 134/54   Weight: 108.9 kg (240 lb 1.3 oz)  Body mass index is 37.6 kg/m².      Intake/Output Summary (Last 24 hours) at 4/5/2020 1043  Last data filed at 4/5/2020 0900  Gross per 24 hour   Intake 3954.29 ml   Output 1825 ml   Net 2129.29 ml       Physical Exam   Nursing note and vitals reviewed.      Vents:  Vent Mode: A/C (04/05/20 1002)  Ventilator Initiated: Yes (03/28/20 1515)  Set Rate: 26 BPM (04/05/20 1002)  Vt Set: 0 mL (04/05/20 1002)  Pressure Support: 0 cmH20 (04/05/20 1002)  PEEP/CPAP: 10 cmH20 (04/05/20 1002)  Oxygen Concentration (%): 70 (04/05/20 1002)  Peak Airway Pressure: 26 cmH2O (04/05/20 1002)  Plateau Pressure: 26 cmH20 (04/05/20 1002)  Total Ve: 13.6 mL (04/05/20 1002)  F/VT Ratio<105 (RSBI): (!) 30.84 (04/05/20 1002)  Lines/Drains/Airways     Central Venous Catheter Line            Trialysis (Dialysis) Catheter 03/28/20 1820 external jugular;left internal jugular 7 days          Drain                 NG/OG Tube 03/28/20 0557 [place] sump 16 Fr. Center mouth 8 days         Urethral Catheter 03/28/20 0606 Non-latex 16 Fr. 8 days         Rectal Tube 03/29/20 2300 rectal tube w/ balloon (indicate number of mLs) 6 days          Airway                 Airway - Non-Surgical 03/28/20 0605 Endotracheal Tube 8 days          Arterial Line                 Arterial Line 03/28/20 1819 Right Radial 7 days              Significant Labs:    CBC/Anemia  Profile:  Recent Labs   Lab 04/04/20  0336 04/05/20  0430   WBC 11.36 12.43   HGB 8.3* 9.6*   HCT 27.6* 31.5*   * 396*   * 102*   RDW 13.9 14.1   FERRITIN 2,833* 3,187*        Chemistries:  Recent Labs   Lab 04/04/20  0336  04/04/20  2336 04/05/20  0430 04/05/20  0800      < > 140 140 139   K 4.2   < > 4.7 5.2* 4.8      < > 106 107 106   CO2 22*   < > 25 21* 20*   BUN 41*   < > 41* 41* 41*   CREATININE 1.1   < > 1.3 1.2 1.3   CALCIUM 8.4*   < > 8.2* 8.4* 8.2*   ALBUMIN 1.2*  --   --  1.3*  --    PROT 5.8*  --   --  6.7  --    BILITOT 1.2*  --   --  1.4*  --    ALKPHOS 135  --   --  146*  --    ALT 34  --   --  42  --    AST 63*  --   --  67*  --    MG 2.3  --   --  1.9  --    PHOS 2.4*  --   --  4.4  --     < > = values in this interval not displayed.       Recent Lab Results       04/05/20  0801   04/05/20  0800   04/05/20  0430   04/04/20  2351   04/04/20  2336        Albumin     1.3         Alkaline Phosphatase     146         ALT     42         Anion Gap   13 12   9     Aniso     Slight         aPTT     33.4  Comment:  aPTT therapeutic range = 39-69 seconds         AST     67  Comment:  *Result may be interfered by visible hemolysis         BANDS     2.0         Basophil%     0.0         BILIRUBIN TOTAL     1.4  Comment:  For infants and newborns, interpretation of results should be based  on gestational age, weight and in agreement with clinical  observations.  Premature Infant recommended reference ranges:  Up to 24 hours.............<8.0 mg/dL  Up to 48 hours............<12.0 mg/dL  3-5 days..................<15.0 mg/dL  6-29 days.................<15.0 mg/dL           BUN, Bld   41 41   41     Calcium   8.2 8.4   8.2     Chloride   106 107   106     CO2   20 21   25     Creatinine   1.3 1.2   1.3     CRP     256.6         D-Dimer     3.65  Comment:  The quantitative D-dimer assay should be used as an aid in   the diagnosis of deep vein thrombosis and pulmonary embolism  in patients  with the appropriate presentation and clinical  history. The upper limit of the reference interval and the clinical   cut off   point are identical. Causes of a positive (>0.50 mg/L FEU) D-Dimer   test  include, but are not limited to: DVT, PE, DIC, thrombolytic   therapy, anticoagulant therapy, recent surgery, trauma, or   pregnancy, disseminated malignancy, aortic aneurysm, cirrhosis,  and severe infection. False negative results may occur in   patients with distal DVT.           Differential Method     Manual         eGFR if    >60.0 >60.0   >60.0     eGFR if non    56.1  Comment:  Calculation used to obtain the estimated glomerular filtration  rate (eGFR) is the CKD-EPI equation.    >60.0  Comment:  Calculation used to obtain the estimated glomerular filtration  rate (eGFR) is the CKD-EPI equation.      56.1  Comment:  Calculation used to obtain the estimated glomerular filtration  rate (eGFR) is the CKD-EPI equation.        Eosinophil%     0.0         Ferritin     3,187         Fragmented Cells     Occasional         Large/Giant Platelets     Present         Glucose   287 227   192     Gran%     89.0         Hematocrit     31.5         Hemoglobin     9.6         Heparin Anti-Xa     0.43  Comment:  Expected therapeutic range for Unfractionated heparin (UFH)  is 0.3-0.7 IU/mL.  The therapeutic range for low molecular weight heparins   (LMWH) varies with the type and , but is   typically between 0.4 and 1.1 IU/mL.           Hypo     Occasional         Immature Grans (Abs)     CANCELED  Comment:  Mild elevation in immature granulocytes is non specific and   can be seen in a variety of conditions including stress response,   acute inflammation, trauma and pregnancy. Correlation with other   laboratory and clinical findings is essential.    Result canceled by the ancillary.           Immature Granulocytes     CANCELED  Comment:  Result canceled by the ancillary.          Lymph%     3.0         Magnesium     1.9         MCH     31.0         MCHC     30.5         MCV     102         Mono%     6.0         MPV     11.0         nRBC     0         Phosphorus     4.4         Platelet Estimate     Increased         Platelets     396         POCT Glucose 259     204       Poik     Slight         Potassium   4.8 5.2   4.7     PROTEIN TOTAL     6.7         RBC     3.10         RDW     14.1         Schistocytes     Present         Sodium   139 140   140     Triglycerides     600  Comment:  The National Cholesterol Education Program (NCEP) has set the  following guidelines (reference values) for triglycerides:  Normal......................<150 mg/dL  Borderline High.............150-199 mg/dL  High........................200-499 mg/dL           WBC     12.43                          04/04/20 2007 04/04/20  1648   04/04/20  1634   04/04/20  1221        Albumin             Alkaline Phosphatase             ALT             Anion Gap   8         Aniso             aPTT       36.5  Comment:  aPTT therapeutic range = 39-69 seconds     AST             BANDS             Basophil%             BILIRUBIN TOTAL             BUN, Bld   41         Calcium   8.2         Chloride   109         CO2   25         Creatinine   1.2         CRP             D-Dimer             Differential Method             eGFR if    >60.0         eGFR if non    >60.0  Comment:  Calculation used to obtain the estimated glomerular filtration  rate (eGFR) is the CKD-EPI equation.            Eosinophil%             Ferritin             Fragmented Cells             Large/Giant Platelets             Glucose   129         Gran%             Hematocrit             Hemoglobin             Heparin Anti-Xa             Hypo             Immature Grans (Abs)             Immature Granulocytes             Lymph%             Magnesium             MCH             MCHC             MCV             Mono%             MPV              nRBC             Phosphorus             Platelet Estimate             Platelets             POCT Glucose 113   142       Poik             Potassium   4.4         PROTEIN TOTAL             RBC             RDW             Schistocytes             Sodium   142         Triglycerides             WBC                 All pertinent labs within the past 24 hours have been reviewed.    Significant Imaging:  I have reviewed all pertinent imaging results/findings within the past 24 hours.      ABG  Recent Labs   Lab 04/03/20  2148   PH 7.518*   PO2 81   PCO2 33.8*   HCO3 27.5   BE 5     Assessment/Plan:     Neuro  Acute metabolic encephalopathy  Secondary to sedation. Will continue to wean appropriately.     Pulmonary  Acute respiratory failure with hypoxia  See acute resp distress syndrome due to 2019 novel coronavirus      Cardiac/Vascular  Hypertriglyceridemia  - likely 2/2 propofol  - try to avoid propofol  - monitor TG    A-fib  - New onset narrow complex tachyarrhythmia overnight 3/28-2/39. Max rate 150. No obvious p waves. Given mult pushes IV metoprolol with modest improvement in rate.   - Given 2g Mg 3/29  - started on amiodarone 3/29. Converted to amio po given IV amio shortage  - Pressor requirements increased since 3/29 night, now decreasing. Continue to monitor    NSTEMI (non-ST elevated myocardial infarction)  - mild troponin elevation, suspect Type 2 NSTEMI from severe metabolic derangements.     Aortic stenosis  - follows with cardiology outpatient    Mixed hyperlipidemia  - on statin at home, can restart when appropriate  - continue home plavix (follows with Dr Jiménez in cardiology and has a pending angiogram to evaluate for CAD)    Essential hypertension  - monitor BP  - pressors for hypotension, at very mild dose as of 3/31, likely sedation related    Renal/  Acute renal failure superimposed on stage 3 chronic kidney disease  - creatinine up to 4 on 3/28, basline about 1.3. Improved after volume  resuscitation.   - trend creatinine  - started on empiric low intensity heparin gtt 3/29 for renal dysfunction and suspicion of microangiopathic coagulopathy.   - trialysis line in the case of need for HD given renal failure incidence with COVID  - nephro consulted in setting of severe hyperkalemia which has improved     Hematology  Anticoagulated  - patient is on heparin gtt. We have received reports that patients with COVID may be more accurately monitored for therapeutic levels with factor Xa levels rather than aPTT  - checked factor Xa 4/2-->appears subtherapeutic    Endocrine  DKA (diabetic ketoacidoses)  - Presented with low bicarb, increased AG, + B OHB.   - resolved with insulin infusion. Required bicarbonate infusion 3/28.  - Continue on insulin infusion, monitor glucose closely, renee with starting tube feeds   - adding basal insulin 4/1, attempt to wean drip so we can reduce the amount of fluid we are giving him  - frequent accuchecks    Severe obesity (BMI 35.0-39.9) with comorbidity  - nutrition consult for tube feeds    Type 2 diabetes mellitus with stage 3 chronic kidney disease, without long-term current use of insulin  See DKA    GI  Transaminitis  See NAFLD    Nonalcoholic fatty liver disease  - history of nonalcoholic fatty liver disease  - trend LFTs  - if evidence of shock liver (transaminitis to the thousands), concerning for poor prognosis    Other  * Acute respiratory distress syndrome (ARDS) due to 2019 novel coronavirus  Covid-19 Virus Infection  Presents with SOB, myalgias. Flu negative.   - COVID-19 testing resulted POSITIVE     - Isolation:   - Airborne and Droplet Precautions  - N95 masks must be fit tested, wear eye protection  - 20 second hand hygiene              - Limit visitors per hospital policy              - Consolidating lab draws, nursing care, and interventions      PLAN:  - continue mechanical ventilation (Intubated on 3/28 at [facility])  - low PEEP protocol per  ARDSnet  - reduced TV to 6ccs/kg 3/29. Started on 7 on admit for acid clearance given DKA.   - patient placed in prone position on 3/30 and again on 3/31  - strict isolation protocol 2/2 positive COVID  - plaquenil for COVID  - f/u ID recs regarding antivirals for COVID  - CAP coverage with 5 day course ceftriaxone azithro completed 4/1  - f/u blood cultures from 3/28-->1/2 positive for coag staph, likely contamininant  - f/u sputum cultures from 3/28-->NGTD as of 3/31  - f/u repeat blood cultures from 3/31-->so far NGTD as of 4/2  - f/u repeat blood and sputum cultures from 4/3 (concern for aspiration event with significant decompensation)  - vanc and cefepime started on 4/3 for concern for septic shock from aspiration    Shock, unspecified  - Suspect distributive 2/2 sedation with contribution from hypovolemia, high PEEP. Possible component of septic shock from COVID-19 infection.   - Pressor requirements stable on about 0.3 levo and vasopressin on 3/29. Off vaso on 3/30. Watch closely   - Holding steroids in setting of uncontrolled hyperglycemia. a    Acute respiratory distress syndrome (ARDS) due to COVID-19 virus  See acute resp distress syndrome due to 2019 novel coronavirus      Endotracheally intubated  See acute resp distress syndrome due to 2019 novel coronavirus      COVID-19 virus detected  See acute resp distress syndrome due to 2019 novel coronavirus       Critical Care Daily Checklist:    A: Awake: RASS Goal/Actual Goal: RASS Goal: -5-->unarousable  Actual: [place] Agitation Sedation Scale (RASS): Unarousable   B: Spontaneous Breathing Trial Performed? Spon. Breathing Trial Initiated?: Not initiated (03/30/20 0840)   C: SAT & SBT Coordinated?  Will wean sedation                      D: Delirium: CAM-ICU Overall CAM-ICU: Positive   E: Early Mobility Performed? Yes   F: Feeding Goal: Goals: meet % EEN/EPN by RD following  Status: Nutrition Goal Status: goal not met   Current Diet Order    Procedures    Diet NPO      AS: Analgesia/Sedation Propofol, fentanyl, precedex  Transition off propofol in favor of PRN ativan   T: Thromboembolic Prophylaxis Heparin   H: HOB > 300 Yes   U: Stress Ulcer Prophylaxis (if needed) Famotidine   G: Glucose Control Insulin, gtt   B: Bowel Function Stool Occurrence: 1(flexi)   I: Indwelling Catheter (Lines & [place]) Necessity trialysis and peralta   D: De-escalation of Antimicrobials/Pharmacotherapies Vanc/cefepime until respiratory cultures return    Plan for the day/ETD Wean sedation    Code Status:  Family/Goals of Care: DNR  Will update famil       Critical secondary to Patient has a condition that poses threat to life and bodily function: Severe Respiratory Distress      Critical care was time spent personally by me on the following activities: development of treatment plan with patient or surrogate and bedside caregivers, discussions with consultants, evaluation of patient's response to treatment, examination of patient, ordering and performing treatments and interventions, ordering and review of laboratory studies, ordering and review of radiographic studies, pulse oximetry, re-evaluation of patient's condition. This critical care time did not overlap with that of any other provider or involve time for any procedures.     Behram Khan, MD  Critical Care Medicine  [facility]

## 2020-04-05 NOTE — PLAN OF CARE
CMICU DAILY GOALS       A: Awake    RASS: Goal - RASS Goal: -5-->unarousable  Actual - RASS ([place] Agitation-Sedation Scale): -4-->deep sedation   Restraint necessity: Clinical Justification: Removing medical devices  B: Breath   SBT: Not attempted   C: Coordinate A & B, analgesics/sedatives   Pain: managed    SAT: Not attempted  D: Delirium   CAM-ICU: Overall CAM-ICU: Positive  E: Early Mobility   MOVE Screen: Fail   Activity: Activity Management: bedrest maintained per order  FAS: Feeding/Nutrition   Diet order: Diet/Nutrition Received: NPO, tube feeding,   Fluid restriction:    T: Thrombus   DVT prophylaxis: VTE Required Core Measure: Pharmacological prophylaxis initiated/maintained  H: HOB Elevation   Head of Bed (HOB): HOB at 30-45 degrees  U: Ulcer Prophylaxis   GI: yes  G: Glucose control   managed Glycemic Management: blood glucose monitoring  S: Skin   Bundle compliance: yes   Bathing/Skin Care: bath, partial Date: 4/4/2020  B: Bowel Function   no issues   I: Indwelling Catheters   [place] necessity:      Urethral Catheter 03/28/20 0606 Non-latex 16 Fr.-Reason for Continuing Urinary Catheterization: Critically ill in ICU requiring intensive monitoring   CVC necessity: Yes   IPAD offered: No  D: De-escalation Antibx   No  Plan for the day   Antibiotics, wean vent settings, TF restarted  Family/Goals of care/Code Status   Code Status: DNR     Current vent settings AC 26/PC/50%/5 with O2 sat in low 90s. Pt remains on Propofol, fentanyl, precedex gtts to maintain sedation. Pt also on Levophed gtt to maintain MAP >65mmHg and antibiotics per MD orders. VS and assessment per flow sheet, patient progressing towards goals as tolerated, plan of care reviewed with wife Yenny and family with all questions and concerns addressed, will continue to monitor.

## 2020-04-05 NOTE — SUBJECTIVE & OBJECTIVE
Interval History/Significant Events: Pt required midazolam bolus alongside increases in fentanyl and precedex infusions to treat desaturation overnight. He was placed on FiO2 of 100% and intermittently paralyzed with rocuronium. Weaned off propofol in d/t hypertriglyceridemia.    Review of Systems   Unable to perform ROS: Intubated     Objective:     Vital Signs (Most Recent):  Temp: 99.1 °F (37.3 °C) (04/05/20 1002)  Pulse: 74 (04/05/20 1002)  Resp: (!) 21 (04/05/20 1002)  BP: (!) 144/69 (04/02/20 0930)  SpO2: (!) 94 % (04/05/20 1002) Vital Signs (24h Range):  Temp:  [97.3 °F (36.3 °C)-101.3 °F (38.5 °C)] 99.1 °F (37.3 °C)  Pulse:  [] 74  Resp:  [21-40] 21  SpO2:  [88 %-100 %] 94 %  Arterial Line BP: ()/(40-66) 134/54   Weight: 108.9 kg (240 lb 1.3 oz)  Body mass index is 37.6 kg/m².      Intake/Output Summary (Last 24 hours) at 4/5/2020 1043  Last data filed at 4/5/2020 0900  Gross per 24 hour   Intake 3954.29 ml   Output 1825 ml   Net 2129.29 ml       Physical Exam   Nursing note and vitals reviewed.      Vents:  Vent Mode: A/C (04/05/20 1002)  Ventilator Initiated: Yes (03/28/20 1515)  Set Rate: 26 BPM (04/05/20 1002)  Vt Set: 0 mL (04/05/20 1002)  Pressure Support: 0 cmH20 (04/05/20 1002)  PEEP/CPAP: 10 cmH20 (04/05/20 1002)  Oxygen Concentration (%): 70 (04/05/20 1002)  Peak Airway Pressure: 26 cmH2O (04/05/20 1002)  Plateau Pressure: 26 cmH20 (04/05/20 1002)  Total Ve: 13.6 mL (04/05/20 1002)  F/VT Ratio<105 (RSBI): (!) 30.84 (04/05/20 1002)  Lines/Drains/Airways     Central Venous Catheter Line            Trialysis (Dialysis) Catheter 03/28/20 1820 external jugular;left internal jugular 7 days          Drain                 NG/OG Tube 03/28/20 0557 [place] sump 16 Fr. Center mouth 8 days         Urethral Catheter 03/28/20 0606 Non-latex 16 Fr. 8 days         Rectal Tube 03/29/20 2300 rectal tube w/ balloon (indicate number of mLs) 6 days          Airway                 Airway - Non-Surgical  03/28/20 0605 Endotracheal Tube 8 days          Arterial Line                 Arterial Line 03/28/20 1819 Right Radial 7 days              Significant Labs:    CBC/Anemia Profile:  Recent Labs   Lab 04/04/20  0336 04/05/20  0430   WBC 11.36 12.43   HGB 8.3* 9.6*   HCT 27.6* 31.5*   * 396*   * 102*   RDW 13.9 14.1   FERRITIN 2,833* 3,187*        Chemistries:  Recent Labs   Lab 04/04/20  0336  04/04/20  2336 04/05/20  0430 04/05/20  0800      < > 140 140 139   K 4.2   < > 4.7 5.2* 4.8      < > 106 107 106   CO2 22*   < > 25 21* 20*   BUN 41*   < > 41* 41* 41*   CREATININE 1.1   < > 1.3 1.2 1.3   CALCIUM 8.4*   < > 8.2* 8.4* 8.2*   ALBUMIN 1.2*  --   --  1.3*  --    PROT 5.8*  --   --  6.7  --    BILITOT 1.2*  --   --  1.4*  --    ALKPHOS 135  --   --  146*  --    ALT 34  --   --  42  --    AST 63*  --   --  67*  --    MG 2.3  --   --  1.9  --    PHOS 2.4*  --   --  4.4  --     < > = values in this interval not displayed.       Recent Lab Results       04/05/20  0801   04/05/20  0800   04/05/20  0430   04/04/20  2351   04/04/20  2336        Albumin     1.3         Alkaline Phosphatase     146         ALT     42         Anion Gap   13 12   9     Aniso     Slight         aPTT     33.4  Comment:  aPTT therapeutic range = 39-69 seconds         AST     67  Comment:  *Result may be interfered by visible hemolysis         BANDS     2.0         Basophil%     0.0         BILIRUBIN TOTAL     1.4  Comment:  For infants and newborns, interpretation of results should be based  on gestational age, weight and in agreement with clinical  observations.  Premature Infant recommended reference ranges:  Up to 24 hours.............<8.0 mg/dL  Up to 48 hours............<12.0 mg/dL  3-5 days..................<15.0 mg/dL  6-29 days.................<15.0 mg/dL           BUN, Bld   41 41   41     Calcium   8.2 8.4   8.2     Chloride   106 107   106     CO2   20 21   25     Creatinine   1.3 1.2   1.3     CRP     256.6          D-Dimer     3.65  Comment:  The quantitative D-dimer assay should be used as an aid in   the diagnosis of deep vein thrombosis and pulmonary embolism  in patients with the appropriate presentation and clinical  history. The upper limit of the reference interval and the clinical   cut off   point are identical. Causes of a positive (>0.50 mg/L FEU) D-Dimer   test  include, but are not limited to: DVT, PE, DIC, thrombolytic   therapy, anticoagulant therapy, recent surgery, trauma, or   pregnancy, disseminated malignancy, aortic aneurysm, cirrhosis,  and severe infection. False negative results may occur in   patients with distal DVT.           Differential Method     Manual         eGFR if    >60.0 >60.0   >60.0     eGFR if non    56.1  Comment:  Calculation used to obtain the estimated glomerular filtration  rate (eGFR) is the CKD-EPI equation.    >60.0  Comment:  Calculation used to obtain the estimated glomerular filtration  rate (eGFR) is the CKD-EPI equation.      56.1  Comment:  Calculation used to obtain the estimated glomerular filtration  rate (eGFR) is the CKD-EPI equation.        Eosinophil%     0.0         Ferritin     3,187         Fragmented Cells     Occasional         Large/Giant Platelets     Present         Glucose   287 227   192     Gran%     89.0         Hematocrit     31.5         Hemoglobin     9.6         Heparin Anti-Xa     0.43  Comment:  Expected therapeutic range for Unfractionated heparin (UFH)  is 0.3-0.7 IU/mL.  The therapeutic range for low molecular weight heparins   (LMWH) varies with the type and , but is   typically between 0.4 and 1.1 IU/mL.           Hypo     Occasional         Immature Grans (Abs)     CANCELED  Comment:  Mild elevation in immature granulocytes is non specific and   can be seen in a variety of conditions including stress response,   acute inflammation, trauma and pregnancy. Correlation with other   laboratory and  clinical findings is essential.    Result canceled by the ancillary.           Immature Granulocytes     CANCELED  Comment:  Result canceled by the ancillary.         Lymph%     3.0         Magnesium     1.9         MCH     31.0         MCHC     30.5         MCV     102         Mono%     6.0         MPV     11.0         nRBC     0         Phosphorus     4.4         Platelet Estimate     Increased         Platelets     396         POCT Glucose 259     204       Poik     Slight         Potassium   4.8 5.2   4.7     PROTEIN TOTAL     6.7         RBC     3.10         RDW     14.1         Schistocytes     Present         Sodium   139 140   140     Triglycerides     600  Comment:  The National Cholesterol Education Program (NCEP) has set the  following guidelines (reference values) for triglycerides:  Normal......................<150 mg/dL  Borderline High.............150-199 mg/dL  High........................200-499 mg/dL           WBC     12.43                          04/04/20 2007 04/04/20  1648   04/04/20  1634   04/04/20  1221        Albumin             Alkaline Phosphatase             ALT             Anion Gap   8         Aniso             aPTT       36.5  Comment:  aPTT therapeutic range = 39-69 seconds     AST             BANDS             Basophil%             BILIRUBIN TOTAL             BUN, Bld   41         Calcium   8.2         Chloride   109         CO2   25         Creatinine   1.2         CRP             D-Dimer             Differential Method             eGFR if    >60.0         eGFR if non    >60.0  Comment:  Calculation used to obtain the estimated glomerular filtration  rate (eGFR) is the CKD-EPI equation.            Eosinophil%             Ferritin             Fragmented Cells             Large/Giant Platelets             Glucose   129         Gran%             Hematocrit             Hemoglobin             Heparin Anti-Xa             Hypo             Immature Grans  (Abs)             Immature Granulocytes             Lymph%             Magnesium             MCH             MCHC             MCV             Mono%             MPV             nRBC             Phosphorus             Platelet Estimate             Platelets             POCT Glucose 113   142       Poik             Potassium   4.4         PROTEIN TOTAL             RBC             RDW             Schistocytes             Sodium   142         Triglycerides             WBC                 All pertinent labs within the past 24 hours have been reviewed.    Significant Imaging:  I have reviewed all pertinent imaging results/findings within the past 24 hours.

## 2020-04-05 NOTE — SIGNIFICANT EVENT
Patient became acutely tachypneic to RR >40 with small tidal volumes, hypoxia to low 80s. Dyssynchronous breaths. Suctioned, tube repositioned prior to my assessment by respiratory without significant improvement. On propofol at 50, precedex at 1.4. Given 4mg midazolam with significant improvement in hypoxemia, reduction in respiratory rate. Still with dyssynchrony and abdominal breathing. Planned for fentanyl infusion, waiting for pharmacy delivery. Hypoxemia worsened again a few minutes later. Given second dose of midazolam, again with improvement in oxygenation. Once adequately sedated will plan to paralyze.     Wade Garcia MD   Internal Medicine PGY-3  [phone]

## 2020-04-06 NOTE — PLAN OF CARE
[facility] DAILY GOALS       A: Awake    RASS: Goal - RASS Goal: -5-->unarousable  Actual - RASS ([place] Agitation-Sedation Scale): -4-->deep sedation   Restraint necessity: Clinical Justification: Removing medical devices  B: Breath   SBT: Not attempted   C: Coordinate A & B, analgesics/sedatives   Pain: managed    SAT: Not attempted  D: Delirium   CAM-ICU: Overall CAM-ICU: Positive  E: Early Mobility   MOVE Screen: Fail   Activity: Activity Management: bedrest maintained per order  FAS: Feeding/Nutrition   Diet order: Diet/Nutrition Received: NPO, tube feeding,   Fluid restriction:    T: Thrombus   DVT prophylaxis: VTE Required Core Measure: Pharmacological prophylaxis initiated/maintained  H: HOB Elevation   Head of Bed (HOB): HOB at 45 degrees  U: Ulcer Prophylaxis   GI: yes  G: Glucose control   uncontrolled Glycemic Management: blood glucose monitoring  S: Skin   Bundle compliance: yes   Bathing/Skin Care: back care, bath, chlorhexidine, bath, complete, dressed/undressed, incontinence care, linen changed Date: 4/5/20 (0330 am)  B: Bowel Function   no issues   I: Indwelling Catheters   [place] necessity:      Urethral Catheter 03/28/20 0606 Non-latex 16 Fr.-Reason for Continuing Urinary Catheterization: Critically ill in ICU requiring intensive monitoring   CVC necessity: Yes   IPAD offered: Not appropriate  D: De-escalation Antibx   No  Plan for the day   Wean down vent settings, decreased sedation  Family/Goals of care/Code Status   Code Status: DNR     Pt. Remains intubated/sedated, VSS. FIO2 60%, PEEP 10, Sats 94%. Remains on propofol, fentanyl, precedex, heparin and levophed. High blood glucose ( MD aware). Will continue to monitor pt.

## 2020-04-06 NOTE — PROGRESS NOTES
Pharmacokinetic Assessment Follow Up: IV Vancomycin    Vancomycin serum concentration assessment(s):    -The random level was drawn correctly and can be used to guide therapy at this time. The measurement is above the desired definitive target range of 15 to 20 mcg/mL.  - Patient did not receive RRT per notes today ( 4/5 )  - creatinine (mg/dL) over last 24 hours : 1.2 > 1.3 > 1.4    Vancomycin Regimen Plan:    Re-dose when the random level is less than 20 mcg/mL, next level to be drawn on 4/6 at 0300    Drug levels (last 3 results):  Recent Labs   Lab Result Units 04/05/20  1630   Vancomycin-Trough ug/mL 32.7*       Pharmacy will continue to follow and monitor vancomycin.    Please contact pharmacy at extension [phone] for questions regarding this assessment.    Thank you for the consult,   Tamara Doran       Patient brief summary:  Erick Valdovinos is a 69 y.o. male initiated on antimicrobial therapy with IV Vancomycin for treatment of pneumonia    The patient's current regimen is pulse dose    Drug Allergies:   Review of patient's allergies indicates:   Allergen Reactions    Codeine Nausea Only    Nsaids (non-steroidal anti-inflammatory drug) Other (See Comments)     Kidney issues       Actual Body Weight:   108.9 kg    Renal Function:   Estimated Creatinine Clearance: 59.4 mL/min (based on SCr of 1.4 mg/dL).,     Dialysis Method (if applicable):  N/A    CBC (last 72 hours):  Recent Labs   Lab Result Units 04/03/20  0412 04/04/20  0336 04/05/20  0430   WBC K/uL 8.32 11.36 12.43   Hemoglobin g/dL 8.7* 8.3* 9.6*   Hematocrit % 28.2* 27.6* 31.5*   Platelets K/uL 310 378* 396*   Gran% % 80.3* 91.0* 89.0*   Lymph% % 7.2* 7.0* 3.0*   Mono% % 7.7 2.0* 6.0   Eosinophil% % 0.1 0.0 0.0   Basophil% % 0.1 0.0 0.0   Differential Method  Automated Manual Manual       Metabolic Panel (last 72 hours):  Recent Labs   Lab Result Units 04/03/20  0412 04/03/20  1108 04/03/20  1736 04/04/20  0336 04/04/20  0903 04/04/20  1648  04/04/20  2336 04/05/20  0430 04/05/20  0800 04/05/20  1630   Sodium mmol/L 141 142 142 142 140 142 140 140 139 139   Potassium mmol/L 4.6 4.8 4.5 4.2 3.9 4.4 4.7 5.2* 4.8 4.6   Chloride mmol/L 108 108 110 110 109 109 106 107 106 107   CO2 mmol/L 23 25 23 22* 24 25 25 21* 20* 22*   Glucose mg/dL 226* 221* 179* 190* 165* 129* 192* 227* 287* 338*   BUN, Bld mg/dL 44* 46* 47* 41* 39* 41* 41* 41* 41* 43*   Creatinine mg/dL 1.2 1.1 1.2 1.1 1.1 1.2 1.3 1.2 1.3 1.4   Albumin g/dL 1.2*  --   --  1.2*  --   --   --  1.3*  --   --    Total Bilirubin mg/dL 0.6  --   --  1.2*  --   --   --  1.4*  --   --    Alkaline Phosphatase U/L 117  --   --  135  --   --   --  146*  --   --    AST U/L 46*  --   --  63*  --   --   --  67*  --   --    ALT U/L 30  --   --  34  --   --   --  42  --   --    Magnesium mg/dL 2.3  --   --  2.3  --   --   --  1.9  --   --    Phosphorus mg/dL 2.3*  --   --  2.4*  --   --   --  4.4  --   --        Vancomycin Administrations:  vancomycin given in the last 96 hours                   vancomycin 1.5 g in dextrose 5 % 250 mL IVPB (ready to mix) (mg) 1,500 mg New Bag 04/05/20 0500     1,500 mg New Bag 04/04/20 1400                Microbiologic Results:  Microbiology Results (last 7 days)     Procedure Component Value Units Date/Time    Blood culture [[id number]] Collected:  04/03/20 1006    Order Status:  Completed Specimen:  Blood Updated:  04/05/20 1612     Blood Culture, Routine No Growth to date      No Growth to date      No Growth to date    Narrative:       Collection has been rescheduled by CBE at 04/03/2020 10:22   Collection has been rescheduled by CBE at 04/03/2020 10:22     Blood culture [[id number]] Collected:  04/03/20 1006    Order Status:  Completed Specimen:  Blood Updated:  04/05/20 1612     Blood Culture, Routine No Growth to date      No Growth to date      No Growth to date    Narrative:       Collection has been rescheduled by CBE at 04/03/2020 10:22   Collection has been rescheduled by  CBE at 04/03/2020 10:22     Culture, Respiratory with Gram Stain [[id number]] Collected:  04/03/20 1350    Order Status:  Completed Specimen:  Respiratory from Tracheal Aspirate Updated:  04/04/20 0821     Respiratory Culture Normal respiratory ran     Gram Stain (Respiratory) <10 epithelial cells per low power field.     Gram Stain (Respiratory) Moderate WBC's     Gram Stain (Respiratory) Many Gram positive cocci     Gram Stain (Respiratory) Moderate Gram negative rods    Blood culture [[id number]] Collected:  03/31/20 0353    Order Status:  Completed Specimen:  Blood from Peripheral, Forearm, Right Updated:  04/04/20 0812     Blood Culture, Routine No Growth after 4 days.     Blood culture [[id number]] Collected:  03/31/20 0351    Order Status:  Completed Specimen:  Blood from Peripheral, Forearm, Left Updated:  04/04/20 0812     Blood Culture, Routine No Growth after 4 days.     Blood culture [[id number]]  (Abnormal) Collected:  03/28/20 1817    Order Status:  Completed Specimen:  Blood from Line, Jugular, Internal Left Updated:  04/02/20 1021     Blood Culture, Routine Gram stain aer bottle: Gram positive cocci in clusters resembling Staph       Results called to and read back by: Celia Buenrostro RN 03/31/2020  02:34      COAGULASE-NEGATIVE STAPHYLOCOCCUS SPECIES  Organism is a probable contaminant      Blood culture [[id number]] Collected:  03/28/20 1817    Order Status:  Completed Specimen:  Blood from Radial Arterial Stick, Right Updated:  04/01/20 2012     Blood Culture, Routine No Growth after 4 days.     Culture, Respiratory with Gram Stain [[id number]] Collected:  03/28/20 2101    Order Status:  Completed Specimen:  Respiratory from Sputum Updated:  03/31/20 1253     Respiratory Culture Normal respiratory ran      No S aureus or Pseudomonas isolated.     Gram Stain (Respiratory) <10 epithelial cells per low power field.     Gram Stain (Respiratory) Rare WBC's     Gram Stain (Respiratory) Few Gram positive  cocci

## 2020-04-06 NOTE — PROGRESS NOTES
[facility]  Critical Care Medicine  Progress Note    Patient Name: Erick Valdovinos  MRN: [id number]  Admission Date: 3/28/2020  Hospital Length of Stay: 9 days  Code Status: DNR  Attending Provider: Kizzy Ingram DO  Primary Care Provider: Brandon Kinney MD   Principal Problem: Acute respiratory distress syndrome (ARDS) due to 2019 novel coronavirus    Subjective:     HPI:  Patient is a 70 yo male with HTN, HLD, DM2, CKD3, NAFLD, aortic stenosis, diabetic neuropathy, cervical radiculopathy who presented to [facility] on 3/28 for worsening SOB in the setting of positive COVID testing done on 3/19-->resulted positive on 3/23. Patient had had fevers and diarrhea for the week prior, fevers as high as 103. He was also having difficulty seeing. Symptoms non-remitting until 3/28 and then had worsening fatigue and weakness, which led him to present to [facility] ED. In the ED, patient was found to be hypoxemic at 78% on room air and in moderate distress. Patient was placed on NRB mask but continued to have issues with poor effort so electively intubated due to worsening condition. Patient was also given 1L fluid bolus for hypotension (systolic in the 80s). Patient sedated on propofol and fentanyl.    ED evaluation at [facility] showed: WBC 6.21 with lymphopenia, bicarb 14, creatinine 2.6 (baseline 1.3), Ferritin 2202, , , , Lactic acid 0.9 and D-Dimer 0.4, BNP 41 and troponin bump to 0.087. Patient was started on ceftriaxone, Azithromycin and Plaquenil.     Patient was transferred to [facility] as [facility] was out of ICU beds. Patient admitted to MICU on 3/28 for management of acute hypoxemic respiratory failure complicated by JAZMIN and troponinemia in the setting of COVID19.    Hospital/ICU Course:  Admitted to MICU on 3/28, intubated and mechanically ventilated. On Ceftriaxone, azithro and plaquenil. Started insulin gtt given glucose >400s and checking serum ketones to evaluate for  DKA. Trending troponins 0.087-->0.04, likely demand ischemia 2/2 ARDS. Patient noted to be in DKA with elevated beta hydroxybutyrate to 4.7. Patient fluid resuscitated about 3L from 3/28-3/29 and started on insulin gtt with improvement in glucose control. JAZMIN improving after fluid resuscitation. Patient did go into A fib with RVR on 3/29 and was started on amio gtt with conversion to PO given IV amio shortage. Patient was also started on low intensity heparin gtt given suspected component of microangiopathic coagulopathy related to JAZMIN. Of note, patient was persistently spiking fevers since admit up until 3/30 with a temp of 103 on 3/30 (still on ceftriaxone azithro). Tube feeds started on 3/30 (watching glucose very closely). Patient was placed in prone position on 3/30 at 1815 given P/F ratio of 122 (due to be supinated at 1015 on 3/31). 1/2 blood cultures from admission 3/28 resulted positive for coag negative staph (contaminant), repeat blood cultures ordered on 3/31-->NGTD. Patient was placed in prone position again on 3/31 at 1800 given P/F of 111 (due to supinate at 4/1 1000). Attempting to wean sedation as of 4/2, given improving oxygenation, and starting daily SAT/SBT. Unfortunately, throughout the day 4/3, patient had noted increasing oxygen requirements and pressor requirements, with concern for aspiration event as there was tube feeds contents suctioned out from ETT. Patient was started on vanc and cefepime and tube feeds held. Family was notified regarding acute decompensation and concern for poor prognosis and was made DNR.  04/05/2020 Pt required midazolam bolus alongside increases in fentanyl and precedex infusions to treat desaturation overnight. He was placed on FiO2 of 100% and intermittently paralyzed with rocuronium.       Interval History/Significant Events: No acute events overnight    Review of Systems   Unable to perform ROS: Intubated     Objective:     Vital Signs (Most Recent):  Temp: 100  °F (37.8 °C) (04/06/20 1600)  Pulse: 88 (04/06/20 1600)  Resp: 20 (04/06/20 1600)  BP: (!) 125/47 (04/06/20 1501)  SpO2: 95 % (04/06/20 1600) Vital Signs (24h Range):  Temp:  [98.4 °F (36.9 °C)-101.8 °F (38.8 °C)] 100 °F (37.8 °C)  Pulse:  [] 88  Resp:  [18-44] 20  SpO2:  [91 %-98 %] 95 %  BP: (125-135)/(47-57) 125/47  Arterial Line BP: ()/(44-65) 102/44   Weight: 111.8 kg (246 lb 7.6 oz)  Body mass index is 38.6 kg/m².      Intake/Output Summary (Last 24 hours) at 4/6/2020 1614  Last data filed at 4/6/2020 1600  Gross per 24 hour   Intake 3798.14 ml   Output 4350 ml   Net -551.86 ml       Physical Exam   Constitutional: He appears well-developed. He is sedated and intubated.   HENT:   Head: Normocephalic.   Eyes: Pupils are equal, round, and reactive to light.   Cardiovascular: Normal rate, regular rhythm and intact distal pulses.   Pulmonary/Chest: He is intubated.   Neurological: He is unresponsive. GCS eye subscore is 1. GCS verbal subscore is 1. GCS motor subscore is 1.   Skin: Skin is warm and dry.   Nursing note and vitals reviewed.      Vents:  Vent Mode: Spont (04/06/20 1441)  Ventilator Initiated: Yes (03/28/20 1515)  Set Rate: 0 BPM (04/06/20 1441)  Vt Set: 0 mL (04/06/20 1441)  Pressure Support: 12 cmH20 (04/06/20 1441)  PEEP/CPAP: 10 cmH20 (04/06/20 1441)  Oxygen Concentration (%): 50 (04/06/20 1600)  Peak Airway Pressure: 23 cmH2O (04/06/20 1441)  Plateau Pressure: 25 cmH20 (04/06/20 1441)  Total Ve: 17.3 mL (04/06/20 1441)  F/VT Ratio<105 (RSBI): (!) 25.35 (04/06/20 1441)  Lines/Drains/Airways     Central Venous Catheter Line            Trialysis (Dialysis) Catheter 03/28/20 1820 external jugular;left internal jugular 8 days          Drain                 NG/OG Tube 03/28/20 0557 [place] sump 16 Fr. Center mouth 9 days         Urethral Catheter 03/28/20 0606 Non-latex 16 Fr. 9 days         Rectal Tube 03/29/20 2300 rectal tube w/ balloon (indicate number of mLs) 7 days          Airway                  Airway - Non-Surgical 03/28/20 0605 Endotracheal Tube 9 days          Arterial Line                 Arterial Line 03/28/20 1819 Right Radial 8 days              Significant Labs:    CBC/Anemia Profile:  Recent Labs   Lab 04/05/20 0430 04/06/20  0243   WBC 12.43 13.97*   HGB 9.6* 8.5*   HCT 31.5* 28.0*   * 354*   * 100*   RDW 14.1 14.0   FERRITIN 3,187* 3,284*        Chemistries:  Recent Labs   Lab 04/05/20  0430 04/05/20  2340 04/06/20  0243 04/06/20  0923      < > 139 140 139   K 5.2*   < > 4.8 5.3* 5.5*      < > 107 108 107   CO2 21*   < > 22* 21* 22*   BUN 41*   < > 48* 51* 60*   CREATININE 1.2   < > 1.4 1.5* 1.7*   CALCIUM 8.4*   < > 8.3* 8.0* 8.0*   ALBUMIN 1.3*  --   --  1.2*  --    PROT 6.7  --   --  6.1  --    BILITOT 1.4*  --   --  0.9  --    ALKPHOS 146*  --   --  143*  --    ALT 42  --   --  46*  --    AST 67*  --   --  65*  --    MG 1.9  --   --  1.8  --    PHOS 4.4  --   --  3.8  --     < > = values in this interval not displayed.       All pertinent labs within the past 24 hours have been reviewed.    Significant Imaging:  I have reviewed and interpreted all pertinent imaging results/findings within the past 24 hours.      ABG  Recent Labs   Lab 04/03/20  2148   PH 7.518*   PO2 81   PCO2 33.8*   HCO3 27.5   BE 5     Assessment/Plan:     Neuro  Acute metabolic encephalopathy  Secondary to sedation. Will continue to wean appropriately.     Pulmonary  Acute respiratory failure with hypoxia  See acute resp distress syndrome due to 2019 novel coronavirus      Cardiac/Vascular  Hypertriglyceridemia  - likely 2/2 propofol  - try to avoid propofol  - monitor TG    A-fib  - New onset narrow complex tachyarrhythmia overnight 3/28-2/39. Max rate 150. No obvious p waves. Given mult pushes IV metoprolol with modest improvement in rate.   - Given 2g Mg 3/29  - started on amiodarone 3/29. Converted to amio po given IV amio shortage  - Pressor requirements increased since 3/29  night, now decreasing. Continue to monitor    NSTEMI (non-ST elevated myocardial infarction)  - mild troponin elevation, suspect Type 2 NSTEMI from severe metabolic derangements.     Aortic stenosis  - follows with cardiology outpatient    Mixed hyperlipidemia  - on statin at home, can restart when appropriate  - continue home plavix (follows with Dr Jiménez in cardiology and has a pending angiogram to evaluate for CAD)    Essential hypertension  - monitor BP  - pressors for hypotension, at very mild dose as of 3/31, likely sedation related    Renal/  Acute renal failure superimposed on stage 3 chronic kidney disease  - creatinine up to 4 on 3/28, basline about 1.3. Improved after volume resuscitation.   - trend creatinine  - started on empiric low intensity heparin gtt 3/29 for renal dysfunction and suspicion of microangiopathic coagulopathy.   - trialysis line in the case of need for HD given renal failure incidence with COVID  - nephro consulted in setting of severe hyperkalemia which has improved     Hematology  Anticoagulated  - patient is on heparin gtt. We have received reports that patients with COVID may be more accurately monitored for therapeutic levels with factor Xa levels rather than aPTT  - checked factor Xa 4/2-->appears subtherapeutic    Endocrine  DKA (diabetic ketoacidoses)  - Presented with low bicarb, increased AG, + B OHB.   - resolved with insulin infusion. Required bicarbonate infusion 3/28.  - Continue on insulin infusion, monitor glucose closely, renee with starting tube feeds   - adding basal insulin 4/1, attempt to wean drip so we can reduce the amount of fluid we are giving him  - frequent accuchecks    Severe obesity (BMI 35.0-39.9) with comorbidity  - nutrition consult for tube feeds    Type 2 diabetes mellitus with stage 3 chronic kidney disease, without long-term current use of insulin  See DKA    GI  Transaminitis  See NAFLD    Nonalcoholic fatty liver disease  - history of  nonalcoholic fatty liver disease  - trend LFTs  - if evidence of shock liver (transaminitis to the thousands), concerning for poor prognosis    Other  * Acute respiratory distress syndrome (ARDS) due to 2019 novel coronavirus  Covid-19 Virus Infection  Presents with SOB, myalgias. Flu negative.   - COVID-19 testing resulted POSITIVE     - Isolation:   - Airborne and Droplet Precautions  - N95 masks must be fit tested, wear eye protection  - 20 second hand hygiene              - Limit visitors per hospital policy              - Consolidating lab draws, nursing care, and interventions      PLAN:  - continue mechanical ventilation (Intubated on 3/28 at [facility])  - low PEEP protocol per ARDSnet  - reduced TV to 6ccs/kg 3/29. Started on 7 on admit for acid clearance given DKA.   - patient placed in prone position on 3/30 and again on 3/31  - strict isolation protocol 2/2 positive COVID  - plaquenil for COVID  - f/u ID recs regarding antivirals for COVID  - CAP coverage with 5 day course ceftriaxone azithro completed 4/1  - f/u blood cultures from 3/28-->1/2 positive for coag staph, likely contamininant  - f/u sputum cultures from 3/28-->NGTD as of 3/31  - f/u repeat blood cultures from 3/31-->so far NGTD as of 4/2  - f/u repeat blood and sputum cultures from 4/3 (concern for aspiration event with significant decompensation)  - vanc and cefepime started on 4/3 for concern for septic shock from aspiration    Shock, unspecified  - Suspect distributive 2/2 sedation with contribution from hypovolemia, high PEEP. Possible component of septic shock from COVID-19 infection.   - Pressor requirements stable on about 0.3 levo and vasopressin on 3/29. Off vaso on 3/30. Watch closely   - Holding steroids in setting of uncontrolled hyperglycemia. a    Acute respiratory distress syndrome (ARDS) due to COVID-19 virus  See acute resp distress syndrome due to 2019 novel coronavirus      Endotracheally intubated  See acute resp  distress syndrome due to 2019 novel coronavirus      COVID-19 virus detected  See acute resp distress syndrome due to 2019 novel coronavirus       Critical Care Daily Checklist:    A: Awake: RASS Goal/Actual Goal: RASS Goal: 0-->alert and calm  Actual: [place] Agitation Sedation Scale (RASS): Unarousable   B: Spontaneous Breathing Trial Performed? Spon. Breathing Trial Initiated?: Not initiated (03/30/20 0840)   C: SAT & SBT Coordinated?   NA                      D: Delirium: CAM-ICU Overall CAM-ICU: Positive   E: Early Mobility Performed? No   F: Feeding Goal: Goals: meet % EEN/EPN by RD following  Status: Nutrition Goal Status: goal met   Current Diet Order   Procedures    Diet NPO      AS: Analgesia/Sedation Infusions- Precedex+Fentanyl   T: Thromboembolic Prophylaxis Heparin   H: HOB > 300 Yes   U: Stress Ulcer Prophylaxis (if needed) Protonix   G: Glucose Control Infusion   B: Bowel Function Stool Occurrence: 1(flexi)   I: Indwelling Catheter (Lines & Mot[place]) Necessity  CVL-#8   D: De-escalation of Antimicrobials/Pharmacotherapies DC'd    Plan for the day/ETD  Wean ventilator and sedation    Code Status:  Family/Goals of Care: DNR  Updated family by phone     Critical Care Time: 50 minutes  Critical secondary to Patient has a condition that poses threat to life and bodily function: Acute Respiratory Failure      Critical care was time spent personally by me on the following activities: development of treatment plan with patient or surrogate and bedside caregivers, discussions with consultants, evaluation of patient's response to treatment, examination of patient, ordering and performing treatments and interventions, ordering and review of laboratory studies, ordering and review of radiographic studies, pulse oximetry, re-evaluation of patient's condition. This critical care time did not overlap with that of any other provider or involve time for any procedures.     Fiona Winterbottom, APRN, CNS  Critical  [facility] Medicine  [facility]

## 2020-04-06 NOTE — CONSULTS
Palliative Care Acknowledgement of Consult - .date    Consult received. Palliative Care Provider:__________ will touch base with team prior to seeing patient. Full consult to follow.    Thank you for allowing us to be a part of the care of this patient.          Faye Spaulding LCSW, [facility] Center[facility]SW      Checked in with primary team. Patient is improving since time consult was put in. Hopeful that patient will be medically extubated.  Please re-consult if we can be of assistance.

## 2020-04-06 NOTE — PROGRESS NOTES
Therapy with vancomycin complete and/or consult discontinued by provider.  Pharmacy will sign off, please re-consult as needed.    Becca Aldridge, PharmD, BCCCP  [phone]

## 2020-04-06 NOTE — PROGRESS NOTES
"[facility]  Adult Nutrition  Progress Note    SUMMARY       Recommendations  1. Continue current TF of Peptamen Intense VHP at 50 mL/hr - meets needs.    -If Peptamen Intense VHP not available, recommend Peptamen AF at 50 mL/hr + 2 packets/day Beneprotein - to provide 1490 kcal/day and 103g protein/day.   2. RD to monitor.    Goals: meet % EEN/EPN by RD following  Nutrition Goal Status: goal met  Communication of RD Recs: reviewed with RN    Reason for Assessment  Reason For Assessment: RD follow-up  Diagnosis: (ARDS 2/2 coronavirus )  Relevant Medical History: HTN, HLD, DM2, CKDIII  Interdisciplinary Rounds: did not attend  General Information Comments: RD working remotely. Patient remains intubated, sedated. Paralytic stopped 4/4. On TF, tolerating at goal rate of 50. Still unable to complete NFPE due to recent hospital wide restrictions to limit the spread of COVID-19. However patient tolerating TF at goal and likely does not meet criteria for malnutrition at this time.  Nutrition Discharge Planning: Unable to determine    Nutrition Risk Screen  Nutrition Risk Screen: tube feeding or parenteral nutrition    Nutrition/Diet History  Food Allergies: NKFA  Factors Affecting Nutritional Intake: NPO, on mechanical ventilation    Anthropometrics  Temp: 99.5 °F (37.5 °C)  Height: 5' 7" (170.2 cm)  Height (inches): 67 in  Weight Method: Bed Scale  Weight: 111.8 kg (246 lb 7.6 oz)  Weight (lb): 246.48 lb  Ideal Body Weight (IBW), Male: 148 lb  % Ideal Body Weight, Male (lb): 162.22 %  BMI (Calculated): 38.6  BMI Grade: 35 - 39.9 - obesity - grade II    Lab/Procedures/Meds  Pertinent Labs Reviewed: reviewed  Pertinent Labs Comments: K 5.5, BUN 60, Creat 1.7, Glu 530, POCT Glu 391-495, HgbA1c 7.2, Alb 1.2, , Trig 351  Pertinent Medications Reviewed: reviewed  Pertinent Medications Comments: lasix, insulin drip, precedex, fentnayl, levophed    Estimated/Assessed Needs  Weight Used For Calorie " Calculations: 109 kg (240 lb 4.8 oz)  Energy Calorie Requirements (kcal): 5185-3879 kcal/d  Energy Need Method: Kcal/kg(11-14 kcal/kg)  Protein Requirements: 101-135 g/d (1.5-2 g/kg IBW)  Weight Used For Protein Calculations: 67.3 kg (148 lb 5.9 oz)  Fluid Requirements (mL): 1 mL/kcal or per MD  Estimated Fluid Requirement Method: RDA Method  RDA Method (mL): 1200  CHO Requirement: 150-191g/day    Nutrition Prescription Ordered  Current Diet Order: NPO  Current Nutrition Support Formula Ordered: Peptamen Intense VHP  Current Nutrition Support Rate Ordered: 50 (ml)  Current Nutrition Support Frequency Ordered: mL/hr    Evaluation of Received Nutrient/Fluid Intake  Enteral Calories (kcal): 1200  Enteral Protein (gm): 110  Enteral (Free Water) Fluid (mL): 1008  % Kcal Needs: 100%  % Protein Needs: 100%  I/O: +4.5L since admit  Energy Calories Required: meeting needs  Protein Required: meeting needs  Fluid Required: (per MD)  Comments: LBM 4/6  Tolerance: tolerating  % Intake of Estimated Energy Needs: 75 - 100 %  % Meal Intake: NPO    Nutrition Risk  Level of Risk/Frequency of Follow-up: low(1x/week)     Assessment and Plan  Nutrition Problem  Inadequate Energy Intake     Related to (etiology):   Inability to consume sufficient energy      Signs and Symptoms (as evidenced by):   Intubated, NPO with no alternative means of nutrition       Interventions (treatment strategy):  Collaboration of nutrition care with other providers     Nutrition Diagnosis Status:   Resolved    Monitor and Evaluation  Food and Nutrient Intake: food and beverage intake, enteral nutrition intake, energy intake  Food and Nutrient Adminstration: diet order, enteral and parenteral nutrition administration  Physical Activity and Function: nutrition-related ADLs and IADLs  Anthropometric Measurements: weight, weight change  Biochemical Data, Medical Tests and Procedures: lipid profile, gastrointestinal profile, glucose/endocrine profile,  inflammatory profile, electrolyte and renal panel  Nutrition-Focused Physical Findings: overall appearance     Nutrition Follow-Up  RD Follow-up?: Yes

## 2020-04-06 NOTE — SUBJECTIVE & OBJECTIVE
Interval History/Significant Events: No acute events overnight    Review of Systems   Unable to perform ROS: Intubated     Objective:     Vital Signs (Most Recent):  Temp: 100 °F (37.8 °C) (04/06/20 1600)  Pulse: 88 (04/06/20 1600)  Resp: 20 (04/06/20 1600)  BP: (!) 125/47 (04/06/20 1501)  SpO2: 95 % (04/06/20 1600) Vital Signs (24h Range):  Temp:  [98.4 °F (36.9 °C)-101.8 °F (38.8 °C)] 100 °F (37.8 °C)  Pulse:  [] 88  Resp:  [18-44] 20  SpO2:  [91 %-98 %] 95 %  BP: (125-135)/(47-57) 125/47  Arterial Line BP: ()/(44-65) 102/44   Weight: 111.8 kg (246 lb 7.6 oz)  Body mass index is 38.6 kg/m².      Intake/Output Summary (Last 24 hours) at 4/6/2020 1614  Last data filed at 4/6/2020 1600  Gross per 24 hour   Intake 3798.14 ml   Output 4350 ml   Net -551.86 ml       Physical Exam   Constitutional: He appears well-developed. He is sedated and intubated.   HENT:   Head: Normocephalic.   Eyes: Pupils are equal, round, and reactive to light.   Cardiovascular: Normal rate, regular rhythm and intact distal pulses.   Pulmonary/Chest: He is intubated.   Neurological: He is unresponsive. GCS eye subscore is 1. GCS verbal subscore is 1. GCS motor subscore is 1.   Skin: Skin is warm and dry.   Nursing note and vitals reviewed.      Vents:  Vent Mode: Spont (04/06/20 1441)  Ventilator Initiated: Yes (03/28/20 1515)  Set Rate: 0 BPM (04/06/20 1441)  Vt Set: 0 mL (04/06/20 1441)  Pressure Support: 12 cmH20 (04/06/20 1441)  PEEP/CPAP: 10 cmH20 (04/06/20 1441)  Oxygen Concentration (%): 50 (04/06/20 1600)  Peak Airway Pressure: 23 cmH2O (04/06/20 1441)  Plateau Pressure: 25 cmH20 (04/06/20 1441)  Total Ve: 17.3 mL (04/06/20 1441)  F/VT Ratio<105 (RSBI): (!) 25.35 (04/06/20 1441)  Lines/Drains/Airways     Central Venous Catheter Line            Trialysis (Dialysis) Catheter 03/28/20 1820 external jugular;left internal jugular 8 days          Drain                 NG/OG Tube 03/28/20 0557 [place] sump 16 Fr. Center mouth 9 days          Urethral Catheter 03/28/20 0606 Non-latex 16 Fr. 9 days         Rectal Tube 03/29/20 2300 rectal tube w/ balloon (indicate number of mLs) 7 days          Airway                 Airway - Non-Surgical 03/28/20 0605 Endotracheal Tube 9 days          Arterial Line                 Arterial Line 03/28/20 1819 Right Radial 8 days              Significant Labs:    CBC/Anemia Profile:  Recent Labs   Lab 04/05/20 0430 04/06/20  0243   WBC 12.43 13.97*   HGB 9.6* 8.5*   HCT 31.5* 28.0*   * 354*   * 100*   RDW 14.1 14.0   FERRITIN 3,187* 3,284*        Chemistries:  Recent Labs   Lab 04/05/20 0430 04/05/20  2340 04/06/20  0243 04/06/20  0923      < > 139 140 139   K 5.2*   < > 4.8 5.3* 5.5*      < > 107 108 107   CO2 21*   < > 22* 21* 22*   BUN 41*   < > 48* 51* 60*   CREATININE 1.2   < > 1.4 1.5* 1.7*   CALCIUM 8.4*   < > 8.3* 8.0* 8.0*   ALBUMIN 1.3*  --   --  1.2*  --    PROT 6.7  --   --  6.1  --    BILITOT 1.4*  --   --  0.9  --    ALKPHOS 146*  --   --  143*  --    ALT 42  --   --  46*  --    AST 67*  --   --  65*  --    MG 1.9  --   --  1.8  --    PHOS 4.4  --   --  3.8  --     < > = values in this interval not displayed.       All pertinent labs within the past 24 hours have been reviewed.    Significant Imaging:  I have reviewed and interpreted all pertinent imaging results/findings within the past 24 hours.

## 2020-04-06 NOTE — PLAN OF CARE
CMICU DAILY GOALS       A: Awake    RASS: Goal - RASS Goal: 0-->alert and calm  Actual - RASS ([place] Agitation-Sedation Scale): -4-->deep sedation   Restraint necessity: Clinical Justification: Removing medical devices  B: Breath   SBT: Pass   C: Coordinate A & B, analgesics/sedatives   Pain: managed    SAT: Not attempted  D: Delirium   CAM-ICU: Overall CAM-ICU: Positive  E: Early Mobility   MOVE Screen: Fail   Activity: Activity Management: bedrest maintained per order, activity clustered for rest period  FAS: Feeding/Nutrition   Diet order: Diet/Nutrition Received: tube feeding,   Fluid restriction:  N/A  T: Thrombus   DVT prophylaxis: VTE Required Core Measure: (SCDs) Sequential compression device initiated/maintained, Pharmacological prophylaxis initiated/maintained  H: HOB Elevation   Head of Bed (HOB): HOB at 30-45 degrees  U: Ulcer Prophylaxis   GI: yes  G: Glucose control   uncontrolled Glycemic Management: blood glucose monitoring  S: Skin   Bundle compliance: no   Bathing/Skin Care: back care, bath, chlorhexidine, bath, complete, dressed/undressed, incontinence care, linen changed   B: Bowel Function   diarrhea   I: Indwelling Catheters   [place] necessity:      Urethral Catheter 03/28/20 0606 Non-latex 16 Fr.-Reason for Continuing Urinary Catheterization: Critically ill in ICU requiring intensive monitoring   CVC necessity: Yes   IPAD offered: Not appropriate  D: De-escalation Antibx   No  Plan for the day  Wean sedation/vent settings, Start insulin drip to control BS, maintain a MAP > 65, will continue to monitor  Family/Goals of care/Code Status   Code Status: DNR     No acute events throughout day, VS and assessment per flow sheet, patient progressing towards goals as tolerated, plan of care reviewed with Erick Valdovinos and family, all concerns addressed, will continue to monitor.

## 2020-04-07 NOTE — PLAN OF CARE
No acute events throughout day, VS and assessment per flow sheet, see below for updates:    Pulmonary: Paradoxical breathing pattern throughout day. Adjusted vent settings. Currently on pressure support, 15/8. Difficulty attaining adequate sedation while maintaining a comfortable breathing pattern. Nebs started, trial steroids.     Cardiovascular: SB-SR on tele. MAP>60, currently off levophed. Heparin gtt still infusing.      Neurological: SAT performed this AM, patient alert and able to follow commands. On low dose fentanyl and precedex. Low grade temps.     Gastrointestinal: Glucerna started, tolerating well. Flexi in place.     Genitourinary: Motta in place, good UOP. Lasix given.     Endocrine: Insulin gtt     LDA: Left IJ trialysis removed, Right IJ TLC placed today.     Skin/Bath: Lesions noted to nares and lips related to previous proning. Stage 2 to buttock, triad cream used. Following wound care rec's.  Date of last CHG bath given: 4/6/20    Able to video call Mrs. Charles, patient's wife, today while he was alert and off sedation. Updated on plan of care, questions answered.     Patient progressing towards goals as tolerated, plan of care communicated and reviewed with Erick Valdovinos and family, all concerns addressed, will continue to monitor.     Jennifer Billingsley RN

## 2020-04-07 NOTE — PROCEDURES
"Erick Valdovinos is a 69 y.o. male patient.    Temp: 99.5 °F (37.5 °C) (04/07/20 1000)  Pulse: 97 (04/07/20 1000)  Resp: (!) 33 (04/07/20 1000)  BP: (!) 125/47 (04/06/20 1501)  SpO2: (!) 92 % (04/07/20 1000)  Weight: 111.8 kg (246 lb 7.6 oz) (04/06/20 1130)  Height: 5' 7" (170.2 cm) (04/01/20 0900)       Central Line    Diagnosis: acute hypoxemic respiraroty failure due to covid 19   Doctor requesting consult: Juan Jose   Patient location during procedure: ICU  Procedure start time: 4/7/2020 11:06 AM  Timeout: 4/7/2020 11:04 AM  Procedure end time: 4/7/2020 11:19 AM    Staffing  Authorizing Provider: MIHAELA Cuello MD  Performing Provider: MIHAELA Cuello MD    Staffing  Anesthesiologist: MIHAELA Cuello MD  Performed: anesthesiologist   Anesthesiologist was present at the time of the procedure.  Preanesthetic Checklist  Completed: patient identified, site marked, surgical consent, pre-op evaluation, timeout performed, IV checked, risks and benefits discussed, monitors and equipment checked and anesthesia consent given  Indication   Indication: med administration, vascular access     Anesthesia     Central Line   Skin Prep: skin prepped with ChloraPrep, skin prep agent completely dried prior to procedure  maximum sterile barriers used during central venous catheter insertion  hand hygiene performed prior to central venous catheter insertion  Location: right, internal jugular.   Catheter type: triple lumen  Catheter Size: 7 Fr  Inserted Catheter Length: 17 cm  Ultrasound: vascular probe with ultrasound  Vessel Caliber: medium, patent  Needle advanced into vessel with real time Ultrasound guidance.  Image recorded and saved.   Manometry: Venous cannualation confirmed by visual estimation of blood vessel pressure using manometry.  Insertion Attempts: 1   Securement:line sutured, chlorhexidine patch, sterile dressing applied and blood return through all ports    Post-Procedure   Adverse " Events:none    Guidewire Guidewire removed intact.   Additional Notes  Chest x-ray deferred.  Covid19 patient,emergency consent.               SEAN Cuello  4/7/2020

## 2020-04-07 NOTE — PROGRESS NOTES
Hospital Medicine  Progress Note  [facility]      Patient Name: Erick Valdovinos  MRN:  [id number]  Hospital Medicine Team: Networked reference to record PCT  Ashley Longoria NP  Date of Admission:  3/28/2020     Length of Stay:  LOS: 10 days       Principal Problem:  Acute respiratory distress syndrome (ARDS) due to 2019 novel coronavirus        HPI:  Patient is a 68 yo male with HTN, HLD, DM2, CKD3, NAFLD, aortic stenosis, diabetic neuropathy, cervical radiculopathy who presented to [facility] on 3/28 for worsening SOB in the setting of positive COVID testing done on 3/19-->resulted positive on 3/23. Patient had had fevers and diarrhea for the week prior, fevers as high as 103. He was also having difficulty seeing. Symptoms non-remitting until 3/28 and then had worsening fatigue and weakness, which led him to present to [facility] ED. In the ED, patient was found to be hypoxemic at 78% on room air and in moderate distress. Patient was placed on NRB mask but continued to have issues with poor effort so electively intubated due to worsening condition. Patient was also given 1L fluid bolus for hypotension (systolic in the 80s). Patient sedated on propofol and fentanyl.     ED evaluation at [place] showed: WBC 6.21 with lymphopenia, bicarb 14, creatinine 2.6 (baseline 1.3), Ferritin 2202, , , , Lactic acid 0.9 and D-Dimer 0.4, BNP 41 and troponin bump to 0.087. Patient was started on ceftriaxone, Azithromycin and Plaquenil.      Patient was transferred to [facility] as [place] was out of ICU beds. Patient admitted to MICU on 3/28 for management of acute hypoxemic respiratory failure complicated by JAZMIN and troponinemia in the setting of COVID19.    Hospital Course:  Patient admitted for evaluation of possible COVID-19.  Hospital/ICU Course:  Admitted to MICU on 3/28, intubated and mechanically ventilated. On Ceftriaxone, azithro and plaquenil. Started insulin gtt given glucose  >400s and checking serum ketones to evaluate for DKA. Trending troponins 0.087-->0.04, likely demand ischemia 2/2 ARDS. Patient noted to be in DKA with elevated beta hydroxybutyrate to 4.7. Patient fluid resuscitated about 3L from 3/28-3/29 and started on insulin gtt with improvement in glucose control. JAZMIN improving after fluid resuscitation. Patient did go into A fib with RVR on 3/29 and was started on amio gtt with conversion to PO given IV amio shortage. Patient was also started on low intensity heparin gtt given suspected component of microangiopathic coagulopathy related to JAZMIN. Of note, patient was persistently spiking fevers since admit up until 3/30 with a temp of 103 on 3/30 (still on ceftriaxone azithro). Tube feeds started on 3/30 (watching glucose very closely). Patient was placed in prone position on 3/30 at 1815 given P/F ratio of 122 (due to be supinated at 1015 on 3/31). 1/2 blood cultures from admission 3/28 resulted positive for coag negative staph (contaminant), repeat blood cultures ordered on 3/31-->NGTD. Patient was placed in prone position again on 3/31 at 1800 given P/F of 111 (due to supinate at 4/1 1000). Attempting to wean sedation as of 4/2, given improving oxygenation, and starting daily SAT/SBT. Unfortunately, throughout the day 4/3, patient had noted increasing oxygen requirements and pressor requirements, with concern for aspiration event as there was tube feeds contents suctioned out from ETT. Patient was started on vanc and cefepime and tube feeds held. Family was notified regarding acute decompensation and concern for poor prognosis and was made DNR.  04/05/2020 Pt required midazolam bolus alongside increases in fentanyl and precedex infusions to treat desaturation overnight. He was placed on FiO2 of 100% and intermittently paralyzed with rocuronium.     Interval History:     No acute events overnight  PMH poorly controlled DM, remains on insulin gtt. Nutrition changed on 4/6 to  "Glucerna OGT.   Continues to have intermittent fever-->Trialysis catheter to be changed today.   Vent settings 40/8 on spont. Pt Will "belly breath" at times, oxygen sats remain stable. Precedex for sedation. If oxygen sats remain stable ,Plan to wean  off vent with possible extubation in near future.   AM labs:  Inflammatory markers, d-dimer, ferritin and LFTs  are slowly trending down.  CKD 3 Scr 1.6, with baseline ~ 1.3-1.4, with good UOP.        Review of Systems:  Limited ROS    Limited review of systems and physical exam per team attending and/or nursing staff due to patient being in special isolation and intubated     Constitutional: sedated , will follow commands  Respiratory: Intubated   Gastrointestinal:  OGT  Genitourinary:  FTG   Neurological: sedated      Inpatient Medications:    Current Facility-Administered Medications:     acetaminophen oral solution 650 mg, 650 mg, Per OG tube, Q6H PRN, Behram Khan, MD, 650 mg at 04/06/20 1818    albuterol inhaler 2 puff, 2 puff, Inhalation, Q6H PRN, Manuel Tony MD    [COMPLETED] amiodarone tablet 400 mg, 400 mg, Oral, Q8H, 400 mg at 04/01/20 2111 **FOLLOWED BY** amiodarone tablet 200 mg, 200 mg, Per OG tube, Daily, Behram Khan, MD, 200 mg at 04/06/20 0919    [COMPLETED] calcium gluconate 1g in dextrose 5% 100mL (ready to mix system), 1 g, Intravenous, Once, 1 g at 03/28/20 1849 **AND** calcium gluconate 1g in dextrose 5% 100mL (ready to mix system), 1 g, Intravenous, Q10 Min PRN, Jessica Blanco MD    ceFEPIme injection 2 g, 2 g, Intravenous, Q12H, Charlotte Aguilera MD, 2 g at 04/07/20 0415    chlorhexidine 0.12 % solution 15 mL, 15 mL, Mouth/Throat, BID, Jessica Blanco MD, 15 mL at 04/06/20 2100    clopidogreL tablet 75 mg, 75 mg, Per OG tube, Daily, Jessica Blanco MD, 75 mg at 04/06/20 0919    dexmedetomidine (PRECEDEX) 400mcg/100mL 0.9% NaCL infusion, 0.2 mcg/kg/hr, Intravenous, Continuous, Everardo Mcfarlane MD, Last Rate: 38.1 " mL/hr at 20 0700, 1.4 mcg/kg/hr at 20 0700    dextrose 10% (D10W) Bolus, 25 g, Intravenous, PRN, Charlotte Aguilera MD    dextrose 10% (D10W) Bolus, 12.5 g, Intravenous, PRN, Charlotte Aguilera MD    fentaNYL 2500 mcg in 0.9% sodium chloride 250 mL infusion premix (titrating), 25 mcg/hr, Intravenous, Continuous, Wade Garcia MD, Last Rate: 20 mL/hr at 20 0700, 200 mcg/hr at 20 0700    [] fentaNYL injection 50 mcg, 50 mcg, Intravenous, Q15 Min PRN, 50 mcg at 20 0333 **FOLLOWED BY** fentaNYL injection 50 mcg, 50 mcg, Intravenous, Q1H PRN, Wade Garcia MD    furosemide injection 60 mg, 60 mg, Intravenous, Q8H, Behram Khan, MD, 60 mg at 20 0415    heparin 25,000 units in dextrose 5% (100 units/ml) IV bolus from bag - ADDITIONAL PRN BOLUS - 30 units/kg, 30 Units/kg (Adjusted), Intravenous, PRN, Wade Garcia MD, 2,500 Units at 20 0718    heparin 25,000 units in dextrose 5% (100 units/ml) IV bolus from bag - ADDITIONAL PRN BOLUS - 60 units/kg, 60 Units/kg (Adjusted), Intravenous, PRN, Wade Garcia MD, 4,000 Units at 20 0000    heparin 25,000 units in dextrose 5% 250 mL (100 units/mL) infusion LOW INTENSITY nomogram - [facility], 23 Units/kg/hr (Adjusted), Intravenous, Continuous, Kizzy Ingram DO, Last Rate: 20.8 mL/hr at 20 0700, 25 Units/kg/hr at 20 0700    insulin regular 100 Units in sodium chloride 0.9% 100 mL infusion, 6 Units/hr, Intravenous, Continuous, Fiona Winterbottom, APRN, CNS, Last Rate: 3.3 mL/hr at 20 0700, 3.25 Units/hr at 20 0700    lorazepam injection 2 mg, 2 mg, Intravenous, Q4H PRN, Chan Das MD    norepinephrine 16 mg in dextrose 5 % 250 mL infusion, 0.02 mcg/kg/min (Dosing Weight), Intravenous, Continuous, Behram Khan, MD, Last Rate: 2 mL/hr at 20 0700, 0.02 mcg/kg/min at 20 0700    ondansetron injection 4 mg, 4 mg, Intravenous, Q8H PRN, Jessica Blanco MD    oxyCODONE  "immediate release tablet 5 mg, 5 mg, Oral, Q6H, Charlotte Aguilera MD, 5 mg at 04/07/20 0551    pantoprazole suspension 40 mg, 40 mg, Per OG tube, Daily, Behram Khan, MD, 40 mg at 04/06/20 0919    polyethylene glycol packet 17 g, 17 g, Per NG tube, BID, Jessica Blanco MD, 17 g at 04/06/20 2014    QUEtiapine tablet 100 mg, 100 mg, Per OG tube, QHS, Behram Khan, MD, 100 mg at 04/06/20 2014    sodium chloride 0.9% flush 10 mL, 10 mL, Intravenous, PRN, Jessica Blanco MD      Physical Exam:   Constitutional:  appears well-developed and well-nourished.   HENT:   Head: Normocephalic.   Mouth/Throat:  ETT    Cardiovascular: Normal rate, regular rhythm     Pulmonary/Chest: Mechanical ventilation   : OGT  Neurological:  Sedated         Intake/Output Summary (Last 24 hours) at 4/7/2020 0746  Last data filed at 4/7/2020 0700  Gross per 24 hour   Intake 3016.45 ml   Output 3850 ml   Net -833.55 ml     Wt Readings from Last 3 Encounters:   04/06/20 111.8 kg (246 lb 7.6 oz)   03/28/20 108.9 kg (240 lb)   03/28/20 108.9 kg (240 lb 1.3 oz)       BP (!) 125/47   Pulse 92   Temp 98.8 °F (37.1 °C)   Resp (!) 25   Ht 5' 7" (1.702 m)   Wt 111.8 kg (246 lb 7.6 oz)   SpO2 (!) 90%   BMI 38.60 kg/m²         Laboratory:  Lab Results   Component Value Date    [id number] Detected (A) 03/19/2020       Recent Labs   Lab 04/05/20  0430 04/06/20  0243 04/07/20  0222   WBC 12.43 13.97* 14.25*   LYMPH 3.0* 4.5*  CANCELED 8.0*  CANCELED   HGB 9.6* 8.5* 8.1*   HCT 31.5* 28.0* 27.4*   * 354* 341     Recent Labs   Lab 04/05/20  0430  04/06/20  0243  04/06/20  1540 04/06/20  2308 04/07/20  0222      < > 140   < > 140 144 144   K 5.2*   < > 5.3*   < > 4.9 4.6 4.5      < > 108   < > 108 111* 113*   CO2 21*   < > 21*   < > 22* 23 20*   BUN 41*   < > 51*   < > 64* 67* 67*   CREATININE 1.2   < > 1.5*   < > 1.7* 1.7* 1.6*   *   < > 425*   < > 437* 230* 186*   CALCIUM 8.4*   < > 8.0*   < > 8.4* 8.6* 8.6* "   MG 1.9  --  1.8  --   --   --  1.9   PHOS 4.4  --  3.8  --   --   --  3.3    < > = values in this interval not displayed.     Recent Labs   Lab 04/05/20 0430 04/06/20 0243 04/07/20 0222   ALKPHOS 146* 143* 120   ALT 42 46* 41   AST 67* 65* 53*   ALBUMIN 1.3* 1.2* 1.3*   PROT 6.7 6.1 6.2   BILITOT 1.4* 0.9 0.7        Recent Labs     04/05/20 0430 04/06/20 0243 04/07/20 0222   DDIMER 3.65* 2.60* 2.36*   FERRITIN 3,187* 3,284* 2,998*   .6* 277.9* 223.9*   LDH  --  492* 393*       All labs within the last 24 hours were reviewed.     Microbiology:  Microbiology Results (last 7 days)     Procedure Component Value Units Date/Time    Blood culture [[id number]] Collected:  04/03/20 1006    Order Status:  Completed Specimen:  Blood Updated:  04/06/20 1612     Blood Culture, Routine No Growth to date      No Growth to date      No Growth to date      No Growth to date    Narrative:       Collection has been rescheduled by CBE at 04/03/2020 10:22   Collection has been rescheduled by CBE at 04/03/2020 10:22     Blood culture [[id number]] Collected:  04/03/20 1006    Order Status:  Completed Specimen:  Blood Updated:  04/06/20 1612     Blood Culture, Routine No Growth to date      No Growth to date      No Growth to date      No Growth to date    Narrative:       Collection has been rescheduled by CBE at 04/03/2020 10:22   Collection has been rescheduled by CBE at 04/03/2020 10:22     Culture, Respiratory with Gram Stain [[id number]] Collected:  04/03/20 1350    Order Status:  Completed Specimen:  Respiratory from Tracheal Aspirate Updated:  04/06/20 1030     Respiratory Culture Normal respiratory ran     Gram Stain (Respiratory) <10 epithelial cells per low power field.     Gram Stain (Respiratory) Moderate WBC's     Gram Stain (Respiratory) Many Gram positive cocci     Gram Stain (Respiratory) Moderate Gram negative rods    Blood culture [[id number]] Collected:  03/31/20 0353    Order Status:  Completed Specimen:   Blood from Peripheral, Forearm, Right Updated:  04/04/20 0812     Blood Culture, Routine No Growth after 4 days.     Blood culture [[id number]] Collected:  03/31/20 0351    Order Status:  Completed Specimen:  Blood from Peripheral, Forearm, Left Updated:  04/04/20 0812     Blood Culture, Routine No Growth after 4 days.     Blood culture [[id number]]  (Abnormal) Collected:  03/28/20 1817    Order Status:  Completed Specimen:  Blood from Line, Jugular, Internal Left Updated:  04/02/20 1021     Blood Culture, Routine Gram stain aer bottle: Gram positive cocci in clusters resembling Staph       Results called to and read back by: Celia Buenrostro RN 03/31/2020  02:34      COAGULASE-NEGATIVE STAPHYLOCOCCUS SPECIES  Organism is a probable contaminant      Blood culture [[id number]] Collected:  03/28/20 1817    Order Status:  Completed Specimen:  Blood from Radial Arterial Stick, Right Updated:  04/01/20 2012     Blood Culture, Routine No Growth after 4 days.     Culture, Respiratory with Gram Stain [[id number]] Collected:  03/28/20 2101    Order Status:  Completed Specimen:  Respiratory from Sputum Updated:  03/31/20 1253     Respiratory Culture Normal respiratory ran      No S aureus or Pseudomonas isolated.     Gram Stain (Respiratory) <10 epithelial cells per low power field.     Gram Stain (Respiratory) Rare WBC's     Gram Stain (Respiratory) Few Gram positive cocci            Imaging      No results found for this or any previous visit.    X-Ray Chest AP Portable  Narrative: EXAMINATION:  XR CHEST AP PORTABLE    CLINICAL HISTORY:  OG tube confirmation;    TECHNIQUE:  Single frontal view of the chest was performed.    COMPARISON:  03/28/2020, 1915 and 06:08 hours.  11/26/2019.    FINDINGS:  Additional history: Positive for COVID-19 on 03/23/2020; hypoxemic at 78% on room air.  Fevers and diarrhea for the week prior with fevers up to 103° F. 03/28/2020 transferred to MICU at [facility], intubated and mechanically  "ventilated.    Endotracheal tube and other life support devices remain in good location.    Multifocal patchy heterogeneous subsegmental opacities are present in both lungs with peripheral and basilar predominance.  Radiographic abnormality has worsened in the lower lung zones since 03/28/2020.    I detect no pleural fluid, pneumothorax, pneumomediastinum or pneumoperitoneum.    Old left rib fractures date back to at least November 2019.  No new osseous abnormality identified.  Impression: Worsening lung disease compatible with radiographic progression of pneumonia in this patient known COVID-19 positive.    Electronically signed by: Christie Ortiz MD  Date:    04/04/2020  Time:    10:32      All imaging within the last 24 hours was reviewed.     Assessment and Plan:  Continues to have intermittent fever-->Trialysis catheter to be changed today.     ARDS; Vent settings 30/8 on spont.-->  Pt Will "belly breath" at times, oxygen sats remain stable. Precedex for sedation. If oxygen sats remain stable ,Plan to wean  off vent with possible extubation in near future.     DM 2: continue insulin gtt regime as prescribed, will continue to monitor .     AM labs:  Inflammatory markers, d-dimer, ferritin and LFTs  are slowly trending down.    CKD 3: Scr 1.6, with baseline ~ 1.3-1.4, with good UOP. Continue to monitor     Active Hospital Problems    Diagnosis  POA    *Acute respiratory distress syndrome (ARDS) due to 2019 novel coronavirus [U07.1, J80]  Yes    Acute metabolic encephalopathy [G93.41]  No    Anticoagulated [Z79.01]  Not Applicable    Hypertriglyceridemia [E78.1]  No    DKA (diabetic ketoacidoses) [E11.10]  Yes    A-fib [I48.91]  No    Shock, unspecified [R57.9]  Yes    COVID-19 virus detected [U07.1]  Yes    Acute respiratory failure with hypoxia [J96.01]  Yes    Endotracheally intubated [Z97.8]  Yes    Transaminitis [R74.0]  Yes    Acute respiratory distress syndrome (ARDS) due to COVID-19 virus " [U07.1, J80]  Yes    Aortic stenosis [I35.0]  Yes    NSTEMI (non-ST elevated myocardial infarction) [I21.4]  Yes    Mixed hyperlipidemia [E78.2]  Yes    Nonalcoholic fatty liver disease [K76.0]  Yes    Severe obesity (BMI 35.0-39.9) with comorbidity [E66.01]  Yes    Acute renal failure superimposed on stage 3 chronic kidney disease [N17.9, N18.3]  Yes    Type 2 diabetes mellitus with stage 3 chronic kidney disease, without long-term current use of insulin [E11.22, N18.3]  Yes    Type 2 diabetes mellitus with diabetic polyneuropathy, without long-term current use of insulin [E11.42]  Yes    Essential hypertension [I10]  Yes      Resolved Hospital Problems   No resolved problems to display.       Suspected Covid-19 Virus Infection  Person Under Investigation (PUI) for COVID-19  - COVID-19 testing: Collection Date: 3/19/2020 Collection Time:   9:40 AM  - Infection Control notified    - Isolation:   - Airborne and Droplet Precautions  - Surgical mask on patient   - N95 masks must be fit tested, wear eye protection  - 20 second hand hygiene   - Limit visitors per hospital policy   - Consolidate lab draws, nursing care, and interventions    - Diagnostics: (Lymphopenia, hyponatremia, hyperferritinemia, elevated troponin, elevated d-dimer, age, and comorbidities are significant predictors of poor clinical outcome)   - CBC:   trend Q48hrs  - CMP:        trend Q48hrs  - Procalcitonin:  - D-dimer:  repeat prior to discharge  - Ferritin:  repeat prior to discharge   - CRP:        trend Q48hrs  - LDH:   repeat prior to discharge  - BNP:  - Troponin:    - ECG:   - rapid Flu:   - RIP only if BMT/solid transplant:   - Legionella antigen:   - Blood culture x2:   - Sputum culture:   - CXR:   - UA and culture:   - CPK:    - Management:   Bundle care as able to maintain isolation & minimize in/out of room   - Supplemental O2 to maintain SpO2 92%-96%   if requiring 6L NC or higher, place on nonrebreather and discuss case with  MICU   - Telemetry & continuous pulse oximetry    - If wheezing   - albuterol inhaler 2-4 puff Q6hr PRN    - ipratropium daily    - acetaminophen 650mg PO Q6hr PRN fever   - loperamide PRN for viral diarrhea  - Empiric antibiotics per likely source & patient allergies    - CAP: x 5 day course (d/c early if low concern for bacterial co-infection)  Ceftriaxone 1g IV Q24hrs            Azithromycin 500mg IV day #1, then 250mg PO daily x4 days     If azithromycin is not available, start doxycycline                 If MRSA risk factors, add Vancomycin IV (PharmD consult)     - Investigational Treatment Protocol: (if patient meets criteria)   [email or web address]     - statin: atorvastatin 40mg po daily (if CPK WNL)    - start HCQ 400mg PO BID x1 day, then 400mg PO daily x 4 days   (check glucose 6 phosphate dehydrogenase (NOT G6PD Quant), ECG on start & @48hrs, and start Qshift POCT glucose)    Safety notes:   - Avoid NIPPV (CPAP/BiPAP) to prevent aerosolization, use on a case-by-case basis if in neg pressure room   - Cautious use of NSAIDS for fever per WHO recommendations (3/16/2020)   - No new ACEi/ARB start or discontinuation of chronic med unless hypotensive (Esler et al. Journal of Hypertension 2020, 38:000-000)   - Careful use of steroids in the absence of other indications (shock, ARDS)   - Fluid sparing resuscitation, avoid maintenance fluids           Advance Care Planning   Advance Care Planning   .       VTE High Risk Prophylaxis: enoxaparin 40mg sq QHS @ 2100 (bundled care) if GFR >30    Patient's chronic/stable medical conditions noted in the problem list above will be managed with the patient's home medications as tolerated.         Subsequent Inpatient Hospital Care     Critical Care Time:  >30  minutes  Critical care was time spent personally by me on the following activities: development of treatment plan with  patient or surrogate and bedside caregivers, discussions with consultants, evaluation of patient's response to treatment, examination of patient, ordering and performing treatments and interventions, ordering and review of laboratory studies, ordering and review of radiographic studies, pulse oximetry, re-evaluation of patient's condition. This critical care time did not overlap with that of any other provider or involve time for any procedures.    Ashley Longoria NP-C  Critical Care Medicine

## 2020-04-08 NOTE — PLAN OF CARE
Patient not medically stable for stepdown at this time. Patient continues to require CCM service for:     ETT  Paralytic  Insulin drip       04/08/20 1407   Discharge Reassessment   Assessment Type Discharge Planning Reassessment   Do you have any problems affording any of your prescribed medications? No   Discharge Plan A Home with family   Discharge Plan B Home Health   DME Needed Upon Discharge  other (see comments)  (TBD)   Anticipated Discharge Disposition Home   Can the patient/caregiver answer the patient profile reliably? No, cognitively impaired  (intubated)       Sha Rueda RN MSN  Critical Care-   Ext. [phone]

## 2020-04-08 NOTE — TELEPHONE ENCOUNTER
Spoke with Patient's wife and her son's girlfriend to  Update her on the patient's status. Currently extubated on BiPAP, maintaining oxygen saturation ~92%, resting comfortably. All questions answered.

## 2020-04-08 NOTE — PROGRESS NOTES
Hospital Medicine  Progress Note  [facility]      Patient Name: Erick Valdovinos  MRN:  [id number]  Hospital Medicine Team: Networked reference to record PCT  Ashley Longoria NP  Date of Admission:  3/28/2020     Length of Stay:  LOS: 11 days       Principal Problem:  Acute respiratory distress syndrome (ARDS) due to 2019 novel coronavirus        HPI:  Patient is a 68 yo male with HTN, HLD, DM2, CKD3, NAFLD, aortic stenosis, diabetic neuropathy, cervical radiculopathy who presented to [facility] on 3/28 for worsening SOB in the setting of positive COVID testing done on 3/19-->resulted positive on 3/23. Patient had had fevers and diarrhea for the week prior, fevers as high as 103. He was also having difficulty seeing. Symptoms non-remitting until 3/28 and then had worsening fatigue and weakness, which led him to present to [facility] ED. In the ED, patient was found to be hypoxemic at 78% on room air and in moderate distress. Patient was placed on NRB mask but continued to have issues with poor effort so electively intubated due to worsening condition. Patient was also given 1L fluid bolus for hypotension (systolic in the 80s). Patient sedated on propofol and fentanyl.     ED evaluation at [facility] showed: WBC 6.21 with lymphopenia, bicarb 14, creatinine 2.6 (baseline 1.3), Ferritin 2202, , , , Lactic acid 0.9 and D-Dimer 0.4, BNP 41 and troponin bump to 0.087. Patient was started on ceftriaxone, Azithromycin and Plaquenil.      Patient was transferred to [facility] as [facility] was out of ICU beds. Patient admitted to MICU on 3/28 for management of acute hypoxemic respiratory failure complicated by JAZMIN and troponinemia in the setting of COVID19.    Hospital Course:  Patient admitted for evaluation of possible COVID-19.  Hospital/ICU Course:  Admitted to MICU on 3/28, intubated and mechanically ventilated. On Ceftriaxone, azithro and plaquenil. Started insulin gtt given glucose  >400s and checking serum ketones to evaluate for DKA. Trending troponins 0.087-->0.04, likely demand ischemia 2/2 ARDS. Patient noted to be in DKA with elevated beta hydroxybutyrate to 4.7. Patient fluid resuscitated about 3L from 3/28-3/29 and started on insulin gtt with improvement in glucose control. JAZMIN improving after fluid resuscitation. Patient did go into A fib with RVR on 3/29 and was started on amio gtt with conversion to PO given IV amio shortage. Patient was also started on low intensity heparin gtt given suspected component of microangiopathic coagulopathy related to JAZMIN. Of note, patient was persistently spiking fevers since admit up until 3/30 with a temp of 103 on 3/30 (still on ceftriaxone azithro). Tube feeds started on 3/30 (watching glucose very closely). Patient was placed in prone position on 3/30 at 1815 given P/F ratio of 122 (due to be supinated at 1015 on 3/31). 1/2 blood cultures from admission 3/28 resulted positive for coag negative staph (contaminant), repeat blood cultures ordered on 3/31-->NGTD. Patient was placed in prone position again on 3/31 at 1800 given P/F of 111 (due to supinate at 4/1 1000). Attempting to wean sedation as of 4/2, given improving oxygenation, and starting daily SAT/SBT. Unfortunately, throughout the day 4/3, patient had noted increasing oxygen requirements and pressor requirements, with concern for aspiration event as there was tube feeds contents suctioned out from ETT. Patient was started on vanc and cefepime and tube feeds held. Family was notified regarding acute decompensation and concern for poor prognosis and was made DNR.  04/05/2020 Pt required midazolam bolus alongside increases in fentanyl and precedex infusions to treat desaturation overnight. He was placed on FiO2 of 100% and intermittently paralyzed with rocuronium.     Interval History:     No acute events overnight  PMH poorly controlled DM, remains on insulin gtt. Nutrition changed on 4/6 to  Glucerna OGT.   Plaquenil/azithro/rocephin completed.  No steroids given due to hyperglycemia.     intermittent fever--> this AM Temp 101.5 f  Trialysis catheter changed  4/7, 4/8 WBC 11.65.    Vent dyssynchrony difficult to control. Vent settings 40/8 on spont.  Continue to need Precedex and fentanyl drip  for sedation.      Today, OGT  Became dislodged with questionable aspiration. Tube feedings placed on hold. Cefepime will be stopped today , previous blood and sputum cx (-).      AM labs:  Inflammatory markers, d-dimer, ferritin and LFTs   Continue to  slowly trend down.  CKD 3 remains stable, Scr 1.6, with baseline ~ 1.3-1.4, with good UOP.        Review of Systems:  Limited ROS    Limited review of systems and physical exam per team attending and/or nursing staff due to patient being in special isolation and intubated     Constitutional: sedated    Respiratory: Intubated   Gastrointestinal:   Tube feedings on hold  Genitourinary:  FTG   Neurological: sedated      Inpatient Medications:    Current Facility-Administered Medications:     acetaminophen oral solution 650 mg, 650 mg, Per OG tube, Q6H PRN, Behram Khan, MD, 650 mg at 04/06/20 1818    albuterol inhaler 2 puff, 2 puff, Inhalation, Q6H PRN, Manuel Tony MD    albuterol-ipratropium 2.5 mg-0.5 mg/3 mL nebulizer solution 3 mL, 3 mL, Nebulization, Q8H, Jodi Page MD, 3 mL at 04/08/20 0745    [COMPLETED] amiodarone tablet 400 mg, 400 mg, Oral, Q8H, 400 mg at 04/01/20 2111 **FOLLOWED BY** amiodarone tablet 200 mg, 200 mg, Per OG tube, Daily, Behram Khan, MD, 200 mg at 04/07/20 0824    [COMPLETED] calcium gluconate 1g in dextrose 5% 100mL (ready to mix system), 1 g, Intravenous, Once, 1 g at 03/28/20 1849 **AND** calcium gluconate 1g in dextrose 5% 100mL (ready to mix system), 1 g, Intravenous, Q10 Min PRN, Jessica Blanco MD    ceFEPIme injection 2 g, 2 g, Intravenous, Q12H, Charlotte Aguilera MD, 2 g at 04/08/20 0435    chlorhexidine 0.12 %  solution 15 mL, 15 mL, Mouth/Throat, BID, Jessica Blanco MD, 15 mL at 20 2036    clopidogreL tablet 75 mg, 75 mg, Per OG tube, Daily, Jessica Blanco MD, 75 mg at 20 0824    dexmedetomidine (PRECEDEX) 400mcg/100mL 0.9% NaCL infusion, 0.2 mcg/kg/hr, Intravenous, Continuous, Everardo Mcfarlane MD, Last Rate: 38.1 mL/hr at 20 0600, 1.4 mcg/kg/hr at 20 0600    dextrose 10% (D10W) Bolus, 25 g, Intravenous, PRN, Charlotte Aguilera MD    dextrose 10% (D10W) Bolus, 12.5 g, Intravenous, PRN, Charlotte Aguilera MD    fentaNYL 2500 mcg in 0.9% sodium chloride 250 mL infusion premix (titrating), 25 mcg/hr, Intravenous, Continuous, Wade Garcia MD, Last Rate: 30 mL/hr at 20 0617, 300 mcg/hr at 20 0617    [] fentaNYL injection 50 mcg, 50 mcg, Intravenous, Q15 Min PRN, 50 mcg at 20 0333 **FOLLOWED BY** fentaNYL injection 50 mcg, 50 mcg, Intravenous, Q1H PRN, Wade Garcia MD    furosemide injection 60 mg, 60 mg, Intravenous, Q8H, Behram Khan, MD, 60 mg at 20 0435    heparin 25,000 units in dextrose 5% (100 units/ml) IV bolus from bag - ADDITIONAL PRN BOLUS - 30 units/kg, 30 Units/kg (Adjusted), Intravenous, PRN, Wade Garcia MD, 2,500 Units at 20 0718    heparin 25,000 units in dextrose 5% (100 units/ml) IV bolus from bag - ADDITIONAL PRN BOLUS - 60 units/kg, 60 Units/kg (Adjusted), Intravenous, PRN, Wade Garcia MD, 4,000 Units at 20 0000    heparin 25,000 units in dextrose 5% 250 mL (100 units/mL) infusion LOW INTENSITY nomogram - [facility], 25 Units/kg/hr (Adjusted), Intravenous, Continuous, Kizzy Ingram, DO, Last Rate: 20.8 mL/hr at 20, 25 Units/kg/hr at 20    insulin regular 100 Units in sodium chloride 0.9% 100 mL infusion, 6 Units/hr, Intravenous, Continuous, Fiona Winterbottom, APRN, CNS, Last Rate: 4 mL/hr at 20, 4 Units/hr at 20    lorazepam injection 2 mg, 2 mg, Intravenous,  "Q4H PRN, Chan Das MD    methylPREDNISolone sodium succinate injection 40 mg, 40 mg, Intravenous, Daily, Jodi Page MD, 40 mg at 04/07/20 1630    norepinephrine 16 mg in dextrose 5 % 250 mL infusion, 0.02 mcg/kg/min (Dosing Weight), Intravenous, Continuous, Behram Khan, MD, Stopped at 04/07/20 1400    ondansetron injection 4 mg, 4 mg, Intravenous, Q8H PRN, Jessica Blanco MD    oxyCODONE immediate release tablet Tab 10 mg, 10 mg, Per OG tube, QID, John Briceño MD    pantoprazole suspension 40 mg, 40 mg, Per OG tube, Daily, Behram Khan, MD, 40 mg at 04/07/20 0824    polyethylene glycol packet 17 g, 17 g, Per NG tube, BID, Jessica Blanco MD, 17 g at 04/07/20 2037    QUEtiapine tablet 100 mg, 100 mg, Per OG tube, QHS, Behram Khan, MD, 100 mg at 04/07/20 2036    sodium chloride 0.9% flush 10 mL, 10 mL, Intravenous, PRN, Jessica Blanco MD      Physical Exam:   Constitutional:  appears well-developed and well-nourished.   HENT:   Head: Normocephalic.   Mouth/Throat:  ETT    Cardiovascular: Normal rate, regular rhythm     Pulmonary/Chest: Mechanical ventilation   :    Neurological:  Sedated         Intake/Output Summary (Last 24 hours) at 4/8/2020 0755  Last data filed at 4/8/2020 0600  Gross per 24 hour   Intake 2586.35 ml   Output 4715 ml   Net -2128.65 ml     Wt Readings from Last 3 Encounters:   04/06/20 111.8 kg (246 lb 7.6 oz)   03/28/20 108.9 kg (240 lb)   03/28/20 108.9 kg (240 lb 1.3 oz)       BP (!) 125/47   Pulse 79   Temp 98.9 °F (37.2 °C) (Axillary)   Resp (!) 23   Ht 5' 7" (1.702 m)   Wt 111.8 kg (246 lb 7.6 oz)   SpO2 97%   BMI 38.60 kg/m²         Laboratory:  Lab Results   Component Value Date    [id number] Detected (A) 03/19/2020       Recent Labs   Lab 04/06/20  0243 04/07/20  0222 04/08/20  0246   WBC 13.97* 14.25* 11.65   LYMPH 4.5*  CANCELED 8.0*  CANCELED 6.7*  0.8*   HGB 8.5* 8.1* 8.0*   HCT 28.0* 27.4* 27.5*   * 341 309 "     Recent Labs   Lab 04/06/20 0243 04/06/20 2308 04/07/20 0222 04/08/20  0246      < > 144 144 146*   K 5.3*   < > 4.6 4.5 4.7      < > 111* 113* 113*   CO2 21*   < > 23 20* 20*   BUN 51*   < > 67* 67* 76*   CREATININE 1.5*   < > 1.7* 1.6* 1.6*   *   < > 230* 186* 321*   CALCIUM 8.0*   < > 8.6* 8.6* 8.5*   MG 1.8  --   --  1.9 2.0   PHOS 3.8  --   --  3.3 4.0    < > = values in this interval not displayed.     Recent Labs   Lab 04/06/20 0243 04/07/20 0222 04/08/20 0246   ALKPHOS 143* 120 118   ALT 46* 41 47*   AST 65* 53* 51*   ALBUMIN 1.2* 1.3* 1.3*   PROT 6.1 6.2 6.2   BILITOT 0.9 0.7 0.5        Recent Labs     04/06/20 0243 04/07/20 0222 04/08/20  0246   DDIMER 2.60* 2.36* 1.99*   FERRITIN 3,284* 2,998* 2,718*   .9* 223.9* 167.7*   * 393* 489*       All labs within the last 24 hours were reviewed.     Microbiology:  Microbiology Results (last 7 days)     Procedure Component Value Units Date/Time    Blood culture [[id number]] Collected:  04/03/20 1006    Order Status:  Completed Specimen:  Blood Updated:  04/07/20 1612     Blood Culture, Routine No Growth after 4 days.     Narrative:       Collection has been rescheduled by CBE at 04/03/2020 10:22   Collection has been rescheduled by CBE at 04/03/2020 10:22     Blood culture [[id number]] Collected:  04/03/20 1006    Order Status:  Completed Specimen:  Blood Updated:  04/07/20 1612     Blood Culture, Routine No Growth after 4 days.     Narrative:       Collection has been rescheduled by CBE at 04/03/2020 10:22   Collection has been rescheduled by CBE at 04/03/2020 10:22     Culture, Respiratory with Gram Stain [[id number]] Collected:  04/03/20 1350    Order Status:  Completed Specimen:  Respiratory from Tracheal Aspirate Updated:  04/06/20 1030     Respiratory Culture Normal respiratory ran     Gram Stain (Respiratory) <10 epithelial cells per low power field.     Gram Stain (Respiratory) Moderate WBC's     Gram Stain  (Respiratory) Many Gram positive cocci     Gram Stain (Respiratory) Moderate Gram negative rods    Blood culture [[id number]] Collected:  03/31/20 0353    Order Status:  Completed Specimen:  Blood from Peripheral, Forearm, Right Updated:  04/04/20 0812     Blood Culture, Routine No Growth after 4 days.     Blood culture [[id number]] Collected:  03/31/20 0351    Order Status:  Completed Specimen:  Blood from Peripheral, Forearm, Left Updated:  04/04/20 0812     Blood Culture, Routine No Growth after 4 days.     Blood culture [[id number]]  (Abnormal) Collected:  03/28/20 1817    Order Status:  Completed Specimen:  Blood from Line, Jugular, Internal Left Updated:  04/02/20 1021     Blood Culture, Routine Gram stain aer bottle: Gram positive cocci in clusters resembling Staph       Results called to and read back by: Celia Buenrostro RN 03/31/2020  02:34      COAGULASE-NEGATIVE STAPHYLOCOCCUS SPECIES  Organism is a probable contaminant      Blood culture [[id number]] Collected:  03/28/20 1817    Order Status:  Completed Specimen:  Blood from Radial Arterial Stick, Right Updated:  04/01/20 2012     Blood Culture, Routine No Growth after 4 days.             Imaging      No results found for this or any previous visit.    X-Ray Chest 1 View  Narrative: EXAMINATION:  XR CHEST 1 VIEW    CLINICAL HISTORY:  confirm R IJ TLC;    TECHNIQUE:  Single frontal view of the chest was performed.    COMPARISON:  No 04/04/2020 ne    FINDINGS:  Bilateral central venous catheters are in the SVC.  Endotracheal tube and NG tube remain.  Heart size normal.  Extrapulmonary air probably pneumomediastinum or pneumopericardium noted adjacent to the left cardiac border  developed since the previous study . Ill-defined patchy airspace consolidation at the lung bases persist.  No significant pleural effusion.  Impression: See above    Electronically signed by: Kike Morales MD  Date:    04/07/2020  Time:    11:56      All imaging within the last 24 hours  was reviewed.     Assessment and Plan:   intermittent fever-->Trialysis catheter to be changed  4/7.      ARDS; Vent settings 40/8 on spont.-->  Continued to need Precedex and fentanyl drip for sedation.      Today, OGT  Became dislodged with questionable aspiration. Tube feedings place on hold. Cefepime will be stopped today , previous blood and sputum cx (-).     DM 2: continue insulin gtt regime as prescribed, will continue to monitor .     AM labs:  Inflammatory markers, d-dimer, ferritin and LFTs  continues  slowly trend down.    CKD 3: remains stable, Scr 1.6, with baseline ~ 1.3-1.4, with good UOP. Continue to monitor     Active Hospital Problems    Diagnosis  POA    *Acute respiratory distress syndrome (ARDS) due to 2019 novel coronavirus [U07.1, J80]  Yes    Acute metabolic encephalopathy [G93.41]  No    Anticoagulated [Z79.01]  Not Applicable    Hypertriglyceridemia [E78.1]  No    DKA (diabetic ketoacidoses) [E11.10]  Yes    A-fib [I48.91]  No    Shock, unspecified [R57.9]  Yes    COVID-19 virus detected [U07.1]  Yes    Acute respiratory failure with hypoxia [J96.01]  Yes    Endotracheally intubated [Z97.8]  Yes    Transaminitis [R74.0]  Yes    Acute respiratory distress syndrome (ARDS) due to COVID-19 virus [U07.1, J80]  Yes    Aortic stenosis [I35.0]  Yes    NSTEMI (non-ST elevated myocardial infarction) [I21.4]  Yes    Mixed hyperlipidemia [E78.2]  Yes    Nonalcoholic fatty liver disease [K76.0]  Yes    Severe obesity (BMI 35.0-39.9) with comorbidity [E66.01]  Yes    Acute renal failure superimposed on stage 3 chronic kidney disease [N17.9, N18.3]  Yes    Type 2 diabetes mellitus with stage 3 chronic kidney disease, without long-term current use of insulin [E11.22, N18.3]  Yes    Type 2 diabetes mellitus with diabetic polyneuropathy, without long-term current use of insulin [E11.42]  Yes    Essential hypertension [I10]  Yes      Resolved Hospital Problems   No resolved problems to  display.       Suspected Covid-19 Virus Infection  Person Under Investigation (PUI) for COVID-19  - COVID-19 testing: Collection Date: 3/19/2020 Collection Time:   9:40 AM  - Infection Control notified    - Isolation:   - Airborne and Droplet Precautions  - Surgical mask on patient   - N95 masks must be fit tested, wear eye protection  - 20 second hand hygiene   - Limit visitors per hospital policy   - Consolidate lab draws, nursing care, and interventions    - Diagnostics: (Lymphopenia, hyponatremia, hyperferritinemia, elevated troponin, elevated d-dimer, age, and comorbidities are significant predictors of poor clinical outcome)   - CBC:   trend Q48hrs  - CMP:        trend Q48hrs  - Procalcitonin:  - D-dimer:  repeat prior to discharge  - Ferritin:  repeat prior to discharge   - CRP:        trend Q48hrs  - LDH:   repeat prior to discharge  - BNP:  - Troponin:    - ECG:   - rapid Flu:   - RIP only if BMT/solid transplant:   - Legionella antigen:   - Blood culture x2:   - Sputum culture:   - CXR:   - UA and culture:   - CPK:    - Management:   Bundle care as able to maintain isolation & minimize in/out of room   - Supplemental O2 to maintain SpO2 92%-96%   if requiring 6L NC or higher, place on nonrebreather and discuss case with MICU   - Telemetry & continuous pulse oximetry    - If wheezing   - albuterol inhaler 2-4 puff Q6hr PRN    - ipratropium daily    - acetaminophen 650mg PO Q6hr PRN fever   - loperamide PRN for viral diarrhea  - Empiric antibiotics per likely source & patient allergies    - CAP: x 5 day course (d/c early if low concern for bacterial co-infection)  Ceftriaxone 1g IV Q24hrs            Azithromycin 500mg IV day #1, then 250mg PO daily x4 days     If azithromycin is not available, start doxycycline                 If MRSA risk factors, add Vancomycin IV (PharmD consult)     - Investigational Treatment Protocol: (if patient meets criteria)    [email or web address]     - statin: atorvastatin 40mg po daily (if CPK WNL)    - start HCQ 400mg PO BID x1 day, then 400mg PO daily x 4 days   (check glucose 6 phosphate dehydrogenase (NOT G6PD Quant), ECG on start & @48hrs, and start Qshift POCT glucose)    Safety notes:   - Avoid NIPPV (CPAP/BiPAP) to prevent aerosolization, use on a case-by-case basis if in neg pressure room   - Cautious use of NSAIDS for fever per WHO recommendations (3/16/2020)   - No new ACEi/ARB start or discontinuation of chronic med unless hypotensive (Esler et al. Journal of Hypertension 2020, 38:000-000)   - Careful use of steroids in the absence of other indications (shock, ARDS)   - Fluid sparing resuscitation, avoid maintenance fluids           Advance Care Planning   Advance Care Planning   .       VTE High Risk Prophylaxis: enoxaparin 40mg sq QHS @ 2100 (bundled care) if GFR >30    Patient's chronic/stable medical conditions noted in the problem list above will be managed with the patient's home medications as tolerated.         Subsequent Inpatient Hospital Care     Critical Care Time:  >30  minutes  Critical care was time spent personally by me on the following activities: development of treatment plan with patient or surrogate and bedside caregivers, discussions with consultants, evaluation of patient's response to treatment, examination of patient, ordering and performing treatments and interventions, ordering and review of laboratory studies, ordering and review of radiographic studies, pulse oximetry, re-evaluation of patient's condition. This critical care time did not overlap with that of any other provider or involve time for any procedures.    Ashley Longoria NP-C  Critical Care Medicine

## 2020-04-09 NOTE — PROCEDURES
"Erick Valdovinos is a 69 y.o. male patient.    Temp: 98.2 °F (36.8 °C) (20)  Pulse: 107 (20 0000)  Resp: (!) 43 (20 0000)  BP: (!) 125/47 (20 1501)  SpO2: (!) 93 % (20 0000)  Weight: 111.8 kg (246 lb 7.6 oz) (20 1130)  Height: 5' 7" (170.2 cm) (20 0900)       Intubation  Date/Time: 2020 1:56 AM  Location procedure was performed: [facility] CRITICAL CARE MEDICINE  Performed by: Jodi Page MD  Authorized by: Jodi Page MD   Pre-operative diagnosis: Respiratory Failure  Post-operative diagnosis: Respiratory Failure  Consent Done: Yes  Consent: Verbal consent obtained.  Consent given by: spouse  Patient identity confirmed: , MRN and name  Time out: Immediately prior to procedure a "time out" was called to verify the correct patient, procedure, equipment, support staff and site/side marked as required.  Indications: respiratory failure  Intubation method: video-assisted  Patient status: unconscious  Preoxygenation: BIPAP.  Sedatives: etomidate  Paralytic: rocuronium  Laryngoscope size: Mac 4  Tube size: 8.0 mm  Tube type: cuffed  Number of attempts: 1  Cricoid pressure: no  Cords visualized: yes  Post-procedure assessment: chest rise and ETCO2 monitor  Breath sounds: equal and absent over the epigastrium  Cuff inflated: yes  ETT to lip: 25 cm  Tube secured with: ETT byers  Chest x-ray interpreted by me.  Chest x-ray findings: endotracheal tube too high  Tube repositioned: tube repositioned successfully  Patient tolerance: Patient tolerated the procedure well with no immediate complications  Complications: No  Specimens: No  Implants: No      Jodi Page MD  [facility]  "

## 2020-04-09 NOTE — EICU
Rounding (Video Assessment):  Yes    Intervention Initiated From:  Bedside    Jay Jay Communicated with Bedside Nurse regarding:  Time-Out     Comments:     eLert for emergent intubation, Dr Preciado supervising Dr Page, successful without incident, see LDA/Time Out flowsheet for additional information.

## 2020-04-09 NOTE — CARE UPDATE
Patient orally intubated by Dr bentley with an # 8.0 ETT .Tube placement verified visually via glide scope ,etco2 followed by CXR.Mechanical vent support initiated with the following settings:AC rate of 22,Vt 450cc,Peep 12 cnh20,fio2 90%.ABG pending.

## 2020-04-09 NOTE — PLAN OF CARE
Called to bedside around 11:30 pm. Patient had increased work of breathing with RR up into the high 30's. Lorazepam 1 mg x1 given without effect. Patient's respiratory rate then went up into the 40's. Haldol 5 mg x1 IV was administered. Again, no effect. Patient was awake with eyes open but not alert or oriented. He was unable to follow any commands. Patient appeared to be in distress with accessory respiratory muscle use. SBP was up in the 200's from distress. Labetalol 10 mg IV x1 was given with improvement in systolics to the 170's. Additional lasix 60 mg IV dose was given for possible flash pulmonary edema due to SBP in the 240's. For the next hour, we continued to watch patient closely with additional lorazepam and haldol doses. However, by 12:40 pm there was no improvement.    At this time, I spoke with patient's wife, son, and son's girlfriend who is a doctor in the [facility] system. I explained patient's diagnosis and prognosis. They were extremely understanding. They understand that Mr. Valdovinos has a poor prognosis given his hospital course requiring the ventilator for 10 days. They state that Mr. Valdovinos would not want to be on a ventilator long-term and would not want tracheostomy. Although he has a poor prognosis amongst multiple co-morbidities, they would like to intubate one more time. Given that patient had a new 102 fever and known aspiration earlier in the day, new HAP is definitely a part of the differential. Vancomycin and zosyn were both started for treatment. Repeat cultures have been drawn. Family states that if he patient cannot be extubated in the future, they will want to discuss withdrawal of care at that time. Additionally, if patient is extubated to BIPAP, they would like to discuss whether or not they would want re-intubation again at that time. Family is reasonable and understand that he is not doing well, but would like to give one more attempt at improvement.    Jodi Page MD  [facility]  PCCM Fellow

## 2020-04-09 NOTE — PT/OT/SLP PROGRESS
Speech Language Pathology  Discharge    Erick Valdovinos  MRN: [id number]    Patient not seen today secondary to pt intubated at this time. Please re-consult when medically appropriate. No further ST warranted at this time.       Marbella Humphrey CCC-SLP  4/9/2020

## 2020-04-09 NOTE — PROGRESS NOTES
Pharmacokinetic Initial Assessment: IV Vancomycin    Assessment/Plan:    Initiate intravenous vancomycin with loading dose of 2250 mg once with subsequent doses when random concentrations are less than 20 mcg/mL  Desired empiric serum trough concentration is 15 to 20 mcg/mL  Draw vancomycin random level on 04/10 @ 14:00  Pharmacy will continue to follow and monitor vancomycin.      Please contact pharmacy at extension [phone] with any questions regarding this assessment.     Thank you for the consult,   Gavi Green       Patient brief summary:  Erick Valdovinos is a 69 y.o. male initiated on antimicrobial therapy with IV Vancomycin for treatment of suspected bacteremia    Drug Allergies:   Review of patient's allergies indicates:   Allergen Reactions    Codeine Nausea Only    Nsaids (non-steroidal anti-inflammatory drug) Other (See Comments)     Kidney issues       Actual Body Weight:   111.8 Kg    Renal Function:   Estimated Creatinine Clearance: 52.8 mL/min (A) (based on SCr of 1.6 mg/dL (H)).,     Dialysis Method (if applicable):      CBC (last 72 hours):  Recent Labs   Lab Result Units 04/06/20  0243 04/07/20 0222 04/08/20  0246   WBC K/uL 13.97* 14.25* 11.65   Hemoglobin g/dL 8.5* 8.1* 8.0*   Hematocrit % 28.0* 27.4* 27.5*   Platelets K/uL 354* 341 309   Gran% % 86.5* 86.5* 84.0*   Lymph% % 4.5* 8.0* 6.7*   Mono% % 5.0 3.0* 4.9   Eosinophil% % 0.0 0.0 0.0   Basophil% % 0.0 0.0 0.1   Differential Method  Manual Manual Automated       Metabolic Panel (last 72 hours):  Recent Labs   Lab Result Units 04/06/20  0243 04/06/20  0923 04/06/20  1540 04/06/20  2308 04/07/20  0222 04/08/20  0246   Sodium mmol/L 140 139 140 144 144 146*   Potassium mmol/L 5.3* 5.5* 4.9 4.6 4.5 4.7   Chloride mmol/L 108 107 108 111* 113* 113*   CO2 mmol/L 21* 22* 22* 23 20* 20*   Glucose mg/dL 425* 530* 437* 230* 186* 321*   BUN, Bld mg/dL 51* 60* 64* 67* 67* 76*   Creatinine mg/dL 1.5* 1.7* 1.7* 1.7* 1.6* 1.6*   Albumin g/dL 1.2*  --    --   --  1.3* 1.3*   Total Bilirubin mg/dL 0.9  --   --   --  0.7 0.5   Alkaline Phosphatase U/L 143*  --   --   --  120 118   AST U/L 65*  --   --   --  53* 51*   ALT U/L 46*  --   --   --  41 47*   Magnesium mg/dL 1.8  --   --   --  1.9 2.0   Phosphorus mg/dL 3.8  --   --   --  3.3 4.0       Drug levels (last 3 results):  Recent Labs   Lab Result Units 04/06/20  0243   Vancomycin, Random ug/mL 21.7       Microbiologic Results:  Microbiology Results (last 7 days)     Procedure Component Value Units Date/Time    Culture, Respiratory with Gram Stain [[id number]]     Order Status:  No result Specimen:  Respiratory from Endotracheal Aspirate     Blood culture [[id number]]     Order Status:  No result Specimen:  Blood     Blood culture [[id number]]     Order Status:  No result Specimen:  Blood     Blood culture [[id number]] Collected:  04/03/20 1006    Order Status:  Completed Specimen:  Blood Updated:  04/07/20 1612     Blood Culture, Routine No Growth after 4 days.     Narrative:       Collection has been rescheduled by CBE at 04/03/2020 10:22   Collection has been rescheduled by CBE at 04/03/2020 10:22     Blood culture [[id number]] Collected:  04/03/20 1006    Order Status:  Completed Specimen:  Blood Updated:  04/07/20 1612     Blood Culture, Routine No Growth after 4 days.     Narrative:       Collection has been rescheduled by CBE at 04/03/2020 10:22   Collection has been rescheduled by CBE at 04/03/2020 10:22     Culture, Respiratory with Gram Stain [[id number]] Collected:  04/03/20 1350    Order Status:  Completed Specimen:  Respiratory from Tracheal Aspirate Updated:  04/06/20 1030     Respiratory Culture Normal respiratory ran     Gram Stain (Respiratory) <10 epithelial cells per low power field.     Gram Stain (Respiratory) Moderate WBC's     Gram Stain (Respiratory) Many Gram positive cocci     Gram Stain (Respiratory) Moderate Gram negative rods    Blood culture [[id number]] Collected:  03/31/20 0353    Order  Status:  Completed Specimen:  Blood from Peripheral, Forearm, Right Updated:  04/04/20 0812     Blood Culture, Routine No Growth after 4 days.     Blood culture [[id number]] Collected:  03/31/20 0351    Order Status:  Completed Specimen:  Blood from Peripheral, Forearm, Left Updated:  04/04/20 0812     Blood Culture, Routine No Growth after 4 days.     Blood culture [[id number]]  (Abnormal) Collected:  03/28/20 1817    Order Status:  Completed Specimen:  Blood from Line, Jugular, Internal Left Updated:  04/02/20 1021     Blood Culture, Routine Gram stain aer bottle: Gram positive cocci in clusters resembling Staph       Results called to and read back by: Celia Buenrostro RN 03/31/2020  02:34      COAGULASE-NEGATIVE STAPHYLOCOCCUS SPECIES  Organism is a probable contaminant

## 2020-04-09 NOTE — PROGRESS NOTES
Hospital Medicine  Progress Note  [facility]      Patient Name: Erick Valdovinos  MRN:  [id number]  Hospital Medicine Team: Networked reference to record PCT  Ashley Longoria NP  Date of Admission:  3/28/2020     Length of Stay:  LOS: 12 days       Principal Problem:  Acute respiratory distress syndrome (ARDS) due to 2019 novel coronavirus        HPI:  Patient is a 68 yo male with HTN, HLD, DM2, CKD3, NAFLD, aortic stenosis, diabetic neuropathy, cervical radiculopathy who presented to [facility] on 3/28 for worsening SOB in the setting of positive COVID testing done on 3/19-->resulted positive on 3/23. Patient had had fevers and diarrhea for the week prior, fevers as high as 103. He was also having difficulty seeing. Symptoms non-remitting until 3/28 and then had worsening fatigue and weakness, which led him to present to [facility] ED. In the ED, patient was found to be hypoxemic at 78% on room air and in moderate distress. Patient was placed on NRB mask but continued to have issues with poor effort so electively intubated due to worsening condition. Patient was also given 1L fluid bolus for hypotension (systolic in the 80s). Patient sedated on propofol and fentanyl.     ED evaluation at [place] showed: WBC 6.21 with lymphopenia, bicarb 14, creatinine 2.6 (baseline 1.3), Ferritin 2202, , , , Lactic acid 0.9 and D-Dimer 0.4, BNP 41 and troponin bump to 0.087. Patient was started on ceftriaxone, Azithromycin and Plaquenil.      Patient was transferred to [facility] as [place] was out of ICU beds. Patient admitted to MICU on 3/28 for management of acute hypoxemic respiratory failure complicated by JAZMIN and troponinemia in the setting of COVID19.    Hospital Course:  Patient admitted for evaluation of possible COVID-19.  Hospital/ICU Course:  Admitted to MICU on 3/28, intubated and mechanically ventilated. On Ceftriaxone, azithro and plaquenil. Started insulin gtt given glucose  >400s and checking serum ketones to evaluate for DKA. Trending troponins 0.087-->0.04, likely demand ischemia 2/2 ARDS. Patient noted to be in DKA with elevated beta hydroxybutyrate to 4.7. Patient fluid resuscitated about 3L from 3/28-3/29 and started on insulin gtt with improvement in glucose control. JAZMIN improving after fluid resuscitation. Patient did go into A fib with RVR on 3/29 and was started on amio gtt with conversion to PO given IV amio shortage. Patient was also started on low intensity heparin gtt given suspected component of microangiopathic coagulopathy related to JAZMIN. Of note, patient was persistently spiking fevers since admit up until 3/30 with a temp of 103 on 3/30 (still on ceftriaxone azithro). Tube feeds started on 3/30 (watching glucose very closely). Patient was placed in prone position on 3/30 at 1815 given P/F ratio of 122 (due to be supinated at 1015 on 3/31). 1/2 blood cultures from admission 3/28 resulted positive for coag negative staph (contaminant), repeat blood cultures ordered on 3/31-->NGTD. Patient was placed in prone position again on 3/31 at 1800 given P/F of 111 (due to supinate at 4/1 1000). Attempting to wean sedation as of 4/2, given improving oxygenation, and starting daily SAT/SBT. Unfortunately, throughout the day 4/3, patient had noted increasing oxygen requirements and pressor requirements, with concern for aspiration event as there was tube feeds contents suctioned out from ETT. Patient was started on vanc and cefepime and tube feeds held. Family was notified regarding acute decompensation and concern for poor prognosis and was made DNR.  04/05/2020 Pt required midazolam bolus alongside increases in fentanyl and precedex infusions to treat desaturation overnight. He was placed on FiO2 of 100% and intermittently paralyzed with rocuronium.    COVID/ARDS:Plaquenil/azithro/rocephin completed.    Interval History:     Extubated yesterday, 4/8 and placed on BiPAP  Over  night he spiked a fever and became unstable, warranting reintubation. He was given rocuronium/ paralytic, and is currently on propofol, fent and levo gtts.  Vent 90/12 4/9 CXR :  Bilateral infiltrates appear mildly increased. Interval increase in soft tissue emphysema throughout the chest wall.  Persistent pneumomediastinum. No pneumothorax identified.     4/8, OGT  Became dislodged with questionable aspiration. Tube feedings were placed on hold   He continues to have intermittent fever. WBC trended up from 11.65 yesterday to 19.2 today .    Today, 4/9 blood and sputum cx sent and  pt started on vancomycin and zosyn  for suspected bacteremia      JAZMIN:   sCr trending up from Scr 1.6 yesterday to 2.0 today, with baseline ~ 1.3-1.4, continues to have  good UOP.        Review of Systems:  Limited ROS    Limited review of systems and physical exam per team attending and/or nursing staff due to patient being in special isolation and intubated   Constitutional: sedated  , paralyzed   Respiratory: Intubated , mechanical vent  Gastrointestinal:      Genitourinary:  FTG   Neurological: sedated      Inpatient Medications:    Current Facility-Administered Medications:     acetaminophen tablet 650 mg, 650 mg, Per NG tube, Q6H PRN, Liz Phipps MD    albuterol inhaler 2 puff, 2 puff, Inhalation, Q6H PRN, Manuel Tony MD    albuterol inhaler 2 puff, 2 puff, Inhalation, Q6H PRN, Ashley Longoria NP    albuterol-ipratropium 2.5 mg-0.5 mg/3 mL nebulizer solution 3 mL, 3 mL, Nebulization, Q6H, Ashley Longoria NP, 3 mL at 04/09/20 1311    [COMPLETED] amiodarone tablet 400 mg, 400 mg, Oral, Q8H, 400 mg at 04/01/20 2111 **FOLLOWED BY** amiodarone tablet 200 mg, 200 mg, Per OG tube, Daily, Behram Khan, MD, 200 mg at 04/09/20 0809    [COMPLETED] calcium gluconate 1g in dextrose 5% 100mL (ready to mix system), 1 g, Intravenous, Once, 1 g at 03/28/20 1849 **AND** calcium gluconate 1g in dextrose 5% 100mL (ready to mix system), 1  g, Intravenous, Q10 Min PRN, Jessica Blanco MD    chlorhexidine 0.12 % solution 15 mL, 15 mL, Mouth/Throat, BID, Anju Story MD, 15 mL at 04/09/20 0914    clopidogreL tablet 75 mg, 75 mg, Per OG tube, Daily, Jessica Blanco MD, 75 mg at 04/09/20 0809    dextrose 10% (D10W) Bolus, 25 g, Intravenous, PRN, Charlotte Aguilera MD    dextrose 10% (D10W) Bolus, 12.5 g, Intravenous, PRN, Charlotte Aguilera MD    [START ON 4/10/2020] famotidine (PF) injection 20 mg, 20 mg, Intravenous, Daily, Fiona Winterbottom, APRN, CNS    fentaNYL 2500 mcg in 0.9% sodium chloride 250 mL infusion premix (titrating), , Intravenous, Continuous, Jodi Page MD, Last Rate: 30 mL/hr at 04/09/20 1200    furosemide injection 60 mg, 60 mg, Intravenous, Q12H, John Briceño MD, 60 mg at 04/09/20 1543    heparin 25,000 units in dextrose 5% (100 units/ml) IV bolus from bag - ADDITIONAL PRN BOLUS - 30 units/kg, 30 Units/kg (Adjusted), Intravenous, PRN, Wade Garcia MD, 2,500 Units at 04/05/20 0718    heparin 25,000 units in dextrose 5% (100 units/ml) IV bolus from bag - ADDITIONAL PRN BOLUS - 60 units/kg, 60 Units/kg (Adjusted), Intravenous, PRN, Wade Garcia MD, 4,000 Units at 04/04/20 0000    heparin 25,000 units in dextrose 5% 250 mL (100 units/mL) infusion LOW INTENSITY nomogram - [facility], 25 Units/kg/hr (Adjusted), Intravenous, Continuous, Kizzy Ingram DO, Last Rate: 20.8 mL/hr at 04/09/20 1522, 25 Units/kg/hr at 04/09/20 1522    insulin regular 100 Units in sodium chloride 0.9% 100 mL infusion, 6 Units/hr, Intravenous, Continuous, Dana Winterbottom, APRN, CNS, Last Rate: 8 mL/hr at 04/09/20 1548, 8 Units/hr at 04/09/20 1548    ipratropium-albuteroL inhaler 1 puff, 1 puff, Inhalation, Q6H PRN, Ashley Longoria, RANDAL    labetalol 20 mg/4 mL (5 mg/mL) IV syring, 10 mg, Intravenous, Q8H PRN, Liz Phipps MD    methylPREDNISolone sodium succinate injection 40 mg, 40 mg, Intravenous, Daily, Jodi Page MD,  "40 mg at 04/09/20 0809    norepinephrine 16 mg in dextrose 5 % 250 mL infusion, 0.02 mcg/kg/min (Dosing Weight), Intravenous, Continuous, Jodi Page MD, Last Rate: 10.2 mL/hr at 04/09/20 1554, 0.1 mcg/kg/min at 04/09/20 1554    ondansetron injection 4 mg, 4 mg, Intravenous, Q8H PRN, Jessica Blanco MD    piperacillin-tazobactam 4.5 g in sodium chloride 0.9% 100 mL IVPB (ready to mix system), 4.5 g, Intravenous, Q8H, Anju Story MD, Last Rate: 25 mL/hr at 04/09/20 1114, 4.5 g at 04/09/20 1114    polyethylene glycol packet 17 g, 17 g, Per NG tube, BID, Jessica Blanco MD, 17 g at 04/09/20 0809    propofol (DIPRIVAN) 10 mg/mL infusion, 5 mcg/kg/min (Dosing Weight), Intravenous, Continuous, Liz Phipps MD, Last Rate: 3.3 mL/hr at 04/09/20 1526, 5 mcg/kg/min at 04/09/20 1526    QUEtiapine tablet 100 mg, 100 mg, Per OG tube, QHS, Behram Khan, MD, 100 mg at 04/07/20 2036    rocuronium 10 mg/mL injection, , , ,     sodium chloride 0.9% flush 10 mL, 10 mL, Intravenous, PRN, Jessica Blanco MD    Pharmacy to dose Vancomycin consult, , , Once **AND** vancomycin - pharmacy to dose, , Intravenous, pharmacy to manage frequency, Anju Story MD      Physical Exam:   Constitutional:  appears well-developed and well-nourished.   HENT:   Head: Normocephalic.   Mouth/Throat:  ETT    Cardiovascular: Normal rate, regular rhythm     Pulmonary/Chest: Mechanical ventilation   Musculoskeletal: RR arterial line , TL  :  FTG  Neurological:  Sedated         Intake/Output Summary (Last 24 hours) at 4/9/2020 1600  Last data filed at 4/9/2020 1300  Gross per 24 hour   Intake 2424.68 ml   Output 2640 ml   Net -215.32 ml     Wt Readings from Last 3 Encounters:   04/06/20 111.8 kg (246 lb 7.6 oz)   03/28/20 108.9 kg (240 lb)   03/28/20 108.9 kg (240 lb 1.3 oz)       /61   Pulse 72   Temp 97.6 °F (36.4 °C) (Axillary)   Resp (!) 28   Ht 5' 7" (1.702 m)   Wt 111.8 kg (246 lb 7.6 oz)   SpO2 98%  "  BMI 38.60 kg/m²         Laboratory:  Lab Results   Component Value Date    [id number] Detected (A) 03/19/2020       Recent Labs   Lab 04/07/20 0222 04/08/20 0246 04/09/20  0258 04/09/20  0302   WBC 14.25* 11.65  --  19.20*   LYMPH 8.0*  CANCELED 6.7*  0.8*  --  6.1*  1.2   HGB 8.1* 8.0*  --  9.3*   HCT 27.4* 27.5* 28* 31.9*    309  --  406*     Recent Labs   Lab 04/07/20 0222 04/08/20 0246 04/09/20  0302    146* 149*   K 4.5 4.7 4.1   * 113* 112*   CO2 20* 20* 24   BUN 67* 76* 89*   CREATININE 1.6* 1.6* 2.0*   * 321* 164*   CALCIUM 8.6* 8.5* 9.2   MG 1.9 2.0 2.0   PHOS 3.3 4.0 5.6*     Recent Labs   Lab 04/07/20 0222 04/08/20 0246 04/09/20  0302   ALKPHOS 120 118 133   ALT 41 47* 55*   AST 53* 51* 67*   ALBUMIN 1.3* 1.3* 1.6*   PROT 6.2 6.2 6.9   BILITOT 0.7 0.5 1.0        Recent Labs     04/07/20 0222 04/08/20 0246 04/09/20  0302 04/09/20  0307   DDIMER 2.36* 1.99* 4.18*  --    FERRITIN 2,998* 2,718* 2,929*  --    .9* 167.7* 195.2*  --    * 489*  --  824*       All labs within the last 24 hours were reviewed.     Microbiology:  Microbiology Results (last 7 days)     Procedure Component Value Units Date/Time    Blood culture [[id number]] Collected:  04/09/20 0501    Order Status:  Completed Specimen:  Blood from Peripheral, Antecubital, Left Updated:  04/09/20 1315     Blood Culture, Routine No Growth to date    Blood culture [[id number]] Collected:  04/09/20 0450    Order Status:  Completed Specimen:  Blood from Peripheral, Antecubital, Right Updated:  04/09/20 1315     Blood Culture, Routine No Growth to date    Culture, Respiratory with Gram Stain [[id number]] Collected:  04/09/20 1107    Order Status:  Sent Specimen:  Respiratory from Endotracheal Aspirate Updated:  04/09/20 1107    Blood culture [[id number]] Collected:  04/03/20 1006    Order Status:  Completed Specimen:  Blood Updated:  04/07/20 1612     Blood Culture, Routine No Growth after 4 days.      Narrative:       Collection has been rescheduled by CBE at 04/03/2020 10:22   Collection has been rescheduled by CBE at 04/03/2020 10:22     Blood culture [[id number]] Collected:  04/03/20 1006    Order Status:  Completed Specimen:  Blood Updated:  04/07/20 1612     Blood Culture, Routine No Growth after 4 days.     Narrative:       Collection has been rescheduled by CBE at 04/03/2020 10:22   Collection has been rescheduled by CBE at 04/03/2020 10:22     Culture, Respiratory with Gram Stain [[id number]] Collected:  04/03/20 1350    Order Status:  Completed Specimen:  Respiratory from Tracheal Aspirate Updated:  04/06/20 1030     Respiratory Culture Normal respiratory ran     Gram Stain (Respiratory) <10 epithelial cells per low power field.     Gram Stain (Respiratory) Moderate WBC's     Gram Stain (Respiratory) Many Gram positive cocci     Gram Stain (Respiratory) Moderate Gram negative rods    Blood culture [[id number]] Collected:  03/31/20 0353    Order Status:  Completed Specimen:  Blood from Peripheral, Forearm, Right Updated:  04/04/20 0812     Blood Culture, Routine No Growth after 4 days.     Blood culture [[id number]] Collected:  03/31/20 0351    Order Status:  Completed Specimen:  Blood from Peripheral, Forearm, Left Updated:  04/04/20 0812     Blood Culture, Routine No Growth after 4 days.             Imaging      No results found for this or any previous visit.    X-Ray Chest 1 View  Narrative: EXAMINATION:  XR CHEST 1 VIEW    CLINICAL HISTORY:  rule out pneumo;    TECHNIQUE:  Single frontal view of the chest was performed.    COMPARISON:  04/09/2020    FINDINGS:  Medical support devices project in unchanged radiographic position.  Mediastinal structures are midline.  There is persistent pneumomediastinum and subcutaneous emphysema involving the chest wall, similar to prior examination.  No definite evidence of new or enlarging pneumothorax.  No significant interval detrimental change in lung aeration with  patchy bilateral airspace opacities most pronounced in the lower lung zones.  Impression: No significant interval detrimental change in cardiopulmonary status as detailed above.    Electronically signed by: Vanessa Pastrana MD  Date:    04/09/2020  Time:    07:01  X-Ray Chest AP Portable  Narrative: EXAMINATION:  XR CHEST AP PORTABLE    CLINICAL HISTORY:  ET tube placement;    TECHNIQUE:  Single frontal view of the chest was performed.    COMPARISON:  April 7, 2020.    FINDINGS:  ET tube, enteric tube and right IJ catheter appear unchanged.  Left IJ catheter no longer identified.    Bilateral infiltrates appear mildly increased.  Interval increase in soft tissue emphysema throughout the chest wall.  Persistent pneumomediastinum.  No pneumothorax identified.    Heart and lungs  appear unchanged when allowing for differences in technique and positioning.    Remote left rib fractures appear unchanged.  Impression: ET tube, enteric tube and right IJ catheter appear unchanged. Left IJ catheter no longer identified.    Bilateral infiltrates appear mildly increased. Interval increase in soft tissue emphysema throughout the chest wall.  Persistent pneumomediastinum. No pneumothorax identified.    Electronically signed by: Karson Estrada MD  Date:    04/09/2020  Time:    02:15      All imaging within the last 24 hours was reviewed.     Assessment and Plan:   Interval History:     Extubated yesterday, 4/8 and placed on BiPAP  Over night he spiked a fever and became unstable, warranting reintubation. He was given rocuronium/ paralytic, and is currently on propofol, fent and levo gtts.  Vent  80/14    4/9 CXR   Bilateral infiltrates appear mildly increased. Interval increase in soft tissue emphysema throughout the chest wall. Persistent pneumomediastinum. No pneumothorax identified.    COVID/ARDS:Plaquenil/azithro/rocephin completed.     4/8, OGT  Became dislodged with questionable aspiration. Tube feedings were placed on  hold-plan to have a post pyloric feeding tube inserted today  He continues to have intermittent fever. WBC trended up from 11.65 yesterday to 19.2 today .    Today, 4/9 blood and sputum cx sent and  pt started on vancomycin and zosyn  for suspected bacteremia      FTG to be changed today and U/A w/ reflex urine cx will be sent    JAZMIN:  sCr trending up from Scr 1.6 yesterday to 2.0 today, with baseline ~ 1.3-1.4, continues to have  good UOP.   --Lasix dose decreased to 60 mg BID  Will contiue to monitor    Pt's family called and given Pt status update    Active Hospital Problems    Diagnosis  POA    *Acute respiratory distress syndrome (ARDS) due to 2019 novel coronavirus [U07.1, J80]  Yes    Acute metabolic encephalopathy [G93.41]  No    Anticoagulated [Z79.01]  Not Applicable    Hypertriglyceridemia [E78.1]  No    DKA (diabetic ketoacidoses) [E11.10]  Yes    A-fib [I48.91]  No    Shock, unspecified [R57.9]  Yes    COVID-19 virus detected [U07.1]  Yes    Acute respiratory failure with hypoxia [J96.01]  Yes    Endotracheally intubated [Z97.8]  Yes    Transaminitis [R74.0]  Yes    Acute respiratory distress syndrome (ARDS) due to COVID-19 virus [U07.1, J80]  Yes    Aortic stenosis [I35.0]  Yes    NSTEMI (non-ST elevated myocardial infarction) [I21.4]  Yes    Mixed hyperlipidemia [E78.2]  Yes    Nonalcoholic fatty liver disease [K76.0]  Yes    Severe obesity (BMI 35.0-39.9) with comorbidity [E66.01]  Yes    Acute renal failure superimposed on stage 3 chronic kidney disease [N17.9, N18.3]  Yes    Type 2 diabetes mellitus with stage 3 chronic kidney disease, without long-term current use of insulin [E11.22, N18.3]  Yes    Type 2 diabetes mellitus with diabetic polyneuropathy, without long-term current use of insulin [E11.42]  Yes    Essential hypertension [I10]  Yes      Resolved Hospital Problems   No resolved problems to display.       Suspected Covid-19 Virus Infection  Person Under  Investigation (PUI) for COVID-19  - COVID-19 testing: Collection Date: 3/19/2020 Collection Time:   9:40 AM  - Infection Control notified    - Isolation:   - Airborne and Droplet Precautions  - Surgical mask on patient   - N95 masks must be fit tested, wear eye protection  - 20 second hand hygiene   - Limit visitors per hospital policy   - Consolidate lab draws, nursing care, and interventions    - Diagnostics: (Lymphopenia, hyponatremia, hyperferritinemia, elevated troponin, elevated d-dimer, age, and comorbidities are significant predictors of poor clinical outcome)   - CBC:   trend Q48hrs  - CMP:        trend Q48hrs  - Procalcitonin:  - D-dimer:  repeat prior to discharge  - Ferritin:  repeat prior to discharge   - CRP:        trend Q48hrs  - LDH:   repeat prior to discharge  - BNP:  - Troponin:    - ECG:   - rapid Flu:   - RIP only if BMT/solid transplant:   - Legionella antigen:   - Blood culture x2:   - Sputum culture:   - CXR:   - UA and culture:   - CPK:    - Management:   Bundle care as able to maintain isolation & minimize in/out of room   - Supplemental O2 to maintain SpO2 92%-96%   if requiring 6L NC or higher, place on nonrebreather and discuss case with MICU   - Telemetry & continuous pulse oximetry    - If wheezing   - albuterol inhaler 2-4 puff Q6hr PRN    - ipratropium daily    - acetaminophen 650mg PO Q6hr PRN fever   - loperamide PRN for viral diarrhea  - Empiric antibiotics per likely source & patient allergies    - CAP: x 5 day course (d/c early if low concern for bacterial co-infection)  Ceftriaxone 1g IV Q24hrs            Azithromycin 500mg IV day #1, then 250mg PO daily x4 days     If azithromycin is not available, start doxycycline                 If MRSA risk factors, add Vancomycin IV (PharmD consult)     - Investigational Treatment Protocol: (if patient meets criteria)   [email or web address]      - statin: atorvastatin 40mg po daily (if CPK WNL)    - start HCQ 400mg PO BID x1 day, then 400mg PO daily x 4 days   (check glucose 6 phosphate dehydrogenase (NOT G6PD Quant), ECG on start & @48hrs, and start Qshift POCT glucose)    Safety notes:   - Avoid NIPPV (CPAP/BiPAP) to prevent aerosolization, use on a case-by-case basis if in neg pressure room   - Cautious use of NSAIDS for fever per WHO recommendations (3/16/2020)   - No new ACEi/ARB start or discontinuation of chronic med unless hypotensive (Esler et al. Journal of Hypertension 2020, 38:000-000)   - Careful use of steroids in the absence of other indications (shock, ARDS)   - Fluid sparing resuscitation, avoid maintenance fluids       Advance Care Planning   Advance Care Planning   .       VTE High Risk Prophylaxis: enoxaparin 40mg sq QHS @ 2100 (bundled care) if GFR >30    Patient's chronic/stable medical conditions noted in the problem list above will be managed with the patient's home medications as tolerated.         Subsequent Inpatient Hospital Care     Critical Care Time:  >30  minutes  Critical care was time spent personally by me on the following activities: development of treatment plan with patient or surrogate and bedside caregivers, discussions with consultants, evaluation of patient's response to treatment, examination of patient, ordering and performing treatments and interventions, ordering and review of laboratory studies, ordering and review of radiographic studies, pulse oximetry, re-evaluation of patient's condition. This critical care time did not overlap with that of any other provider or involve time for any procedures.    Ashley Longoria NP-C  Critical Care Medicine

## 2020-04-10 NOTE — CONSULTS
Wound care consult received. Pt positive covid 19.  Spoke with Nurse Rachel who reports what appears to be redness and partial thickness skin breakdown to bilateral cheeks related to ETT byers. Discussed properties of hydrocolloid of the ETT byers as appropriate treatment for the skin trauma under ETT device during this time.   Wound care team to sign off. Please reconsult for any further needs. [id number]

## 2020-04-10 NOTE — PROGRESS NOTES
Hospital Medicine  Progress Note  [facility]      Patient Name: Erick Valdovinos  MRN:  [id number]  Hospital Medicine Team: Networked reference to record PCT  Ashley Longoria NP  Date of Admission:  3/28/2020     Length of Stay:  LOS: 13 days       Principal Problem:  Acute respiratory distress syndrome (ARDS) due to 2019 novel coronavirus        HPI:  Patient is a 68 yo male with HTN, HLD, DM2, CKD3, NAFLD, aortic stenosis, diabetic neuropathy, cervical radiculopathy who presented to [facility] on 3/28 for worsening SOB in the setting of positive COVID testing done on 3/19-->resulted positive on 3/23. Patient had had fevers and diarrhea for the week prior, fevers as high as 103. He was also having difficulty seeing. Symptoms non-remitting until 3/28 and then had worsening fatigue and weakness, which led him to present to [facility] ED. In the ED, patient was found to be hypoxemic at 78% on room air and in moderate distress. Patient was placed on NRB mask but continued to have issues with poor effort so electively intubated due to worsening condition. Patient was also given 1L fluid bolus for hypotension (systolic in the 80s). Patient sedated on propofol and fentanyl.     ED evaluation at [facility] showed: WBC 6.21 with lymphopenia, bicarb 14, creatinine 2.6 (baseline 1.3), Ferritin 2202, , , , Lactic acid 0.9 and D-Dimer 0.4, BNP 41 and troponin bump to 0.087. Patient was started on ceftriaxone, Azithromycin and Plaquenil.      Patient was transferred to [facility] as [facility] was out of ICU beds. Patient admitted to MICU on 3/28 for management of acute hypoxemic respiratory failure complicated by JAZMIN and troponinemia in the setting of COVID19.    Hospital Course:  Patient admitted for evaluation of possible COVID-19.  Hospital/ICU Course:  Admitted to MICU on 3/28, intubated and mechanically ventilated. On Ceftriaxone, azithro and plaquenil. Started insulin gtt given glucose  >400s and checking serum ketones to evaluate for DKA. Trending troponins 0.087-->0.04, likely demand ischemia 2/2 ARDS. Patient noted to be in DKA with elevated beta hydroxybutyrate to 4.7. Patient fluid resuscitated about 3L from 3/28-3/29 and started on insulin gtt with improvement in glucose control. JAZMIN improving after fluid resuscitation. Patient did go into A fib with RVR on 3/29 and was started on amio gtt with conversion to PO given IV amio shortage. Patient was also started on low intensity heparin gtt given suspected component of microangiopathic coagulopathy related to JAZMIN. Of note, patient was persistently spiking fevers since admit up until 3/30 with a temp of 103 on 3/30 (still on ceftriaxone azithro). Tube feeds started on 3/30 (watching glucose very closely). Patient was placed in prone position on 3/30 at 1815 given P/F ratio of 122 (due to be supinated at 1015 on 3/31). 1/2 blood cultures from admission 3/28 resulted positive for coag negative staph (contaminant), repeat blood cultures ordered on 3/31-->NGTD. Patient was placed in prone position again on 3/31 at 1800 given P/F of 111 (due to supinate at 4/1 1000). Attempting to wean sedation as of 4/2, given improving oxygenation, and starting daily SAT/SBT. Unfortunately, throughout the day 4/3, patient had noted increasing oxygen requirements and pressor requirements, with concern for aspiration event as there was tube feeds contents suctioned out from ETT. Patient was started on vanc and cefepime and tube feeds held. Family was notified regarding acute decompensation and concern for poor prognosis and was made DNR.  04/05/2020 Pt required midazolam bolus alongside increases in fentanyl and precedex infusions to treat desaturation overnight. He was placed on FiO2 of 100% and intermittently paralyzed with rocuronium.    COVID/ARDS:Plaquenil/azithro/rocephin completed.  Extubated  4/8 and placed on BiPAP. OGT  Became dislodged with questionable  aspiration and spiked a fever and became unstable, warranting reintubation. He was given rocuronium/ paralytic, and is currently on propofol, fent and levo gtts.   Tube feedings were placed on hold and a postpyloric tube inserted.   4/9 CXR :  Bilateral infiltrates appear mildly increased. Interval increase in soft tissue emphysema throughout the chest wall.  Persistent pneumomediastinum. No pneumothorax identified.    Interval History:     Uneventful night   Total Vent days ~ 13  re intubated  2 days  Vent 60/10      WBC continues to trended up from  19.2 yesterday to  21.32 today .     4/9 blood and sputum cx sent   Blood cx-no growth to date  Sputum cx -few WBCs and few Gm + cocci   since 4/8  Pt has been on vancomycin and zosyn  for suspected bacteremia      JAZMIN:   CKD 4 sCr trending up from Scr 1.6  to 2.0 yesterday, and today 3.4.  baseline sCr ~ 1.3-1.4, continues to have   UOP.        Review of Systems:  Limited ROS    Limited review of systems and physical exam per team attending and/or nursing staff due to patient being in special isolation and intubated   Constitutional: sedated  , paralyzed   Respiratory: Intubated , mechanical vent  Gastrointestinal:    NGT/postpyloric tube  Genitourinary:  FTG   Neurological: sedated      Inpatient Medications:    Current Facility-Administered Medications:     acetaminophen tablet 650 mg, 650 mg, Per NG tube, Q6H PRN, Liz Phipps MD    albuterol inhaler 2 puff, 2 puff, Inhalation, Q6H PRN, Samra Espana NP    albuterol-ipratropium 2.5 mg-0.5 mg/3 mL nebulizer solution 3 mL, 3 mL, Nebulization, Q6H, Ashley Longoria NP, 3 mL at 04/10/20 0820    [COMPLETED] amiodarone tablet 400 mg, 400 mg, Oral, Q8H, 400 mg at 04/01/20 2111 **FOLLOWED BY** amiodarone tablet 200 mg, 200 mg, Per OG tube, Daily, Behram Khan, MD, 200 mg at 04/10/20 0826    chlorhexidine 0.12 % solution 15 mL, 15 mL, Mouth/Throat, BID, Anju Story MD, 15 mL at 04/10/20 0827    clopidogreL tablet  75 mg, 75 mg, Per OG tube, Daily, Jessica Blanco MD, 75 mg at 04/10/20 0826    dextrose 10% (D10W) Bolus, 25 g, Intravenous, PRN, Charlotte Aguilera MD    dextrose 10% (D10W) Bolus, 12.5 g, Intravenous, PRN, Charlotte Aguilera MD    famotidine (PF) injection 20 mg, 20 mg, Intravenous, Daily, Fiona Winterbottom, APRN, CNS, 20 mg at 04/10/20 0826    fentaNYL 2500 mcg in 0.9% sodium chloride 250 mL infusion premix (titrating), , Intravenous, Continuous, Jodi Page MD, Last Rate: 30 mL/hr at 04/10/20 0600    furosemide injection 60 mg, 60 mg, Intravenous, Q12H, John Briceño MD, 60 mg at 04/10/20 0418    heparin 25,000 units in dextrose 5% (100 units/ml) IV bolus from bag - ADDITIONAL PRN BOLUS - 30 units/kg, 30 Units/kg (Adjusted), Intravenous, PRN, Wade Garcia MD, 2,500 Units at 04/05/20 0718    heparin 25,000 units in dextrose 5% (100 units/ml) IV bolus from bag - ADDITIONAL PRN BOLUS - 60 units/kg, 60 Units/kg (Adjusted), Intravenous, PRN, Wade Garcia MD, 4,000 Units at 04/04/20 0000    heparin 25,000 units in dextrose 5% 250 mL (100 units/mL) infusion LOW INTENSITY nomogram - [facility], 25 Units/kg/hr (Adjusted), Intravenous, Continuous, Kizzy Ingram DO, Last Rate: 18.3 mL/hr at 04/10/20 0716, 22 Units/kg/hr at 04/10/20 0716    insulin regular 100 Units in sodium chloride 0.9% 100 mL infusion, 6 Units/hr, Intravenous, Continuous, Fiona Winterbottom, APRN, CNS, Last Rate: 7.5 mL/hr at 04/10/20 0652, 7.5 Units/hr at 04/10/20 0652    ipratropium-albuteroL inhaler 1 puff, 1 puff, Inhalation, Q6H PRN, Ashley Longoria NP    labetalol 20 mg/4 mL (5 mg/mL) IV syring, 10 mg, Intravenous, Q8H PRN, Liz Phipps MD    methylPREDNISolone sodium succinate injection 40 mg, 40 mg, Intravenous, Daily, Jodi Page MD, 40 mg at 04/10/20 0826    norepinephrine 16 mg in dextrose 5 % 250 mL infusion, 0.02 mcg/kg/min (Dosing Weight), Intravenous, Continuous, Jodi Page MD, Last Rate: 16.3  "mL/hr at 04/10/20 0600, 0.16 mcg/kg/min at 04/10/20 0600    ondansetron injection 4 mg, 4 mg, Intravenous, Q8H PRN, Jessica Blanco MD    piperacillin-tazobactam 4.5 g in sodium chloride 0.9% 100 mL IVPB (ready to mix system), 4.5 g, Intravenous, Q8H, Anju Story MD, Last Rate: 25 mL/hr at 04/10/20 0156, 4.5 g at 04/10/20 0156    polyethylene glycol packet 17 g, 17 g, Per NG tube, BID, Jessica Blanco MD, 17 g at 04/10/20 0826    propofol (DIPRIVAN) 10 mg/mL infusion, 5 mcg/kg/min (Dosing Weight), Intravenous, Continuous, Liz Phipps MD, Last Rate: 26.1 mL/hr at 04/10/20 0600, 39.945 mcg/kg/min at 04/10/20 0600    QUEtiapine tablet 100 mg, 100 mg, Per OG tube, QHS, Behram Khan, MD, 100 mg at 04/09/20 2017    sodium chloride 0.9% flush 10 mL, 10 mL, Intravenous, PRN, Jessica Blanco MD    Pharmacy to dose Vancomycin consult, , , Once **AND** vancomycin - pharmacy to dose, , Intravenous, pharmacy to manage frequency, Anju Story MD      Physical Exam:   Constitutional:  appears well-developed and well-nourished.   HENT: NGT/postpyloric tube  Head: Normocephalic.   Mouth/Throat:  ETT  .      Cardiovascular: Normal rate, regular rhythm     Pulmonary/Chest: Mechanical ventilation   Musculoskeletal: RR arterial line , TL  :  FTG  Neurological:  Sedated         Intake/Output Summary (Last 24 hours) at 4/10/2020 0844  Last data filed at 4/10/2020 0600  Gross per 24 hour   Intake 3242.64 ml   Output 1045 ml   Net 2197.64 ml     Wt Readings from Last 3 Encounters:   04/06/20 111.8 kg (246 lb 7.6 oz)   03/28/20 108.9 kg (240 lb)   03/28/20 108.9 kg (240 lb 1.3 oz)       BP (!) 109/53 (BP Location: Right arm, Patient Position: Lying)   Pulse 72   Temp 97.2 °F (36.2 °C) (Axillary)   Resp (!) 26   Ht 5' 7" (1.702 m)   Wt 111.8 kg (246 lb 7.6 oz)   SpO2 98%   BMI 38.60 kg/m²         Laboratory:  Lab Results   Component Value Date    [id number] Detected (A) 03/19/2020       Recent " Labs   Lab 04/08/20  0246 04/09/20  0258 04/09/20  0302 04/10/20  0305   WBC 11.65  --  19.20* 21.32*   LYMPH 6.7*  0.8*  --  6.1*  1.2 7.3*  1.6   HGB 8.0*  --  9.3* 7.9*   HCT 27.5* 28* 31.9* 27.5*     --  406* 319     Recent Labs   Lab 04/08/20 0246 04/09/20  0302 04/10/20  0305   * 149* 143   K 4.7 4.1 4.6   * 112* 107   CO2 20* 24 20*   BUN 76* 89* 117*   CREATININE 1.6* 2.0* 3.4*   * 164* 218*   CALCIUM 8.5* 9.2 8.4*   MG 2.0 2.0 2.6   PHOS 4.0 5.6* 9.1*     Recent Labs   Lab 04/08/20  0246 04/09/20  0302 04/10/20  0305   ALKPHOS 118 133 124   ALT 47* 55* 52*   AST 51* 67* 40   ALBUMIN 1.3* 1.6* 1.4*   PROT 6.2 6.9 6.4   BILITOT 0.5 1.0 0.9        Recent Labs     04/08/20 0246 04/09/20 0302 04/09/20  0307 04/10/20  0305   DDIMER 1.99* 4.18*  --  1.32*   FERRITIN 2,718* 2,929*  --  2,798*   .7* 195.2*  --  221.6*   *  --  824* 524*       All labs within the last 24 hours were reviewed.     Microbiology:  Microbiology Results (last 7 days)     Procedure Component Value Units Date/Time    Blood culture [[id number]] Collected:  04/09/20 0501    Order Status:  Completed Specimen:  Blood from Peripheral, Antecubital, Left Updated:  04/10/20 0612     Blood Culture, Routine No Growth to date      No Growth to date    Blood culture [[id number]] Collected:  04/09/20 0450    Order Status:  Completed Specimen:  Blood from Peripheral, Antecubital, Right Updated:  04/10/20 0612     Blood Culture, Routine No Growth to date      No Growth to date    Culture, Respiratory with Gram Stain [[id number]] Collected:  04/09/20 1107    Order Status:  Completed Specimen:  Respiratory from Endotracheal Aspirate Updated:  04/09/20 1737     Gram Stain (Respiratory) Few WBC's     Gram Stain (Respiratory) Few Gram positive cocci    Blood culture [[id number]] Collected:  04/03/20 1006    Order Status:  Completed Specimen:  Blood Updated:  04/07/20 1612     Blood Culture, Routine No Growth after 4  days.     Narrative:       Collection has been rescheduled by CBE at 04/03/2020 10:22   Collection has been rescheduled by CBE at 04/03/2020 10:22     Blood culture [[id number]] Collected:  04/03/20 1006    Order Status:  Completed Specimen:  Blood Updated:  04/07/20 1612     Blood Culture, Routine No Growth after 4 days.     Narrative:       Collection has been rescheduled by CBE at 04/03/2020 10:22   Collection has been rescheduled by CBE at 04/03/2020 10:22     Culture, Respiratory with Gram Stain [[id number]] Collected:  04/03/20 1350    Order Status:  Completed Specimen:  Respiratory from Tracheal Aspirate Updated:  04/06/20 1030     Respiratory Culture Normal respiratory ran     Gram Stain (Respiratory) <10 epithelial cells per low power field.     Gram Stain (Respiratory) Moderate WBC's     Gram Stain (Respiratory) Many Gram positive cocci     Gram Stain (Respiratory) Moderate Gram negative rods    Blood culture [[id number]] Collected:  03/31/20 0353    Order Status:  Completed Specimen:  Blood from Peripheral, Forearm, Right Updated:  04/04/20 0812     Blood Culture, Routine No Growth after 4 days.     Blood culture [[id number]] Collected:  03/31/20 0351    Order Status:  Completed Specimen:  Blood from Peripheral, Forearm, Left Updated:  04/04/20 0812     Blood Culture, Routine No Growth after 4 days.             Imaging      No results found for this or any previous visit.    X-ray Abdomen for [place] Tube Placement (Nursing should notify Radiology after placement)  Narrative: EXAMINATION:  XR NON-RADIOLOGIST PERFORMED NG/GASTRIC TUBE CHECK    CLINICAL HISTORY:  confirm placement demar tube;    TECHNIQUE:  AP View(s) of the abdomen was performed.    COMPARISON:  04/08/2020    FINDINGS:  There is a stably positioned esophagogastric tube, the tip of which courses below the diaphragm, crossing to the right of midline, possibly within the region of the distal stomach.  Interval placement of weighted enteric feeding  tube, with tip also projecting in the region of the distal stomach.  If transpyloric positioning is desired consider advancing.  No significantly dilated loops of bowel in the visualized upper abdomen.  No definite evidence of free intraperitoneal air.  Visualized osseous structures are intact.  There is subcutaneous emphysema involving the chest wall and probable continued pneumomediastinum.  Impression: Please see above    Electronically signed by: Vanessa Pastrana MD  Date:    04/10/2020  Time:    07:01      All imaging within the last 24 hours was reviewed.     Assessment and Plan:   Interval History:      COVID/ARDS:Plaquenil/azithro/rocephin completed.  Extubated 4/8 and placed on BiPAP. Needed reintubation on 4/9     WBC continues to trended up from  19.2 yesterday to  21.32 today .      4/9 blood and sputum cx sent   4/9 U/A (-), Blood cx-no growth to date  Sputum cx -few WBCs and few Gm + cocci   Continue vancomycin and zosyn  for suspected bacteremia      JAZMIN:   CKD 4, sCr trending up from Scr 1.6  to 2.0 yesterday, and today 3.4.  baseline sCr ~ 1.3-1.4, continues to have   UOP, will continue to monitor.          Active Hospital Problems    Diagnosis  POA    *Acute respiratory distress syndrome (ARDS) due to 2019 novel coronavirus [U07.1, J80]  Yes    Acute metabolic encephalopathy [G93.41]  No    Anticoagulated [Z79.01]  Not Applicable    Hypertriglyceridemia [E78.1]  No    DKA (diabetic ketoacidoses) [E11.10]  Yes    A-fib [I48.91]  No    Shock, unspecified [R57.9]  Yes    COVID-19 virus detected [U07.1]  Yes    Acute respiratory failure with hypoxia [J96.01]  Yes    Endotracheally intubated [Z97.8]  Yes    Transaminitis [R74.0]  Yes    Acute respiratory distress syndrome (ARDS) due to COVID-19 virus [U07.1, J80]  Yes    Aortic stenosis [I35.0]  Yes    NSTEMI (non-ST elevated myocardial infarction) [I21.4]  Yes    Mixed hyperlipidemia [E78.2]  Yes    Nonalcoholic fatty liver disease [K76.0]   Yes    Severe obesity (BMI 35.0-39.9) with comorbidity [E66.01]  Yes    Acute renal failure superimposed on stage 3 chronic kidney disease [N17.9, N18.3]  Yes    Type 2 diabetes mellitus with stage 3 chronic kidney disease, without long-term current use of insulin [E11.22, N18.3]  Yes    Type 2 diabetes mellitus with diabetic polyneuropathy, without long-term current use of insulin [E11.42]  Yes    Essential hypertension [I10]  Yes      Resolved Hospital Problems   No resolved problems to display.       Suspected Covid-19 Virus Infection  Person Under Investigation (PUI) for COVID-19  - COVID-19 testing: Collection Date: 3/19/2020 Collection Time:   9:40 AM  - Infection Control notified    - Isolation:   - Airborne and Droplet Precautions  - Surgical mask on patient   - N95 masks must be fit tested, wear eye protection  - 20 second hand hygiene   - Limit visitors per hospital policy   - Consolidate lab draws, nursing care, and interventions    - Diagnostics: (Lymphopenia, hyponatremia, hyperferritinemia, elevated troponin, elevated d-dimer, age, and comorbidities are significant predictors of poor clinical outcome)   - CBC:   trend Q48hrs  - CMP:        trend Q48hrs  - Procalcitonin:  - D-dimer:  repeat prior to discharge  - Ferritin:  repeat prior to discharge   - CRP:        trend Q48hrs  - LDH:   repeat prior to discharge  - BNP:  - Troponin:    - ECG:   - rapid Flu:   - RIP only if BMT/solid transplant:   - Legionella antigen:   - Blood culture x2:   - Sputum culture:   - CXR:   - UA and culture:   - CPK:    - Management:   Bundle care as able to maintain isolation & minimize in/out of room   - Supplemental O2 to maintain SpO2 92%-96%   if requiring 6L NC or higher, place on nonrebreather and discuss case with MICU   - Telemetry & continuous pulse oximetry    - If wheezing   - albuterol inhaler 2-4 puff Q6hr PRN    - ipratropium daily    - acetaminophen 650mg PO Q6hr PRN fever   - loperamide PRN for viral  diarrhea  - Empiric antibiotics per likely source & patient allergies    - CAP: x 5 day course (d/c early if low concern for bacterial co-infection)  Ceftriaxone 1g IV Q24hrs            Azithromycin 500mg IV day #1, then 250mg PO daily x4 days     If azithromycin is not available, start doxycycline                 If MRSA risk factors, add Vancomycin IV (PharmD consult)     - Investigational Treatment Protocol: (if patient meets criteria)   [email or web address]     - statin: atorvastatin 40mg po daily (if CPK WNL)    - start HCQ 400mg PO BID x1 day, then 400mg PO daily x 4 days   (check glucose 6 phosphate dehydrogenase (NOT G6PD Quant), ECG on start & @48hrs, and start Qshift POCT glucose)    Safety notes:   - Avoid NIPPV (CPAP/BiPAP) to prevent aerosolization, use on a case-by-case basis if in neg pressure room   - Cautious use of NSAIDS for fever per WHO recommendations (3/16/2020)   - No new ACEi/ARB start or discontinuation of chronic med unless hypotensive (Esler et al. Journal of Hypertension 2020, 38:000-000)   - Careful use of steroids in the absence of other indications (shock, ARDS)   - Fluid sparing resuscitation, avoid maintenance fluids       Advance Care Planning   Advance Care Planning   .       VTE High Risk Prophylaxis: enoxaparin 40mg sq QHS @ 2100 (bundled care) if GFR >30    Patient's chronic/stable medical conditions noted in the problem list above will be managed with the patient's home medications as tolerated.         Subsequent Inpatient Hospital Care     Critical Care Time:  >30  minutes  Critical care was time spent personally by me on the following activities: development of treatment plan with patient or surrogate and bedside caregivers, discussions with consultants, evaluation of patient's response to treatment, examination of patient, ordering and performing treatments and interventions,  ordering and review of laboratory studies, ordering and review of radiographic studies, pulse oximetry, re-evaluation of patient's condition. This critical care time did not overlap with that of any other provider or involve time for any procedures.    Ashley Longoria NP-C  Critical Care Medicine

## 2020-04-11 NOTE — PROGRESS NOTES
Pharmacokinetic Assessment Follow Up: IV Vancomycin    Vancomycin Regimen Assessment/Plan:    - Vancomycin random level resulted supra-therapeutic as 23.5 mg/dl.  Level was drawn approximately 48 hours after the loading dose.   - Patient with worsening JAZMIN, SCr 3.9 mg/dl, still making urine.  - No additional vancomycin is required.   - A random level is ordered with morning labs tomorrow.  Pharmacy to re-dose based on level and renal function.    Drug levels (last 3 results):  Recent Labs   Lab Result Units 04/11/20  0231   Vancomycin, Random ug/mL 23.5       Pharmacy will continue to follow and monitor vancomycin.    Please contact pharmacy at extension [phone] for questions regarding this assessment.    Thank you for the consult,   Becca Aldridge, PharmD, BCCCP       Patient brief summary:  Erick Valdovinos is a 69 y.o. male initiated on antimicrobial therapy with IV Vancomycin for treatment of lower respiratory infection    The patient's current regimen is pulse dosing.     Drug Allergies:   Review of patient's allergies indicates:   Allergen Reactions    Codeine Nausea Only    Nsaids (non-steroidal anti-inflammatory drug) Other (See Comments)     Kidney issues       Actual Body Weight:   111.8 kg    Renal Function:   Estimated Creatinine Clearance: 21.6 mL/min (A) (based on SCr of 3.9 mg/dL (H)).,     Dialysis Method (if applicable):  N/A    CBC (last 72 hours):  Recent Labs   Lab Result Units 04/09/20  0302 04/10/20  0305 04/11/20  0231   WBC K/uL 19.20* 21.32* 27.57*   Hemoglobin g/dL 9.3* 7.9* 8.0*   Hematocrit % 31.9* 27.5* 26.8*   Platelets K/uL 406* 319 394*   Gran% % 87.5* 85.4* 88.0*   Lymph% % 6.1* 7.3* 5.0*   Mono% % 4.3 3.4* 5.0   Eosinophil% % 0.0 0.1 0.0   Basophil% % 0.2 0.1 0.0   Differential Method  Automated Automated Manual       Metabolic Panel (last 72 hours):  Recent Labs   Lab Result Units 04/09/20  0302 04/09/20  1534 04/10/20  0305 04/11/20  0231   Sodium mmol/L 149*  --  143 143    Potassium mmol/L 4.1  --  4.6 4.7   Chloride mmol/L 112*  --  107 108   CO2 mmol/L 24  --  20* 19*   Glucose mg/dL 164*  --  218* 135*   Glucose, UA   --  1+*  1+*  --   --    BUN, Bld mg/dL 89*  --  117* 109*   Creatinine mg/dL 2.0*  --  3.4* 3.9*   Albumin g/dL 1.6*  --  1.4* 1.5*   Total Bilirubin mg/dL 1.0  --  0.9 0.9   Alkaline Phosphatase U/L 133  --  124 107   AST U/L 67*  --  40 29   ALT U/L 55*  --  52* 47*   Magnesium mg/dL 2.0  --  2.6 2.4   Phosphorus mg/dL 5.6*  --  9.1* 8.7*       Vancomycin Administrations:  vancomycin given in the last 96 hours      No antibiotic orders with administrations found.                Microbiologic Results:  Microbiology Results (last 7 days)     Procedure Component Value Units Date/Time    Blood culture [[id number]] Collected:  04/09/20 0501    Order Status:  Completed Specimen:  Blood from Peripheral, Antecubital, Left Updated:  04/11/20 0612     Blood Culture, Routine No Growth to date      No Growth to date      No Growth to date    Blood culture [[id number]] Collected:  04/09/20 0450    Order Status:  Completed Specimen:  Blood from Peripheral, Antecubital, Right Updated:  04/11/20 0612     Blood Culture, Routine No Growth to date      No Growth to date      No Growth to date    Culture, Respiratory with Gram Stain [[id number]] Collected:  04/09/20 1107    Order Status:  Completed Specimen:  Respiratory from Endotracheal Aspirate Updated:  04/10/20 0911     Respiratory Culture Normal respiratory ran     Gram Stain (Respiratory) Few WBC's     Gram Stain (Respiratory) Few Gram positive cocci    Blood culture [[id number]] Collected:  04/03/20 1006    Order Status:  Completed Specimen:  Blood Updated:  04/07/20 1612     Blood Culture, Routine No Growth after 4 days.     Narrative:       Collection has been rescheduled by CBE at 04/03/2020 10:22   Collection has been rescheduled by CBE at 04/03/2020 10:22     Blood culture [[id number]] Collected:  04/03/20 1006    Order  Status:  Completed Specimen:  Blood Updated:  04/07/20 1612     Blood Culture, Routine No Growth after 4 days.     Narrative:       Collection has been rescheduled by CBE at 04/03/2020 10:22   Collection has been rescheduled by CBE at 04/03/2020 10:22     Culture, Respiratory with Gram Stain [[id number]] Collected:  04/03/20 1350    Order Status:  Completed Specimen:  Respiratory from Tracheal Aspirate Updated:  04/06/20 1030     Respiratory Culture Normal respiratory ran     Gram Stain (Respiratory) <10 epithelial cells per low power field.     Gram Stain (Respiratory) Moderate WBC's     Gram Stain (Respiratory) Many Gram positive cocci     Gram Stain (Respiratory) Moderate Gram negative rods    Blood culture [[id number]] Collected:  03/31/20 0353    Order Status:  Completed Specimen:  Blood from Peripheral, Forearm, Right Updated:  04/04/20 0812     Blood Culture, Routine No Growth after 4 days.     Blood culture [[id number]] Collected:  03/31/20 0351    Order Status:  Completed Specimen:  Blood from Peripheral, Forearm, Left Updated:  04/04/20 0812     Blood Culture, Routine No Growth after 4 days.

## 2020-04-11 NOTE — PROGRESS NOTES
Patient Name: Erick Valdovinos  MRN: [id number]  Admission Date: 3/28/2020   Code Status:   Attending: Jackie Restrepo MD     SUBJECTIVE:    HPI:  Patient is a 68 yo male with HTN, HLD, DM2, CKD3, NAFLD, aortic stenosis, diabetic neuropathy, cervical radiculopathy who presented to [facility] on 3/28 for worsening SOB in the setting of positive COVID testing done on 3/19-->resulted positive on 3/23. Patient had had fevers and diarrhea for the week prior, fevers as high as 103. He was also having difficulty seeing. Symptoms non-remitting until 3/28 and then had worsening fatigue and weakness, which led him to present to [facility] ED. In the ED, patient was found to be hypoxemic at 78% on room air and in moderate distress. Patient was placed on NRB mask but continued to have issues with poor effort so electively intubated due to worsening condition. Patient was also given 1L fluid bolus for hypotension (systolic in the 80s). Patient sedated on propofol and fentanyl.     ED evaluation at [facility] showed: WBC 6.21 with lymphopenia, bicarb 14, creatinine 2.6 (baseline 1.3), Ferritin 2202, , , , Lactic acid 0.9 and D-Dimer 0.4, BNP 41 and troponin bump to 0.087. Patient was started on ceftriaxone, Azithromycin and Plaquenil.      [place] [facility] [place] [facility] was out of [place] beds. Patient admitted to MICU on 3/28 for management of acute hypoxemic respiratory failure complicated by JAZMIN and troponinemia in the setting of COVID19.        Hospital Course: Erick Valdovinos was admitted to ICU for management of respiratory failure secondary to suspected and/or confirmed COVID-19 requiring mechanical ventilation for respiratory support.     Hospital/ICU Course:  Admitted to MICU on 3/28, intubated and mechanically ventilated. On Ceftriaxone, azithro and plaquenil. Started insulin gtt given glucose >400s and checking serum ketones to evaluate for DKA. Trending troponins 0.087-->0.04, likely  demand ischemia 2/2 ARDS. Patient noted to be in DKA with elevated beta hydroxybutyrate to 4.7. Patient fluid resuscitated about 3L from 3/28-3/29 and started on insulin gtt with improvement in glucose control. JAZMIN improving after fluid resuscitation. Patient did go into A fib with RVR on 3/29 and was started on amio gtt with conversion to PO given IV amio shortage. Patient was also started on low intensity heparin gtt given suspected component of microangiopathic coagulopathy related to JAZMIN. Of note, patient was persistently spiking fevers since admit up until 3/30 with a temp of 103 on 3/30 (still on ceftriaxone azithro). Tube feeds started on 3/30 (watching glucose very closely). Patient was placed in prone position on 3/30 at 1815 given P/F ratio of 122 (due to be supinated at 1015 on 3/31). 1/2 blood cultures from admission 3/28 resulted positive for coag negative staph (contaminant), repeat blood cultures ordered on 3/31-->NGTD. Patient was placed in prone position again on 3/31 at 1800 given P/F of 111 (due to supinate at 4/1 1000). Attempting to wean sedation as of 4/2, given improving oxygenation, and starting daily SAT/SBT. Unfortunately, throughout the day 4/3, patient had noted increasing oxygen requirements and pressor requirements, with concern for aspiration event as there was tube feeds contents suctioned out from ETT. Patient was started on vanc and cefepime and tube feeds held. Family was notified regarding acute decompensation and concern for poor prognosis and was made DNR.  04/05/2020 Pt required midazolam bolus alongside increases in fentanyl and precedex infusions to treat desaturation overnight. He was placed on FiO2 of 100% and intermittently paralyzed with rocuronium.      COVID/ARDS:Plaquenil/azithro/rocephin completed.  -Extubated  4/8 and placed on BiPAP. OGT  Became dislodged with questionable aspiration and spiked a fever and became unstable, warranting reintubation. He was given  rocuronium/ paralytic, and is currently on propofol, fent and levo gtts.   -Tube feedings were placed on hold and a postpyloric tube inserted.   4/9 CXR :  Bilateral infiltrates appear mildly increased. Interval increase in soft tissue emphysema throughout the chest wall.  Persistent pneumomediastinum. No pneumothorax identified.    -4/10 post pyloric tube placement with trickle  feeds restarted.        Overnight events/Interval History: Hospital day #14.   Uneventful night   Total Vent days ~14   Vent  40/8     WBC 27.57-->continues to slowly trended up    4/9 blood and sputum cx sent   Blood cx-no growth to date  Sputum cx -few WBCs and few Gm + cocci   since 4/8  --remains on  vancomycin and zosyn  for suspected bacteremia       JAZMIN:  CKD 5, baseline sCr ~ 1.3-1.4,  sCr 3.9,   continues to have  good  UOP, electrolytes acceptable.        OBJECTIVE: Limited review of systems and physical exam per team attending and/or nursing staff due to patient being in special isolation.     ROS   Limited ROS    Limited review of systems and physical exam per team attending and/or nursing staff due to patient being in special isolation and intubated   Constitutional: sedated  , paralyzed   Respiratory: Intubated , mechanical vent  Gastrointestinal:    NGT/postpyloric tube  Genitourinary:  FTG   Neurological: sedated     Vitals:   Vitals:    04/11/20 0800 04/11/20 0827 04/11/20 0900 04/11/20 1000   BP: 128/64  137/65 (!) 146/67   BP Location: Right arm  Right arm Right arm   Patient Position: Lying  Lying Lying   Pulse: 67 93 77 75   Resp: (!) 28 (!) 28 (!) 28 (!) 28   Temp: 98.6 °F (37 °C)      TempSrc: Axillary      SpO2: 95% (!) 94% (!) 89% (!) 90%   Weight:       Height:           Vent Mode: A/C  Oxygen Concentration (%):  [40-50] 40  Resp Rate Total:  [28 br/min-31 br/min] 29 br/min  Vt Set:  [450 mL] 450 mL  PEEP/CPAP:  [8 cmH20-10 cmH20] 8 cmH20  Mean Airway Pressure:  [15 whA61-66 cmH20] 15 cmH20     Date 04/11/20 0700 -  04/12/20 0659   Shift 1200-7518 2307-3849 0995-5456 24 Hour Total   INTAKE   I.V.(mL/kg) 294.1(2.6)   294.1(2.6)   NG/GT 40   40   Shift Total(mL/kg) 334.1(3)   334.1(3)   OUTPUT   Urine(mL/kg/hr) 400   400   Shift Total(mL/kg) 400(3.6)   400(3.6)   Weight (kg) 111.8 111.8 111.8 111.8         Physical Exam    Constitutional:  appears well-developed and well-nourished.   HENT: NGT/postpyloric tube  Head: Normocephalic.   Mouth/Throat:  ETT  .      Cardiovascular: Normal rate, regular rhythm     Pulmonary/Chest: Mechanical ventilation   Musculoskeletal: RR arterial line , TL  :  FTG  Neurological:  Sedated   Labs: All labs within the last 24 hours were reviewed.     Imaging: All imaging within the last 24 hours was reviewed.         ASSESSMENT & PLAN  This is a 69 y.o. admitted on 3/28/2020 for respiratory failure secondary to confirmed/suspected COVID-19 requiring mechanical ventilation for respiratory support.     COVID/ARDS:  Plaquenil/azithro completed.    Extubated 4/8 and placed on BiPAP. Needed reintubation on 4/9      WBC 21.32 -->continues to trended up    4/9 blood and sputum cx sent   4/9 U/A (-), Blood cx-no growth to date  Sputum cx -few WBCs and few Gm + cocci   Continue vancomycin and zosyn  for suspected bacteremia       JAZMIN:   CKD 4, sCr trending up from Scr 1.6  to 2.0 yesterday, and today 3.4.  baseline sCr ~ 1.3-1.4, continues to have   UOP, will continue to monitor.   Spoke with Patient's wife and given patient status update.        Neuro:   Propofol and  fentanyl gtt  -Intubated, sedated, and paralyzed.   -Sedation order set utilized.     Cardiac/Circulatory:  Levo and  vasopressin gtt  -Maintain MAP's >65 with vasopressor support if necessary.   -Consideration for stress dose steroids if multiple vasopressors required.     Pulmonary:   40/8, vent day 14  -Continue mechanical ventilation with ARDS targeted strategies. Goal plateau pressure <30.   -Continue closed ventilator circuit and avoid  aerosol generating procedures.   -Wean ventilator settings per ARDSnet protocol.   -Wean sedation with coordinated SAT and SBT daily.    -Monitor daily triglycerides if using PROPOFOL.     -Follow-up COVID-19 results. + covid 3/23  - completed  PLAQUENIL/Azithromycin   -Daily CRP, d-dimer, ferritin, and CK-MB to trend inflammatory response.   -Strict I/O's and goal net neutral status to help optimize vent mechanics.   -Special isolation and aspiration precautions ordered.       Renal:  baseline sCr 1.3-1.4  CKD 5 , sCR 3.9  Good UOP--> no intervention, will continue to monitor    -Monitoring urine output with consideration for dialysis initiation if necessary.   -Trialysis line placement if dialysis initiated.   -Trending renal function daily.   -Strict I/O's.     Gastrointestinal:   Stress ulcer prophylaxis  Famotidine   -Trending liver function daily.--stable    -per post pyloric tube -Trickle tube feed initiation starting at 10 mL/hour advancing by 10 mL/hour with goal of 40-50 mL/hr    Endocrine:   Insulin Gtt  -Hold home oral diabetic agents.   -Goal glucose 140-180  -POCT glucose checks Q6H while NPO.   -Sliding scale insulin and/or insulin drip ordered, if necessary for goal.     ID:    WBC 21.32 -->continues to trended up    4/9 blood and sputum cx sent   4/9 U/A (-), Blood cx-no growth to date  Sputum cx -few WBCs and few Gm + cocci   Continue vancomycin and zosyn  for suspected bacteremia      Hematology/Oncology:   -Trending CBC daily and monitor for signs of bleeding.   -Transfuse for Hgb <7.   DVT prophylaxis --  heparin gtt, plavix      ICU DAILY CHECKLIST:   1. Awake with RASS goal of 0?           2. SBT/SAT performed and coordinated?                 3. Sedation Minimized  4. Head of bed >30 degrees?        5. Early Mobility: PT/OT, UOOB to chair       6. Tube feeding initiated yes    7. Glucose at goal? gtt vs ssi--insulin gtt           8. Having bowel movements daily? Last BM 4/10                 9. Stress ulcer prophylaxis  Famotidine                10. DVT prophylaxis --  heparin gtt            11. Can indwelling catheter be discontinued? CVL/[place]/-no  12. Can antimicrobials be discontinued-no         13. Pressure ulcer (mattress, turning, OOB, foam, boots)  14. Keratitis  15. CPR status DNR  16. Can transfer out of ICU no   17. Family updated?                            Subsequent Inpatient Hospital Care      Critical Care Time:  >30  minutes  Critical care was time spent personally by me on the following activities: development of treatment plan with patient or surrogate and bedside caregivers, discussions with consultants, evaluation of patient's response to treatment, examination of patient, ordering and performing treatments and interventions, ordering and review of laboratory studies, ordering and review of radiographic studies, pulse oximetry, re-evaluation of patient's condition. This critical care time did not overlap with that of any other provider or involve time for any procedures.     Ashley Longoria NP-C  Critical Care Medicine

## 2020-04-12 PROBLEM — R65.20 SEVERE SEPSIS: Status: ACTIVE | Noted: 2020-01-01

## 2020-04-12 PROBLEM — A41.9 SEVERE SEPSIS: Status: ACTIVE | Noted: 2020-01-01

## 2020-04-12 NOTE — PROGRESS NOTES
Pharmacokinetic Assessment Follow Up: IV Vancomycin    Vancomycin Regimen Assessment/Plan:    - Vancomycin random level resulted within goal as 18.2 mg/dl.    - Patient with worsening JAZMIN, SCr 4.1 mg/dl, still making urine.  - Administer vancomycin 500 mg x 1 dose.  - A random level is ordered tomorrow at 13:00.  Pharmacy to re-dose based on level and renal function.    Drug levels (last 3 results):  Recent Labs   Lab Result Units 04/11/20 0231 04/12/20  0315   Vancomycin, Random ug/mL 23.5 18.2       Pharmacy will continue to follow and monitor vancomycin.    Please contact pharmacy at extension [phone] for questions regarding this assessment.    Thank you for the consult,   Becca Aldridge, PharmD, BCCCP       Patient brief summary:  Erick Valdovinos is a 69 y.o. male initiated on antimicrobial therapy with IV Vancomycin for treatment of lower respiratory infection    The patient's current regimen is pulse dosing.     Drug Allergies:   Review of patient's allergies indicates:   Allergen Reactions    Codeine Nausea Only    Nsaids (non-steroidal anti-inflammatory drug) Other (See Comments)     Kidney issues       Actual Body Weight:   111.8 kg    Renal Function:   Estimated Creatinine Clearance: 20.6 mL/min (A) (based on SCr of 4.1 mg/dL (H)).,     Dialysis Method (if applicable):  N/A    CBC (last 72 hours):  Recent Labs   Lab Result Units 04/10/20  0305 04/11/20  0231 04/12/20  0315   WBC K/uL 21.32* 27.57* 23.24*   Hemoglobin g/dL 7.9* 8.0* 8.3*   Hematocrit % 27.5* 26.8* 27.9*   Platelets K/uL 319 394* 363*   Gran% % 85.4* 88.0* 83.0*   Lymph% % 7.3* 5.0* 9.0*   Mono% % 3.4* 5.0 3.0*   Eosinophil% % 0.1 0.0 0.0   Basophil% % 0.1 0.0 0.0   Differential Method  Automated Manual Manual       Metabolic Panel (last 72 hours):  Recent Labs   Lab Result Units 04/09/20  1534 04/10/20  0305 04/11/20 0231 04/12/20  0315   Sodium mmol/L  --  143 143 142   Potassium mmol/L  --  4.6 4.7 4.8   Chloride mmol/L  --  107  108 104   CO2 mmol/L  --  20* 19* 22*   Glucose mg/dL  --  218* 135* 260*   Glucose, UA  1+*  1+*  --   --   --    BUN, Bld mg/dL  --  117* 109* 121*   Creatinine mg/dL  --  3.4* 3.9* 4.1*   Albumin g/dL  --  1.4* 1.5* 1.7*   Total Bilirubin mg/dL  --  0.9 0.9 0.7   Alkaline Phosphatase U/L  --  124 107 108   AST U/L  --  40 29 37   ALT U/L  --  52* 47* 47*   Magnesium mg/dL  --  2.6 2.4 2.4   Phosphorus mg/dL  --  9.1* 8.7* 9.5*       Vancomycin Administrations:  vancomycin given in the last 96 hours      No antibiotic orders with administrations found.                Microbiologic Results:  Microbiology Results (last 7 days)     Procedure Component Value Units Date/Time    Blood culture [[id number]] Collected:  04/12/20 0448    Order Status:  Completed Specimen:  Blood from Peripheral, Foot, Left Updated:  04/12/20 1145     Blood Culture, Routine No Growth to date    Narrative:       fungal    Blood culture [[id number]] Collected:  04/11/20 2337    Order Status:  Completed Specimen:  Blood from Peripheral, Antecubital, Left Updated:  04/12/20 0715     Blood Culture, Routine No Growth to date    Narrative:       fungal    Blood culture [[id number]] Collected:  04/09/20 0501    Order Status:  Completed Specimen:  Blood from Peripheral, Antecubital, Left Updated:  04/12/20 0612     Blood Culture, Routine No Growth to date      No Growth to date      No Growth to date      No Growth to date    Blood culture [[id number]] Collected:  04/09/20 0450    Order Status:  Completed Specimen:  Blood from Peripheral, Antecubital, Right Updated:  04/12/20 0612     Blood Culture, Routine No Growth to date      No Growth to date      No Growth to date      No Growth to date    Fungus culture [[id number]] Collected:  04/11/20 2337    Order Status:  Sent Specimen:  Blood from Abdomen Updated:  04/12/20 0317    Culture, Respiratory with Gram Stain [[id number]] Collected:  04/09/20 1107    Order Status:  Completed Specimen:  Respiratory  from Endotracheal Aspirate Updated:  04/11/20 0923     Respiratory Culture Normal respiratory ran      No S aureus or Pseudomonas isolated.     Gram Stain (Respiratory) Few WBC's     Gram Stain (Respiratory) Few Gram positive cocci    Blood culture [[id number]] Collected:  04/03/20 1006    Order Status:  Completed Specimen:  Blood Updated:  04/07/20 1612     Blood Culture, Routine No Growth after 4 days.     Narrative:       Collection has been rescheduled by CBE at 04/03/2020 10:22   Collection has been rescheduled by CBE at 04/03/2020 10:22     Blood culture [[id number]] Collected:  04/03/20 1006    Order Status:  Completed Specimen:  Blood Updated:  04/07/20 1612     Blood Culture, Routine No Growth after 4 days.     Narrative:       Collection has been rescheduled by CBE at 04/03/2020 10:22   Collection has been rescheduled by CBE at 04/03/2020 10:22     Culture, Respiratory with Gram Stain [[id number]] Collected:  04/03/20 1350    Order Status:  Completed Specimen:  Respiratory from Tracheal Aspirate Updated:  04/06/20 1030     Respiratory Culture Normal respiratory ran     Gram Stain (Respiratory) <10 epithelial cells per low power field.     Gram Stain (Respiratory) Moderate WBC's     Gram Stain (Respiratory) Many Gram positive cocci     Gram Stain (Respiratory) Moderate Gram negative rods

## 2020-04-12 NOTE — PROGRESS NOTES
Patient Name: Erick Valdovinos  MRN: [id number]  Admission Date: 3/28/2020   Code Status:   Attending: Dacia Arias MD     SUBJECTIVE:    HPI:  Patient is a 70 yo male with HTN, HLD, DM2, CKD3, NAFLD, aortic stenosis, diabetic neuropathy, cervical radiculopathy who presented to [facility] on 3/28 for worsening SOB in the setting of positive COVID testing done on 3/19-->resulted positive on 3/23. Patient had had fevers and diarrhea for the week prior, fevers as high as 103. He was also having difficulty seeing. Symptoms non-remitting until 3/28 and then had worsening fatigue and weakness, which led him to present to [facility] ED. In the ED, patient was found to be hypoxemic at 78% on room air and in moderate distress. Patient was placed on NRB mask but continued to have issues with poor effort so electively intubated due to worsening condition. Patient was also given 1L fluid bolus for hypotension (systolic in the 80s). Patient sedated on propofol and fentanyl.     ED evaluation at [facility] showed: WBC 6.21 with lymphopenia, bicarb 14, creatinine 2.6 (baseline 1.3), Ferritin 2202, , , , Lactic acid 0.9 and D-Dimer 0.4, BNP 41 and troponin bump to 0.087. Patient was started on ceftriaxone, Azithromycin and Plaquenil.      Patient was transferred to [facility] as [facility] was out of ICU beds. Patient admitted to MICU on 3/28 for management of acute hypoxemic respiratory failure complicated by JAZMIN and troponinemia in the setting of COVID19.        Hospital Course: Erick Valdovinos was admitted to ICU for management of respiratory failure secondary to suspected and/or confirmed COVID-19 requiring mechanical ventilation for respiratory support.     Hospital/ICU Course:  Admitted to MICU on 3/28, intubated and mechanically ventilated. On Ceftriaxone, azithro and plaquenil. Started insulin gtt given glucose >400s and checking serum ketones to evaluate for DKA. Trending troponins 0.087-->0.04, likely  demand ischemia 2/2 ARDS. Patient noted to be in DKA with elevated beta hydroxybutyrate to 4.7. Patient fluid resuscitated about 3L from 3/28-3/29 and started on insulin gtt with improvement in glucose control. JAZMIN improving after fluid resuscitation. Patient did go into A fib with RVR on 3/29 and was started on amio gtt with conversion to PO given IV amio shortage. Patient was also started on low intensity heparin gtt given suspected component of microangiopathic coagulopathy related to JAZMIN. Of note, patient was persistently spiking fevers since admit up until 3/30 with a temp of 103 on 3/30 (still on ceftriaxone azithro). Tube feeds started on 3/30 (watching glucose very closely). Patient was placed in prone position on 3/30 at 1815 given P/F ratio of 122 (due to be supinated at 1015 on 3/31). 1/2 blood cultures from admission 3/28 resulted positive for coag negative staph (contaminant), repeat blood cultures ordered on 3/31-->NGTD. Patient was placed in prone position again on 3/31 at 1800 given P/F of 111 (due to supinate at 4/1 1000). Attempting to wean sedation as of 4/2, given improving oxygenation, and starting daily SAT/SBT. Unfortunately, throughout the day 4/3, patient had noted increasing oxygen requirements and pressor requirements, with concern for aspiration event as there was tube feeds contents suctioned out from ETT. Patient was started on vanc and cefepime and tube feeds held. Family was notified regarding acute decompensation and concern for poor prognosis and was made DNR.  04/05/2020 Pt required midazolam bolus alongside increases in fentanyl and precedex infusions to treat desaturation overnight. He was placed on FiO2 of 100% and intermittently paralyzed with rocuronium.      COVID/ARDS:Plaquenil/azithro/rocephin completed.  -Extubated  4/8 and placed on BiPAP. OGT  Became dislodged with questionable aspiration and spiked a fever and became unstable, warranting reintubation. He was given  rocuronium/ paralytic, and is currently on propofol, fent and levo gtts.   -Tube feedings were placed on hold and a postpyloric tube inserted.   4/9 CXR :  Bilateral infiltrates appear mildly increased. Interval increase in soft tissue emphysema throughout the chest wall.  Persistent pneumomediastinum. No pneumothorax identified.  -4/10 post pyloric tube placement with trickle  feeds restarted.        Overnight events/Interval History: Hospital day #15.   Uneventful night   Total Vent days ~14   Vent  40/8     WBC 23.4->  4/11 blood cx and fungal cx sent --started on micafungi IVPB   4/9 Sputum cx -few WBCs and few Gm + cocci  since 4/8  --remains on  vancomycin and zosyn  for suspected bacteremia       JAZMIN:  CKD 5, baseline sCr ~ 1.3-1.4,  sCr 4.0,   continues to have  good  UOP, electrolytes acceptable.       Attempted to call Pt's wife , no answer, message left    OBJECTIVE: Limited review of systems and physical exam per team attending and/or nursing staff due to patient being in special isolation.     ROS   Limited ROS    Limited review of systems and physical exam per team attending and/or nursing staff due to patient being in special isolation and intubated   Constitutional: sedated      ENT: nasal packing right nare   Respiratory: Intubated , mechanical vent  Gastrointestinal:      Genitourinary:  FTG   Neurological: sedated     Vitals:   Vitals:    04/12/20 0300 04/12/20 0400 04/12/20 0500 04/12/20 0600   BP: 129/61 130/60 (!) 118/58 125/60   BP Location: Right arm Right arm Right arm Right arm   Patient Position: Lying Lying Lying Lying   Pulse: (!) 113 93 85 83   Resp: (!) 27 (!) 27 20 20   Temp:       TempSrc:       SpO2: (!) 93% (!) 94% 95% (!) 94%   Weight:       Height:           Vent Mode: A/C  Oxygen Concentration (%):  [40] 40  Resp Rate Total:  [20 br/min-29 br/min] 21 br/min  Vt Set:  [450 mL] 450 mL  PEEP/CPAP:  [8 cmH20] 8 cmH20  Mean Airway Pressure:  [13 pcL47-47 cmH20] 13 cmH20         Physical Exam    Constitutional:  appears well-developed and well-nourished.   HENT:  nasal packing right nare   Head: Normocephalic.   Mouth/Throat:  ETT  .      Cardiovascular: Normal rate, regular rhythm     Pulmonary/Chest: Mechanical ventilation   Musculoskeletal: RR arterial line , TL  :  FTG  Neurological:  Sedated   Labs: All labs within the last 24 hours were reviewed.     Imaging: All imaging within the last 24 hours was reviewed.         ASSESSMENT & PLAN  This is a 69 y.o. admitted on 3/28/2020 for respiratory failure secondary to confirmed/suspected COVID-19 requiring mechanical ventilation for respiratory support.     COVID/ARDS:  Plaquenil/azithro completed.    Extubated 4/8 and placed on BiPAP. Needed reintubation on 4/9   Plan to wean sedation in attempt to wake up  Vent setting 40/8     WBC 23.4->  blood cx and fungal cx sent -started on micafungi IVPB 4/9 Sputum cx -few WBCs and few Gm + cocci   since 4/8  --remains on  vancomycin and zosyn  for suspected bacteremia       JAZMIN:  CKD 5, baseline sCr ~ 1.3-1.4,  sCr 4.0,   continues to have  good  UOP, electrolytes acceptable.     UOP 4735 cc, Intake 2040, net -2895  Plan to add volume to match intake--> IVF 2L LR  over 10 hours    ENT consult --> To remove right nasal packing,  assess sinus drainage, ? Sinusitis and tx plan       Neuro:   Propofol and  fentanyl gtt  -Intubated, sedated, and paralyzed.   -Sedation order set utilized.     Cardiac/Circulatory:  Levo  Gtt--currently off  -Maintain MAP's >65 with vasopressor support if necessary.   -Consideration for stress dose steroids if multiple vasopressors required.     Pulmonary:   40/8, vent day 14  -Continue mechanical ventilation with ARDS targeted strategies. Goal plateau pressure <30.   -Continue closed ventilator circuit and avoid aerosol generating procedures.   -Wean ventilator settings per ARDSnet protocol.   -Wean sedation with coordinated SAT and SBT daily.    -Monitor daily  "triglycerides if using PROPOFOL.   Plan to wean sedation/propofol in attempt to wake up   4/12/2020   Triglycerides 30 - 150 mg/dL 298 (A)          -Follow-up COVID-19 results. + covid 3/23  - completed  PLAQUENIL/Azithromycin   -Daily CRP, d-dimer, ferritin, and CK-MB to trend inflammatory response.   -Strict I/O's and goal net neutral status to help optimize vent mechanics.   -Special isolation and aspiration precautions ordered.       Renal:  baseline sCr 1.3-1.4  CKD 5 , sCR 4.0  Good UOP--> no intervention, will continue to monitor   UOP 4735 cc, Intake 2040, net -2895  Plan to add volume to match intake--> IVF 2L LR  over 10 hours  -Monitoring urine output with consideration for dialysis initiation if necessary.   -Trialysis line placement if dialysis initiated.   -Trending renal function daily.   -Strict I/O's.     Gastrointestinal:   Stress ulcer prophylaxis  Famotidine   -Trending liver function daily.--stable    -  post pyloric tube d/c'd 4/11 d/t being "coiled" and  trickle tube feed stopped      Endocrine:   Insulin Gtt  -Hold home oral diabetic agents.   -Goal glucose 140-180  -POCT glucose checks Q6H while NPO.   -Sliding scale insulin and/or insulin drip ordered, if necessary for goal.     ID:     WBC 23.4->  4/11 blood cx and fungal cx sent 4/11  --started on micafungi IVPB 4/9 4/8 Sputum cx -few WBCs and few Gm + cocci   since 4/8  --remains on  vancomycin and zosyn  for suspected bacteremia        Hematology/Oncology:   -Trending CBC daily and monitor for signs of bleeding.   -Transfuse for Hgb <7.   DVT prophylaxis --  heparin gtt, plavix     4/12/2020   aPTT 21.0 - 32.0 sec 84.0 (A)  aPTT therapeutic range = 39-69 seconds     Heparin Anti-Xa 0.30 - 0.70 IU/mL 1.30High      D-Dimer <0.50 mg/L FEU 2.43High            ICU DAILY CHECKLIST:   1. Awake with RASS goal of 0?           2. SBT/SAT performed and coordinated?                 3. Sedation Minimized  4. Head of bed >30 degrees?        5. Early " Mobility: PT/OT, UOOB to chair       6. Tube feeding initiated yes, stopped 4/11   7. Glucose at goal? gtt vs ssi--insulin gtt           8. Having bowel movements daily? Last BM 4/11                9. Stress ulcer prophylaxis  Famotidine                10. DVT prophylaxis --  heparin gtt            11. Can indwelling catheter be discontinued? CVL/[place]/-no  12. Can antimicrobials be discontinued-no         13. Pressure ulcer (mattress, turning, OOB, foam, boots)  14. Keratitis  15. CPR status DNR  16. Can transfer out of ICU no   17. Family updated?                            Subsequent Inpatient Hospital Care      Critical Care Time:  >30  minutes  Critical care was time spent personally by me on the following activities: development of treatment plan with patient or surrogate and bedside caregivers, discussions with consultants, evaluation of patient's response to treatment, examination of patient, ordering and performing treatments and interventions, ordering and review of laboratory studies, ordering and review of radiographic studies, pulse oximetry, re-evaluation of patient's condition. This critical care time did not overlap with that of any other provider or involve time for any procedures.     Ashley Longoria NP-C  Critical Care Medicine

## 2020-04-13 PROBLEM — R04.0 EPISTAXIS: Status: ACTIVE | Noted: 2020-01-01

## 2020-04-13 NOTE — SUBJECTIVE & OBJECTIVE
Interval History:     Patient had seizures.  Overall he has clinically declined.    Review of Systems   Unable to perform ROS: Intubated     Objective:     Vital Signs (Most Recent):  Temp: 97.8 °F (36.6 °C) (04/13/20 1100)  Pulse: 80 (04/13/20 1221)  Resp: (!) 26 (04/13/20 1221)  BP: 109/63 (04/13/20 1200)  SpO2: 97 % (04/13/20 1221) Vital Signs (24h Range):  Temp:  [97.7 °F (36.5 °C)-99.2 °F (37.3 °C)] 97.8 °F (36.6 °C)  Pulse:  [] 80  Resp:  [20-40] 26  SpO2:  [94 %-99 %] 97 %  BP: ()/(51-71) 109/63  Arterial Line BP: ()/(41-67) 92/50     Weight: 111.8 kg (246 lb 7.6 oz)  Body mass index is 38.6 kg/m².    Estimated Creatinine Clearance: 21.1 mL/min (A) (based on SCr of 4 mg/dL (H)).    Physical Exam   HENT:   Dried, crusted blood to lips and notrils.   Eyes: Right eye exhibits no discharge. Left eye exhibits no discharge.   Cardiovascular:   Tachycardic    Pulmonary/Chest:   On the ventilator   Abdominal: Soft. Bowel sounds are normal. He exhibits no distension.   Neurological:   Sedated.   Skin: Skin is warm.   Nursing note and vitals reviewed.      Significant Labs:   Microbiology Results (last 7 days)     Procedure Component Value Units Date/Time    Blood culture [[id number]] Collected:  04/12/20 0448    Order Status:  Completed Specimen:  Blood from Peripheral, Foot, Left Updated:  04/13/20 0613     Blood Culture, Routine No Growth to date      No Growth to date    Narrative:       fungal    Blood culture [[id number]] Collected:  04/11/20 2337    Order Status:  Completed Specimen:  Blood from Peripheral, Antecubital, Left Updated:  04/13/20 0612     Blood Culture, Routine No Growth to date      No Growth to date    Narrative:       fungal    Blood culture [[id number]] Collected:  04/09/20 0450    Order Status:  Completed Specimen:  Blood from Peripheral, Antecubital, Right Updated:  04/13/20 0612     Blood Culture, Routine No Growth after 4 days.     Blood culture [[id number]] Collected:   04/09/20 0501    Order Status:  Completed Specimen:  Blood from Peripheral, Antecubital, Left Updated:  04/13/20 0612     Blood Culture, Routine No Growth after 4 days.     Fungus culture [[id number]] Collected:  04/11/20 2337    Order Status:  Sent Specimen:  Blood from Abdomen Updated:  04/12/20 0317    Culture, Respiratory with Gram Stain [[id number]] Collected:  04/09/20 1107    Order Status:  Completed Specimen:  Respiratory from Endotracheal Aspirate Updated:  04/11/20 0923     Respiratory Culture Normal respiratory ran      No S aureus or Pseudomonas isolated.     Gram Stain (Respiratory) Few WBC's     Gram Stain (Respiratory) Few Gram positive cocci    Blood culture [[id number]] Collected:  04/03/20 1006    Order Status:  Completed Specimen:  Blood Updated:  04/07/20 1612     Blood Culture, Routine No Growth after 4 days.     Narrative:       Collection has been rescheduled by CBE at 04/03/2020 10:22   Collection has been rescheduled by CBE at 04/03/2020 10:22     Blood culture [[id number]] Collected:  04/03/20 1006    Order Status:  Completed Specimen:  Blood Updated:  04/07/20 1612     Blood Culture, Routine No Growth after 4 days.     Narrative:       Collection has been rescheduled by CBE at 04/03/2020 10:22   Collection has been rescheduled by CBE at 04/03/2020 10:22           Significant Imaging: I have reviewed all pertinent imaging results/findings within the past 24 hours.

## 2020-04-13 NOTE — PROGRESS NOTES
[facility]  Infectious Disease  Progress Note    Patient Name: Erick Valdovinos  MRN: [id number]  Admission Date: 3/28/2020  Length of Stay: 16 days  Attending Physician: Jackie Restrepo MD  Primary Care Provider: Brandon Kinney MD    Isolation Status: Airborne and Contact and Droplet  Assessment/Plan:      Severe sepsis  68 y/o male with a history of HTN, HLD, DM2, CKD3, NAFLD, aortic stenosis, diabetic neuropathy, cervical radiculopathy.  He was initially seen in his PCPs office on march 18 with complaints of fevers, fatigue and body aches.  Testing for SARSCoV2 was positive.  The patient was not having any shortness of breath.  As such a plan to monitor the patient at home was developed with is PCPs office.  His symptoms continued to slowly get worse.  He became weaker.  He was seen via telemedicine virtual visit on march 26 and there were concerns that he may need inpatient evaluation.  The patient was advised to go to the ER if anything got worse.  He ultimately presented to [facility] with respiratory distress.  He was transferred to [facility] for higher level of care.  He has had a prolonged ICU course.  I am re-consulted for worsening leukocytosis and initiation of micafungin.    I have been asked specifically about the possibility about mucor around his nose and mouth.  In my discussion with nursing staff yesterday, it was communicated to me that the bleeding and wounds were sustained when the patient was proned.  The lesions don't grossly appear to be mucor and seem more consistent with bruising.  Micafungin does not have any activity against mucor.  WBC count had started declining prior to micafungin being given.  Discontinue micafungin.  May continue empiric zosyn for 5 day total course.  Okay to discontinue vancomycin at the same time zosyn is discontinued.      Patient remains critically ill.  Prognosis appears poor.  Agree with palliative are consultation.  ID will  sign off.        Anticipated Disposition: TBD    Thank you for your consult. I will sign off. Please contact us if you have any additional questions.    Sergei Campo MD  Infectious Disease  [facility]    Subjective:     Principal Problem:Acute respiratory distress syndrome (ARDS) due to 2019 novel coronavirus    HPI: 68 y/o male with a history of HTN, HLD, DM2, CKD3, NAFLD, aortic stenosis, diabetic neuropathy, cervical radiculopathy.  He was initially seen in his PCPs office on march 18 with complaints of fevers, fatigue and body aches.  Testing for SARSCoV2 was positive.  The patient was not having any shortness of breath.  As such a plan to monitor the patient at home was developed with is PCPs office.  His symptoms continued to slowly get worse.  He became weaker.  He was seen via telemedicine virtual visit on march 26 and there were concerns that he may need inpatient evaluation.  The patient was advised to go to the ER if anything got worse.  He ultimately presented to [facility] with respiratory distress.  He was transferred to [facility] for higher level of care.  He has had a prolonged ICU course.  I am re-consulted for worsening leukocytosis and initiation of micafungin.  Interval History:     Patient had seizures.  Overall he has clinically declined.    Review of Systems   Unable to perform ROS: Intubated     Objective:     Vital Signs (Most Recent):  Temp: 97.8 °F (36.6 °C) (04/13/20 1100)  Pulse: 80 (04/13/20 1221)  Resp: (!) 26 (04/13/20 1221)  BP: 109/63 (04/13/20 1200)  SpO2: 97 % (04/13/20 1221) Vital Signs (24h Range):  Temp:  [97.7 °F (36.5 °C)-99.2 °F (37.3 °C)] 97.8 °F (36.6 °C)  Pulse:  [] 80  Resp:  [20-40] 26  SpO2:  [94 %-99 %] 97 %  BP: ()/(51-71) 109/63  Arterial Line BP: ()/(41-67) 92/50     Weight: 111.8 kg (246 lb 7.6 oz)  Body mass index is 38.6 kg/m².    Estimated Creatinine Clearance: 21.1 mL/min (A) (based on SCr of 4 mg/dL  (H)).    Physical Exam   HENT:   Dried, crusted blood to lips and notrils.   Eyes: Right eye exhibits no discharge. Left eye exhibits no discharge.   Cardiovascular:   Tachycardic    Pulmonary/Chest:   On the ventilator   Abdominal: Soft. Bowel sounds are normal. He exhibits no distension.   Neurological:   Sedated.   Skin: Skin is warm.   Nursing note and vitals reviewed.      Significant Labs:   Microbiology Results (last 7 days)     Procedure Component Value Units Date/Time    Blood culture [[id number]] Collected:  04/12/20 0448    Order Status:  Completed Specimen:  Blood from Peripheral, Foot, Left Updated:  04/13/20 0613     Blood Culture, Routine No Growth to date      No Growth to date    Narrative:       fungal    Blood culture [[id number]] Collected:  04/11/20 2337    Order Status:  Completed Specimen:  Blood from Peripheral, Antecubital, Left Updated:  04/13/20 0612     Blood Culture, Routine No Growth to date      No Growth to date    Narrative:       fungal    Blood culture [[id number]] Collected:  04/09/20 0450    Order Status:  Completed Specimen:  Blood from Peripheral, Antecubital, Right Updated:  04/13/20 0612     Blood Culture, Routine No Growth after 4 days.     Blood culture [[id number]] Collected:  04/09/20 0501    Order Status:  Completed Specimen:  Blood from Peripheral, Antecubital, Left Updated:  04/13/20 0612     Blood Culture, Routine No Growth after 4 days.     Fungus culture [[id number]] Collected:  04/11/20 2337    Order Status:  Sent Specimen:  Blood from Abdomen Updated:  04/12/20 0317    Culture, Respiratory with Gram Stain [[id number]] Collected:  04/09/20 1107    Order Status:  Completed Specimen:  Respiratory from Endotracheal Aspirate Updated:  04/11/20 0923     Respiratory Culture Normal respiratory ran      No S aureus or Pseudomonas isolated.     Gram Stain (Respiratory) Few WBC's     Gram Stain (Respiratory) Few Gram positive cocci    Blood culture [[id number]] Collected:   04/03/20 1006    Order Status:  Completed Specimen:  Blood Updated:  04/07/20 1612     Blood Culture, Routine No Growth after 4 days.     Narrative:       Collection has been rescheduled by CBE at 04/03/2020 10:22   Collection has been rescheduled by CBE at 04/03/2020 10:22     Blood culture [[id number]] Collected:  04/03/20 1006    Order Status:  Completed Specimen:  Blood Updated:  04/07/20 1612     Blood Culture, Routine No Growth after 4 days.     Narrative:       Collection has been rescheduled by CBE at 04/03/2020 10:22   Collection has been rescheduled by CBE at 04/03/2020 10:22           Significant Imaging: I have reviewed all pertinent imaging results/findings within the past 24 hours.

## 2020-04-13 NOTE — PROGRESS NOTES
0815-4600: Brett NP made aware of increased drainage from mouth/nose area. STAT CBC drawn and heparin gtt discontinued, NP came to bedside to assess bleeding and continuing to watch for now. 1300: continued bleeding noted, suggested blood might be arterial given its bright red color. NP currently at bedside to assess patient. JAIME.

## 2020-04-13 NOTE — SUBJECTIVE & OBJECTIVE
Medications:  Continuous Infusions:   dexmedetomidine (PRECEDEX) infusion 1.399 mcg/kg/hr (04/13/20 1100)    heparin (porcine) in D5W 15.986 Units/kg/hr (04/13/20 1100)    insulin (HUMAN R) infusion (adults) 3 Units/hr (04/13/20 1130)    norepinephrine bitartrate-D5W 0.05 mcg/kg/min (04/13/20 1100)    propofoL 50 mcg/kg/min (04/13/20 1134)    remifentanil (ULTIVA) infusion (TITRATING) 0.2 mcg/kg/min (04/13/20 1149)    vasopressin (PITRESSIN) infusion 0.04 Units/min (04/13/20 1100)     Scheduled Meds:   albuterol-ipratropium  3 mL Nebulization Q6H    amiodarone  200 mg Per OG tube Daily    chlorhexidine  15 mL Mouth/Throat BID    clopidogreL  75 mg Per OG tube Daily    famotidine (PF)  20 mg Intravenous Daily    furosemide (LASIX) IV  40 mg Intravenous Q12H    micafungin (MYCAMINE) IVPB  100 mg Intravenous Q24H    oxyCODONE  5 mg Per NG tube QID    piperacillin-tazobactam (ZOSYN) IVPB  4.5 g Intravenous Q8H    polyethylene glycol  17 g Per NG tube BID    QUEtiapine  100 mg Per OG tube QHS    vancomycin (VANCOCIN) IVPB  750 mg Intravenous Once     PRN Meds:acetaminophen, albuterol, Dextrose 10% Bolus, Dextrose 10% Bolus, Dextrose 10% Bolus, glucagon (human recombinant), heparin (PORCINE), heparin (PORCINE), HYDROmorphone, insulin aspart U-100, ipratropium-albuteroL, labetalol, lorazepam, morphine, ondansetron, sodium chloride 0.9%, Pharmacy to dose Vancomycin consult **AND** vancomycin - pharmacy to dose     No current facility-administered medications on file prior to encounter.      Current Outpatient Medications on File Prior to Encounter   Medication Sig    blood sugar diagnostic Strp 1 strip by Misc.(Non-Drug; Combo Route) route once daily. Test strips for meter covered by insurance.    blood-glucose meter Misc Meter covered by insurance, Use as directed    butabarbital (BUTISOL) 30 mg Tab Take 30 mg by mouth daily as needed.    clopidogrel (PLAVIX) 75 mg tablet Take 1 tablet (75 mg total)  "by mouth once daily.    coenzyme Q10 200 mg capsule Take by mouth once daily.     ezetimibe (ZETIA) 10 mg tablet Take 1 tablet (10 mg total) by mouth every evening.    fenofibrate (TRICOR) 145 MG tablet Take 1 tablet (145 mg total) by mouth every evening.    gluc jacobo/chondro jacobo A/vit C/Mn (GLUCOSAMINE 1500 COMPLEX ORAL) Take by mouth once daily.    HYDROcodone-acetaminophen (NORCO) 5-325 mg per tablet Take 1 tablet by mouth every 6 (six) hours as needed for Pain.    lancets Misc To check BG once daily, to use with insurance preferred meter    lancets Misc 1 Device by Misc.(Non-Drug; Combo Route) route once daily.    multivitamin (MULTIVITAMIN) per tablet Take 1 tablet by mouth once daily.    nitroGLYCERIN (NITROSTAT) 0.4 MG SL tablet Place 1 tablet (0.4 mg total) under the tongue every 5 (five) minutes as needed for Chest pain (Go to ER following 3rd tab if needed).    omeprazole (PRILOSEC) 20 MG capsule Take 1 capsule (20 mg total) by mouth once daily.    pen needle, diabetic (RELION PEN NEEDLES) 32 gauge x 5/32" Ndle Needs levemir flexpen needles. To use once daily    rosuvastatin (CRESTOR) 40 MG Tab Take 1 tablet (40 mg total) by mouth every evening.    sildenafil (REVATIO) 20 mg Tab Take 1 tablet (20 mg total) by mouth daily as needed.    SITagliptan-metformin (JANUMET)  mg per tablet Take 1 tablet by mouth 2 (two) times daily with meals.    triamcinolone acetonide 0.1% (KENALOG) 0.1 % cream Apply topically daily as needed.    valsartan (DIOVAN) 320 MG tablet Take 1 tablet (320 mg total) by mouth once daily.       Review of patient's allergies indicates:   Allergen Reactions    Codeine Nausea Only    Nsaids (non-steroidal anti-inflammatory drug) Other (See Comments)     Kidney issues       Past Medical History:   Diagnosis Date    Agatston coronary artery calcium score greater than 400 1/13/2020    Anticoagulant long-term use     Calculus of gallbladder without cholecystitis without " obstruction 9/10/2018    Cervical radiculopathy 9/28/2018    Chronic neck pain 9/28/2018    DDD (degenerative disc disease), cervical 9/28/2018    Diabetes mellitus     Diabetes mellitus, type 2     Esophageal reflux     Essential hypertension 2/11/2013    Fatty liver 1/13/2020    Gastroesophageal reflux disease without esophagitis 2/11/2013    Hyperlipidemia     Hypertension     Mixed hyperlipidemia 3/4/2020    Nephrolithiasis     Osteoarthritis     Type 2 diabetes mellitus with diabetic polyneuropathy, without long-term current use of insulin 2/11/2013    Type 2 diabetes mellitus with stage 3 chronic kidney disease, without long-term current use of insulin 9/27/2018     Past Surgical History:   Procedure Laterality Date    COLONOSCOPY      COLONOSCOPY N/A 3/19/2019    Procedure: COLONOSCOPY Suprep;  Surgeon: Italo Bañuelos MD;  Location: [facility];  Service: Endoscopy;  Laterality: N/A;    EPIDURAL STEROID INJECTION INTO CERVICAL SPINE N/A 10/30/2018    Procedure: Injection-steroid-epidural-cervical - C7 - T1;  Surgeon: Hernan Murguia Jr., MD;  Location: [facility];  Service: Pain Management;  Laterality: N/A;  PT IS DIABETIC    EPIDURAL STEROID INJECTION INTO CERVICAL SPINE N/A 4/11/2019    Procedure: Cervical Epidural Steroid Injection, C7-T1, With Dexamethasone,  and IV Sedation;  Surgeon: Hernan Murguia Jr., MD;  Location: [facility];  Service: Pain Management;  Laterality: N/A;  PT IS A DIABETIC.  Pt states he stopped taking ASA 81mg 13 days ago.      EPIDURAL STEROID INJECTION INTO CERVICAL SPINE N/A 4/30/2019    Procedure: Injection-steroid-epidural-cervical--C7-T1 With Dexamethasone, and IV Sedation;  Surgeon: Hernan Murguia Jr., MD;  Location: [facility];  Service: Pain Management;  Laterality: N/A;    ESOPHAGOGASTRODUODENOSCOPY N/A 3/19/2019    Procedure: ESOPHAGOGASTRODUODENOSCOPY (EGD);  Surgeon: Italo Bañuelos MD;  Location: [facility];  Service: Endoscopy;   Laterality: N/A;    EYE SURGERY      hemrrhoidectomy      TONSILLECTOMY      WRIST FRACTURE SURGERY      WRIST SURGERY       Family History     Problem Relation (Age of Onset)    Coronary artery disease Father, Brother    Heart attack Father    Heart disease Father    Hyperlipidemia Father, Brother, Brother    Hypertension Mother    Kidney disease Mother    Kidney failure Mother        Tobacco Use    Smoking status: Never Smoker    Smokeless tobacco: Never Used   Substance and Sexual Activity    Alcohol use: Not Currently    Drug use: No    Sexual activity: Yes     Partners: Female     Review of Systems   HENT: Positive for nosebleeds.      Objective:     Vital Signs (Most Recent):  Temp: 97.8 °F (36.6 °C) (04/13/20 0701)  Pulse: 87 (04/13/20 0900)  Resp: (!) 26 (04/13/20 0900)  BP: 117/69 (04/13/20 0900)  SpO2: 99 % (04/13/20 0900) Vital Signs (24h Range):  Temp:  [97.7 °F (36.5 °C)-99.2 °F (37.3 °C)] 97.8 °F (36.6 °C)  Pulse:  [] 87  Resp:  [20-40] 26  SpO2:  [94 %-99 %] 99 %  BP: ()/(51-69) 117/69  Arterial Line BP: ()/(41-67) 108/57     Weight: 111.8 kg (246 lb 7.6 oz)  Body mass index is 38.6 kg/m².    Date 04/13/20 0700 - 04/14/20 0659   Shift 7358-0583 6481-9585 2529-4937 24 Hour Total   INTAKE   I.V.(mL/kg) 472.5(4.2)   472.5(4.2)   NG/GT 60   60   IV Piggyback 278.4   278.4   Shift Total(mL/kg) 810.9(7.3)   810.9(7.3)   OUTPUT   Urine(mL/kg/hr) 1500   1500   Shift Total(mL/kg) 1500(13.4)   1500(13.4)   Weight (kg) 111.8 111.8 111.8 111.8       Physical Exam   Constitutional: He appears well-nourished. He is intubated.   HENT:   Head: Normocephalic.   Crusted blood on upper and lower lip- debrided at bedside.  Underlying tissues are healthy.  No evidence of necrosis of the facial soft tissues or anterior nasal cavity though the nasal exam is limited.    Pulmonary/Chest: He is intubated.       Significant Labs:  BMP:   Recent Labs   Lab 04/13/20  0232   *   *   CO2 18*    *   CREATININE 4.0*   CALCIUM 8.3*   MG 2.3     CBC:   Recent Labs   Lab 04/13/20  0232   WBC 13.27*   RBC 2.69*   HGB 7.9*   HCT 27.6*      *   MCH 29.4   MCHC 28.6*       Significant Diagnostics:  None

## 2020-04-13 NOTE — PROGRESS NOTES
Pharmacokinetic Assessment Follow Up: IV Vancomycin    Vancomycin Regimen Assessment & Plan:  - Vancomycin level collected with morning labs today resulted as 12.6 ug/mL.  - Patient with JAZMIN, SCr 4 mg/dL still making urine. Continue pulse dosing while renal function remains unstable.  - Administer vancomycin 750 mg IV x1 dose this morning. Collect vancomycin level with morning labs tomorrow. Will re-dose as needed depending on level and renal function.    Drug levels (last 3 results):  Recent Labs   Lab Result Units 04/11/20  0231 04/12/20  0315 04/13/20  0232   Vancomycin, Random ug/mL 23.5 18.2 12.6     Pharmacy will continue to follow and monitor vancomycin.    Please contact pharmacy at extension [phone] for questions regarding this assessment.    Thank you for the consult,   Gunner Daly     Patient brief summary:  Erick Valdovinos is a 69 y.o. male initiated on antimicrobial therapy with IV Vancomycin for treatment of lower respiratory infection    The patient's current regimen is pulse dosing.    Drug Allergies:   Review of patient's allergies indicates:   Allergen Reactions    Codeine Nausea Only    Nsaids (non-steroidal anti-inflammatory drug) Other (See Comments)     Kidney issues       Actual Body Weight:   111.8 kg    Renal Function:   Estimated Creatinine Clearance: 21.1 mL/min (A) (based on SCr of 4 mg/dL (H)).,     Dialysis Method (if applicable):  N/A

## 2020-04-13 NOTE — ASSESSMENT & PLAN NOTE
70 y/o male with a history of HTN, HLD, DM2, CKD3, NAFLD, aortic stenosis, diabetic neuropathy, cervical radiculopathy.  He was initially seen in his PCPs office on march 18 with complaints of fevers, fatigue and body aches.  Testing for SARSCoV2 was positive.  The patient was not having any shortness of breath.  As such a plan to monitor the patient at home was developed with is PCPs office.  His symptoms continued to slowly get worse.  He became weaker.  He was seen via telemedicine virtual visit on march 26 and there were concerns that he may need inpatient evaluation.  The patient was advised to go to the ER if anything got worse.  He ultimately presented to [facility] with respiratory distress.  He was transferred to [facility] for higher level of care.  He has had a prolonged ICU course.  I am re-consulted for worsening leukocytosis and initiation of micafungin.    Continue vancomycin, zosyn and micafungin.  WBC count dropped slightly today.  Will follow up on the cultures.

## 2020-04-13 NOTE — PROGRESS NOTES
"  [facility]  Adult Nutrition  Consult Note    SUMMARY     Recommendations    1. When medically feasible, resume enteral nutrition. Rec'd Peptamen Intense VHP at 50 mL/hr to provide 1200 kcals, 110 g of protein, 1008 mL fluid.    - If Peptamen Intense VHP out of stock, recommend Peptamen AF at 50 mL/hr + 2 packets/day Beneprotein to provide 1490 kcal/day and 103g protein/day.   2. RD to monitor & follow-up.    Goals: meet % EEN/EPN by RD following  Nutrition Goal Status: goal not met  Communication of RD Recs: reviewed with RN    Reason for Assessment    Reason For Assessment: RD follow-up  Diagnosis: (ARDS 2/2 coronavirus )  Relevant Medical History: HTN, HLD, DM2, CKDIII  Interdisciplinary Rounds: did not attend    General Information Comments: RD working remotely, +COVID19. Pt remains intubated/sedated, TFs held. Noted palliative care following. Still unable to complete NFPE due to recent hospital wide restrictions to limit the spread of COVID-19.  Nutrition Discharge Planning: Unable to determine    Nutrition/Diet History    Food Allergies: NKFA  Factors Affecting Nutritional Intake: NPO, on mechanical ventilation    Anthropometrics    Temp: 97.8 °F (36.6 °C)  Height: 5' 7" (170.2 cm)  Height (inches): 67 in  Weight Method: Bed Scale  Weight: 111.8 kg (246 lb 7.6 oz)  Weight (lb): 246.48 lb  Ideal Body Weight (IBW), Male: 148 lb  % Ideal Body Weight, Male (lb): 166.54 %  BMI (Calculated): 38.6  BMI Grade: 35 - 39.9 - obesity - grade II    Lab/Procedures/Meds    Pertinent Labs Reviewed: reviewed  Pertinent Labs Comments: K 5.2, , Creat 4, GFR 14.4, Gluc 234  Pertinent Medications Reviewed: reviewed  Pertinent Medications Comments: Precedex, Heparin, Insulin, Levophed, Propofol, Vasopressin, Ultiva    Estimated/Assessed Needs    Weight Used For Calorie Calculations: 111.8 kg (246 lb 7.6 oz)     Energy Calorie Requirements (kcal): 1462-8433 kcal/d  Energy Need Method: Kcal/kg(11-14 " kcal/kg)     Protein Requirements: 101-135 g/d (1.5-2 g/kg IBW)  Weight Used For Protein Calculations: 67.3 kg (148 lb 5.9 oz)     Fluid Requirements (mL): 1 mL/kcal or per MD     CHO Requirement: 50% total kcals    Nutrition Prescription Ordered    Current Diet Order: NPO  Current Nutrition Support Formula Ordered: Other (Comment)(Glucerna 1.5)    Evaluation of Received Nutrient/Fluid Intake    Other Calories (kcal): 863 (Propofol)    Comments: LBM: 4/12    Nutrition Risk    Level of Risk/Frequency of Follow-up: (2x/week)     Assessment and Plan    Nutrition Problem  Inadequate Energy Intake     Related to (etiology):   Inability to consume sufficient energy      Signs and Symptoms (as evidenced by):   Intubated, NPO with no alternative means of nutrition       Interventions (treatment strategy):  Collaboration of nutrition care with other providers     Nutrition Diagnosis Status:   Continues     Monitor and Evaluation    Food and Nutrient Intake: food and beverage intake, enteral nutrition intake, energy intake  Food and Nutrient Adminstration: diet order, enteral and parenteral nutrition administration  Physical Activity and Function: nutrition-related ADLs and IADLs  Anthropometric Measurements: weight, weight change  Biochemical Data, Medical Tests and Procedures: lipid profile, gastrointestinal profile, glucose/endocrine profile, inflammatory profile, electrolyte and renal panel  Nutrition-Focused Physical Findings: overall appearance     Nutrition Follow-Up    RD Follow-up?: Yes

## 2020-04-13 NOTE — ASSESSMENT & PLAN NOTE
Status post NGT placement with possible episode several weeks ago treated with nasal packing.  No packing noted on exam.  Crusts removed from midface without evidence of underlying soft tissue necrosis.  Nasal exam limited but there is no evidence of necrosis within the anterior nasal cavity.  I do not feel that nasal endoscopy is warranted given his COVID status and the high risk of viral transmission with performance of this procedure and my low clinical suspicion for an invasive fungal infection.  Also, if there is indeed an invasive fungal infection present, we would be very unlikely to proceed to the OR for debridement given his guarded condition and the fact that an invasive fungal infection in someone who is so critically ill would likely be fatal.      In discussing his case with the ICU team, they indicated that his family would consider withdrawing care if there is evidence of mucor.  I reiterated my impressions and recommendations as above.    In the meantime, he is to be monitored for changes in the appearance of his facial soft tissues.  It is OK for the nursing staff to remove the crust with dilute hydrogen peroxide.      ENT is available for future issues/concerns.

## 2020-04-13 NOTE — ASSESSMENT & PLAN NOTE
Likely 2/2 NGT insertion trauma  --Heparin gtt d/c'ed  --ENT consulted; no c/f mucor; continue to monitor for bleeding  -- f/up with ID regarding d/c'ing micafungin with low concern for mucor

## 2020-04-13 NOTE — CONSULTS
"General Neurology Consult Note    Erick Valdovinos is a 69 year old male with a medical history significant for HTN, HLD, DM2 who is admitted to [facility] for management of COVID-19 infection. Patient was diagnosed on 3/18 and subsequently decompensated before being admitted to [facility] MICU on 3/28. Patient was intubated and sedated on transfer to [facility]. Hospital course has been complicated by DKA, elevated troponin likely secondary to demand ischemia, JAZMIN thought to be secondary to microangiopathic coagulopathy (now on heparin drip), and reintubation after extubation due to increasing O2 demands thought to be secondary to aspiration PNA. Patient is now s/p treatment with ceftriaxone, azithromycin and Plaquenil.    General Neurology is consulted on 4/13 for evaluation of "seizure like activity". Per discussion with primary team, patient is reported to have had BLE myoclonus and "twitching" for which he was given 2mg Ativan and propofol was increased. Primary team had ordered an MRI W/WO and 24 Hour EEG prior to consult (neither have been completed).    Per chart review, patient remains intubated and sedated on Propofol, Precedex and Remifentanil. Patient is requiring intermittent pressor support with norepi and vasopressin. Patient is also receiving Oxycodone 5mg QID and Seroquel 100mg nightly.     ID following due to concern for ongoing infection. Patient currently receiving Micafungin, Zosyn and Vancomycin for persistent leukocytosis (WBC 13.6, decreased from 27.57 2d ago). Blood and Resp Cx without significant growth since admission. Influenza negative 3/19.    Palliative care consulted per primary team.     /63   Pulse 80   Temp 97.8 °F (36.6 °C) (Axillary)   Resp (!) 26   Ht 5' 7" (1.702 m)   Wt 111.8 kg (246 lb 7.6 oz)   SpO2 97%   BMI 38.60 kg/m²      Vent Mode: A/C  Oxygen Concentration (%):  [40-70] 50  Resp Rate Total:  [20 br/min-41 br/min] 26 br/min  Vt Set:  [450 mL-500 mL] 500 mL  PEEP/CPAP:  [8 " cmH20-10 cmH20] 8 cmH20  Mean Airway Pressure:  [15 tkO45-55 cmH20] 26 cmH20    Recent Labs   Lab 04/13/20  1148   WBC 13.63*   RBC 2.56*   HGB 7.7*   HCT 26.3*      *   MCH 30.1   MCHC 29.3*     Recent Labs   Lab 04/13/20  0232   CALCIUM 8.3*   PROT 6.4      K 5.2*   CO2 18*   *   *   CREATININE 4.0*   ALKPHOS 103   ALT 56*   AST 66*   BILITOT 0.8       Due to COVID-19, the Neurology team is limiting interaction with consult patients unless absolutely necessary in order to limit exposure to the virus. As of now, the Neurology team will be chart checking this patient and discussing with staff. If the patient has an acute neurological change and/or you believe the patient needs to be examined by a provider, please page Neurology on call to discuss the patient with a team member.     Recommendations  - Patient with multiple reasons for potential triggered seizure vs toxic/metabolic encephalopathy   - Continued management of toxic/metabolic derangements per primary team  - Recommend CT Head WO contrast to assess for acute intracranial pathology   - Would defer MRI and avoid gadolinium until improvement in renal function  - No AEDs recommended at this time  - Routine EEG following imaging  - Wean sedation and other sedating medications as tolerated to further assess neurologic function   - General Neurology will continue to follow, please contact at [phone] with any questions or care updates        Duy Sparrow MD, MPH  Neurology Resident - PGY2  Department of Neurology  [address] [place] [id number]

## 2020-04-13 NOTE — CONSULTS
"Palliative Medicine Covid 19 Goals of Care Consult     Consult received by:  [facility]    Impression:  69 year old male with PMH HTN, HLD, CKDIII, NAFLD, aortic stenosis. COVID +. Was extubated but then had to be re-intubated. Poor prognosis. Now with possible seizure activity     Advance Care Planning   Advance Care Planning /Goals of care   -Spoke with patient's wife. She expressed a good understanding of how sick her  is. Provided emotional support. She said she is hoping for a miracle but from our conversation I do not believe she is expecting one. She was clear that if her  was going to be a "vegetable" or he did not have a chance at meaningful recovery, he would not want to be prolonged on machines. She is aware of patient's new seizure like activity. She is hoping to talk with the neurologists. If patient was not thought to recover neurologically, she would be ready to withdraw patient from life support.      Living Will: no  LaPOST: no  Do Not Resuscitate Status: yes    Next of kin for medical decision making: Wife Yenny Valdovinos [phone]            Wife is clear that if patient does not have a chance at meaningful recovery, he would not want to be prolonged on machines. Expressed my concern that patient will not recover. She is very concerned about patient's mental status and is hoping to talk with the neurologists to help her make decisions going forward. If we get to the point of withdrawing life sustaining measures, wife would want to skype/factime to say goodbye but does not want to come to the hospital      Will follow       30 minutes spent discussing ACP       "

## 2020-04-13 NOTE — ASSESSMENT & PLAN NOTE
68 y/o male with a history of HTN, HLD, DM2, CKD3, NAFLD, aortic stenosis, diabetic neuropathy, cervical radiculopathy.  He was initially seen in his PCPs office on march 18 with complaints of fevers, fatigue and body aches.  Testing for SARSCoV2 was positive.  The patient was not having any shortness of breath.  As such a plan to monitor the patient at home was developed with is PCPs office.  His symptoms continued to slowly get worse.  He became weaker.  He was seen via telemedicine virtual visit on march 26 and there were concerns that he may need inpatient evaluation.  The patient was advised to go to the ER if anything got worse.  He ultimately presented to [facility] with respiratory distress.  He was transferred to [facility] for higher level of care.  He has had a prolonged ICU course.  I am re-consulted for worsening leukocytosis and initiation of micafungin.    I have been asked specifically about the possibility about mucor around his nose and mouth.  In my discussion with nursing staff yesterday, it was communicated to me that the bleeding and wounds were sustained when the patient was proned.  The lesions don't grossly appear to be mucor and seem more consistent with bruising.  Micafungin does not have any activity against mucor.  WBC count had started declining prior to micafungin being given.  Discontinue micafungin.  May continue empiric zosyn for 5 day total course.  Okay to discontinue vancomycin at the same time zosyn is discontinued.      Patient remains critically ill.  Prognosis appears poor.  Agree with palliative are consultation.  ID will sign off.

## 2020-04-13 NOTE — ASSESSMENT & PLAN NOTE
Covid-19 Virus Infection  Presents with SOB, myalgias. Flu negative.   - COVID-19 testing resulted POSITIVE     - Isolation:   - Airborne and Droplet Precautions  - N95 masks must be fit tested, wear eye protection  - 20 second hand hygiene              - Limit visitors per hospital policy              - Consolidating lab draws, nursing care, and interventions      PLAN:  - continue mechanical ventilation (Intubated on 3/28 at [facility])  - low PEEP protocol per ARDSnet  - reduced TV to 6ccs/kg 3/29. Started on 7 on admit for acid clearance given DKA.   - patient placed in prone position on 3/30 and again on 3/31  - strict isolation protocol 2/2 positive COVID  - plaquenil/azithro/solumedrol completed for COVID  - CAP coverage with 5 day course ceftriaxone azithro completed 4/1  - f/u blood cultures from 3/28-->1/2 positive for coag staph, likely contamininant  - f/u sputum cultures from 4/9 negative  - f/u repeat blood cultures from 4/12 NGTD  - f/u repeat blood and sputum cultures from 4/3 (concern for aspiration event with significant decompensation)--NG  - vanc and cefepime started on 4/3 and complete  concern for septic shock likely 2/2 aspiration  --fungal cultures obtained--NGTD

## 2020-04-13 NOTE — CONSULTS
Wound care consulted for unstageable facial trauma   PMH:  HTN, HLD, DM2, CKD3, NAFLD, aortic stenosis, diabetic neuropathy, cervical radiculopathy, ARDS.   The patient is Covid-19 positive, on the ventilator per endo tube.  Chart reviewed and discussed skin injuries with unit nurse Shilpi.  Patient is currently on vent and hydrocolloid dressings (ET byers) are covering any facial trauma.   Hydrocolloid dressings allow an autolytic response to heal wounds while protecting the wounds. Nursing to continue care, wound care signing off  Please reconsult if needed.  NATASHA Ellington RN, CN  [phone]

## 2020-04-13 NOTE — HPI
70 yo M admitted to [facility] with COVID-19.  He was intubated on 3/28, extubated on 4/8 and reintubated on 4/9.  In discussing his case with the ICU team, there was an apparent history of nasal packing several weeks ago.  Per their report, this packing was removed yesterday. I am not able to find documentation of packing placement or removal.  He underwent NGT placement on 4/11 which resulted in epistaxis.  He was noted to have dark crusts on his midface and lips.  ENT is consulted to rule out an invasive fungal infection of the soft tissues of the face.

## 2020-04-13 NOTE — PROGRESS NOTES
[facility]  Critical Care Medicine  Progress Note    Patient Name: Erick Valdovinos  MRN: [id number]  Admission Date: 3/28/2020  Hospital Length of Stay: 16 days  Code Status: DNR  Attending Provider: Jackie Restrepo MD  Primary Care Provider: Brandon Kinney MD   Principal Problem: Acute respiratory distress syndrome (ARDS) due to 2019 novel coronavirus    Subjective:     HPI:  Patient is a 70 yo male with HTN, HLD, DM2, CKD3, NAFLD, aortic stenosis, diabetic neuropathy, cervical radiculopathy who presented to [facility] on 3/28 for worsening SOB in the setting of positive COVID testing done on 3/19-->resulted positive on 3/23. Patient had had fevers and diarrhea for the week prior, fevers as high as 103. He was also having difficulty seeing. Symptoms non-remitting until 3/28 and then had worsening fatigue and weakness, which led him to present to [facility] ED. In the ED, patient was found to be hypoxemic at 78% on room air and in moderate distress. Patient was placed on NRB mask but continued to have issues with poor effort so electively intubated due to worsening condition. Patient was also given 1L fluid bolus for hypotension (systolic in the 80s). Patient sedated on propofol and fentanyl.    ED evaluation at [facility] showed: WBC 6.21 with lymphopenia, bicarb 14, creatinine 2.6 (baseline 1.3), Ferritin 2202, , , , Lactic acid 0.9 and D-Dimer 0.4, BNP 41 and troponin bump to 0.087. Patient was started on ceftriaxone, Azithromycin and Plaquenil.     Patient was transferred to [facility] as [facility] was out of ICU beds. Patient admitted to MICU on 3/28 for management of acute hypoxemic respiratory failure complicated by JAZMIN and troponinemia in the setting of COVID19.    Hospital/ICU Course:  Admitted to MICU on 3/28, intubated and mechanically ventilated. On Ceftriaxone, azithro and plaquenil. Started insulin gtt given glucose >400s and checking serum ketones to evaluate for  DKA. Trending troponins 0.087-->0.04, likely demand ischemia 2/2 ARDS. Patient noted to be in DKA with elevated beta hydroxybutyrate to 4.7. Patient fluid resuscitated about 3L from 3/28-3/29 and started on insulin gtt with improvement in glucose control. JAZMIN improving after fluid resuscitation. Patient did go into A fib with RVR on 3/29 and was started on amio gtt with conversion to PO given IV amio shortage. Patient was also started on low intensity heparin gtt given suspected component of microangiopathic coagulopathy related to JAZMIN. Of note, patient was persistently spiking fevers since admit up until 3/30 with a temp of 103 on 3/30 (still on ceftriaxone azithro). Tube feeds started on 3/30 (watching glucose very closely). Patient was placed in prone position on 3/30 at 1815 given P/F ratio of 122 (due to be supinated at 1015 on 3/31). 1/2 blood cultures from admission 3/28 resulted positive for coag negative staph (contaminant), repeat blood cultures ordered on 3/31-->NGTD. Patient was placed in prone position again on 3/31 at 1800 given P/F of 111 (due to supinate at 4/1 1000). Attempting to wean sedation as of 4/2, given improving oxygenation, and starting daily SAT/SBT. Unfortunately, throughout the day 4/3, patient had noted increasing oxygen requirements and pressor requirements, with concern for aspiration event as there was tube feeds contents suctioned out from ETT. Patient was started on vanc and cefepime and tube feeds held. Family was notified regarding acute decompensation and concern for poor prognosis and was made DNR.  04/05/2020 Pt required midazolam bolus alongside increases in fentanyl and precedex infusions to treat desaturation overnight. He was placed on FiO2 of 100% and intermittently paralyzed with rocuronium.   Patient remains intubated and sedated. Overnight on 4/12, the patient showed signs of twitching/possible seizure activity. Patient's sedation was increased, neuro consulted. Neuro  recommends CT head and EEG afterwards, no seizure meds necessary at the present time. ENT was also consulted for increased dried blood vs possible mucor within nasal cavity. ENT indicates there is low to no suspicion for an invasive fungal infection and if there were, it would be unlikely for ENT to proceed to the OR for debridement given his guarded condition and the fact that an invasive fungal infection in someone who is so critically ill would likely be fatal.        Interval History/Significant Events: Seizure-like activity noted overnight. Neuro consulted. Plan for CT head and then EEG. Patient bleeding from nares post-ENT consult. Heparin gtt stopped. Continue to monitor.    Review of Systems   Unable to perform ROS: Intubated     Objective:     Vital Signs (Most Recent):  Temp: 97.8 °F (36.6 °C) (04/13/20 0701)  Pulse: 87 (04/13/20 0900)  Resp: (!) 26 (04/13/20 0900)  BP: 117/69 (04/13/20 0900)  SpO2: 99 % (04/13/20 0900) Vital Signs (24h Range):  Temp:  [97.7 °F (36.5 °C)-99.2 °F (37.3 °C)] 97.8 °F (36.6 °C)  Pulse:  [] 87  Resp:  [20-40] 26  SpO2:  [94 %-99 %] 99 %  BP: ()/(51-69) 117/69  Arterial Line BP: ()/(41-67) 108/57   Weight: 111.8 kg (246 lb 7.6 oz)  Body mass index is 38.6 kg/m².      Intake/Output Summary (Last 24 hours) at 4/13/2020 1218  Last data filed at 4/13/2020 1200  Gross per 24 hour   Intake 3712.03 ml   Output 5225 ml   Net -1512.97 ml       Physical Exam   Constitutional: He appears well-developed. He is sedated and intubated.   HENT:   Head: Normocephalic.   Eyes: Pupils are equal, round, and reactive to light.   Cardiovascular: Normal rate, regular rhythm and intact distal pulses.   Pulmonary/Chest: He is intubated.   Neurological: He is unresponsive. GCS eye subscore is 1. GCS verbal subscore is 1. GCS motor subscore is 1.   Skin: Skin is warm and dry.   Nursing note and vitals reviewed.      Vents:  Vent Mode: A/C (04/13/20 0708)  Ventilator Initiated: Yes  (04/09/20 0149)  Set Rate: 26 BPM (04/13/20 0708)  Vt Set: 500 mL (04/13/20 0708)  Pressure Support: 12 cmH20 (04/08/20 1527)  PEEP/CPAP: 10 cmH20 (04/13/20 0708)  Oxygen Concentration (%): 70 (04/13/20 0900)  Peak Airway Pressure: 55 cmH2O (04/13/20 0708)  Plateau Pressure: 29 cmH20 (04/13/20 0708)  Total Ve: 13.2 mL (04/13/20 0708)  F/VT Ratio<105 (RSBI): (!) 55.23 (04/13/20 0708)  Lines/Drains/Airways     Central Venous Catheter Line            Percutaneous Central Line Insertion/Assessment - Triple Lumen  04/07/20 1106 6 days          Drain                 Rectal Tube 03/29/20 2300 rectal tube w/ balloon (indicate number of mLs) 14 days         NG/OG Tube 04/09/20 0150 orogastric Center mouth 4 days         Urethral Catheter 04/09/20 1345 Straight-tip 18 Fr. 3 days          Airway                 Airway - Non-Surgical 04/09/20 0142 Endotracheal Tube 4 days          Arterial Line                 Arterial Line 03/28/20 1819 Right Radial 15 days              Significant Labs:    CBC/Anemia Profile:  Recent Labs   Lab 04/12/20 0315 04/13/20  0232 04/13/20  1148   WBC 23.24* 13.27* 13.63*   HGB 8.3* 7.9* 7.7*   HCT 27.9* 27.6* 26.3*   * 265 286   * 103* 103*   RDW 14.5 14.8* 14.7*   FERRITIN 1,977* 2,230*  --         Chemistries:  Recent Labs   Lab 04/12/20 0315 04/13/20  0232    145   K 4.8 5.2*    111*   CO2 22* 18*   * 117*   CREATININE 4.1* 4.0*   CALCIUM 8.2* 8.3*   ALBUMIN 1.7* 1.8*   PROT 6.4 6.4   BILITOT 0.7 0.8   ALKPHOS 108 103   ALT 47* 56*   AST 37 66*   MG 2.4 2.3   PHOS 9.5* 8.9*       All pertinent labs within the past 24 hours have been reviewed.    Significant Imaging:  I have reviewed all pertinent imaging results/findings within the past 24 hours.      ABG  Recent Labs   Lab 04/09/20  1604   PH 7.347*   PO2 86   PCO2 43.2   HCO3 23.7*   BE -2     Assessment/Plan:     Neuro  Acute metabolic encephalopathy  Secondary to sedation. Will continue to wean  appropriately.   -- remifentanil, propofol, and precedex  -- seizure-like activity/twitching/possible myoclonus in LEs noted overnight (4/12); neuro consulted--recommending CT head and then EEG. Plan for patient to travel to [place] when nasal bleeding subsides.    ENT  Epistaxis  Likely 2/2 NGT insertion trauma  --Heparin gtt d/c'ed  --ENT consulted; no c/f mucor; continue to monitor for bleeding  -- f/up with ID regarding d/c'ing micafungin with low concern for mucor    Pulmonary  Acute respiratory failure with hypoxia  See acute resp distress syndrome due to 2019 novel coronavirus      Cardiac/Vascular  Hypertriglyceridemia  likely 2/2 propofol; hx of HLD  - monitor TG daily    A-fib  - New onset narrow complex tachyarrhythmia overnight 3/28-2/39. Max rate 150. No obvious p waves. Given mult pushes IV metoprolol with modest improvement in rate.   - Given 2g Mg 3/29  - started on amiodarone 3/29. Converted to amio po given IV amio shortage  - Pressor requirements increased since 3/29 night, now decreasing. Continue to monitor    NSTEMI (non-ST elevated myocardial infarction)  - mild troponin elevation, now down-trending  --repeat EKG to f/up 4/13    Aortic stenosis  - follows with cardiology outpatient    Mixed hyperlipidemia  - on statin at home, can restart when appropriate  - continue home plavix (follows with Dr Jiménez in cardiology and has a pending angiogram to evaluate for CAD)    Essential hypertension  - monitor BP  - pressors for hypotension, holding home antiHTNs    Renal/  Acute renal failure superimposed on stage 3 chronic kidney disease  - creatinine up to 4 on 3/28, basline about 1.3. Improved after volume resuscitation.   - trend creatinine  - started on empiric low intensity heparin gtt 3/29 for renal dysfunction and suspicion of microangiopathic coagulopathy. Now D/C'ED.  - trialysis line in the case of need for HD given renal failure incidence with COVID  - nephro consulted in setting of severe  hyperkalemia which has improved   - maintaining adequate u/o    Hematology  Anticoagulated  - patient is on heparin gtt. We have received reports that patients with COVID may be more accurately monitored for therapeutic levels with factor Xa levels rather than aPTT  - checked factor Xa 4/2-->appears subtherapeutic    Endocrine  DKA (diabetic ketoacidoses)  - Presented with low bicarb, increased AG, + B OHB.   - resolved with insulin infusion. Required bicarbonate infusion 3/28.  - Continue on insulin infusion, monitor glucose closely, renee with starting tube feeds   - SSI and insulin gtt  - accuchecks q4h    Severe obesity (BMI 35.0-39.9) with comorbidity  - nutrition consult for tube feeds    Type 2 diabetes mellitus with stage 3 chronic kidney disease, without long-term current use of insulin  See DKA    GI  Transaminitis  See NAFLD    Nonalcoholic fatty liver disease  - history of nonalcoholic fatty liver disease  - trend LFTs--now down-trending    Other  * Acute respiratory distress syndrome (ARDS) due to 2019 novel coronavirus  Covid-19 Virus Infection  Presents with SOB, myalgias. Flu negative.   - COVID-19 testing resulted POSITIVE     - Isolation:   - Airborne and Droplet Precautions  - N95 masks must be fit tested, wear eye protection  - 20 second hand hygiene              - Limit visitors per hospital policy              - Consolidating lab draws, nursing care, and interventions      PLAN:  - continue mechanical ventilation (Intubated on 3/28 at [facility])  - low PEEP protocol per ARDSnet  - reduced TV to 6ccs/kg 3/29. Started on 7 on admit for acid clearance given DKA.   - patient placed in prone position on 3/30 and again on 3/31  - strict isolation protocol 2/2 positive COVID  - plaquenil/azithro/solumedrol completed for COVID  - CAP coverage with 5 day course ceftriaxone azithro completed 4/1  - f/u blood cultures from 3/28-->1/2 positive for coag staph, likely contamininant  - f/u sputum cultures  from 4/9 negative  - f/u repeat blood cultures from 4/12 NGTD  - f/u repeat blood and sputum cultures from 4/3 (concern for aspiration event with significant decompensation)--NG  - vanc and cefepime started on 4/3 and complete  concern for septic shock likely 2/2 aspiration  --fungal cultures obtained--NGTD      Shock, unspecified  - Suspect distributive 2/2 sedation with contribution from hypovolemia, high PEEP. Possible component of septic shock from COVID-19 infection.   - Pressor requirements stable on about 0.3 levo and vasopressin on 3/29. Off vaso on 3/30. Watch closely   - Holding steroids in setting of uncontrolled hyperglycemia. a    Acute respiratory distress syndrome (ARDS) due to COVID-19 virus  See acute resp distress syndrome due to 2019 novel coronavirus      Endotracheally intubated  See acute resp distress syndrome due to 2019 novel coronavirus      COVID-19 virus detected  See acute resp distress syndrome due to 2019 novel coronavirus       Critical Care Daily Checklist:    A: Awake: RASS Goal/Actual Goal: RASS Goal: (S) 2-->agitated  Actual: Mensah Agitation Sedation Scale (RASS): Unarousable   B: Spontaneous Breathing Trial Performed? Spon. Breathing Trial Initiated?: Not initiated (04/10/20 0820)   C: SAT & SBT Coordinated?  done                      D: Delirium: CAM-ICU Overall CAM-ICU: Positive   E: Early Mobility Performed? no   F: Feeding Goal: Goals: meet % EEN/EPN by RD following  Status: Nutrition Goal Status: goal not met   Current Diet Order   Procedures    Diet NPO      AS: Analgesia/Sedation done   T: Thromboembolic Prophylaxis Hep gtt d/c'ed for nasal bleeding; plan to start SQ heparin   H: HOB > 300 Yes   U: Stress Ulcer Prophylaxis (if needed) done   G: Glucose Control q4h ssi/gtt   B: Bowel Function Stool Occurrence: 0   I: Indwelling Catheter (Lines & Motta) Necessity necessary   D: De-escalation of Antimicrobials/Pharmacotherapies done    Plan for the day/ETD Cont  course    Code Status:  Family/Goals of Care: DNR  Cont course     Critical Care Time: 90 minutes  Critical secondary to Patient has a condition that poses threat to life and bodily function: remains intubated on vasopressors.     Critical care was time spent personally by me on the following activities: development of treatment plan with patient or surrogate and bedside caregivers, discussions with consultants, evaluation of patient's response to treatment, examination of patient, ordering and performing treatments and interventions, ordering and review of laboratory studies, ordering and review of radiographic studies, pulse oximetry, re-evaluation of patient's condition. This critical care time did not overlap with that of any other provider or involve time for any procedures.     Brett Clark NP  Critical Care Medicine  [facility]

## 2020-04-13 NOTE — CONSULTS
[facility]  Infectious Disease  Consult Note    Patient Name: Erick Valdovinos  MRN: [id number]  Admission Date: 3/28/2020  Hospital Length of Stay: 15 days  Attending Physician: Devin Mancia MD  Primary Care Provider: Brandon Kinney MD     Isolation Status: Airborne and Contact and Droplet    Patient information was obtained from past medical records and ER records.      Inpatient consult to Infectious Diseases  Consult performed by: Sergei Campo MD  Consult ordered by: Jacque Anderson NP        Assessment/Plan:     Severe sepsis  68 y/o male with a history of HTN, HLD, DM2, CKD3, NAFLD, aortic stenosis, diabetic neuropathy, cervical radiculopathy.  He was initially seen in his PCPs office on march 18 with complaints of fevers, fatigue and body aches.  Testing for SARSCoV2 was positive.  The patient was not having any shortness of breath.  As such a plan to monitor the patient at home was developed with is PCPs office.  His symptoms continued to slowly get worse.  He became weaker.  He was seen via telemedicine virtual visit on march 26 and there were concerns that he may need inpatient evaluation.  The patient was advised to go to the ER if anything got worse.  He ultimately presented to [facility] with respiratory distress.  He was transferred to [facility] for higher level of care.  He has had a prolonged ICU course.  I am re-consulted for worsening leukocytosis and initiation of micafungin.    Continue vancomycin, zosyn and micafungin.  WBC count dropped slightly today.  Will follow up on the cultures.          Thank you for your consult. I will follow-up with patient. Please contact us if you have any additional questions.    Sergei Campo MD  Infectious Disease  [facility]    Subjective:     Principal Problem: Acute respiratory distress syndrome (ARDS) due to 2019 novel coronavirus    HPI: 68 y/o male with a history of HTN, HLD, DM2, CKD3, NAFLD,  aortic stenosis, diabetic neuropathy, cervical radiculopathy.  He was initially seen in his PCPs office on march 18 with complaints of fevers, fatigue and body aches.  Testing for SARSCoV2 was positive.  The patient was not having any shortness of breath.  As such a plan to monitor the patient at home was developed with is PCPs office.  His symptoms continued to slowly get worse.  He became weaker.  He was seen via telemedicine virtual visit on march 26 and there were concerns that he may need inpatient evaluation.  The patient was advised to go to the ER if anything got worse.  He ultimately presented to [facility] with respiratory distress.  He was transferred to [facility] for higher level of care.  He has had a prolonged ICU course.  I am re-consulted for worsening leukocytosis and initiation of micafungin.    Past Medical History:   Diagnosis Date    Agatston coronary artery calcium score greater than 400 1/13/2020    Anticoagulant long-term use     Calculus of gallbladder without cholecystitis without obstruction 9/10/2018    Cervical radiculopathy 9/28/2018    Chronic neck pain 9/28/2018    DDD (degenerative disc disease), cervical 9/28/2018    Diabetes mellitus     Diabetes mellitus, type 2     Esophageal reflux     Essential hypertension 2/11/2013    Fatty liver 1/13/2020    Gastroesophageal reflux disease without esophagitis 2/11/2013    Hyperlipidemia     Hypertension     Mixed hyperlipidemia 3/4/2020    Nephrolithiasis     Osteoarthritis     Type 2 diabetes mellitus with diabetic polyneuropathy, without long-term current use of insulin 2/11/2013    Type 2 diabetes mellitus with stage 3 chronic kidney disease, without long-term current use of insulin 9/27/2018       Past Surgical History:   Procedure Laterality Date    COLONOSCOPY      COLONOSCOPY N/A 3/19/2019    Procedure: COLONOSCOPY Suprep;  Surgeon: Italo Bañuelos MD;  Location: [facility];  Service: Endoscopy;   Laterality: N/A;    EPIDURAL STEROID INJECTION INTO CERVICAL SPINE N/A 10/30/2018    Procedure: Injection-steroid-epidural-cervical - C7 - T1;  Surgeon: Hernan Murguia Jr., MD;  Location: [facility];  Service: Pain Management;  Laterality: N/A;  PT IS DIABETIC    EPIDURAL STEROID INJECTION INTO CERVICAL SPINE N/A 4/11/2019    Procedure: Cervical Epidural Steroid Injection, C7-T1, With Dexamethasone,  and IV Sedation;  Surgeon: Hernan Murguia Jr., MD;  Location: [facility];  Service: Pain Management;  Laterality: N/A;  PT IS A DIABETIC.  Pt states he stopped taking ASA 81mg 13 days ago.      EPIDURAL STEROID INJECTION INTO CERVICAL SPINE N/A 4/30/2019    Procedure: Injection-steroid-epidural-cervical--C7-T1 With Dexamethasone, and IV Sedation;  Surgeon: Hernan Murguia Jr., MD;  Location: [facility];  Service: Pain Management;  Laterality: N/A;    ESOPHAGOGASTRODUODENOSCOPY N/A 3/19/2019    Procedure: ESOPHAGOGASTRODUODENOSCOPY (EGD);  Surgeon: Italo Bañuelos MD;  Location: [facility];  Service: Endoscopy;  Laterality: N/A;    EYE SURGERY      hemrrhoidectomy      TONSILLECTOMY      WRIST FRACTURE SURGERY      WRIST SURGERY         Review of patient's allergies indicates:   Allergen Reactions    Codeine Nausea Only    Nsaids (non-steroidal anti-inflammatory drug) Other (See Comments)     Kidney issues       Medications:  Medications Prior to Admission   Medication Sig    blood sugar diagnostic Strp 1 strip by Misc.(Non-Drug; Combo Route) route once daily. Test strips for meter covered by insurance.    blood-glucose meter Misc Meter covered by insurance, Use as directed    butabarbital (BUTISOL) 30 mg Tab Take 30 mg by mouth daily as needed.    clopidogrel (PLAVIX) 75 mg tablet Take 1 tablet (75 mg total) by mouth once daily.    coenzyme Q10 200 mg capsule Take by mouth once daily.     ezetimibe (ZETIA) 10 mg tablet Take 1 tablet (10 mg total) by mouth every evening.    fenofibrate  "(TRICOR) 145 MG tablet Take 1 tablet (145 mg total) by mouth every evening.    gluc jacobo/chondro jacobo A/vit C/Mn (GLUCOSAMINE 1500 COMPLEX ORAL) Take by mouth once daily.    HYDROcodone-acetaminophen (NORCO) 5-325 mg per tablet Take 1 tablet by mouth every 6 (six) hours as needed for Pain.    lancets Misc To check BG once daily, to use with insurance preferred meter    lancets Misc 1 Device by Misc.(Non-Drug; Combo Route) route once daily.    multivitamin (MULTIVITAMIN) per tablet Take 1 tablet by mouth once daily.    nitroGLYCERIN (NITROSTAT) 0.4 MG SL tablet Place 1 tablet (0.4 mg total) under the tongue every 5 (five) minutes as needed for Chest pain (Go to ER following 3rd tab if needed).    omeprazole (PRILOSEC) 20 MG capsule Take 1 capsule (20 mg total) by mouth once daily.    pen needle, diabetic (RELION PEN NEEDLES) 32 gauge x 5/32" Ndle Needs levemir flexpen needles. To use once daily    rosuvastatin (CRESTOR) 40 MG Tab Take 1 tablet (40 mg total) by mouth every evening.    sildenafil (REVATIO) 20 mg Tab Take 1 tablet (20 mg total) by mouth daily as needed.    SITagliptan-metformin (JANUMET)  mg per tablet Take 1 tablet by mouth 2 (two) times daily with meals.    triamcinolone acetonide 0.1% (KENALOG) 0.1 % cream Apply topically daily as needed.    valsartan (DIOVAN) 320 MG tablet Take 1 tablet (320 mg total) by mouth once daily.     Antibiotics (From admission, onward)    Start     Stop Route Frequency Ordered    04/09/20 0215  piperacillin-tazobactam 4.5 g in sodium chloride 0.9% 100 mL IVPB (ready to mix system)      -- IV Every 8 hours (non-standard times) 04/09/20 0113 04/09/20 0212  vancomycin - pharmacy to dose  (vancomycin IVPB)      -- IV pharmacy to manage frequency 04/09/20 0113        Antifungals (From admission, onward)    Start     Stop Route Frequency Ordered    04/12/20 0745  micafungin 100 mg in sodium chloride 0.9 % 100 mL IVPB (ready to mix system)      -- IV Every 24 " hours (non-standard times) 04/12/20 0635        Antivirals (From admission, onward)    None           Immunization History   Administered Date(s) Administered    Influenza - High Dose - PF (65 years and older) 09/08/2016, 10/26/2018    Influenza A (H1N1) 2009 Monovalent - IM 12/28/2009    Influenza Split 10/14/2015    Pneumococcal Conjugate - 13 Valent 05/03/2019    Pneumococcal Polysaccharide - 23 Valent 11/02/2015    Td - PF (ADULT) 07/29/2019    Zoster 10/14/2015       Family History     Problem Relation (Age of Onset)    Coronary artery disease Father, Brother    Heart attack Father    Heart disease Father    Hyperlipidemia Father, Brother, Brother    Hypertension Mother    Kidney disease Mother    Kidney failure Mother        Social History     Socioeconomic History    Marital status:      Spouse name: Not on file    Number of children: Not on file    Years of education: Not on file    Highest education level: Not on file   Occupational History    Not on file   Social Needs    Financial resource strain: Not on file    Food insecurity:     Worry: Not on file     Inability: Not on file    Transportation needs:     Medical: Not on file     Non-medical: Not on file   Tobacco Use    Smoking status: Never Smoker    Smokeless tobacco: Never Used   Substance and Sexual Activity    Alcohol use: Not Currently    Drug use: No    Sexual activity: Yes     Partners: Female   Lifestyle    Physical activity:     Days per week: Not on file     Minutes per session: Not on file    Stress: Not on file   Relationships    Social connections:     Talks on phone: Not on file     Gets together: Not on file     Attends Yazidi service: Not on file     Active member of club or organization: Not on file     Attends meetings of clubs or organizations: Not on file     Relationship status: Not on file   Other Topics Concern    Not on file   Social History Narrative    Not on file     Review of Systems    Unable to perform ROS: Intubated     Objective:     Vital Signs (Most Recent):  Temp: 99 °F (37.2 °C) (04/12/20 2000)  Pulse: (!) 123 (04/12/20 2000)  Resp: (!) 27 (04/12/20 2000)  BP: 134/65 (04/12/20 2000)  SpO2: 98 % (04/12/20 2000) Vital Signs (24h Range):  Temp:  [98.2 °F (36.8 °C)-99 °F (37.2 °C)] 99 °F (37.2 °C)  Pulse:  [] 123  Resp:  [20-40] 27  SpO2:  [87 %-99 %] 98 %  BP: (103-144)/(52-65) 134/65  Arterial Line BP: (127-200)/(44-67) 133/45     Weight: 111.8 kg (246 lb 7.6 oz)  Body mass index is 38.6 kg/m².    Estimated Creatinine Clearance: 20.6 mL/min (A) (based on SCr of 4.1 mg/dL (H)).    Physical Exam   HENT:   Dried, crusted blood to lips and notrils.   Eyes: Right eye exhibits no discharge. Left eye exhibits no discharge.   Cardiovascular:   Tachycardic    Pulmonary/Chest:   On the ventilator   Abdominal: Soft. Bowel sounds are normal. He exhibits no distension.   Neurological:   Sedated.   Skin: Skin is warm.   Nursing note and vitals reviewed.      Significant Labs:   Microbiology Results (last 7 days)     Procedure Component Value Units Date/Time    Blood culture [[id number]] Collected:  04/12/20 0448    Order Status:  Completed Specimen:  Blood from Peripheral, Foot, Left Updated:  04/12/20 1145     Blood Culture, Routine No Growth to date    Narrative:       fungal    Blood culture [[id number]] Collected:  04/11/20 2337    Order Status:  Completed Specimen:  Blood from Peripheral, Antecubital, Left Updated:  04/12/20 0715     Blood Culture, Routine No Growth to date    Narrative:       fungal    Blood culture [[id number]] Collected:  04/09/20 0501    Order Status:  Completed Specimen:  Blood from Peripheral, Antecubital, Left Updated:  04/12/20 0612     Blood Culture, Routine No Growth to date      No Growth to date      No Growth to date      No Growth to date    Blood culture [[id number]] Collected:  04/09/20 0450    Order Status:  Completed Specimen:  Blood from Peripheral, Antecubital,  Right Updated:  04/12/20 0612     Blood Culture, Routine No Growth to date      No Growth to date      No Growth to date      No Growth to date    Fungus culture [[id number]] Collected:  04/11/20 2337    Order Status:  Sent Specimen:  Blood from Abdomen Updated:  04/12/20 0317    Culture, Respiratory with Gram Stain [[id number]] Collected:  04/09/20 1107    Order Status:  Completed Specimen:  Respiratory from Endotracheal Aspirate Updated:  04/11/20 0923     Respiratory Culture Normal respiratory ran      No S aureus or Pseudomonas isolated.     Gram Stain (Respiratory) Few WBC's     Gram Stain (Respiratory) Few Gram positive cocci    Blood culture [[id number]] Collected:  04/03/20 1006    Order Status:  Completed Specimen:  Blood Updated:  04/07/20 1612     Blood Culture, Routine No Growth after 4 days.     Narrative:       Collection has been rescheduled by CBE at 04/03/2020 10:22   Collection has been rescheduled by CBE at 04/03/2020 10:22     Blood culture [[id number]] Collected:  04/03/20 1006    Order Status:  Completed Specimen:  Blood Updated:  04/07/20 1612     Blood Culture, Routine No Growth after 4 days.     Narrative:       Collection has been rescheduled by CBE at 04/03/2020 10:22   Collection has been rescheduled by CBE at 04/03/2020 10:22     Culture, Respiratory with Gram Stain [[id number]] Collected:  04/03/20 1350    Order Status:  Completed Specimen:  Respiratory from Tracheal Aspirate Updated:  04/06/20 1030     Respiratory Culture Normal respiratory ran     Gram Stain (Respiratory) <10 epithelial cells per low power field.     Gram Stain (Respiratory) Moderate WBC's     Gram Stain (Respiratory) Many Gram positive cocci     Gram Stain (Respiratory) Moderate Gram negative rods          Significant Imaging: I have reviewed all pertinent imaging results/findings within the past 24 hours.

## 2020-04-13 NOTE — PLAN OF CARE
Patient not medically stable for stepdown at this time. Patient continues to require [facility] service for:     ETT  Pressors  Paralytics  Insulin drip    Palliative following       04/13/20 9602   Discharge Reassessment   Assessment Type Discharge Planning Reassessment   Do you have any problems affording any of your prescribed medications? No   Discharge Plan A Skilled Nursing Facility   Discharge Plan B Home Health   DME Needed Upon Discharge  other (see comments)  (TBD)   Anticipated Discharge Disposition SNF   Can the patient/caregiver answer the patient profile reliably? No, cognitively impaired  (intubated sedated)       Sha Rueda RN MSN  Critical Care-   Ext. [phone]

## 2020-04-13 NOTE — ASSESSMENT & PLAN NOTE
- Presented with low bicarb, increased AG, + B OHB.   - resolved with insulin infusion. Required bicarbonate infusion 3/28.  - Continue on insulin infusion, monitor glucose closely, renee with starting tube feeds   - SSI and insulin gtt  - accuchecks q4h

## 2020-04-13 NOTE — CONSULTS
[facility]  Otorhinolaryngology-Head & Neck Surgery  Consult Note    Patient Name: Erick Valdovinos  MRN: [id number]  Code Status: DNR  Admission Date: 3/28/2020  Hospital Length of Stay: 16 days  Attending Physician: Jackie Restrepo MD  Primary Care Provider: Brandon Kinney MD    Patient information was obtained from past medical records.     Inpatient consult to ENT  Consult performed by: Isiah Gamboa MD  Consult ordered by: Liz Phipps MD        Subjective:     Chief Complaint/Reason for Admission: Facial lesions/possible nasal packing    History of Present Illness: 68 [place] [facility] [place].  He was intubated on 3/28, extubated on 4/8 and reintubated on 4/9.  In discussing his case with the ICU team, there was an apparent history of nasal packing several weeks ago.  Per their report, this packing was removed yesterday. I am not able to find documentation of packing placement or removal.  He underwent NGT placement on 4/11 which resulted in epistaxis.  He was noted to have dark crusts on his midface and lips.  ENT is consulted to rule out an invasive fungal infection of the soft tissues of the face.     Medications:  Continuous Infusions:   dexmedetomidine (PRECEDEX) infusion 1.399 mcg/kg/hr (04/13/20 1100)    heparin (porcine) in D5W 15.986 Units/kg/hr (04/13/20 1100)    insulin (HUMAN R) infusion (adults) 3 Units/hr (04/13/20 1130)    norepinephrine bitartrate-D5W 0.05 mcg/kg/min (04/13/20 1100)    propofoL 50 mcg/kg/min (04/13/20 1134)    remifentanil (ULTIVA) infusion (TITRATING) 0.2 mcg/kg/min (04/13/20 1149)    vasopressin (PITRESSIN) infusion 0.04 Units/min (04/13/20 1100)     Scheduled Meds:   albuterol-ipratropium  3 mL Nebulization Q6H    amiodarone  200 mg Per OG tube Daily    chlorhexidine  15 mL Mouth/Throat BID    clopidogreL  75 mg Per OG tube Daily    famotidine (PF)  20 mg Intravenous Daily    furosemide (LASIX) IV  40 mg Intravenous Q12H     micafungin (MYCAMINE) IVPB  100 mg Intravenous Q24H    oxyCODONE  5 mg Per NG tube QID    piperacillin-tazobactam (ZOSYN) IVPB  4.5 g Intravenous Q8H    polyethylene glycol  17 g Per NG tube BID    QUEtiapine  100 mg Per OG tube QHS    vancomycin (VANCOCIN) IVPB  750 mg Intravenous Once     PRN Meds:acetaminophen, albuterol, Dextrose 10% Bolus, Dextrose 10% Bolus, Dextrose 10% Bolus, glucagon (human recombinant), heparin (PORCINE), heparin (PORCINE), HYDROmorphone, insulin aspart U-100, ipratropium-albuteroL, labetalol, lorazepam, morphine, ondansetron, sodium chloride 0.9%, Pharmacy to dose Vancomycin consult **AND** vancomycin - pharmacy to dose     No current facility-administered medications on file prior to encounter.      Current Outpatient Medications on File Prior to Encounter   Medication Sig    blood sugar diagnostic Strp 1 strip by Misc.(Non-Drug; Combo Route) route once daily. Test strips for meter covered by insurance.    blood-glucose meter Misc Meter covered by insurance, Use as directed    butabarbital (BUTISOL) 30 mg Tab Take 30 mg by mouth daily as needed.    clopidogrel (PLAVIX) 75 mg tablet Take 1 tablet (75 mg total) by mouth once daily.    coenzyme Q10 200 mg capsule Take by mouth once daily.     ezetimibe (ZETIA) 10 mg tablet Take 1 tablet (10 mg total) by mouth every evening.    fenofibrate (TRICOR) 145 MG tablet Take 1 tablet (145 mg total) by mouth every evening.    gluc jacobo/chondro jacobo A/vit C/Mn (GLUCOSAMINE 1500 COMPLEX ORAL) Take by mouth once daily.    HYDROcodone-acetaminophen (NORCO) 5-325 mg per tablet Take 1 tablet by mouth every 6 (six) hours as needed for Pain.    lancets [facility] To check BG once daily, to use with insurance preferred meter    lancets Misc 1 Device by Misc.(Non-Drug; Combo Route) route once daily.    multivitamin (MULTIVITAMIN) per tablet Take 1 tablet by mouth once daily.    nitroGLYCERIN (NITROSTAT) 0.4 MG SL tablet Place 1 tablet (0.4 mg  "total) under the tongue every 5 (five) minutes as needed for Chest pain (Go to ER following 3rd tab if needed).    omeprazole (PRILOSEC) 20 MG capsule Take 1 capsule (20 mg total) by mouth once daily.    pen needle, diabetic (RELION PEN NEEDLES) 32 gauge x 5/32" Ndle Needs levemir flexpen needles. To use once daily    rosuvastatin (CRESTOR) 40 MG Tab Take 1 tablet (40 mg total) by mouth every evening.    sildenafil (REVATIO) 20 mg Tab Take 1 tablet (20 mg total) by mouth daily as needed.    SITagliptan-metformin (JANUMET)  mg per tablet Take 1 tablet by mouth 2 (two) times daily with meals.    triamcinolone acetonide 0.1% (KENALOG) 0.1 % cream Apply topically daily as needed.    valsartan (DIOVAN) 320 MG tablet Take 1 tablet (320 mg total) by mouth once daily.       Review of patient's allergies indicates:   Allergen Reactions    Codeine Nausea Only    Nsaids (non-steroidal anti-inflammatory drug) Other (See Comments)     Kidney issues       Past Medical History:   Diagnosis Date    Agatston coronary artery calcium score greater than 400 1/13/2020    Anticoagulant long-term use     Calculus of gallbladder without cholecystitis without obstruction 9/10/2018    Cervical radiculopathy 9/28/2018    Chronic neck pain 9/28/2018    DDD (degenerative disc disease), cervical 9/28/2018    Diabetes mellitus     Diabetes mellitus, type 2     Esophageal reflux     Essential hypertension 2/11/2013    Fatty liver 1/13/2020    Gastroesophageal reflux disease without esophagitis 2/11/2013    Hyperlipidemia     Hypertension     Mixed hyperlipidemia 3/4/2020    Nephrolithiasis     Osteoarthritis     Type 2 diabetes mellitus with diabetic polyneuropathy, without long-term current use of insulin 2/11/2013    Type 2 diabetes mellitus with stage 3 chronic kidney disease, without long-term current use of insulin 9/27/2018     Past Surgical History:   Procedure Laterality Date    COLONOSCOPY      " COLONOSCOPY N/A 3/19/2019    Procedure: COLONOSCOPY Suprep;  Surgeon: Italo Bañuelos MD;  Location: [facility] ENDO;  Service: Endoscopy;  Laterality: N/A;    EPIDURAL STEROID INJECTION INTO CERVICAL SPINE N/A 10/30/2018    Procedure: Injection-steroid-epidural-cervical - C7 - T1;  Surgeon: Hernan Murguia Jr., MD;  Location: [facility] PAIN MGT;  Service: Pain Management;  Laterality: N/A;  PT IS DIABETIC    EPIDURAL STEROID INJECTION INTO CERVICAL SPINE N/A 4/11/2019    Procedure: Cervical Epidural Steroid Injection, C7-T1, With Dexamethasone,  and IV Sedation;  Surgeon: Hernan Murguia Jr., MD;  Location: [facility] PAIN T;  Service: Pain Management;  Laterality: N/A;  PT IS A DIABETIC.  Pt states he stopped taking ASA 81mg 13 days ago.      EPIDURAL STEROID INJECTION INTO CERVICAL SPINE N/A 4/30/2019    Procedure: Injection-steroid-epidural-cervical--C7-T1 With Dexamethasone, and IV Sedation;  Surgeon: Hernan Murguia Jr., MD;  [place] [facility];  Service: Pain Management;  Laterality: N/A;    ESOPHAGOGASTRODUODENOSCOPY N/A 3/19/2019    Procedure: ESOPHAGOGASTRODUODENOSCOPY (EGD);  Surgeon: Italo Bañuelos MD;  Location: [facility];  Service: Endoscopy;  Laterality: N/A;    EYE SURGERY      hemrrhoidectomy      TONSILLECTOMY      WRIST FRACTURE SURGERY      WRIST SURGERY       Family History     Problem Relation (Age of Onset)    Coronary artery disease Father, Brother    Heart attack Father    Heart disease Father    Hyperlipidemia Father, Brother, Brother    Hypertension Mother    Kidney disease Mother    Kidney failure Mother        Tobacco Use    Smoking status: Never Smoker    Smokeless tobacco: Never Used   Substance and Sexual Activity    Alcohol use: Not Currently    Drug use: No    Sexual activity: Yes     Partners: Female     Review of Systems   HENT: Positive for nosebleeds.      Objective:     Vital Signs (Most Recent):  Temp: 97.8 °F (36.6 °C) (04/13/20 0701)  Pulse: 87 (04/13/20  0900)  Resp: (!) 26 (04/13/20 0900)  BP: 117/69 (04/13/20 0900)  SpO2: 99 % (04/13/20 0900) Vital Signs (24h Range):  Temp:  [97.7 °F (36.5 °C)-99.2 °F (37.3 °C)] 97.8 °F (36.6 °C)  Pulse:  [] 87  Resp:  [20-40] 26  SpO2:  [94 %-99 %] 99 %  BP: ()/(51-69) 117/69  Arterial Line BP: ()/(41-67) 108/57     Weight: 111.8 kg (246 lb 7.6 oz)  Body mass index is 38.6 kg/m².    Date 04/13/20 0700 - 04/14/20 0659   Shift 2450-1010 9527-4041 6720-0990 24 Hour Total   INTAKE   I.V.(mL/kg) 472.5(4.2)   472.5(4.2)   NG/GT 60   60   IV Piggyback 278.4   278.4   Shift Total(mL/kg) 810.9(7.3)   810.9(7.3)   OUTPUT   Urine(mL/kg/hr) 1500   1500   Shift Total(mL/kg) 1500(13.4)   1500(13.4)   Weight (kg) 111.8 111.8 111.8 111.8       Physical Exam   Constitutional: He appears well-nourished. He is intubated.   HENT:   Head: Normocephalic.   Crusted blood on upper and lower lip- debrided at bedside.  Underlying tissues are healthy.  No evidence of necrosis of the facial soft tissues or anterior nasal cavity though the nasal exam is limited.    Pulmonary/Chest: He is intubated.       Significant Labs:  BMP:   Recent Labs   Lab 04/13/20  0232   *   *   CO2 18*   *   CREATININE 4.0*   CALCIUM 8.3*   MG 2.3     CBC:   Recent Labs   Lab 04/13/20  0232   WBC 13.27*   RBC 2.69*   HGB 7.9*   HCT 27.6*      *   MCH 29.4   MCHC 28.6*       Significant Diagnostics:  None    Assessment/Plan:     Epistaxis  Status post NGT placement with possible episode several weeks ago treated with nasal packing.  No packing noted on exam.  Crusts removed from midface without evidence of underlying soft tissue necrosis.  Nasal exam limited but there is no evidence of necrosis within the anterior nasal cavity.  I do not feel that nasal endoscopy is warranted given his COVID status and the high risk of viral transmission with performance of this procedure and my low clinical suspicion for an invasive fungal  infection.  Also, if there is indeed an invasive fungal infection present, we would be very unlikely to proceed to the OR for debridement given his guarded condition and the fact that an invasive fungal infection in someone who is so critically ill would likely be fatal.      In discussing his case with the ICU team, they indicated that his family would consider withdrawing care if there is evidence of mucor.  I reiterated my impressions and recommendations as above.    In the meantime, he is to be monitored for changes in the appearance of his facial soft tissues.  It is OK for the nursing staff to remove the crust with dilute hydrogen peroxide.      ENT is available for future issues/concerns.      VTE Risk Mitigation (From admission, onward)         Ordered     IP VTE HIGH RISK PATIENT  Once      04/09/20 0123     Apply sequential compression device to lower extremities (if no contraindications). Medical venous thromboembolus prophylaxis is preferred.  Until discontinued      03/30/20 0655                Thank you for your consult. I will sign off. Please contact us if you have any additional questions.    Isiah Gamboa MD  Otorhinolaryngology-Head & Neck Surgery  [facility]

## 2020-04-13 NOTE — ASSESSMENT & PLAN NOTE
Secondary to sedation. Will continue to wean appropriately.   -- remifentanil, propofol, and precedex  -- seizure-like activity/twitching/possible myoclonus in LEs noted overnight (4/12); neuro consulted--recommending CT head and then EEG. Plan for patient to travel to [place] when nasal bleeding subsides.

## 2020-04-13 NOTE — SUBJECTIVE & OBJECTIVE
Past Medical History:   Diagnosis Date    Agatston coronary artery calcium score greater than 400 1/13/2020    Anticoagulant long-term use     Calculus of gallbladder without cholecystitis without obstruction 9/10/2018    Cervical radiculopathy 9/28/2018    Chronic neck pain 9/28/2018    DDD (degenerative disc disease), cervical 9/28/2018    Diabetes mellitus     Diabetes mellitus, type 2     Esophageal reflux     Essential hypertension 2/11/2013    Fatty liver 1/13/2020    Gastroesophageal reflux disease without esophagitis 2/11/2013    Hyperlipidemia     Hypertension     Mixed hyperlipidemia 3/4/2020    Nephrolithiasis     Osteoarthritis     Type 2 diabetes mellitus with diabetic polyneuropathy, without long-term current use of insulin 2/11/2013    Type 2 diabetes mellitus with stage 3 chronic kidney disease, without long-term current use of insulin 9/27/2018       Past Surgical History:   Procedure Laterality Date    COLONOSCOPY      COLONOSCOPY N/A 3/19/2019    Procedure: COLONOSCOPY Suprep;  Surgeon: Italo Bañuelos MD;  Location: [facility];  Service: Endoscopy;  Laterality: N/A;    EPIDURAL STEROID INJECTION INTO CERVICAL SPINE N/A 10/30/2018    Procedure: Injection-steroid-epidural-cervical - C7 - T1;  Surgeon: Hernan Murguia Jr., MD;  Location: [facility];  Service: Pain Management;  Laterality: N/A;  PT IS DIABETIC    EPIDURAL STEROID INJECTION INTO CERVICAL SPINE N/A 4/11/2019    Procedure: Cervical Epidural Steroid Injection, C7-T1, With Dexamethasone,  and IV Sedation;  Surgeon: Hernan Murguia Jr., MD;  Location: [facility];  Service: Pain Management;  Laterality: N/A;  PT IS A DIABETIC.  Pt states he stopped taking ASA 81mg 13 days ago.      EPIDURAL STEROID INJECTION INTO CERVICAL SPINE N/A 4/30/2019    Procedure: Injection-steroid-epidural-cervical--C7-T1 With Dexamethasone, and IV Sedation;  Surgeon: Hernan Murguia Jr., MD;  Location: [facility];  Service:  Pain Management;  Laterality: N/A;    ESOPHAGOGASTRODUODENOSCOPY N/A 3/19/2019    Procedure: ESOPHAGOGASTRODUODENOSCOPY (EGD);  Surgeon: Italo Bañuelos MD;  Location: [facility];  Service: Endoscopy;  Laterality: N/A;    EYE SURGERY      hemrrhoidectomy      TONSILLECTOMY      WRIST FRACTURE SURGERY      WRIST SURGERY         Review of patient's allergies indicates:   Allergen Reactions    Codeine Nausea Only    Nsaids (non-steroidal anti-inflammatory drug) Other (See Comments)     Kidney issues       Medications:  Medications Prior to Admission   Medication Sig    blood sugar diagnostic Strp 1 strip by Misc.(Non-Drug; Combo Route) route once daily. Test strips for meter covered by insurance.    blood-glucose meter Misc Meter covered by insurance, Use as directed    butabarbital (BUTISOL) 30 mg Tab Take 30 mg by mouth daily as needed.    clopidogrel (PLAVIX) 75 mg tablet Take 1 tablet (75 mg total) by mouth once daily.    coenzyme Q10 200 mg capsule Take by mouth once daily.     ezetimibe (ZETIA) 10 mg tablet Take 1 tablet (10 mg total) by mouth every evening.    fenofibrate (TRICOR) 145 MG tablet Take 1 tablet (145 mg total) by mouth every evening.    gluc jacobo/chondro jacobo A/vit C/Mn (GLUCOSAMINE 1500 COMPLEX ORAL) Take by mouth once daily.    HYDROcodone-acetaminophen (NORCO) 5-325 mg per tablet Take 1 tablet by mouth every 6 (six) hours as needed for Pain.    lancets Misc To check BG once daily, to use with insurance preferred meter    lancets Misc 1 Device by Misc.(Non-Drug; Combo Route) route once daily.    multivitamin (MULTIVITAMIN) per tablet Take 1 tablet by mouth once daily.    nitroGLYCERIN (NITROSTAT) 0.4 MG SL tablet Place 1 tablet (0.4 mg total) under the tongue every 5 (five) minutes as needed for Chest pain (Go to ER following 3rd tab if needed).    omeprazole (PRILOSEC) 20 MG capsule Take 1 capsule (20 mg total) by mouth once daily.    pen needle, diabetic (RELION PEN NEEDLES)  "32 gauge x 5/32" Ndle Needs levemir flexpen needles. To use once daily    rosuvastatin (CRESTOR) 40 MG Tab Take 1 tablet (40 mg total) by mouth every evening.    sildenafil (REVATIO) 20 mg Tab Take 1 tablet (20 mg total) by mouth daily as needed.    SITagliptan-metformin (JANUMET)  mg per tablet Take 1 tablet by mouth 2 (two) times daily with meals.    triamcinolone acetonide 0.1% (KENALOG) 0.1 % cream Apply topically daily as needed.    valsartan (DIOVAN) 320 MG tablet Take 1 tablet (320 mg total) by mouth once daily.     Antibiotics (From admission, onward)    Start     Stop Route Frequency Ordered    04/09/20 0215  piperacillin-tazobactam 4.5 g in sodium chloride 0.9% 100 mL IVPB (ready to mix system)      -- IV Every 8 hours (non-standard times) 04/09/20 0113    04/09/20 0212  vancomycin - pharmacy to dose  (vancomycin IVPB)      -- IV pharmacy to manage frequency 04/09/20 0113        Antifungals (From admission, onward)    Start     Stop Route Frequency Ordered    04/12/20 0745  micafungin 100 mg in sodium chloride 0.9 % 100 mL IVPB (ready to mix system)      -- IV Every 24 hours (non-standard times) 04/12/20 0635        Antivirals (From admission, onward)    None           Immunization History   Administered Date(s) Administered    Influenza - High Dose - PF (65 years and older) 09/08/2016, 10/26/2018    Influenza A (H1N1) 2009 Monovalent - IM 12/28/2009    Influenza Split 10/14/2015    Pneumococcal Conjugate - 13 Valent 05/03/2019    Pneumococcal Polysaccharide - 23 Valent 11/02/2015    Td - PF (ADULT) 07/29/2019    Zoster 10/14/2015       Family History     Problem Relation (Age of Onset)    Coronary artery disease Father, Brother    Heart attack Father    Heart disease Father    Hyperlipidemia Father, Brother, Brother    Hypertension Mother    Kidney disease Mother    Kidney failure Mother        Social History     Socioeconomic History    Marital status:      Spouse name: Not on " file    Number of children: Not on file    Years of education: Not on file    Highest education level: Not on file   Occupational History    Not on file   Social Needs    Financial resource strain: Not on file    Food insecurity:     Worry: Not on file     Inability: Not on file    Transportation needs:     Medical: Not on file     Non-medical: Not on file   Tobacco Use    Smoking status: Never Smoker    Smokeless tobacco: Never Used   Substance and Sexual Activity    Alcohol use: Not Currently    Drug use: No    Sexual activity: Yes     Partners: Female   Lifestyle    Physical activity:     Days per week: Not on file     Minutes per session: Not on file    Stress: Not on file   Relationships    Social connections:     Talks on phone: Not on file     Gets together: Not on file     Attends Anabaptism service: Not on file     Active member of club or organization: Not on file     Attends meetings of clubs or organizations: Not on file     Relationship status: Not on file   Other Topics Concern    Not on file   Social History Narrative    Not on file     Review of Systems   Unable to perform ROS: Intubated     Objective:     Vital Signs (Most Recent):  Temp: 99 °F (37.2 °C) (04/12/20 2000)  Pulse: (!) 123 (04/12/20 2000)  Resp: (!) 27 (04/12/20 2000)  BP: 134/65 (04/12/20 2000)  SpO2: 98 % (04/12/20 2000) Vital Signs (24h Range):  Temp:  [98.2 °F (36.8 °C)-99 °F (37.2 °C)] 99 °F (37.2 °C)  Pulse:  [] 123  Resp:  [20-40] 27  SpO2:  [87 %-99 %] 98 %  BP: (103-144)/(52-65) 134/65  Arterial Line BP: (127-200)/(44-67) 133/45     Weight: 111.8 kg (246 lb 7.6 oz)  Body mass index is 38.6 kg/m².    Estimated Creatinine Clearance: 20.6 mL/min (A) (based on SCr of 4.1 mg/dL (H)).    Physical Exam   HENT:   Dried, crusted blood to lips and notrils.   Eyes: Right eye exhibits no discharge. Left eye exhibits no discharge.   Cardiovascular:   Tachycardic    Pulmonary/Chest:   On the ventilator   Abdominal:  Soft. Bowel sounds are normal. He exhibits no distension.   Neurological:   Sedated.   Skin: Skin is warm.   Nursing note and vitals reviewed.      Significant Labs:   Microbiology Results (last 7 days)     Procedure Component Value Units Date/Time    Blood culture [[id number]] Collected:  04/12/20 0448    Order Status:  Completed Specimen:  Blood from Peripheral, Foot, Left Updated:  04/12/20 1145     Blood Culture, Routine No Growth to date    Narrative:       fungal    Blood culture [[id number]] Collected:  04/11/20 2337    Order Status:  Completed Specimen:  Blood from Peripheral, Antecubital, Left Updated:  04/12/20 0715     Blood Culture, Routine No Growth to date    Narrative:       fungal    Blood culture [[id number]] Collected:  04/09/20 0501    Order Status:  Completed Specimen:  Blood from Peripheral, Antecubital, Left Updated:  04/12/20 0612     Blood Culture, Routine No Growth to date      No Growth to date      No Growth to date      No Growth to date    Blood culture [[id number]] Collected:  04/09/20 0450    Order Status:  Completed Specimen:  Blood from Peripheral, Antecubital, Right Updated:  04/12/20 0612     Blood Culture, Routine No Growth to date      No Growth to date      No Growth to date      No Growth to date    Fungus culture [[id number]] Collected:  04/11/20 2337    Order Status:  Sent Specimen:  Blood from Abdomen Updated:  04/12/20 0317    Culture, Respiratory with Gram Stain [[id number]] Collected:  04/09/20 1107    Order Status:  Completed Specimen:  Respiratory from Endotracheal Aspirate Updated:  04/11/20 0923     Respiratory Culture Normal respiratory ran      No S aureus or Pseudomonas isolated.     Gram Stain (Respiratory) Few WBC's     Gram Stain (Respiratory) Few Gram positive cocci    Blood culture [[id number]] Collected:  04/03/20 1006    Order Status:  Completed Specimen:  Blood Updated:  04/07/20 1612     Blood Culture, Routine No Growth after 4 days.     Narrative:        Collection has been rescheduled by CBE at 04/03/2020 10:22   Collection has been rescheduled by CBE at 04/03/2020 10:22     Blood culture [[id number]] Collected:  04/03/20 1006    Order Status:  Completed Specimen:  Blood Updated:  04/07/20 1612     Blood Culture, Routine No Growth after 4 days.     Narrative:       Collection has been rescheduled by CBE at 04/03/2020 10:22   Collection has been rescheduled by CBE at 04/03/2020 10:22     Culture, Respiratory with Gram Stain [[id number]] Collected:  04/03/20 1350    Order Status:  Completed Specimen:  Respiratory from Tracheal Aspirate Updated:  04/06/20 1030     Respiratory Culture Normal respiratory ran     Gram Stain (Respiratory) <10 epithelial cells per low power field.     Gram Stain (Respiratory) Moderate WBC's     Gram Stain (Respiratory) Many Gram positive cocci     Gram Stain (Respiratory) Moderate Gram negative rods          Significant Imaging: I have reviewed all pertinent imaging results/findings within the past 24 hours.

## 2020-04-13 NOTE — SUBJECTIVE & OBJECTIVE
Interval History/Significant Events: Seizure-like activity noted overnight. Neuro consulted. Plan for CT head and then EEG. Patient bleeding from nares post-ENT consult. Heparin gtt stopped. Continue to monitor.    Review of Systems   Unable to perform ROS: Intubated     Objective:     Vital Signs (Most Recent):  Temp: 97.8 °F (36.6 °C) (04/13/20 0701)  Pulse: 87 (04/13/20 0900)  Resp: (!) 26 (04/13/20 0900)  BP: 117/69 (04/13/20 0900)  SpO2: 99 % (04/13/20 0900) Vital Signs (24h Range):  Temp:  [97.7 °F (36.5 °C)-99.2 °F (37.3 °C)] 97.8 °F (36.6 °C)  Pulse:  [] 87  Resp:  [20-40] 26  SpO2:  [94 %-99 %] 99 %  BP: ()/(51-69) 117/69  Arterial Line BP: ()/(41-67) 108/57   Weight: 111.8 kg (246 lb 7.6 oz)  Body mass index is 38.6 kg/m².      Intake/Output Summary (Last 24 hours) at 4/13/2020 1218  Last data filed at 4/13/2020 1200  Gross per 24 hour   Intake 3712.03 ml   Output 5225 ml   Net -1512.97 ml       Physical Exam   Constitutional: He appears well-developed. He is sedated and intubated.   HENT:   Head: Normocephalic.   Eyes: Pupils are equal, round, and reactive to light.   Cardiovascular: Normal rate, regular rhythm and intact distal pulses.   Pulmonary/Chest: He is intubated.   Neurological: He is unresponsive. GCS eye subscore is 1. GCS verbal subscore is 1. GCS motor subscore is 1.   Skin: Skin is warm and dry.   Nursing note and vitals reviewed.      Vents:  Vent Mode: A/C (04/13/20 0708)  Ventilator Initiated: Yes (04/09/20 0149)  Set Rate: 26 BPM (04/13/20 0708)  Vt Set: 500 mL (04/13/20 0708)  Pressure Support: 12 cmH20 (04/08/20 1527)  PEEP/CPAP: 10 cmH20 (04/13/20 0708)  Oxygen Concentration (%): 70 (04/13/20 0900)  Peak Airway Pressure: 55 cmH2O (04/13/20 0708)  Plateau Pressure: 29 cmH20 (04/13/20 0708)  Total Ve: 13.2 mL (04/13/20 0708)  F/VT Ratio<105 (RSBI): (!) 55.23 (04/13/20 0708)  Lines/Drains/Airways     Central Venous Catheter Line            Percutaneous Central Line  Insertion/Assessment - Triple Lumen  04/07/20 1106 6 days          Drain                 Rectal Tube 03/29/20 2300 rectal tube w/ balloon (indicate number of mLs) 14 days         NG/OG Tube 04/09/20 0150 orogastric Center mouth 4 days         Urethral Catheter 04/09/20 1345 Straight-tip 18 Fr. 3 days          Airway                 Airway - Non-Surgical 04/09/20 0142 Endotracheal Tube 4 days          Arterial Line                 Arterial Line 03/28/20 1819 Right Radial 15 days              Significant Labs:    CBC/Anemia Profile:  Recent Labs   Lab 04/12/20 0315 04/13/20  0232 04/13/20  1148   WBC 23.24* 13.27* 13.63*   HGB 8.3* 7.9* 7.7*   HCT 27.9* 27.6* 26.3*   * 265 286   * 103* 103*   RDW 14.5 14.8* 14.7*   FERRITIN 1,977* 2,230*  --         Chemistries:  Recent Labs   Lab 04/12/20 0315 04/13/20  0232    145   K 4.8 5.2*    111*   CO2 22* 18*   * 117*   CREATININE 4.1* 4.0*   CALCIUM 8.2* 8.3*   ALBUMIN 1.7* 1.8*   PROT 6.4 6.4   BILITOT 0.7 0.8   ALKPHOS 108 103   ALT 47* 56*   AST 37 66*   MG 2.4 2.3   PHOS 9.5* 8.9*       All pertinent labs within the past 24 hours have been reviewed.    Significant Imaging:  I have reviewed all pertinent imaging results/findings within the past 24 hours.

## 2020-04-13 NOTE — ASSESSMENT & PLAN NOTE
- creatinine up to 4 on 3/28, basline about 1.3. Improved after volume resuscitation.   - trend creatinine  - started on empiric low intensity heparin gtt 3/29 for renal dysfunction and suspicion of microangiopathic coagulopathy. Now D/C'ED.  - trialysis line in the case of need for HD given renal failure incidence with COVID  - nephro consulted in setting of severe hyperkalemia which has improved   - maintaining adequate u/o

## 2020-04-14 PROBLEM — Z51.5 PALLIATIVE CARE ENCOUNTER: Status: ACTIVE | Noted: 2019-03-19

## 2020-04-14 NOTE — PLAN OF CARE
GOC discussion w/wife.  Palliative care consulted.  Dx: COVID 19+                                                                                            Intubated                                                                                            Pressors    No discharge recs @ this time. SW will continue to follow and assist w/any discharge needs as determine.      Cherry Melgar LMSW  [facility] -  Dept  X [phone]

## 2020-04-14 NOTE — PROGRESS NOTES
"General Neurology Progress Note    HPI: Erick Valdovinos is a 69 year old male with a medical history significant for HTN, HLD, DM2 who is admitted to [facility] for management of COVID-19 infection. Patient was diagnosed on 3/18 and subsequently decompensated before being admitted to [facility] MICU on 3/28. Patient was intubated and sedated on transfer to [facility]. Hospital course has been complicated by DKA, elevated troponin likely secondary to demand ischemia, JAZMIN thought to be secondary to microangiopathic coagulopathy (now on heparin drip), and reintubation after extubation due to increasing O2 demands thought to be secondary to aspiration PNA. Patient is now s/p treatment with ceftriaxone, azithromycin and Plaquenil.    General Neurology is consulted on 4/13 for evaluation of "seizure like activity". Per discussion with primary team, patient is reported to have had BLE myoclonus and "twitching" for which he was given 2mg Ativan and propofol was increased. Primary team had ordered an MRI W/WO and 24 Hour EEG prior to consult (neither have been completed).    Per chart review, patient remains intubated and sedated on Propofol, Precedex and Remifentanil. Patient is requiring intermittent pressor support with norepi and vasopressin. Patient is also receiving Oxycodone 5mg QID and Seroquel 100mg nightly.     ID following due to concern for ongoing infection. Patient currently receiving Micafungin, Zosyn and Vancomycin for persistent leukocytosis (WBC 13.6, decreased from 27.57 2d ago). Blood and Resp Cx without significant growth since admission. Influenza negative 3/19.    Palliative care consulted per primary team.     Interval History 4/14/2020: No further events noted. Remains intubated. Per primary team, when sedation discontinued patient became hypertensive, tachycardic and tachypnic. No spontaneous eye movement or purposeful movement in any extremity was noted per report. CT Head without acute intracranial abnormality, " "remote L IC lacunar infarct noted. EEG without epileptiform abnormalities, diffuse slowing noted, likely related to toxic/metabolic derangements.  Will discuss results and findings with patients family and primary team. Oxycodone discontinued.      BP (!) 108/56   Pulse (!) 119   Temp 98.5 °F (36.9 °C) (Axillary)   Resp (!) 52   Ht 5' 7" (1.702 m)   Wt 111.8 kg (246 lb 7.6 oz)   SpO2 (!) 94%   BMI 38.60 kg/m²      Vent Mode: A/C  Oxygen Concentration (%):  [40-50] 50  Resp Rate Total:  [26 br/min-36 br/min] 32 br/min  Vt Set:  [500 mL] 500 mL  PEEP/CPAP:  [5 cmH20-8 cmH20] 5 cmH20  Mean Airway Pressure:  [11 izL33-23 cmH20] 15 cmH20    Recent Labs   Lab 04/14/20  0150   WBC 13.07*   RBC 2.40*   HGB 7.2*   HCT 24.4*      *   MCH 30.0   MCHC 29.5*     Recent Labs   Lab 04/14/20  0150   CALCIUM 8.6*   PROT 6.4      K 5.1   CO2 21*      *   CREATININE 3.4*   ALKPHOS 91   ALT 54*   AST 66*   BILITOT 0.9       Due to COVID-19, the Neurology team is limiting interaction with consult patients unless absolutely necessary in order to limit exposure to the virus. As of now, the Neurology team will be chart checking this patient and discussing with staff. If the patient has an acute neurological change and/or you believe the patient needs to be examined by a provider, please page Neurology on call to discuss the patient with a team member.     Recommendations  - Patient with multiple reasons for potential triggered seizure vs toxic/metabolic encephalopathy   - EEG with diffuse slowing suggestive on ongoing toxic/metabolic encephalopathy   - Continued management of toxic/metabolic derangements per primary team  - Defer MRI and avoid gadolinium until improvement in renal function  - No AEDs recommended at this time  - Continue to wean sedation and other sedating medications as tolerated to further assess neurologic function.   - General Neurology will continue to follow, please contact at " [phone] with any questions or care updates        Duy Sparrow MD, MPH  Neurology Resident - PGY2  Department of Neurology  [facility] [place] [address]

## 2020-04-14 NOTE — ASSESSMENT & PLAN NOTE
Secondary to sedation. Will continue to wean appropriately.   -- remifentanil, propofol, and precedex  -- seizure-like activity/twitching/possible myoclonus in LEs noted overnight (4/12); neuro consulted--recommending CT head and then EEG.  --CTH negative, remote infarct present.  --EEG to be applied on 4/14, plan to f/up  --plan for SAT

## 2020-04-14 NOTE — ASSESSMENT & PLAN NOTE
- Suspect distributive 2/2 sedation with contribution from hypovolemia, high PEEP. Possible component of septic shock from COVID-19 infection.   - Pressor requirements stable on low dose Levophed. Off vaso on 4/14.  - Holding steroids in setting of uncontrolled hyperglycemia

## 2020-04-14 NOTE — CHAPLAIN
"Tele-chaplaincy visit with wife, Yenny. We spoke by phone for about 15 minutes and I let her talk, vent and cry, then we prayed over the phone together; very teary. Her son has just "recovered" from CV+ and she' snot been tested. She seems to understand the seriousness of her 's condition, but still hopeful. Strong Zoroastrianism. Lord, in your mercy.  "

## 2020-04-14 NOTE — PROGRESS NOTES
[facility]  Critical Care Medicine  Progress Note    Patient Name: Erick Valdovinos  MRN: [id number]  Admission Date: 3/28/2020  Hospital Length of Stay: 17 days  Code Status: DNR  Attending Provider: Jackie Restrepo MD  Primary Care Provider: Brandon Kinney MD   Principal Problem: Acute respiratory distress syndrome (ARDS) due to 2019 novel coronavirus    Subjective:     HPI:  Patient is a 70 yo male with HTN, HLD, DM2, CKD3, NAFLD, aortic stenosis, diabetic neuropathy, cervical radiculopathy who presented to [facility] on 3/28 for worsening SOB in the setting of positive COVID testing done on 3/19-->resulted positive on 3/23. Patient had had fevers and diarrhea for the week prior, fevers as high as 103. He was also having difficulty seeing. Symptoms non-remitting until 3/28 and then had worsening fatigue and weakness, which led him to present to [facility] ED. In the ED, patient was found to be hypoxemic at 78% on room air and in moderate distress. Patient was placed on NRB mask but continued to have issues with poor effort so electively intubated due to worsening condition. Patient was also given 1L fluid bolus for hypotension (systolic in the 80s). Patient sedated on propofol and fentanyl.    ED evaluation at [place] showed: WBC 6.21 with lymphopenia, bicarb 14, creatinine 2.6 (baseline 1.3), Ferritin 2202, , , , Lactic acid 0.9 and D-Dimer 0.4, BNP 41 and troponin bump to 0.087. Patient was started on ceftriaxone, Azithromycin and Plaquenil.     Patient was transferred to [facility] as [place] was out of ICU beds. Patient admitted to MICU on 3/28 for management of acute hypoxemic respiratory failure complicated by JAZMIN and troponinemia in the setting of COVID19.    Hospital/ICU Course:  Admitted to MICU on 3/28, intubated and mechanically ventilated. On Ceftriaxone, azithro and plaquenil. Started insulin gtt given glucose >400s and checking serum ketones to evaluate for  DKA. Trending troponins 0.087-->0.04, likely demand ischemia 2/2 ARDS. Patient noted to be in DKA with elevated beta hydroxybutyrate to 4.7. Patient fluid resuscitated about 3L from 3/28-3/29 and started on insulin gtt with improvement in glucose control. JAZMIN improving after fluid resuscitation. Patient did go into A fib with RVR on 3/29 and was started on amio gtt with conversion to PO given IV amio shortage. Patient was also started on low intensity heparin gtt given suspected component of microangiopathic coagulopathy related to JAZMIN. Of note, patient was persistently spiking fevers since admit up until 3/30 with a temp of 103 on 3/30 (still on ceftriaxone azithro). Tube feeds started on 3/30 (watching glucose very closely). Patient was placed in prone position on 3/30 at 1815 given P/F ratio of 122 (due to be supinated at 1015 on 3/31). 1/2 blood cultures from admission 3/28 resulted positive for coag negative staph (contaminant), repeat blood cultures ordered on 3/31-->NGTD. Patient was placed in prone position again on 3/31 at 1800 given P/F of 111 (due to supinate at 4/1 1000). Attempting to wean sedation as of 4/2, given improving oxygenation, and starting daily SAT/SBT. Unfortunately, throughout the day 4/3, patient had noted increasing oxygen requirements and pressor requirements, with concern for aspiration event as there was tube feeds contents suctioned out from ETT. Patient was started on vanc and cefepime and tube feeds held. Family was notified regarding acute decompensation and concern for poor prognosis and was made DNR.  04/05/2020 Pt required midazolam bolus alongside increases in fentanyl and precedex infusions to treat desaturation overnight. He was placed on FiO2 of 100% and intermittently paralyzed with rocuronium.   Patient remains intubated and sedated. Overnight on 4/12, the patient showed signs of twitching/possible seizure activity. Patient's sedation was increased, neuro consulted. Neuro  recommends CT head and EEG afterwards, no seizure meds necessary at the present time. ENT was also consulted for increased dried blood vs possible mucor within nasal cavity. ENT indicates there is low to no suspicion for an invasive fungal infection and if there were, it would be unlikely for ENT to proceed to the OR for debridement given his guarded condition and the fact that an invasive fungal infection in someone who is so critically ill would likely be fatal. Head CT negative. EEG to be applied on 4/14.      Interval History/Significant Events: No acute overnight evnets. Minimal vent settings. Plan on EEG and SAT/SBT to identify mental status and possible seizures.    Review of Systems   Unable to perform ROS: Intubated     Objective:     Vital Signs (Most Recent):  Temp: 97.1 °F (36.2 °C) (04/14/20 0301)  Pulse: 65 (04/14/20 0714)  Resp: (!) 26 (04/14/20 0714)  BP: 122/63 (04/14/20 0701)  SpO2: 97 % (04/14/20 0714) Vital Signs (24h Range):  Temp:  [97.1 °F (36.2 °C)-98 °F (36.7 °C)] 97.1 °F (36.2 °C)  Pulse:  [65-87] 65  Resp:  [19-27] 26  SpO2:  [90 %-99 %] 97 %  BP: ()/(56-75) 122/63  Arterial Line BP: ()/(50-68) 116/61   Weight: 111.8 kg (246 lb 7.6 oz)  Body mass index is 38.6 kg/m².      Intake/Output Summary (Last 24 hours) at 4/14/2020 0818  Last data filed at 4/14/2020 0800  Gross per 24 hour   Intake 3040.21 ml   Output 4095 ml   Net -1054.79 ml       Physical Exam   Constitutional: He appears well-developed. He is sedated and intubated.   HENT:   Head: Normocephalic.   Eyes: Pupils are equal, round, and reactive to light.   Cardiovascular: Normal rate, regular rhythm and intact distal pulses.   Pulmonary/Chest: He is intubated.   Neurological: He is unresponsive. GCS eye subscore is 1. GCS verbal subscore is 1. GCS motor subscore is 1.   Skin: Skin is warm and dry.   Nursing note and vitals reviewed.      Vents:  Vent Mode: A/C (04/14/20 0714)  Ventilator Initiated: Yes (04/09/20  0149)  Set Rate: 26 BPM (04/14/20 0714)  Vt Set: 500 mL (04/14/20 0714)  Pressure Support: 12 cmH20 (04/08/20 1527)  PEEP/CPAP: 5 cmH20 (04/14/20 0714)  Oxygen Concentration (%): 40 (04/14/20 0714)  Peak Airway Pressure: 27 cmH2O (04/14/20 0714)  Plateau Pressure: 30 cmH20 (04/14/20 0714)  Total Ve: 13.3 mL (04/14/20 0714)  F/VT Ratio<105 (RSBI): (!) 50.88 (04/14/20 0714)  Lines/Drains/Airways     Central Venous Catheter Line            Percutaneous Central Line Insertion/Assessment - Triple Lumen  04/07/20 1106 6 days          Drain                 Rectal Tube 03/29/20 2300 rectal tube w/ balloon (indicate number of mLs) 15 days         NG/OG Tube 04/09/20 0150 orogastric Center mouth 5 days         Urethral Catheter 04/09/20 1345 Straight-tip 18 Fr. 4 days          Airway                 Airway - Non-Surgical 04/09/20 0142 Endotracheal Tube 5 days          Arterial Line                 Arterial Line 03/28/20 1819 Right Radial 16 days              Significant Labs:    CBC/Anemia Profile:  Recent Labs   Lab 04/13/20  0232 04/13/20  1148 04/14/20  0150   WBC 13.27* 13.63* 13.07*   HGB 7.9* 7.7* 7.2*   HCT 27.6* 26.3* 24.4*    286 233   * 103* 102*   RDW 14.8* 14.7* 14.3   FERRITIN 2,230*  --  2,174*        Chemistries:  Recent Labs   Lab 04/13/20  0232 04/14/20  0150    145   K 5.2* 5.1   * 108   CO2 18* 21*   * 110*   CREATININE 4.0* 3.4*   CALCIUM 8.3* 8.6*   ALBUMIN 1.8* 1.8*   PROT 6.4 6.4   BILITOT 0.8 0.9   ALKPHOS 103 91   ALT 56* 54*   AST 66* 66*   MG 2.3 2.3   PHOS 8.9* 7.7*       All pertinent labs within the past 24 hours have been reviewed.    Significant Imaging:  I have reviewed all pertinent imaging results/findings within the past 24 hours.      ABG  Recent Labs   Lab 04/09/20  1604   PH 7.347*   PO2 86   PCO2 43.2   HCO3 23.7*   BE -2     Assessment/Plan:     Neuro  Acute metabolic encephalopathy  Secondary to sedation. Will continue to wean appropriately.   --  remifentanil, propofol, and precedex  -- seizure-like activity/twitching/possible myoclonus in LEs noted overnight (4/12); neuro consulted--recommending CT head and then EEG.  --CTH negative, remote infarct present.  --EEG to be applied on 4/14, plan to f/up  --plan for SAT    ENT  Epistaxis  Likely 2/2 NGT insertion trauma  --Heparin gtt d/c'ed  --ENT consulted; no c/f mucor; continue to monitor for bleeding  --micafungin d/c'ed per ID recs given no concern for mucor  --xeroform/telfa applied to nasal/facial bleeding    Pulmonary  Acute respiratory failure with hypoxia  See acute resp distress syndrome due to 2019 novel coronavirus      Cardiac/Vascular  Hypertriglyceridemia  likely 2/2 propofol; hx of HLD  - monitor TG daily    A-fib  - New onset narrow complex tachyarrhythmia overnight 3/28-2/39. Max rate 150. No obvious p waves. Given mult pushes IV metoprolol with modest improvement in rate.   - Given 2g Mg 3/29  - started on amiodarone 3/29. Converted to amio po given IV amio shortage  - Pressor requirements increased since 3/29 night, now decreasing. Continue to monitor    NSTEMI (non-ST elevated myocardial infarction)  - mild troponin elevation, now down-trending  --repeat EKG with slight ST elevation    Aortic stenosis  - follows with cardiology outpatient    Mixed hyperlipidemia  - on statin at home, can restart when appropriate  - continue home plavix (follows with Dr Jiménez in cardiology and has a pending angiogram to evaluate for CAD)    Essential hypertension  - monitor BP  - pressors for hypotension, holding home antiHTNs    Renal/  Acute renal failure superimposed on stage 3 chronic kidney disease  - creatinine up to 4 on 3/28, basline about 1.3. Improved after volume resuscitation.   - trend creatinine  - started on empiric low intensity heparin gtt 3/29 for renal dysfunction and suspicion of microangiopathic coagulopathy. Now D/C'ED.  - trialysis line in the case of need for HD given renal failure  incidence with COVID  - nephro consulted in setting of severe hyperkalemia which has improved   - maintaining adequate u/o    Hematology  Anticoagulated  - patient is on heparin gtt. We have received reports that patients with COVID may be more accurately monitored for therapeutic levels with factor Xa levels rather than aPTT  - checked factor Xa 4/2-->appears subtherapeutic    Endocrine  DKA (diabetic ketoacidoses)  - Presented with low bicarb, increased AG, + B OHB.   - resolved with insulin infusion. Required bicarbonate infusion 3/28.  - Continue on insulin infusion, monitor glucose closely, renee with starting tube feeds   - SSI and insulin gtt  - accuchecks q4h    Severe obesity (BMI 35.0-39.9) with comorbidity  --tube feeds resumed    Type 2 diabetes mellitus with stage 3 chronic kidney disease, without long-term current use of insulin  See DKA    GI  Transaminitis  See NAFLD    Nonalcoholic fatty liver disease  - history of nonalcoholic fatty liver disease  - trend LFTs--now down-trending    Other  * Acute respiratory distress syndrome (ARDS) due to 2019 novel coronavirus  Covid-19 Virus Infection  Presents with SOB, myalgias. Flu negative.   - COVID-19 testing resulted POSITIVE     - Isolation:   - Airborne and Droplet Precautions  - N95 masks must be fit tested, wear eye protection  - 20 second hand hygiene              - Limit visitors per hospital policy              - Consolidating lab draws, nursing care, and interventions      PLAN:  - continue mechanical ventilation (Intubated on 3/28 at [facility])  - low PEEP protocol per ARDSnet  - reduced TV to 6ccs/kg 3/29. Started on 7 on admit for acid clearance given DKA.   - patient placed in prone position on 3/30 and again on 3/31  - strict isolation protocol 2/2 positive COVID  - plaquenil/azithro/solumedrol completed for COVID  - CAP coverage with 5 day course ceftriaxone azithro completed 4/1  - f/u blood cultures from 3/28-->1/2 positive for coag  staph, likely contamininant  - f/u sputum cultures from 4/9 negative  - f/u repeat blood cultures from 4/12 NGTD  - f/u repeat blood and sputum cultures from 4/3 (concern for aspiration event with significant decompensation)--NG  - vanc and cefepime started on 4/3 and complete concern for septic shock likely 2/2 aspiration  --fungal cultures obtained--NGTD    Shock, unspecified  - Suspect distributive 2/2 sedation with contribution from hypovolemia, high PEEP. Possible component of septic shock from COVID-19 infection.   - Pressor requirements stable on low dose Levophed. Off vaso on 4/14.  - Holding steroids in setting of uncontrolled hyperglycemia    Acute respiratory distress syndrome (ARDS) due to COVID-19 virus  See acute resp distress syndrome due to 2019 novel coronavirus      Endotracheally intubated  See acute resp distress syndrome due to 2019 novel coronavirus      COVID-19 virus detected  See acute resp distress syndrome due to 2019 novel coronavirus    Palliative care encounter  --Palliative care following  --patient's wife next of kin; ongoing conversation regarding GOC  --patient is currently DNR, family aware that we are exhausting all options to keep patient alive but will do everything we can  --CTH negative, plan for EEG on 4/14 and weaning sedation to assess underlying neuro status       Critical Care Daily Checklist:    A: Awake: RASS Goal/Actual Goal: RASS Goal: 0-->alert and calm  Actual: Mensah Agitation Sedation Scale (RASS): Unarousable   B: Spontaneous Breathing Trial Performed? Spon. Breathing Trial Initiated?: Not initiated (04/10/20 0820)   C: SAT & SBT Coordinated?  done                      D: Delirium: CAM-ICU Overall CAM-ICU: Positive   E: Early Mobility Performed? No   F: Feeding Goal: Goals: meet % EEN/EPN by RD following  Status: Nutrition Goal Status: goal not met   Current Diet Order   Procedures    Diet NPO      AS: Analgesia/Sedation done   T: Thromboembolic  Prophylaxis done   H: HOB > 300 Yes   U: Stress Ulcer Prophylaxis (if needed) done   G: Glucose Control done   B: Bowel Function Stool Occurrence: 0   I: Indwelling Catheter (Lines & Motta) Necessity necessary   D: De-escalation of Antimicrobials/Pharmacotherapies done    Plan for the day/ETD Cont course    Code Status:  Family/Goals of Care: DNR  Cont course     Critical Care Time: 75 minutes  Critical secondary to Patient has a condition that poses threat to life and bodily function: patient remains intubated on vasopressors.     Critical care was time spent personally by me on the following activities: development of treatment plan with patient or surrogate and bedside caregivers, discussions with consultants, evaluation of patient's response to treatment, examination of patient, ordering and performing treatments and interventions, ordering and review of laboratory studies, ordering and review of radiographic studies, pulse oximetry, re-evaluation of patient's condition. This critical care time did not overlap with that of any other provider or involve time for any procedures.     Brett Clark NP  Critical Care Medicine  [facility]

## 2020-04-14 NOTE — ASSESSMENT & PLAN NOTE
--Palliative care following  --patient's wife next of kin; ongoing conversation regarding GOC  --patient is currently DNR, family aware that we are exhausting all options to keep patient alive but will do everything we can  --CTH negative, plan for EEG on 4/14 and weaning sedation to assess underlying neuro status

## 2020-04-14 NOTE — CONSULTS
Pharmacokinetic Assessment Follow Up: IV Vancomycin    Vancomycin Regimen Assessment & Plan:  - Vancomycin level collected with morning labs today resulted as 17.4 ug/mL.  - Patient with JAZMIN, down trending SCr 4 --> 3.4 mg/dL today and still making urine. Will transition from pulse dosing to scheduled regimen.  - Scheduled vancomycin 750 mg IV q24h. End date entered on order to complete therapy after tomorrow's dose for a total of 7 days of therapy. No need for additional levels in light of planned duration.  - Pharmacy will sign off, please re-consult as needed.    Drug levels (last 3 results):  Recent Labs   Lab Result Units 04/12/20  0315 04/13/20  0232 04/14/20  0150   Vancomycin, Random ug/mL 18.2 12.6 17.4     Pharmacy will continue to follow and monitor vancomycin.    Please contact pharmacy at extension [phone] for questions regarding this assessment.    Thank you for the consult,   Gunner Daly     Patient brief summary:  Erick Valdovinos is a 69 y.o. male initiated on antimicrobial therapy with IV Vancomycin for treatment of lower respiratory infection    The patient's current regimen is pulse dosing.    Drug Allergies:   Review of patient's allergies indicates:   Allergen Reactions    Codeine Nausea Only    Nsaids (non-steroidal anti-inflammatory drug) Other (See Comments)     Kidney issues       Actual Body Weight:   111.8 kg    Renal Function:   Estimated Creatinine Clearance: 24.8 mL/min (A) (based on SCr of 3.4 mg/dL (H)).,     Dialysis Method (if applicable):  N/A

## 2020-04-14 NOTE — ASSESSMENT & PLAN NOTE
Likely 2/2 NGT insertion trauma  --Heparin gtt d/c'ed  --ENT consulted; no c/f mucor; continue to monitor for bleeding  --micafungin d/c'ed per ID recs given no concern for mucor  --xeroform/telfa applied to nasal/facial bleeding

## 2020-04-14 NOTE — PHYSICIAN QUERY
PT Name: Erick Valdovinos  MR #: [id number]    Physician Query Form - Perfusion Diagnosis Clarification     CDS/: Andrade Carmona Jr, RN              Contact information:[email or web address]  This form is a permanent document in the medical record.     Query Date: April 14, 2020    By submitting this query, we are merely seeking further clarification of documentation. Please utilize your independent clinical judgment when addressing the question(s) below.    The medical record contains the following:    Indicators   Supporting Clinical Findings   Location in Medical Record   x Acute Illness (e.g. AMI, Sepsis, etc.)  NSTEMI (non-ST elevated myocardial infarction)    Acute respiratory distress syndrome (ARDS) due to 2019 novel coronavirus  Covid-19 Virus Infection   4/13 Critical Care Progress Note    4/14 Critical Care Progress Note    Acidosis documented      ABGs / Labs      Vital Signs     x Hypotension or Low Blood Pressure documented Patient was also given 1L fluid bolus for hypotension (systolic in the 80s). Patient sedated on propofol and fentanyl 3/28 H&P    Altered Mental Status or Confusion      Diaphoresis, Cold Extremities or Cyanosis      Oliguria     x Medication/Treatment:  -Vasopressors  -Inotropic Drugs  -IV Fluids  -Cardiac Assist Devices  -Hemodynamic Monitoring  -Blood/Blood Products      if significant elevation in troponins, will need to obtain echo to evaluate heart function, especially if there is concern for cardiogenic shock     vanc and cefepime started on 4/3 for concern for septic shock from aspiration                                                                     3/28 H&P    4/5 Critical Care Progress Note   x Other: Shock, unspecified  - Suspect distributive 2/2 sedation with contribution from hypovolemia, high PEEP. Possible component of septic shock from COVID-19 infection.   - Pressor requirements stable on low dose Levophed. Off vaso on 4/14.  - Holding steroids in  setting of uncontrolled hyperglycemia 4/14 Critical Care Progress Note     Provider, please specify diagnosis or diagnoses associated with above clinical findings.  Specify the Type of Shock    [ x  ] Septic Shock Due to COVID-19     [   ] Cardiogenic Shock     [   ] Shock Unspecified     [  x ] Other Shock (please specify): Culture negative septic shock     [   ] Clinically Undetermined         Please document in your progress notes daily for the duration of treatment until resolved and include in your discharge summary.

## 2020-04-14 NOTE — SUBJECTIVE & OBJECTIVE
Interval History/Significant Events: No acute overnight evnets. Minimal vent settings. Plan on EEG and SAT/SBT to identify mental status and possible seizures.    Review of Systems   Unable to perform ROS: Intubated     Objective:     Vital Signs (Most Recent):  Temp: 97.1 °F (36.2 °C) (04/14/20 0301)  Pulse: 65 (04/14/20 0714)  Resp: (!) 26 (04/14/20 0714)  BP: 122/63 (04/14/20 0701)  SpO2: 97 % (04/14/20 0714) Vital Signs (24h Range):  Temp:  [97.1 °F (36.2 °C)-98 °F (36.7 °C)] 97.1 °F (36.2 °C)  Pulse:  [65-87] 65  Resp:  [19-27] 26  SpO2:  [90 %-99 %] 97 %  BP: ()/(56-75) 122/63  Arterial Line BP: ()/(50-68) 116/61   Weight: 111.8 kg (246 lb 7.6 oz)  Body mass index is 38.6 kg/m².      Intake/Output Summary (Last 24 hours) at 4/14/2020 0818  Last data filed at 4/14/2020 0800  Gross per 24 hour   Intake 3040.21 ml   Output 4095 ml   Net -1054.79 ml       Physical Exam   Constitutional: He appears well-developed. He is sedated and intubated.   HENT:   Head: Normocephalic.   Eyes: Pupils are equal, round, and reactive to light.   Cardiovascular: Normal rate, regular rhythm and intact distal pulses.   Pulmonary/Chest: He is intubated.   Neurological: He is unresponsive. GCS eye subscore is 1. GCS verbal subscore is 1. GCS motor subscore is 1.   Skin: Skin is warm and dry.   Nursing note and vitals reviewed.      Vents:  Vent Mode: A/C (04/14/20 0714)  Ventilator Initiated: Yes (04/09/20 0149)  Set Rate: 26 BPM (04/14/20 0714)  Vt Set: 500 mL (04/14/20 0714)  Pressure Support: 12 cmH20 (04/08/20 1527)  PEEP/CPAP: 5 cmH20 (04/14/20 0714)  Oxygen Concentration (%): 40 (04/14/20 0714)  Peak Airway Pressure: 27 cmH2O (04/14/20 0714)  Plateau Pressure: 30 cmH20 (04/14/20 0714)  Total Ve: 13.3 mL (04/14/20 0714)  F/VT Ratio<105 (RSBI): (!) 50.88 (04/14/20 0714)  Lines/Drains/Airways     Central Venous Catheter Line            Percutaneous Central Line Insertion/Assessment - Triple Lumen  04/07/20 1106 6 days           Drain                 Rectal Tube 03/29/20 2300 rectal tube w/ balloon (indicate number of mLs) 15 days         NG/OG Tube 04/09/20 0150 orogastric Center mouth 5 days         Urethral Catheter 04/09/20 1345 Straight-tip 18 Fr. 4 days          Airway                 Airway - Non-Surgical 04/09/20 0142 Endotracheal Tube 5 days          Arterial Line                 Arterial Line 03/28/20 1819 Right Radial 16 days              Significant Labs:    CBC/Anemia Profile:  Recent Labs   Lab 04/13/20  0232 04/13/20  1148 04/14/20  0150   WBC 13.27* 13.63* 13.07*   HGB 7.9* 7.7* 7.2*   HCT 27.6* 26.3* 24.4*    286 233   * 103* 102*   RDW 14.8* 14.7* 14.3   FERRITIN 2,230*  --  2,174*        Chemistries:  Recent Labs   Lab 04/13/20  0232 04/14/20  0150    145   K 5.2* 5.1   * 108   CO2 18* 21*   * 110*   CREATININE 4.0* 3.4*   CALCIUM 8.3* 8.6*   ALBUMIN 1.8* 1.8*   PROT 6.4 6.4   BILITOT 0.8 0.9   ALKPHOS 103 91   ALT 56* 54*   AST 66* 66*   MG 2.3 2.3   PHOS 8.9* 7.7*       All pertinent labs within the past 24 hours have been reviewed.    Significant Imaging:  I have reviewed all pertinent imaging results/findings within the past 24 hours.

## 2020-04-15 NOTE — ASSESSMENT & PLAN NOTE
Secondary to sedation. Will continue to wean appropriately.   -- remifentanil, propofol, and precedex  -- seizure-like activity/twitching/possible myoclonus in LEs noted overnight (4/12); neuro consulted--recommending CT head and then EEG.  --CTH negative, remote infarct present.  --EEG (4/14) shows generalized slowing, no epileptiform activity  --plan for SAT/SBT and extubation on 4/15   Abdomen soft, non-tender and non-distended without organomegaly or masses. Normal bowel sounds.

## 2020-04-15 NOTE — PROGRESS NOTES
[facility]  Critical Care Medicine  Progress Note    Patient Name: Erick Valdovinos  MRN: [id number]  Admission Date: 3/28/2020  Hospital Length of Stay: 18 days  Code Status: DNR  Attending Provider: Ken Jimenez MD  Primary Care Provider: Brandon Kinney MD   Principal Problem: Acute respiratory distress syndrome (ARDS) due to 2019 novel coronavirus    Subjective:     HPI:  Patient is a 70 yo male with HTN, HLD, DM2, CKD3, NAFLD, aortic stenosis, diabetic neuropathy, cervical radiculopathy who presented to [facility] on 3/28 for worsening SOB in the setting of positive COVID testing done on 3/19-->resulted positive on 3/23. Patient had had fevers and diarrhea for the week prior, fevers as high as 103. He was also having difficulty seeing. Symptoms non-remitting until 3/28 and then had worsening fatigue and weakness, which led him to present to [facility] ED. In the ED, patient was found to be hypoxemic at 78% on room air and in moderate distress. Patient was placed on NRB mask but continued to have issues with poor effort so electively intubated due to worsening condition. Patient was also given 1L fluid bolus for hypotension (systolic in the 80s). Patient sedated on propofol and fentanyl.    ED evaluation at [place] showed: WBC 6.21 with lymphopenia, bicarb 14, creatinine 2.6 (baseline 1.3), Ferritin 2202, , , , Lactic acid 0.9 and D-Dimer 0.4, BNP 41 and troponin bump to 0.087. Patient was started on ceftriaxone, Azithromycin and Plaquenil.     Patient was transferred to [facility] as [place] was out of ICU beds. Patient admitted to MICU on 3/28 for management of acute hypoxemic respiratory failure complicated by JAZMIN and troponinemia in the setting of COVID19.    Hospital/ICU Course:  Admitted to MICU on 3/28, intubated and mechanically ventilated. On Ceftriaxone, azithro and plaquenil. Started insulin gtt given glucose >400s and checking serum ketones to evaluate for  DKA. Trending troponins 0.087-->0.04, likely demand ischemia 2/2 ARDS. Patient noted to be in DKA with elevated beta hydroxybutyrate to 4.7. Patient fluid resuscitated about 3L from 3/28-3/29 and started on insulin gtt with improvement in glucose control. JAZMIN improving after fluid resuscitation. Patient did go into A fib with RVR on 3/29 and was started on amio gtt with conversion to PO given IV amio shortage. Patient was also started on low intensity heparin gtt given suspected component of microangiopathic coagulopathy related to JAZMIN. Of note, patient was persistently spiking fevers since admit up until 3/30 with a temp of 103 on 3/30 (still on ceftriaxone azithro). Tube feeds started on 3/30 (watching glucose very closely). Patient was placed in prone position on 3/30 at 1815 given P/F ratio of 122 (due to be supinated at 1015 on 3/31). 1/2 blood cultures from admission 3/28 resulted positive for coag negative staph (contaminant), repeat blood cultures ordered on 3/31-->NGTD. Patient was placed in prone position again on 3/31 at 1800 given P/F of 111 (due to supinate at 4/1 1000). Attempting to wean sedation as of 4/2, given improving oxygenation, and starting daily SAT/SBT. Unfortunately, throughout the day 4/3, patient had noted increasing oxygen requirements and pressor requirements, with concern for aspiration event as there was tube feeds contents suctioned out from ETT. Patient was started on vanc and cefepime and tube feeds held. Family was notified regarding acute decompensation and concern for poor prognosis and was made DNR.  04/05/2020 Pt required midazolam bolus alongside increases in fentanyl and precedex infusions to treat desaturation overnight. He was placed on FiO2 of 100% and intermittently paralyzed with rocuronium.   Patient remains intubated and sedated. Overnight on 4/12, the patient showed signs of twitching/possible seizure activity. Patient's sedation was increased, neuro consulted. Neuro  recommends CT head and EEG afterwards, no seizure meds necessary at the present time. ENT was also consulted for increased dried blood vs possible mucor within nasal cavity. ENT indicates there is low to no suspicion for an invasive fungal infection and if there were, it would be unlikely for ENT to proceed to the OR for debridement given his guarded condition and the fact that an invasive fungal infection in someone who is so critically ill would likely be fatal. Head CT negative. EEG to be applied on 4/14, also negative read. Family spoken to daily regarding GOC; aware of poor prognosis. Plan to wean sedation fully and extubate patient on 4/15.      Interval History/Significant Events: No acute overnight events. Currently remains on vasopressors. Plan to wean sedation for SAT/SBT and extubation.    Review of Systems   Unable to perform ROS: Intubated     Objective:     Vital Signs (Most Recent):  Temp: 97.6 °F (36.4 °C) (04/15/20 0700)  Pulse: 103 (04/15/20 1000)  Resp: (!) 47 (04/15/20 1000)  BP: (!) 90/51 (04/15/20 1000)  SpO2: 97 % (04/15/20 1000) Vital Signs (24h Range):  Temp:  [97.6 °F (36.4 °C)-98.5 °F (36.9 °C)] 97.6 °F (36.4 °C)  Pulse:  [] 103  Resp:  [21-52] 47  SpO2:  [91 %-100 %] 97 %  BP: ()/(51-75) 90/51  Arterial Line BP: ()/(52-91) 118/56   Weight: 111.8 kg (246 lb 7.6 oz)  Body mass index is 38.6 kg/m².      Intake/Output Summary (Last 24 hours) at 4/15/2020 1027  Last data filed at 4/15/2020 1000  Gross per 24 hour   Intake 2382.16 ml   Output 4875 ml   Net -2492.84 ml       Physical Exam   Constitutional: He appears well-developed. He is sedated and intubated.   HENT:   Head: Normocephalic.   Eyes: Pupils are equal, round, and reactive to light.   Cardiovascular: Normal rate, regular rhythm and intact distal pulses.   Pulmonary/Chest: He is intubated.   Neurological: He is unresponsive. GCS eye subscore is 1. GCS verbal subscore is 1. GCS motor subscore is 1.   Skin: Skin is  warm and dry.   Nursing note and vitals reviewed.      Vents:  Vent Mode: A/C (04/15/20 1000)  Ventilator Initiated: Yes (04/09/20 0149)  Set Rate: 26 BPM (04/15/20 1000)  Vt Set: 450 mL (04/15/20 1000)  Pressure Support: 12 cmH20 (04/08/20 1527)  PEEP/CPAP: 8 cmH20 (04/15/20 1000)  Oxygen Concentration (%): 50 (04/15/20 1000)  Peak Airway Pressure: 27 cmH2O (04/15/20 0750)  Plateau Pressure: 21 cmH20 (04/15/20 0750)  Total Ve: 13.3 mL (04/15/20 0750)  F/VT Ratio<105 (RSBI): (!) 50.68 (04/15/20 0750)  Lines/Drains/Airways     Central Venous Catheter Line            Percutaneous Central Line Insertion/Assessment - Triple Lumen  04/07/20 1106 7 days          Drain                 Rectal Tube 03/29/20 2300 rectal tube w/ balloon (indicate number of mLs) 16 days         NG/OG Tube 04/09/20 0150 orogastric Center mouth 6 days         Urethral Catheter 04/09/20 1345 Straight-tip 18 Fr. 5 days          Airway                 Airway - Non-Surgical 04/09/20 0142 Endotracheal Tube 6 days          Arterial Line                 Arterial Line 03/28/20 1819 Right Radial 17 days              Significant Labs:    CBC/Anemia Profile:  Recent Labs   Lab 04/13/20  1148 04/14/20  0150 04/15/20  0330   WBC 13.63* 13.07* 15.07*   HGB 7.7* 7.2* 7.7*   HCT 26.3* 24.4* 26.6*    233 250   * 102* 103*   RDW 14.7* 14.3 14.6*   FERRITIN  --  2,174*  --         Chemistries:  Recent Labs   Lab 04/14/20  0150 04/15/20  0330    149*   K 5.1 4.4    109   CO2 21* 24   * 110*   CREATININE 3.4* 2.9*   CALCIUM 8.6* 9.2   ALBUMIN 1.8* 1.9*   PROT 6.4 6.7   BILITOT 0.9 0.9   ALKPHOS 91 98   ALT 54* 60*   AST 66* 91*   MG 2.3 2.1   PHOS 7.7* 6.2*       All pertinent labs within the past 24 hours have been reviewed.    Significant Imaging:  I have reviewed all pertinent imaging results/findings within the past 24 hours.      AB  Recent Labs   Lab 04/09/20  1604   PH 7.347*   PO2 86   PCO2 43.2   HCO3 23.7*   BE -2      Assessment/Plan:     Neuro  Acute metabolic encephalopathy  Secondary to sedation. Will continue to wean appropriately.   -- remifentanil, propofol, and precedex  -- seizure-like activity/twitching/possible myoclonus in LEs noted overnight (4/12); neuro consulted--recommending CT head and then EEG.  --CTH negative, remote infarct present.  --EEG (4/14) shows generalized slowing, no epileptiform activity  --plan for SAT/SBT and extubation on 4/15    ENT  Epistaxis  Likely 2/2 NGT insertion trauma  --Heparin gtt d/c'ed; SQ Heparin initiated  --ENT consulted; no c/f mucor; continue to monitor for bleeding  --micafungin d/c'ed per ID recs given no concern for mucor  --xeroform/telfa applied to nasal/facial bleeding    Pulmonary  Acute respiratory failure with hypoxia  See acute resp distress syndrome due to 2019 novel coronavirus      Cardiac/Vascular  Hypertriglyceridemia  likely 2/2 propofol; hx of HLD  - monitor TG daily    A-fib  - New onset narrow complex tachyarrhythmia overnight 3/28-2/39. Max rate 150. No obvious p waves. Given mult pushes IV metoprolol with modest improvement in rate.   - Given 2g Mg 3/29  - started on amiodarone 3/29. Converted to amio po given IV amio shortage  - Pressor requirements increased since 3/29 night, now decreasing. Continue to monitor    NSTEMI (non-ST elevated myocardial infarction)  - mild troponin elevation, now down-trending  --repeat EKG with slight ST elevation    Aortic stenosis  - follows with cardiology outpatient    Mixed hyperlipidemia  - on statin at home, can restart when appropriate  - continue home plavix (follows with Dr Jiménez in cardiology and has a pending angiogram to evaluate for CAD)    Essential hypertension  - monitor BP  - pressors for hypotension, holding home antiHTNs    Renal/  Acute renal failure superimposed on stage 3 chronic kidney disease  - creatinine up to 4 on 3/28, basline about 1.3. Improved after volume resuscitation.   - trend  creatinine  - started on empiric low intensity heparin gtt 3/29 for renal dysfunction and suspicion of microangiopathic coagulopathy. Now D/C'ED.  - trialysis line in the case of need for HD given renal failure incidence with COVID  - nephro consulted in setting of severe hyperkalemia which has improved   - maintaining adequate u/o    Hematology  Anticoagulated  - patient is on heparin gtt. We have received reports that patients with COVID may be more accurately monitored for therapeutic levels with factor Xa levels rather than aPTT  - checked factor Xa 4/2-->appears subtherapeutic    Endocrine  DKA (diabetic ketoacidoses)  - Presented with low bicarb, increased AG, + B OHB.   - resolved with insulin infusion. Required bicarbonate infusion 3/28.  - Continue on insulin infusion, monitor glucose closely, renee with starting tube feeds   - SSI and insulin gtt  - accuchecks q4h    Severe obesity (BMI 35.0-39.9) with comorbidity  --tube feeds resumed    Type 2 diabetes mellitus with stage 3 chronic kidney disease, without long-term current use of insulin  See DKA    GI  Transaminitis  See NAFLD    Nonalcoholic fatty liver disease  - history of nonalcoholic fatty liver disease  - trend LFTs--now down-trending    Other  * Acute respiratory distress syndrome (ARDS) due to 2019 novel coronavirus  Covid-19 Virus Infection  Presents with SOB, myalgias. Flu negative.   - COVID-19 testing resulted POSITIVE     - Isolation:   - Airborne and Droplet Precautions  - N95 masks must be fit tested, wear eye protection  - 20 second hand hygiene              - Limit visitors per hospital policy              - Consolidating lab draws, nursing care, and interventions      PLAN:  - continue mechanical ventilation (Intubated on 3/28 at [facility])  - low PEEP protocol per ARDSnet  - reduced TV to 6ccs/kg 3/29. Started on 7 on admit for acid clearance given DKA.   - patient placed in prone position on 3/30 and again on 3/31  - strict  isolation protocol 2/2 positive COVID  - plaquenil/azithro/solumedrol completed for COVID  - CAP coverage with 5 day course ceftriaxone azithro completed 4/1  - f/u blood cultures from 3/28-->1/2 positive for coag staph, likely contamininant  - f/u sputum cultures from 4/9 negative  - f/u repeat blood cultures from 4/12 NGTD  - f/u repeat blood and sputum cultures from 4/3 (concern for aspiration event with significant decompensation)--NG  - vanc and cefepime started on 4/3 and complete concern for septic shock likely 2/2 aspiration  --fungal cultures obtained--NGTD    Shock, unspecified  - Suspect distributive 2/2 sedation with contribution from hypovolemia, high PEEP. Possible component of septic shock from COVID-19 infection.   - Pressor requirements stable on low dose Levophed. Off vaso on 4/14.  - Holding steroids in setting of uncontrolled hyperglycemia    Acute respiratory distress syndrome (ARDS) due to COVID-19 virus  See acute resp distress syndrome due to 2019 novel coronavirus      Endotracheally intubated  See acute resp distress syndrome due to 2019 novel coronavirus      COVID-19 virus detected  See acute resp distress syndrome due to 2019 novel coronavirus    Palliative care encounter  --Palliative care following  --patient's wife next of kin; ongoing conversation regarding GOC  --patient is currently DNR, family aware that we are exhausting all options to keep patient alive but will do everything we can  --CTH negative, plan for EEG (4/14) negative  --plan for SAT/SBT and extubation on 4/15.        Critical Care Daily Checklist:    A: Awake: RASS Goal/Actual Goal: RASS Goal: -5-->unarousable  Actual: Mensah Agitation Sedation Scale (RASS): Unarousable   B: Spontaneous Breathing Trial Performed? Spon. Breathing Trial Initiated?: Not initiated (04/10/20 0820)   C: SAT & SBT Coordinated?  done                      D: Delirium: CAM-ICU Overall CAM-ICU: Positive   E: Early Mobility Performed? No   F:  Feeding Goal: Goals: meet % EEN/EPN by RD following  Status: Nutrition Goal Status: goal not met   Current Diet Order   Procedures    Diet NPO      AS: Analgesia/Sedation done   T: Thromboembolic Prophylaxis done   H: HOB > 300 Yes   U: Stress Ulcer Prophylaxis (if needed) done   G: Glucose Control done   B: Bowel Function Stool Occurrence: 0   I: Indwelling Catheter (Lines & Motta) Necessity necessary   D: De-escalation of Antimicrobials/Pharmacotherapies done    Plan for the day/ETD extubation    Code Status:  Family/Goals of Care: DNR  Cont course; palliative following      Critical Care Time: 70 minutes  Critical secondary to Patient has a condition that poses threat to life and bodily function: intubated on vasopressors.     Critical care was time spent personally by me on the following activities: development of treatment plan with patient or surrogate and bedside caregivers, discussions with consultants, evaluation of patient's response to treatment, examination of patient, ordering and performing treatments and interventions, ordering and review of laboratory studies, ordering and review of radiographic studies, pulse oximetry, re-evaluation of patient's condition. This critical care time did not overlap with that of any other provider or involve time for any procedures.     Brett Clark NP  Critical Care Medicine  [facility]

## 2020-04-15 NOTE — ASSESSMENT & PLAN NOTE
Likely 2/2 NGT insertion trauma  --Heparin gtt d/c'ed; SQ Heparin initiated  --ENT consulted; no c/f mucor; continue to monitor for bleeding  --micafungin d/c'ed per ID recs given no concern for mucor  --xeroform/telfa applied to nasal/facial bleeding

## 2020-04-15 NOTE — RESPIRATORY THERAPY
1115: Per MD order pt was extubated w/o parameters. Pt was placed on continuous BiPAP for comfort.

## 2020-04-15 NOTE — SUBJECTIVE & OBJECTIVE
Interval History/Significant Events: No acute overnight events. Currently remains on vasopressors. Plan to wean sedation for SAT/SBT and extubation.    Review of Systems   Unable to perform ROS: Intubated     Objective:     Vital Signs (Most Recent):  Temp: 97.6 °F (36.4 °C) (04/15/20 0700)  Pulse: 103 (04/15/20 1000)  Resp: (!) 47 (04/15/20 1000)  BP: (!) 90/51 (04/15/20 1000)  SpO2: 97 % (04/15/20 1000) Vital Signs (24h Range):  Temp:  [97.6 °F (36.4 °C)-98.5 °F (36.9 °C)] 97.6 °F (36.4 °C)  Pulse:  [] 103  Resp:  [21-52] 47  SpO2:  [91 %-100 %] 97 %  BP: ()/(51-75) 90/51  Arterial Line BP: ()/(52-91) 118/56   Weight: 111.8 kg (246 lb 7.6 oz)  Body mass index is 38.6 kg/m².      Intake/Output Summary (Last 24 hours) at 4/15/2020 1027  Last data filed at 4/15/2020 1000  Gross per 24 hour   Intake 2382.16 ml   Output 4875 ml   Net -2492.84 ml       Physical Exam   Constitutional: He appears well-developed. He is sedated and intubated.   HENT:   Head: Normocephalic.   Eyes: Pupils are equal, round, and reactive to light.   Cardiovascular: Normal rate, regular rhythm and intact distal pulses.   Pulmonary/Chest: He is intubated.   Neurological: He is unresponsive. GCS eye subscore is 1. GCS verbal subscore is 1. GCS motor subscore is 1.   Skin: Skin is warm and dry.   Nursing note and vitals reviewed.      Vents:  Vent Mode: A/C (04/15/20 1000)  Ventilator Initiated: Yes (04/09/20 0149)  Set Rate: 26 BPM (04/15/20 1000)  Vt Set: 450 mL (04/15/20 1000)  Pressure Support: 12 cmH20 (04/08/20 1527)  PEEP/CPAP: 8 cmH20 (04/15/20 1000)  Oxygen Concentration (%): 50 (04/15/20 1000)  Peak Airway Pressure: 27 cmH2O (04/15/20 0750)  Plateau Pressure: 21 cmH20 (04/15/20 0750)  Total Ve: 13.3 mL (04/15/20 0750)  F/VT Ratio<105 (RSBI): (!) 50.68 (04/15/20 0750)  Lines/Drains/Airways     Central Venous Catheter Line            Percutaneous Central Line Insertion/Assessment - Triple Lumen  04/07/20 1106 7 days           Drain                 Rectal Tube 03/29/20 2300 rectal tube w/ balloon (indicate number of mLs) 16 days         NG/OG Tube 04/09/20 0150 orogastric Center mouth 6 days         Urethral Catheter 04/09/20 1345 Straight-tip 18 Fr. 5 days          Airway                 Airway - Non-Surgical 04/09/20 0142 Endotracheal Tube 6 days          Arterial Line                 Arterial Line 03/28/20 1819 Right Radial 17 days              Significant Labs:    CBC/Anemia Profile:  Recent Labs   Lab 04/13/20  1148 04/14/20  0150 04/15/20  0330   WBC 13.63* 13.07* 15.07*   HGB 7.7* 7.2* 7.7*   HCT 26.3* 24.4* 26.6*    233 250   * 102* 103*   RDW 14.7* 14.3 14.6*   FERRITIN  --  2,174*  --         Chemistries:  Recent Labs   Lab 04/14/20  0150 04/15/20  0330    149*   K 5.1 4.4    109   CO2 21* 24   * 110*   CREATININE 3.4* 2.9*   CALCIUM 8.6* 9.2   ALBUMIN 1.8* 1.9*   PROT 6.4 6.7   BILITOT 0.9 0.9   ALKPHOS 91 98   ALT 54* 60*   AST 66* 91*   MG 2.3 2.1   PHOS 7.7* 6.2*       All pertinent labs within the past 24 hours have been reviewed.    Significant Imaging:  I have reviewed all pertinent imaging results/findings within the past 24 hours.

## 2020-04-15 NOTE — PROCEDURES
ICU EEG/VIDEO MONITORING REPORT    DATE OF SERVICE:  04/14/2020  EEG NUMBER: FH -1,2  REQUESTED BY:  Dr. Restrepo  LOCATION OF SERVICE:  [id number]    METHODOLOGY   Electroencephalographic (EEG) recording is with electrodes placed according to the International 10-20 placement system.  Thirty two (32) channels of digital signal are simultaneously recorded from the scalp and may include EKG, EMG, and/or eye monitors.   Recording band pass was 0.1 to 512 hz.  Digital video recording of the patient is simultaneously recorded with the EEG.  The nursing staff report clinical symptoms and may press an event button when the patient has symptoms of clinical interest to the treating physicians.  EEG and video recording is stored and archived in digital format.  The entire recording is visually reviewed and the times identified by computer analysis as being spikes or seizures are reviewed again.  Activation procedures which include photic stimulation, hyperventilation and instructing patients to perform simple task are done in selected patients.   Compresses spectral analysis (CSA) is also performed on the activity recorded from each individual channel.  This is displayed as a power display of frequencies from 0 to 30 Hz over time.   The CSA analysis is done and displayed continuously.  This is reviewed for asymmetries in power between homologous areas of the scalp and for presence of changes in power which canbe seen when seizures occur.  Sections of suspected abnormalities on the CSA is then compared with the original EEG recording.     [employer] software was also utilized in the review of this study.  This software suite analyzes the EEG recording in multiple domains.  Coherence and rhythmicity is computed to identify EEG sections which may contain organized seizures.  Each channel undergoes analysis to detect presence of spike and sharp waves which have special and morphological characteristic of epileptic activity.  The  routine EEG recording is converted from spacial into frequency domain.  This is then displayed comparing homologous areas to identify areas of significant asymmetry.  Algorithm to identify non-cortically generated artifact is used to separate eye movement, EMG and other artifact from the EEG.      Recording Times  Start 04/14/2020 at 9:06 a.m.  Stop on 04/15/2020 at 8:03 a.m.  A total of the 22 hr and 57 min of EEG was recorded.    EEG FINDINGS  Recording was obtained at the patient's bedside in the ICU.  The patient was intubated and on a respirator.  Background initially consisted of a fairly rhythmic 5-7 hertz theta intermixed seen diffusely.  Some slower theta and low with 2 hertz delta was intermixed at times.  The very beginning the background is symmetrical but around 10:00 a.m. the in asymmetry developed with more delta frequencies noted over the left hemisphere.  The this persisted until around 11:45 a.m. the background became lower amplitude with theta frequencies becoming less evident in the delta over the right left hemisphere became slower and somewhat periodic with the 1/2 to 1 hertz delta occurring every 2 sec.  Subsequently mid-range beta spindles were noted over the delta activity.  This pattern continued through the remainder of the monitoring.  No lateralized or focal changes were subsequently seen and no spike or sharp wave activity was seen throughout the recording.  The the last the 19 hr recording there is no cyclic changes.    CYCLIC EEG Changs (eg Wake/Sleep):  Described above  Activation Procedures  Hyperventilation: Not performed  Intermittent photic stimulation: Not performed  Sensory stimulation: Not performed  Cognitive testing:  Not performed  Cardiac Monitor:   Single lead EKG was obtained which showed a normal rhythm a rate of approximately 60 the-65     IMPRESSION:  Abnormal EEG:  Throughout the recording the background is diffusely slow indicative of a generalized encephalopathy.   There is an asymmetry initially showing more delta over the left hemisphere suggesting a lateralized probably structural process on that side.  This changes noted at the 5 hr a monitoring suggested possible medication (?anesthetic) effect.

## 2020-04-15 NOTE — PROGRESS NOTES
"General Neurology Progress Note    HPI: Erick Valdovinos is a 69 year old male with a medical history significant for HTN, HLD, DM2 who is admitted to [facility] for management of COVID-19 infection. Patient was diagnosed on 3/18 and subsequently decompensated before being admitted to [facility] MICU on 3/28. Patient was intubated and sedated on transfer to [facility]. Hospital course has been complicated by DKA, elevated troponin likely secondary to demand ischemia, JAZMIN thought to be secondary to microangiopathic coagulopathy (now on heparin drip), and reintubation after extubation due to increasing O2 demands thought to be secondary to aspiration PNA. Patient is now s/p treatment with ceftriaxone, azithromycin and Plaquenil.    General Neurology is consulted on 4/13 for evaluation of "seizure like activity". Per discussion with primary team, patient is reported to have had BLE myoclonus and "twitching" for which he was given 2mg Ativan and propofol was increased. Primary team had ordered an MRI W/WO and 24 Hour EEG prior to consult (neither have been completed).    Per chart review, patient remains intubated and sedated on Propofol, Precedex and Remifentanil. Patient is requiring intermittent pressor support with norepi and vasopressin. Patient is also receiving Oxycodone 5mg QID and Seroquel 100mg nightly.     ID following due to concern for ongoing infection. Patient currently receiving Micafungin, Zosyn and Vancomycin for persistent leukocytosis (WBC 13.6, decreased from 27.57 2d ago). Blood and Resp Cx without significant growth since admission. Influenza negative 3/19.    Palliative care consulted per primary team.     Interval History 4/14/2020: No further events noted. Remains intubated. Per primary team, when sedation discontinued patient became hypertensive, tachycardic and tachypnic. No spontaneous eye movement or purposeful movement in any extremity was noted per report. CT Head without acute intracranial abnormality, " "remote L IC lacunar infarct noted. EEG without epileptiform abnormalities, diffuse slowing noted, likely related to toxic/metabolic derangements.  Will discuss results and findings with patients family and primary team. Oxycodone discontinued.      Interval History 4/15/2020: ABDIEL. Remains on sedation and pressors. Plan for possible SBT per primary team. When clinically able, can pursue MRI Brain WITHOUT contrast if ongoing cocnern for altered LOC when off of sedation. Neurology will continue to follow peripherally.     BP (!) 90/51 (BP Location: Left arm, Patient Position: Lying)   Pulse 103   Temp 97.6 °F (36.4 °C) (Axillary)   Resp (!) 47   Ht 5' 7" (1.702 m)   Wt 111.8 kg (246 lb 7.6 oz)   SpO2 97%   BMI 38.60 kg/m²      Vent Mode: A/C  Oxygen Concentration (%):  [30-50] 50  Resp Rate Total:  [26 br/min-32 br/min] 30 br/min  Vt Set:  [450 mL-500 mL] 450 mL  PEEP/CPAP:  [5 cmH20-8 cmH20] 8 cmH20  Mean Airway Pressure:  [14 txJ23-39 cmH20] 15 cmH20    Recent Labs   Lab 04/15/20  0330   WBC 15.07*   RBC 2.59*   HGB 7.7*   HCT 26.6*      *   MCH 29.7   MCHC 28.9*     Recent Labs   Lab 04/15/20  0330   CALCIUM 9.2   PROT 6.7   *   K 4.4   CO2 24      *   CREATININE 2.9*   ALKPHOS 98   ALT 60*   AST 91*   BILITOT 0.9       Due to COVID-19, the Neurology team is limiting interaction with consult patients unless absolutely necessary in order to limit exposure to the virus. As of now, the Neurology team will be chart checking this patient and discussing with staff. If the patient has an acute neurological change and/or you believe the patient needs to be examined by a provider, please page Neurology on call to discuss the patient with a team member.     Recommendations  - Patient with multiple reasons for potential triggered seizure vs toxic/metabolic encephalopathy   - EEG with diffuse slowing suggestive on ongoing toxic/metabolic encephalopathy, no seizures   - Continued " management of toxic/metabolic derangements per primary team  - MRI without contrast when able if ongoing altered LOC post sedation  - No AEDs recommended at this time  - Continue to wean sedation and other sedating medications as tolerated to further assess neurologic function.    - General Neurology will follow peripherally, please contact at [phone] with any questions or care updates        Duy Sparrow MD, MPH  Neurology Resident - PGY2  Department of Neurology  [facility] [place] [address]

## 2020-04-15 NOTE — ASSESSMENT & PLAN NOTE
--Palliative care following  --patient's wife next of kin; ongoing conversation regarding GOC  --patient is currently DNR, family aware that we are exhausting all options to keep patient alive but will do everything we can  --CTH negative, plan for EEG (4/14) negative  --plan for SAT/SBT and extubation on 4/15.

## 2020-04-16 NOTE — PLAN OF CARE
Patient  89 on 2020. Preliminary cause of death: Acute Respiratory Distress Syndrome secondary to SARS CoV-2 per MD note.       20 0843   Final Note   Assessment Type Final Discharge Note   Anticipated Discharge Disposition    Hospital Follow Up  Appt(s) scheduled? No   Discharge plans and expectations educations in teach back method with documentation complete? No   Right Care Referral Info   Post Acute Recommendation No Care   Referral Type    Facility Name n/a       Sha Rueda RN MSN  Critical Care-   Ext. [phone]

## 2020-04-16 NOTE — DISCHARGE SUMMARY
Death Note  Critical Care Medicine      Admit Date: 3/28/2020    Date of Death: 2020    Time of Death: 03:27    Attending Physician: Ken Jimenez MD    Principal Diagnoses: Acute respiratory distress syndrome (ARDS) due to 2019 novel coronavirus    Preliminary Cause of Death: Acute respiratory distress syndrome (ARDS) due to 2019 novel coronavirus    Secondary Diagnoses:   Active Hospital Problems    Diagnosis  POA    *Acute respiratory distress syndrome (ARDS) due to 2019 novel coronavirus [U07.1, J80]  Yes    Epistaxis [R04.0]  Unknown    Severe sepsis [A41.9, R65.20]  Unknown    Acute metabolic encephalopathy [G93.41]  No    Anticoagulated [Z79.01]  Not Applicable    Hypertriglyceridemia [E78.1]  No    DKA (diabetic ketoacidoses) [E11.10]  Yes    A-fib [I48.91]  No    Shock, unspecified [R57.9]  Yes    COVID-19 virus detected [U07.1]  Yes    Acute respiratory failure with hypoxia [J96.01]  Yes    Endotracheally intubated [Z97.8]  Yes    Transaminitis [R74.0]  Yes    Acute respiratory distress syndrome (ARDS) due to COVID-19 virus [U07.1, J80]  Yes    Aortic stenosis [I35.0]  Yes    NSTEMI (non-ST elevated myocardial infarction) [I21.4]  Yes    Mixed hyperlipidemia [E78.2]  Yes    Nonalcoholic fatty liver disease [K76.0]  Yes    Severe obesity (BMI 35.0-39.9) with comorbidity [E66.01]  Yes    Acute renal failure superimposed on stage 3 chronic kidney disease [N17.9, N18.3]  Yes    Palliative care encounter [Z51.5]  Not Applicable    Type 2 diabetes mellitus with stage 3 chronic kidney disease, without long-term current use of insulin [E11.22, N18.3]  Yes    Type 2 diabetes mellitus with diabetic polyneuropathy, without long-term current use of insulin [E11.42]  Yes    Essential hypertension [I10]  Yes      Resolved Hospital Problems   No resolved problems to display.        Discharged Condition:     HPI:  Patient is a 70 yo male with HTN, HLD, DM2, CKD3, NAFLD, aortic  stenosis, diabetic neuropathy, cervical radiculopathy who presented to [facility] on 3/28 for worsening SOB in the setting of positive COVID testing done on 3/19-->resulted positive on 3/23. Patient had had fevers and diarrhea for the week prior, fevers as high as 103. He was also having difficulty seeing. Symptoms non-remitting until 3/28 and then had worsening fatigue and weakness, which led him to present to [facility] ED. In the ED, patient was found to be hypoxemic at 78% on room air and in moderate distress. Patient was placed on NRB mask but continued to have issues with poor effort so electively intubated due to worsening condition. Patient was also given 1L fluid bolus for hypotension (systolic in the 80s). Patient sedated on propofol and fentanyl.    ED evaluation at [facility] showed: WBC 6.21 with lymphopenia, bicarb 14, creatinine 2.6 (baseline 1.3), Ferritin 2202, , , , Lactic acid 0.9 and D-Dimer 0.4, BNP 41 and troponin bump to 0.087. Patient was started on ceftriaxone, Azithromycin and Plaquenil.     [place] [facility] [place] [facility] was out of ICU beds. Patient admitted to MICU on 3/28 for management of acute hypoxemic respiratory failure complicated by JAZMIN and troponinemia in the setting of COVID19.    Hospital/ICU Course:  Admitted to MICU on 3/28, intubated and mechanically ventilated. On Ceftriaxone, azithro and plaquenil. Started insulin gtt given glucose >400s and checking serum ketones to evaluate for DKA. Trending troponins 0.087-->0.04, likely demand ischemia 2/2 ARDS. Patient noted to be in DKA with elevated beta hydroxybutyrate to 4.7. Patient fluid resuscitated about 3L from 3/28-3/29 and started on insulin gtt with improvement in glucose control. JAZMIN improving after fluid resuscitation. Patient did go into A fib with RVR on 3/29 and was started on amio gtt with conversion to PO given IV amio shortage. Patient was also started on low intensity heparin gtt  given suspected component of microangiopathic coagulopathy related to JAZMIN. Of note, patient was persistently spiking fevers since admit up until 3/30 with a temp of 103 on 3/30 (still on ceftriaxone azithro). Tube feeds started on 3/30 (watching glucose very closely). Patient was placed in prone position on 3/30 at 1815 given P/F ratio of 122 (due to be supinated at 1015 on 3/31). 1/2 blood cultures from admission 3/28 resulted positive for coag negative staph (contaminant), repeat blood cultures ordered on 3/31-->NGTD. Patient was placed in prone position again on 3/31 at 1800 given P/F of 111 (due to supinate at 4/1 1000). Attempting to wean sedation as of 4/2, given improving oxygenation, and starting daily SAT/SBT. Unfortunately, throughout the day 4/3, patient had noted increasing oxygen requirements and pressor requirements, with concern for aspiration event as there was tube feeds contents suctioned out from ETT. Patient was started on vanc and cefepime and tube feeds held. Family was notified regarding acute decompensation and concern for poor prognosis and was made DNR.  04/05/2020 Pt required midazolam bolus alongside increases in fentanyl and precedex infusions to treat desaturation overnight. He was placed on FiO2 of 100% and intermittently paralyzed with rocuronium.   Patient remains intubated and sedated. Overnight on 4/12, the patient showed signs of twitching/possible seizure activity. Patient's sedation was increased, neuro consulted. Neuro recommends CT head and EEG afterwards, no seizure meds necessary at the present time. ENT was also consulted for increased dried blood vs possible mucor within nasal cavity. ENT indicates there is low to no suspicion for an invasive fungal infection and if there were, it would be unlikely for ENT to proceed to the OR for debridement given his guarded condition and the fact that an invasive fungal infection in someone who is so critically ill would likely be  fatal. Head CT negative. EEG to be applied on 4/14, also negative read. Family spoken to daily regarding GOC; aware of poor prognosis. Plan to wean sedation fully and extubate patient on 4/15.    The patient was extubated on 4/15 to bipap with plans not to reintubate. Unfortunately that evening the patient went into respiratory distress again. Goals of care were discussed with the patient's family and the patient was transitioned to comfort care. Measures to ensure the comfort of the patient including, but not limited to, morphine as needed for pain and air hunger as well as benzodiazepines as needed for agitation. The patient was subsequently declared dead. Family informed of pt's passing and condolences offered.    Samra Espana NP  Critical Care Medicine

## 2020-04-16 NOTE — SIGNIFICANT EVENT
Death Note    I was called to room by nurse, who had noted asystole on the monitor.     On exam, the patient was unresponsive to verbal and tactile stimuli. There were no radial or carotid pulses. Chest rise and breath and heart sounds were absent.    I pronounced the patient dead at 0327 on 4/16/2020. Preliminary cause of death: Acute Respiratory Distress Syndrome secondary to SARS CoV-2    The family has been informed of the death and condolences given. The  has been summoned for further support.     Ivan Ramirez M.D. PGY-3  [facility]  3:30 AM  4/16/2020  [phone]  [email or web address]

## 2020-05-13 LAB — FUNGUS SPEC CULT: NORMAL

## 2022-07-29 NOTE — ASSESSMENT & PLAN NOTE
68 y/o male with a history of HTN, HLD, DM2, CKD3, NAFLD, aortic stenosis diagnosed with SARSCoV2 on march 19, 2020.  Initially managed at home with frequent monitoring.  He however continued to decline and ultimately required admission.  At this time, his poor renal function and need for pressors means that he doesn't meet criteria set by [facility] for use of remdesivir.   There is some risk of super-imposed bacterial infection so I agree with IV ceftriaxone and azithromycin.  ID will contact team if an appropriate clinical trial is approved for [facility].    ID will sign off at this time.   (2) cough or sneeze

## 2023-01-01 NOTE — NURSING
CMICU DAILY GOALS       A: Awake    RASS: Goal - RASS Goal: -5-->unarousable  Actual - RASS ([place] Agitation-Sedation Scale): -4-->deep sedation   Restraint necessity: Clinical Justification: Removing medical devices  B: Breath   SBT: {SBT pass/fail:22795}   C: Coordinate A & B, analgesics/sedatives   Pain: {Managed/ Uncontrolled:22878}    SAT: {SBT pass/fail:88361}  D: Delirium   CAM-ICU: Overall CAM-ICU: Positive  E: Early Mobility   MOVE Screen: {Pass/Fail:14005}   Activity: Activity Management: bedrest maintained per order  FAS: Feeding/Nutrition   Diet order: Diet/Nutrition Received: NPO, tube feeding,   Fluid restriction:    T: Thrombus   DVT prophylaxis: VTE Required Core Measure: Pharmacological prophylaxis initiated/maintained  H: HOB Elevation   Head of Bed (HOB): HOB at 45 degrees  U: Ulcer Prophylaxis   GI: {YES/NO:16111}  G: Glucose control   {Managed/ Uncontrolled:23892} Glycemic Management: blood glucose monitoring  S: Skin   Bundle compliance: {YES/NO:20890}   Bathing/Skin Care: back care, bath, chlorhexidine, bath, complete, dressed/undressed, incontinence care, linen changed Date: {Bath Date:33199}  B: Bowel Function   {bowel:18693}   I: Indwelling Catheters   [place] necessity:      Urethral Catheter 03/28/20 0606 Non-latex 16 Fr.-Reason for Continuing Urinary Catheterization: Critically ill in ICU requiring intensive monitoring   CVC necessity: {YES:75996}   IPAD offered: {Ipad:55077}  D: De-escalation Antibx   {YES:08353}  Plan for the day   ***  Family/Goals of care/Code Status   Code Status: DNR     No acute events throughout day, VS and assessment per flow sheet, patient progressing towards goals as tolerated, plan of care reviewed with Erick Valdovinos and family, all concerns addressed, will continue to monitor.    
Critical care team notified pt's  and per Insulin nomogram rate would be 0.69 U/hr. Per critical care stop Insulin gtt and recheck BG in 2 hours.   
End of shift note:    Pt on venti mask at 10L/min, breathing irregular, gargling and rattling. Suctioned, bloody show with clots. Attempted to clean nares, pt continues to bleed. Coughing, but unable to produce. Increased morphine infusion and admin'd lorazepam pushes per MAR.     MD Dowling discussed comfort measures with family over phone. Declined Facetime call at this time.   
Fentanyl turned off at 0315. Patient had continuous episodes of coughing which was unrelieved by suction. HR elevated (100s-110s), BP elevated (SBP 200s), and SpO2 decreased (89-90%). Notified RANDAL Santillan, who agreed with resuming fentanyl gtt.  
Informed CCS  M.D. at bedside of patient tachypnic with respirations in the upper 30's, an oxygen saturation of 85%, and that patient is febrile.  Orders received and carried out.  
No acute events overnight. VSS.     Neuro: Pt proned/medically paralyzed & sedated, not responsive to stimuli; febrile max temp 102.3    Pulm: Mechanically ventilated. A/C, FiO2 60%, PEEP 10, Rate 20, . Sats > 89% overnight.    CV: NSR, HR 70s. MAP > 65 on levo gtts @ 0.02.    GI/: Motta, UOP ~ 750 overnight. OGT, TF currently paused. Flexi in place. POCT CBG 200s, insulin gtts titrated per nomogram.    Access/GTTS: L IJ trialysis, 2 PIVs, R radial art line. Insulin, versed, fentanyl, precedex, levo, nimbex, heparin, abx. See flowsheet for current rates.    Skin/bath: Partial CHG night bath, gown and sheets changed. Foam dressing in place.  
No acute events overnight. VSS.      Neuro: Pt proned/medically paralyzed & sedated, not responsive to stimuli; BIS 20s, TOF 2/4. Febrile max temp 99.8     Pulm: Mechanically ventilated. A/C, FiO2 50%, PEEP 12, Rate 20, . Sats > 93% overnight.     CV: NSR, HR 70s. MAP > 65 on levo gtts @ 0.03.     GI/: Motta, UOP ~ 500 overnight. OGT, trickle TF @ 10 cc/hr. Flexi in place, output 100. POCT -200s, insulin gtts titrated per nomogram, currently at 2.9.     Access/GTTS: L IJ trialysis, 2 PIVs, R radial art line. Insulin, fentanyl, precedex, levo, nimbex, heparin, abx. See flowsheet for current rates.     Skin/bath: Foam dressing in place. Last bath 3/31, day bath.  
Notified Dr. Wise that pt OGT was coiled in mouth causing pt to aspirate on tube feeds. Pt desat to 85% - suctioned out tube feeds from ETT and pts mouth. Removed OGT and stopped tubefeeds - pt O2 sat now 90-92%. MD verbalizes understanding - will replace OGT in order to give meds.   
Postpyloric tube noted to be coiled in oral area, oral and nasal trauma noted with dark red blood to site. CCS notified and at bedside. Will not replace postlpyloric tube due to trauma.  
Pt and family updated with plan of care. Pt opens eyes spontaneously. Pt intubated. Rate 20, PEEP 8, FiO2 40%, . SaO2 > 88%. No respiratory distress noted during shift. Pt HR . NSR noted on continuous cardiac monitoring. MAP goal > 65. MAP 65-70. Levo gtt 0.01, Propofol gtt 20, Vaso gtt 0.04, Fentanyl gtt 212.5. Rectal tube in place. Stool color brown, peralta in place, urine color yellow and clear. Skin break down noted to sacrum. Facial trauma noted. Insulin gtt noted. Acu checks q 2h. Central lines in place. OG tube in place. Tube feedings held. No major events noted during shift.  
Pt extubated with RTT at bs. Suctioned with bloody/clotted secretions. Placed on Bipap and restrained removed per MD order.   
Witnessed tarsha activity; Dr Tee & CCS team at bedside. 2mg Ativan given x2, Propofol infusion increased.   
Statement Selected

## 2024-03-20 NOTE — TELEPHONE ENCOUNTER
He is scheduled with  cardiology on 5/14.  You want to see him too?   Quality 137: Melanoma: Continuity Of Care - Recall System: Patient information entered into a recall system that includes: target date for the next exam specified AND a process to follow up with patients regarding missed or unscheduled appointments Detail Level: Detailed

## (undated) DEVICE — DRESSING LEUKOPLAST FLEX 1X3IN